# Patient Record
Sex: MALE | Race: WHITE | NOT HISPANIC OR LATINO | Employment: OTHER | URBAN - METROPOLITAN AREA
[De-identification: names, ages, dates, MRNs, and addresses within clinical notes are randomized per-mention and may not be internally consistent; named-entity substitution may affect disease eponyms.]

---

## 2018-07-24 LAB — HCV AB SER-ACNC: NON REACTIVE

## 2020-06-10 DIAGNOSIS — G89.4 CHRONIC PAIN SYNDROME: Primary | ICD-10-CM

## 2020-06-10 RX ORDER — UMECLIDINIUM 62.5 UG/1
1 AEROSOL, POWDER ORAL EVERY EVENING
COMMUNITY
Start: 2020-05-05

## 2020-06-10 RX ORDER — ATORVASTATIN CALCIUM 20 MG/1
TABLET, FILM COATED ORAL
COMMUNITY
Start: 2020-04-18 | End: 2021-04-05 | Stop reason: SDUPTHER

## 2020-06-10 RX ORDER — DOFETILIDE 0.25 MG/1
CAPSULE ORAL
COMMUNITY
Start: 2020-05-19 | End: 2021-07-02 | Stop reason: ALTCHOICE

## 2020-06-10 RX ORDER — METOPROLOL SUCCINATE 50 MG/1
TABLET, EXTENDED RELEASE ORAL
COMMUNITY
Start: 2020-05-29 | End: 2021-03-05 | Stop reason: SDUPTHER

## 2020-06-10 RX ORDER — APIXABAN 5 MG/1
TABLET, FILM COATED ORAL
COMMUNITY
Start: 2020-05-05 | End: 2020-07-15 | Stop reason: SDUPTHER

## 2020-06-10 RX ORDER — AMLODIPINE BESYLATE 10 MG/1
TABLET ORAL
COMMUNITY
Start: 2020-05-19 | End: 2020-10-23

## 2020-06-10 RX ORDER — LEVOTHYROXINE SODIUM 75 MCG
TABLET ORAL
COMMUNITY
Start: 2020-03-24 | End: 2020-06-17

## 2020-06-11 DIAGNOSIS — G89.4 CHRONIC PAIN SYNDROME: ICD-10-CM

## 2020-06-11 RX ORDER — OXYCODONE HYDROCHLORIDE AND ACETAMINOPHEN 5; 325 MG/1; MG/1
1 TABLET ORAL EVERY 4 HOURS PRN
Qty: 120 TABLET | Refills: 0 | Status: SHIPPED | OUTPATIENT
Start: 2020-06-11 | End: 2020-09-18 | Stop reason: SDUPTHER

## 2020-06-11 RX ORDER — OXYCODONE HYDROCHLORIDE AND ACETAMINOPHEN 5; 325 MG/1; MG/1
1 TABLET ORAL EVERY 4 HOURS PRN
Qty: 120 TABLET | Refills: 0 | Status: SHIPPED | OUTPATIENT
Start: 2020-06-11 | End: 2020-06-11 | Stop reason: SDUPTHER

## 2020-06-16 DIAGNOSIS — E03.9 HYPOTHYROIDISM, UNSPECIFIED TYPE: Primary | ICD-10-CM

## 2020-06-17 RX ORDER — LEVOTHYROXINE SODIUM 0.07 MG/1
TABLET ORAL
Qty: 90 TABLET | Refills: 3 | Status: SHIPPED | OUTPATIENT
Start: 2020-06-17 | End: 2021-02-15 | Stop reason: SDUPTHER

## 2020-06-18 ENCOUNTER — OFFICE VISIT (OUTPATIENT)
Dept: FAMILY MEDICINE CLINIC | Facility: CLINIC | Age: 75
End: 2020-06-18
Payer: MEDICARE

## 2020-06-18 VITALS
DIASTOLIC BLOOD PRESSURE: 80 MMHG | OXYGEN SATURATION: 97 % | TEMPERATURE: 97.4 F | HEART RATE: 79 BPM | RESPIRATION RATE: 16 BRPM | BODY MASS INDEX: 35.57 KG/M2 | SYSTOLIC BLOOD PRESSURE: 130 MMHG | WEIGHT: 213.5 LBS | HEIGHT: 65 IN

## 2020-06-18 DIAGNOSIS — E03.9 HYPOTHYROIDISM, UNSPECIFIED TYPE: ICD-10-CM

## 2020-06-18 DIAGNOSIS — E78.5 HYPERLIPIDEMIA, UNSPECIFIED HYPERLIPIDEMIA TYPE: ICD-10-CM

## 2020-06-18 DIAGNOSIS — I10 ESSENTIAL HYPERTENSION: Primary | ICD-10-CM

## 2020-06-18 DIAGNOSIS — M25.552 LEFT HIP PAIN: ICD-10-CM

## 2020-06-18 DIAGNOSIS — E66.01 CLASS 2 SEVERE OBESITY DUE TO EXCESS CALORIES WITH SERIOUS COMORBIDITY AND BODY MASS INDEX (BMI) OF 35.0 TO 35.9 IN ADULT (HCC): ICD-10-CM

## 2020-06-18 DIAGNOSIS — R73.01 IMPAIRED FASTING GLUCOSE: ICD-10-CM

## 2020-06-18 DIAGNOSIS — I48.19 ATRIAL FIBRILLATION, PERSISTENT (HCC): ICD-10-CM

## 2020-06-18 DIAGNOSIS — M15.9 PRIMARY OSTEOARTHRITIS INVOLVING MULTIPLE JOINTS: ICD-10-CM

## 2020-06-18 DIAGNOSIS — N18.30 CKD (CHRONIC KIDNEY DISEASE), STAGE III (HCC): ICD-10-CM

## 2020-06-18 DIAGNOSIS — J44.9 CHRONIC OBSTRUCTIVE PULMONARY DISEASE, UNSPECIFIED COPD TYPE (HCC): ICD-10-CM

## 2020-06-18 PROBLEM — E66.812 CLASS 2 SEVERE OBESITY DUE TO EXCESS CALORIES WITH SERIOUS COMORBIDITY AND BODY MASS INDEX (BMI) OF 35.0 TO 35.9 IN ADULT (HCC): Status: ACTIVE | Noted: 2020-06-18

## 2020-06-18 PROBLEM — G89.29 CHRONIC LUMBAR PAIN: Status: ACTIVE | Noted: 2020-06-18

## 2020-06-18 PROBLEM — R49.0 HOARSE VOICE QUALITY: Status: ACTIVE | Noted: 2020-06-18

## 2020-06-18 PROBLEM — G89.29 CHRONIC RIGHT SHOULDER PAIN: Status: ACTIVE | Noted: 2020-06-18

## 2020-06-18 PROBLEM — G89.29 CHRONIC LEFT SHOULDER PAIN: Status: ACTIVE | Noted: 2020-06-18

## 2020-06-18 PROBLEM — M25.512 CHRONIC LEFT SHOULDER PAIN: Status: ACTIVE | Noted: 2020-06-18

## 2020-06-18 PROBLEM — I77.810 DILATED AORTIC ROOT (HCC): Status: ACTIVE | Noted: 2020-02-29

## 2020-06-18 PROBLEM — M25.511 CHRONIC RIGHT SHOULDER PAIN: Status: ACTIVE | Noted: 2020-06-18

## 2020-06-18 PROBLEM — M54.50 CHRONIC LUMBAR PAIN: Status: ACTIVE | Noted: 2020-06-18

## 2020-06-18 PROBLEM — E21.3 HYPERPARATHYROIDISM (HCC): Status: ACTIVE | Noted: 2020-03-17

## 2020-06-18 PROCEDURE — 1160F RVW MEDS BY RX/DR IN RCRD: CPT | Performed by: FAMILY MEDICINE

## 2020-06-18 PROCEDURE — 3075F SYST BP GE 130 - 139MM HG: CPT | Performed by: FAMILY MEDICINE

## 2020-06-18 PROCEDURE — 3008F BODY MASS INDEX DOCD: CPT | Performed by: FAMILY MEDICINE

## 2020-06-18 PROCEDURE — 99214 OFFICE O/P EST MOD 30 MIN: CPT | Performed by: FAMILY MEDICINE

## 2020-06-18 PROCEDURE — 3079F DIAST BP 80-89 MM HG: CPT | Performed by: FAMILY MEDICINE

## 2020-06-18 PROCEDURE — 1036F TOBACCO NON-USER: CPT | Performed by: FAMILY MEDICINE

## 2020-06-18 PROCEDURE — 4040F PNEUMOC VAC/ADMIN/RCVD: CPT | Performed by: FAMILY MEDICINE

## 2020-06-18 RX ORDER — ACETAMINOPHEN 160 MG
1 TABLET,DISINTEGRATING ORAL
COMMUNITY

## 2020-06-18 RX ORDER — METOPROLOL TARTRATE 50 MG/1
1 TABLET, FILM COATED ORAL
COMMUNITY
End: 2020-11-05 | Stop reason: SDUPTHER

## 2020-07-15 DIAGNOSIS — I48.19 ATRIAL FIBRILLATION, PERSISTENT (HCC): Primary | ICD-10-CM

## 2020-07-15 RX ORDER — APIXABAN 5 MG/1
5 TABLET, FILM COATED ORAL 2 TIMES DAILY
Qty: 180 TABLET | Refills: 1 | Status: SHIPPED | OUTPATIENT
Start: 2020-07-15 | End: 2020-12-31 | Stop reason: SDUPTHER

## 2020-07-21 ENCOUNTER — TRANSCRIBE ORDERS (OUTPATIENT)
Dept: ADMINISTRATIVE | Facility: HOSPITAL | Age: 75
End: 2020-07-21

## 2020-07-22 ENCOUNTER — TRANSCRIBE ORDERS (OUTPATIENT)
Dept: ADMINISTRATIVE | Facility: HOSPITAL | Age: 75
End: 2020-07-22

## 2020-07-22 DIAGNOSIS — I71.2 THORACIC AORTIC ANEURYSM WITHOUT RUPTURE (HCC): Primary | ICD-10-CM

## 2020-08-03 ENCOUNTER — APPOINTMENT (OUTPATIENT)
Dept: LAB | Facility: CLINIC | Age: 75
End: 2020-08-03
Payer: MEDICARE

## 2020-08-03 ENCOUNTER — TRANSCRIBE ORDERS (OUTPATIENT)
Dept: LAB | Facility: CLINIC | Age: 75
End: 2020-08-03

## 2020-08-03 DIAGNOSIS — N18.30 CHRONIC KIDNEY DISEASE, STAGE III (MODERATE) (HCC): ICD-10-CM

## 2020-08-03 DIAGNOSIS — R80.1 PERSISTENT PROTEINURIA: ICD-10-CM

## 2020-08-03 DIAGNOSIS — R80.1 PERSISTENT PROTEINURIA: Primary | ICD-10-CM

## 2020-08-03 DIAGNOSIS — E21.3 HYPERPARATHYROIDISM, UNSPECIFIED (HCC): ICD-10-CM

## 2020-08-03 LAB
25(OH)D3 SERPL-MCNC: 33 NG/ML (ref 30–100)
ALBUMIN SERPL BCP-MCNC: 4.2 G/DL (ref 3.5–5)
ANION GAP SERPL CALCULATED.3IONS-SCNC: 6 MMOL/L (ref 4–13)
BUN SERPL-MCNC: 20 MG/DL (ref 5–25)
CALCIUM SERPL-MCNC: 9.9 MG/DL (ref 8.3–10.1)
CHLORIDE SERPL-SCNC: 108 MMOL/L (ref 100–108)
CO2 SERPL-SCNC: 25 MMOL/L (ref 21–32)
CREAT SERPL-MCNC: 1.64 MG/DL (ref 0.6–1.3)
GFR SERPL CREATININE-BSD FRML MDRD: 41 ML/MIN/1.73SQ M
GLUCOSE SERPL-MCNC: 107 MG/DL (ref 65–140)
PHOSPHATE SERPL-MCNC: 3.2 MG/DL (ref 2.3–4.1)
POTASSIUM SERPL-SCNC: 4.2 MMOL/L (ref 3.5–5.3)
PTH-INTACT SERPL-MCNC: 151.4 PG/ML (ref 18.4–80.1)
SODIUM SERPL-SCNC: 139 MMOL/L (ref 136–145)

## 2020-08-03 PROCEDURE — 80069 RENAL FUNCTION PANEL: CPT

## 2020-08-03 PROCEDURE — 84165 PROTEIN E-PHORESIS SERUM: CPT

## 2020-08-03 PROCEDURE — 82397 CHEMILUMINESCENT ASSAY: CPT

## 2020-08-03 PROCEDURE — 36415 COLL VENOUS BLD VENIPUNCTURE: CPT

## 2020-08-03 PROCEDURE — 82306 VITAMIN D 25 HYDROXY: CPT

## 2020-08-03 PROCEDURE — 84165 PROTEIN E-PHORESIS SERUM: CPT | Performed by: PATHOLOGY

## 2020-08-03 PROCEDURE — 82652 VIT D 1 25-DIHYDROXY: CPT

## 2020-08-03 PROCEDURE — 83883 ASSAY NEPHELOMETRY NOT SPEC: CPT

## 2020-08-03 PROCEDURE — 83970 ASSAY OF PARATHORMONE: CPT

## 2020-08-04 LAB
ALBUMIN SERPL ELPH-MCNC: 4.54 G/DL (ref 3.5–5)
ALBUMIN SERPL ELPH-MCNC: 57.5 % (ref 52–65)
ALPHA1 GLOB SERPL ELPH-MCNC: 0.3 G/DL (ref 0.1–0.4)
ALPHA1 GLOB SERPL ELPH-MCNC: 3.8 % (ref 2.5–5)
ALPHA2 GLOB SERPL ELPH-MCNC: 0.7 G/DL (ref 0.4–1.2)
ALPHA2 GLOB SERPL ELPH-MCNC: 8.9 % (ref 7–13)
BETA GLOB ABNORMAL SERPL ELPH-MCNC: 0.46 G/DL (ref 0.4–0.8)
BETA1 GLOB SERPL ELPH-MCNC: 5.8 % (ref 5–13)
BETA2 GLOB SERPL ELPH-MCNC: 4.6 % (ref 2–8)
BETA2+GAMMA GLOB SERPL ELPH-MCNC: 0.36 G/DL (ref 0.2–0.5)
GAMMA GLOB ABNORMAL SERPL ELPH-MCNC: 1.53 G/DL (ref 0.5–1.6)
GAMMA GLOB SERPL ELPH-MCNC: 19.4 % (ref 12–22)
IGG/ALB SER: 1.35 {RATIO} (ref 1.1–1.8)
KAPPA LC FREE SER-MCNC: 37.3 MG/L (ref 3.3–19.4)
KAPPA LC FREE/LAMBDA FREE SER: 1.97 {RATIO} (ref 0.26–1.65)
LAMBDA LC FREE SERPL-MCNC: 18.9 MG/L (ref 5.7–26.3)
PROT PATTERN SERPL ELPH-IMP: NORMAL
PROT SERPL-MCNC: 7.9 G/DL (ref 6.4–8.2)

## 2020-08-05 LAB — 1,25(OH)2D3 SERPL-MCNC: 72.8 PG/ML (ref 19.9–79.3)

## 2020-08-07 ENCOUNTER — HOSPITAL ENCOUNTER (OUTPATIENT)
Dept: CT IMAGING | Facility: HOSPITAL | Age: 75
Discharge: HOME | End: 2020-08-07
Attending: INTERNAL MEDICINE
Payer: MEDICARE

## 2020-08-07 DIAGNOSIS — I71.2 THORACIC AORTIC ANEURYSM WITHOUT RUPTURE (HCC): ICD-10-CM

## 2020-08-07 PROCEDURE — 71260 CT THORAX DX C+: CPT

## 2020-08-07 RX ADMIN — IODIXANOL 85 ML: 320 INJECTION, SOLUTION INTRAVASCULAR at 07:05

## 2020-08-13 LAB — PTH RELATED PROT SERPL-SCNC: <2 PMOL/L

## 2020-08-24 ENCOUNTER — TRANSCRIBE ORDERS (OUTPATIENT)
Dept: LAB | Facility: CLINIC | Age: 75
End: 2020-08-24

## 2020-08-24 ENCOUNTER — APPOINTMENT (OUTPATIENT)
Dept: LAB | Facility: CLINIC | Age: 75
End: 2020-08-24
Payer: MEDICARE

## 2020-08-24 DIAGNOSIS — E55.9 AVITAMINOSIS D: ICD-10-CM

## 2020-08-24 DIAGNOSIS — E03.9 MYXEDEMA HEART DISEASE: ICD-10-CM

## 2020-08-24 DIAGNOSIS — E78.2 MIXED HYPERLIPIDEMIA: ICD-10-CM

## 2020-08-24 DIAGNOSIS — I51.9 MYXEDEMA HEART DISEASE: ICD-10-CM

## 2020-08-24 DIAGNOSIS — I11.9 MALIGNANT HYPERTENSIVE HEART DISEASE WITHOUT HEART FAILURE: Primary | ICD-10-CM

## 2020-08-24 LAB
25(OH)D3 SERPL-MCNC: 34.2 NG/ML (ref 30–100)
ALBUMIN SERPL BCP-MCNC: 4.3 G/DL (ref 3.5–5)
ALBUMIN SERPL BCP-MCNC: 4.3 G/DL (ref 3.5–5)
ALP SERPL-CCNC: 99 U/L (ref 46–116)
ALT SERPL W P-5'-P-CCNC: 48 U/L (ref 12–78)
ANION GAP SERPL CALCULATED.3IONS-SCNC: 6 MMOL/L (ref 4–13)
AST SERPL W P-5'-P-CCNC: 27 U/L (ref 5–45)
BILIRUB DIRECT SERPL-MCNC: 0.33 MG/DL (ref 0–0.2)
BILIRUB SERPL-MCNC: 1.57 MG/DL (ref 0.2–1)
BUN SERPL-MCNC: 21 MG/DL (ref 5–25)
CALCIUM ALBUM COR SERPL-MCNC: 10.1 MG/DL (ref 8.3–10.1)
CALCIUM SERPL-MCNC: 10.3 MG/DL (ref 8.3–10.1)
CALCIUM SERPL-MCNC: 10.3 MG/DL (ref 8.3–10.1)
CHLORIDE SERPL-SCNC: 107 MMOL/L (ref 100–108)
CHOLEST SERPL-MCNC: 140 MG/DL (ref 50–200)
CO2 SERPL-SCNC: 25 MMOL/L (ref 21–32)
CREAT SERPL-MCNC: 1.38 MG/DL (ref 0.6–1.3)
ERYTHROCYTE [DISTWIDTH] IN BLOOD BY AUTOMATED COUNT: 13.8 % (ref 11.6–15.1)
GFR SERPL CREATININE-BSD FRML MDRD: 50 ML/MIN/1.73SQ M
GLUCOSE P FAST SERPL-MCNC: 117 MG/DL (ref 65–99)
HCT VFR BLD AUTO: 57 % (ref 36.5–49.3)
HDLC SERPL-MCNC: 46 MG/DL
HGB BLD-MCNC: 18.8 G/DL (ref 12–17)
LDLC SERPL CALC-MCNC: 70 MG/DL (ref 0–100)
MCH RBC QN AUTO: 30.3 PG (ref 26.8–34.3)
MCHC RBC AUTO-ENTMCNC: 33 G/DL (ref 31.4–37.4)
MCV RBC AUTO: 92 FL (ref 82–98)
NONHDLC SERPL-MCNC: 94 MG/DL
PLATELET # BLD AUTO: 193 THOUSANDS/UL (ref 149–390)
PMV BLD AUTO: 11.8 FL (ref 8.9–12.7)
POTASSIUM SERPL-SCNC: 4 MMOL/L (ref 3.5–5.3)
PROT SERPL-MCNC: 8.3 G/DL (ref 6.4–8.2)
RBC # BLD AUTO: 6.2 MILLION/UL (ref 3.88–5.62)
SODIUM SERPL-SCNC: 138 MMOL/L (ref 136–145)
T4 FREE SERPL-MCNC: 1.19 NG/DL (ref 0.76–1.46)
TRIGL SERPL-MCNC: 120 MG/DL
TSH SERPL DL<=0.05 MIU/L-ACNC: 1.46 UIU/ML (ref 0.36–3.74)
WBC # BLD AUTO: 7.92 THOUSAND/UL (ref 4.31–10.16)

## 2020-08-24 PROCEDURE — 80076 HEPATIC FUNCTION PANEL: CPT

## 2020-08-24 PROCEDURE — 85027 COMPLETE CBC AUTOMATED: CPT

## 2020-08-24 PROCEDURE — 82040 ASSAY OF SERUM ALBUMIN: CPT

## 2020-08-24 PROCEDURE — 84443 ASSAY THYROID STIM HORMONE: CPT

## 2020-08-24 PROCEDURE — 80061 LIPID PANEL: CPT

## 2020-08-24 PROCEDURE — 84439 ASSAY OF FREE THYROXINE: CPT

## 2020-08-24 PROCEDURE — 36415 COLL VENOUS BLD VENIPUNCTURE: CPT

## 2020-08-24 PROCEDURE — 80048 BASIC METABOLIC PNL TOTAL CA: CPT

## 2020-08-24 PROCEDURE — 82306 VITAMIN D 25 HYDROXY: CPT

## 2020-09-18 DIAGNOSIS — G89.4 CHRONIC PAIN SYNDROME: ICD-10-CM

## 2020-09-18 RX ORDER — OXYCODONE HYDROCHLORIDE AND ACETAMINOPHEN 5; 325 MG/1; MG/1
1 TABLET ORAL EVERY 4 HOURS PRN
Qty: 180 TABLET | Refills: 0 | Status: SHIPPED | OUTPATIENT
Start: 2020-09-18 | End: 2020-11-05 | Stop reason: ALTCHOICE

## 2020-10-23 DIAGNOSIS — I10 ESSENTIAL HYPERTENSION: Primary | ICD-10-CM

## 2020-10-23 RX ORDER — AMLODIPINE BESYLATE 10 MG/1
TABLET ORAL
Qty: 90 TABLET | Refills: 3 | Status: SHIPPED | OUTPATIENT
Start: 2020-10-23 | End: 2021-11-22 | Stop reason: SDUPTHER

## 2020-11-05 ENCOUNTER — OFFICE VISIT (OUTPATIENT)
Dept: FAMILY MEDICINE CLINIC | Facility: CLINIC | Age: 75
End: 2020-11-05
Payer: MEDICARE

## 2020-11-05 VITALS
BODY MASS INDEX: 36.49 KG/M2 | SYSTOLIC BLOOD PRESSURE: 124 MMHG | HEIGHT: 65 IN | OXYGEN SATURATION: 98 % | RESPIRATION RATE: 16 BRPM | DIASTOLIC BLOOD PRESSURE: 80 MMHG | WEIGHT: 219 LBS | HEART RATE: 61 BPM | TEMPERATURE: 97.9 F

## 2020-11-05 DIAGNOSIS — Z00.00 WELL ADULT EXAM: Primary | ICD-10-CM

## 2020-11-05 DIAGNOSIS — Z23 NEED FOR VACCINATION: ICD-10-CM

## 2020-11-05 DIAGNOSIS — Z79.899 OTHER LONG TERM (CURRENT) DRUG THERAPY: ICD-10-CM

## 2020-11-05 DIAGNOSIS — E78.5 HYPERLIPIDEMIA, UNSPECIFIED HYPERLIPIDEMIA TYPE: ICD-10-CM

## 2020-11-05 DIAGNOSIS — Z12.5 SCREENING FOR PROSTATE CANCER: ICD-10-CM

## 2020-11-05 DIAGNOSIS — E03.9 HYPOTHYROIDISM, UNSPECIFIED TYPE: ICD-10-CM

## 2020-11-05 DIAGNOSIS — I48.19 ATRIAL FIBRILLATION, PERSISTENT (HCC): ICD-10-CM

## 2020-11-05 DIAGNOSIS — G89.4 CHRONIC PAIN SYNDROME: ICD-10-CM

## 2020-11-05 DIAGNOSIS — J44.9 CHRONIC OBSTRUCTIVE PULMONARY DISEASE, UNSPECIFIED COPD TYPE (HCC): ICD-10-CM

## 2020-11-05 DIAGNOSIS — N18.31 STAGE 3A CHRONIC KIDNEY DISEASE (HCC): ICD-10-CM

## 2020-11-05 PROCEDURE — G0008 ADMIN INFLUENZA VIRUS VAC: HCPCS | Performed by: FAMILY MEDICINE

## 2020-11-05 PROCEDURE — G0439 PPPS, SUBSEQ VISIT: HCPCS | Performed by: FAMILY MEDICINE

## 2020-11-05 PROCEDURE — 99214 OFFICE O/P EST MOD 30 MIN: CPT | Performed by: FAMILY MEDICINE

## 2020-11-05 PROCEDURE — 90662 IIV NO PRSV INCREASED AG IM: CPT | Performed by: FAMILY MEDICINE

## 2020-12-08 ENCOUNTER — TRANSCRIBE ORDERS (OUTPATIENT)
Dept: LAB | Facility: CLINIC | Age: 75
End: 2020-12-08

## 2020-12-08 ENCOUNTER — LAB (OUTPATIENT)
Dept: LAB | Facility: CLINIC | Age: 75
End: 2020-12-08
Payer: MEDICARE

## 2020-12-08 DIAGNOSIS — N18.2 CHRONIC KIDNEY DISEASE, STAGE II (MILD): ICD-10-CM

## 2020-12-08 DIAGNOSIS — R73.01 IMPAIRED FASTING GLUCOSE: ICD-10-CM

## 2020-12-08 DIAGNOSIS — I77.810 DILATED AORTIC ROOT (HCC): ICD-10-CM

## 2020-12-08 DIAGNOSIS — I11.9 HYPERTENSIVE HEART DISEASE WITHOUT HEART FAILURE: Primary | ICD-10-CM

## 2020-12-08 DIAGNOSIS — I11.9 HYPERTENSIVE HEART DISEASE WITHOUT HEART FAILURE: ICD-10-CM

## 2020-12-08 DIAGNOSIS — E03.9 ACQUIRED HYPOTHYROIDISM: ICD-10-CM

## 2020-12-08 DIAGNOSIS — I48.19 ATRIAL FIBRILLATION, PERSISTENT (HCC): ICD-10-CM

## 2020-12-08 DIAGNOSIS — Z12.5 SCREENING FOR PROSTATE CANCER: ICD-10-CM

## 2020-12-08 DIAGNOSIS — Z79.899 OTHER LONG TERM (CURRENT) DRUG THERAPY: ICD-10-CM

## 2020-12-08 DIAGNOSIS — E78.2 MIXED HYPERLIPIDEMIA: ICD-10-CM

## 2020-12-08 LAB
ALBUMIN SERPL BCP-MCNC: 4.2 G/DL (ref 3.5–5)
ALP SERPL-CCNC: 99 U/L (ref 46–116)
ALT SERPL W P-5'-P-CCNC: 43 U/L (ref 12–78)
ANION GAP SERPL CALCULATED.3IONS-SCNC: 5 MMOL/L (ref 4–13)
AST SERPL W P-5'-P-CCNC: 27 U/L (ref 5–45)
BASOPHILS # BLD AUTO: 0.03 THOUSANDS/ΜL (ref 0–0.1)
BASOPHILS NFR BLD AUTO: 0 % (ref 0–1)
BILIRUB DIRECT SERPL-MCNC: 0.35 MG/DL (ref 0–0.2)
BILIRUB SERPL-MCNC: 1.59 MG/DL (ref 0.2–1)
BUN SERPL-MCNC: 22 MG/DL (ref 5–25)
CALCIUM SERPL-MCNC: 10.1 MG/DL (ref 8.3–10.1)
CHLORIDE SERPL-SCNC: 106 MMOL/L (ref 100–108)
CHOLEST SERPL-MCNC: 126 MG/DL (ref 50–200)
CO2 SERPL-SCNC: 27 MMOL/L (ref 21–32)
CREAT SERPL-MCNC: 1.44 MG/DL (ref 0.6–1.3)
CREAT UR-MCNC: 71 MG/DL
EOSINOPHIL # BLD AUTO: 0.1 THOUSAND/ΜL (ref 0–0.61)
EOSINOPHIL NFR BLD AUTO: 2 % (ref 0–6)
ERYTHROCYTE [DISTWIDTH] IN BLOOD BY AUTOMATED COUNT: 13.5 % (ref 11.6–15.1)
EST. AVERAGE GLUCOSE BLD GHB EST-MCNC: 117 MG/DL
GFR SERPL CREATININE-BSD FRML MDRD: 48 ML/MIN/1.73SQ M
GLUCOSE P FAST SERPL-MCNC: 104 MG/DL (ref 65–99)
HBA1C MFR BLD: 5.7 %
HCT VFR BLD AUTO: 54.2 % (ref 36.5–49.3)
HDLC SERPL-MCNC: 44 MG/DL
HGB BLD-MCNC: 18 G/DL (ref 12–17)
IMM GRANULOCYTES # BLD AUTO: 0.03 THOUSAND/UL (ref 0–0.2)
IMM GRANULOCYTES NFR BLD AUTO: 0 % (ref 0–2)
LDLC SERPL CALC-MCNC: 59 MG/DL (ref 0–100)
LYMPHOCYTES # BLD AUTO: 1.69 THOUSANDS/ΜL (ref 0.6–4.47)
LYMPHOCYTES NFR BLD AUTO: 25 % (ref 14–44)
MAGNESIUM SERPL-MCNC: 2.6 MG/DL (ref 1.6–2.6)
MCH RBC QN AUTO: 30.6 PG (ref 26.8–34.3)
MCHC RBC AUTO-ENTMCNC: 33.2 G/DL (ref 31.4–37.4)
MCV RBC AUTO: 92 FL (ref 82–98)
MICROALBUMIN UR-MCNC: 79.3 MG/L (ref 0–20)
MICROALBUMIN/CREAT 24H UR: 112 MG/G CREATININE (ref 0–30)
MONOCYTES # BLD AUTO: 0.59 THOUSAND/ΜL (ref 0.17–1.22)
MONOCYTES NFR BLD AUTO: 9 % (ref 4–12)
NEUTROPHILS # BLD AUTO: 4.42 THOUSANDS/ΜL (ref 1.85–7.62)
NEUTS SEG NFR BLD AUTO: 64 % (ref 43–75)
NONHDLC SERPL-MCNC: 82 MG/DL
NRBC BLD AUTO-RTO: 0 /100 WBCS
PLATELET # BLD AUTO: 194 THOUSANDS/UL (ref 149–390)
PMV BLD AUTO: 11 FL (ref 8.9–12.7)
POTASSIUM SERPL-SCNC: 3.9 MMOL/L (ref 3.5–5.3)
PROT SERPL-MCNC: 7.7 G/DL (ref 6.4–8.2)
PSA SERPL-MCNC: 4.8 NG/ML (ref 0–4)
RBC # BLD AUTO: 5.88 MILLION/UL (ref 3.88–5.62)
SODIUM SERPL-SCNC: 138 MMOL/L (ref 136–145)
T4 FREE SERPL-MCNC: 1.13 NG/DL (ref 0.76–1.46)
TRIGL SERPL-MCNC: 116 MG/DL
TSH SERPL DL<=0.05 MIU/L-ACNC: 2.29 UIU/ML (ref 0.36–3.74)
WBC # BLD AUTO: 6.86 THOUSAND/UL (ref 4.31–10.16)

## 2020-12-08 PROCEDURE — 82570 ASSAY OF URINE CREATININE: CPT | Performed by: FAMILY MEDICINE

## 2020-12-08 PROCEDURE — G0103 PSA SCREENING: HCPCS

## 2020-12-08 PROCEDURE — 83036 HEMOGLOBIN GLYCOSYLATED A1C: CPT

## 2020-12-08 PROCEDURE — 84439 ASSAY OF FREE THYROXINE: CPT

## 2020-12-08 PROCEDURE — 80061 LIPID PANEL: CPT

## 2020-12-08 PROCEDURE — 36415 COLL VENOUS BLD VENIPUNCTURE: CPT

## 2020-12-08 PROCEDURE — 82043 UR ALBUMIN QUANTITATIVE: CPT | Performed by: FAMILY MEDICINE

## 2020-12-08 PROCEDURE — 84443 ASSAY THYROID STIM HORMONE: CPT

## 2020-12-08 PROCEDURE — 83735 ASSAY OF MAGNESIUM: CPT

## 2020-12-08 PROCEDURE — 80048 BASIC METABOLIC PNL TOTAL CA: CPT

## 2020-12-08 PROCEDURE — 85025 COMPLETE CBC W/AUTO DIFF WBC: CPT

## 2020-12-08 PROCEDURE — 80076 HEPATIC FUNCTION PANEL: CPT

## 2020-12-09 ENCOUNTER — TELEPHONE (OUTPATIENT)
Dept: FAMILY MEDICINE CLINIC | Facility: CLINIC | Age: 75
End: 2020-12-09

## 2020-12-10 DIAGNOSIS — R97.20 PSA ELEVATION: Primary | ICD-10-CM

## 2020-12-31 DIAGNOSIS — I48.19 ATRIAL FIBRILLATION, PERSISTENT (HCC): ICD-10-CM

## 2020-12-31 NOTE — TELEPHONE ENCOUNTER
After today he will be [place] [facility] [place] [facility] so he needs this done today

## 2021-01-01 RX ORDER — APIXABAN 5 MG/1
5 TABLET, FILM COATED ORAL 2 TIMES DAILY
Qty: 180 TABLET | Refills: 1 | Status: SHIPPED | OUTPATIENT
Start: 2021-01-01 | End: 2021-01-04 | Stop reason: SDUPTHER

## 2021-01-04 DIAGNOSIS — I48.19 ATRIAL FIBRILLATION, PERSISTENT (HCC): ICD-10-CM

## 2021-01-04 RX ORDER — APIXABAN 5 MG/1
5 TABLET, FILM COATED ORAL 2 TIMES DAILY
Qty: 180 TABLET | Refills: 1 | Status: SHIPPED | OUTPATIENT
Start: 2021-01-04 | End: 2021-07-13

## 2021-01-07 ENCOUNTER — LAB (OUTPATIENT)
Dept: LAB | Facility: CLINIC | Age: 76
End: 2021-01-07
Payer: COMMERCIAL

## 2021-01-07 ENCOUNTER — TRANSCRIBE ORDERS (OUTPATIENT)
Dept: LAB | Facility: CLINIC | Age: 76
End: 2021-01-07

## 2021-01-07 DIAGNOSIS — R97.20 ABNORMAL PSA: Primary | ICD-10-CM

## 2021-01-07 DIAGNOSIS — R97.20 ABNORMAL PSA: ICD-10-CM

## 2021-01-07 LAB — PSA SERPL-MCNC: 3.3 NG/ML (ref 0–4)

## 2021-01-07 PROCEDURE — 84153 ASSAY OF PSA TOTAL: CPT

## 2021-01-14 ENCOUNTER — TELEPHONE (OUTPATIENT)
Dept: FAMILY MEDICINE CLINIC | Facility: CLINIC | Age: 76
End: 2021-01-14

## 2021-01-14 DIAGNOSIS — M54.50 CHRONIC LOW BACK PAIN, UNSPECIFIED BACK PAIN LATERALITY, UNSPECIFIED WHETHER SCIATICA PRESENT: Primary | ICD-10-CM

## 2021-01-14 DIAGNOSIS — G89.4 CHRONIC PAIN SYNDROME: Primary | ICD-10-CM

## 2021-01-14 DIAGNOSIS — G89.29 CHRONIC LOW BACK PAIN, UNSPECIFIED BACK PAIN LATERALITY, UNSPECIFIED WHETHER SCIATICA PRESENT: Primary | ICD-10-CM

## 2021-01-14 RX ORDER — OXYCODONE HYDROCHLORIDE AND ACETAMINOPHEN 5; 325 MG/1; MG/1
1 TABLET ORAL EVERY 4 HOURS PRN
Qty: 180 TABLET | Refills: 0 | Status: CANCELLED | OUTPATIENT
Start: 2021-01-14

## 2021-01-14 RX ORDER — OXYCODONE HYDROCHLORIDE AND ACETAMINOPHEN 5; 325 MG/1; MG/1
1 TABLET ORAL EVERY 4 HOURS PRN
Qty: 180 TABLET | Refills: 0 | Status: SHIPPED | OUTPATIENT
Start: 2021-01-14 | End: 2021-05-10 | Stop reason: SDUPTHER

## 2021-01-14 NOTE — TELEPHONE ENCOUNTER
Patient called in, he said that he was referred to urology due to an elevated PSA lab test  he saw Dr Dez Sutherland who scheduled him for a biopsy which is on 1/27    the patient had a repeat PSA, he would like to know the results and IF they are normal should he proceed with the biopsy as ordered by Dr Brennen Tidwell? Please call him back

## 2021-01-14 NOTE — TELEPHONE ENCOUNTER
PD MP Last refill 09/18/2020 # [id number]     Back in November this was removed from the med list? I verify with patient that he has been taking this all along

## 2021-02-15 DIAGNOSIS — E03.9 HYPOTHYROIDISM, UNSPECIFIED TYPE: ICD-10-CM

## 2021-02-15 RX ORDER — SULFAMETHOXAZOLE AND TRIMETHOPRIM 800; 160 MG/1; MG/1
TABLET ORAL
COMMUNITY
Start: 2021-02-03 | End: 2021-03-05 | Stop reason: ALTCHOICE

## 2021-02-15 RX ORDER — LEVOTHYROXINE SODIUM 0.07 MG/1
75 TABLET ORAL DAILY
Qty: 90 TABLET | Refills: 3 | Status: SHIPPED | OUTPATIENT
Start: 2021-02-15 | End: 2022-03-07 | Stop reason: SDUPTHER

## 2021-02-15 RX ORDER — CIPROFLOXACIN 500 MG/1
TABLET, FILM COATED ORAL
COMMUNITY
Start: 2021-02-03 | End: 2021-03-05 | Stop reason: ALTCHOICE

## 2021-03-04 ENCOUNTER — IMMUNIZATIONS (OUTPATIENT)
Dept: FAMILY MEDICINE CLINIC | Facility: HOSPITAL | Age: 76
End: 2021-03-04

## 2021-03-04 DIAGNOSIS — Z23 ENCOUNTER FOR IMMUNIZATION: Primary | ICD-10-CM

## 2021-03-04 PROCEDURE — 0001A SARS-COV-2 / COVID-19 MRNA VACCINE (PFIZER-BIONTECH) 30 MCG: CPT

## 2021-03-04 PROCEDURE — 91300 SARS-COV-2 / COVID-19 MRNA VACCINE (PFIZER-BIONTECH) 30 MCG: CPT

## 2021-03-04 RX ORDER — METOPROLOL SUCCINATE 100 MG/1
TABLET, EXTENDED RELEASE ORAL
COMMUNITY
Start: 2021-02-16

## 2021-03-05 ENCOUNTER — OFFICE VISIT (OUTPATIENT)
Dept: FAMILY MEDICINE CLINIC | Facility: CLINIC | Age: 76
End: 2021-03-05
Payer: COMMERCIAL

## 2021-03-05 VITALS
BODY MASS INDEX: 35.59 KG/M2 | RESPIRATION RATE: 18 BRPM | OXYGEN SATURATION: 98 % | DIASTOLIC BLOOD PRESSURE: 84 MMHG | HEART RATE: 76 BPM | WEIGHT: 213.6 LBS | SYSTOLIC BLOOD PRESSURE: 128 MMHG | HEIGHT: 65 IN

## 2021-03-05 DIAGNOSIS — N18.31 STAGE 3A CHRONIC KIDNEY DISEASE (HCC): ICD-10-CM

## 2021-03-05 DIAGNOSIS — N40.0 BENIGN PROSTATIC HYPERPLASIA WITHOUT LOWER URINARY TRACT SYMPTOMS: ICD-10-CM

## 2021-03-05 DIAGNOSIS — E66.01 CLASS 2 SEVERE OBESITY DUE TO EXCESS CALORIES WITH SERIOUS COMORBIDITY AND BODY MASS INDEX (BMI) OF 35.0 TO 35.9 IN ADULT (HCC): ICD-10-CM

## 2021-03-05 DIAGNOSIS — M25.552 LEFT HIP PAIN: ICD-10-CM

## 2021-03-05 DIAGNOSIS — E21.3 HYPERPARATHYROIDISM (HCC): ICD-10-CM

## 2021-03-05 DIAGNOSIS — J44.9 CHRONIC OBSTRUCTIVE PULMONARY DISEASE, UNSPECIFIED COPD TYPE (HCC): ICD-10-CM

## 2021-03-05 DIAGNOSIS — I71.2 THORACIC AORTIC ANEURYSM WITHOUT RUPTURE (HCC): ICD-10-CM

## 2021-03-05 DIAGNOSIS — I48.19 ATRIAL FIBRILLATION, PERSISTENT (HCC): Primary | ICD-10-CM

## 2021-03-05 PROCEDURE — 3725F SCREEN DEPRESSION PERFORMED: CPT | Performed by: FAMILY MEDICINE

## 2021-03-05 PROCEDURE — 3008F BODY MASS INDEX DOCD: CPT | Performed by: FAMILY MEDICINE

## 2021-03-05 PROCEDURE — 1160F RVW MEDS BY RX/DR IN RCRD: CPT | Performed by: FAMILY MEDICINE

## 2021-03-05 PROCEDURE — 1036F TOBACCO NON-USER: CPT | Performed by: FAMILY MEDICINE

## 2021-03-05 PROCEDURE — 99214 OFFICE O/P EST MOD 30 MIN: CPT | Performed by: FAMILY MEDICINE

## 2021-03-05 NOTE — PROGRESS NOTES
Assessment/Plan:    1  Atrial fibrillation, persistent ([facility] )    2  Chronic obstructive pulmonary disease, unspecified COPD type ([facility] )    3  Thoracic aortic aneurysm without rupture (HCC)    4  Class 2 severe obesity due to excess calories with serious comorbidity and body mass index (BMI) of 35 0 to 35 9 in adult ([facility] )    5  Hyperparathyroidism ([facility] )    6  Stage 3a chronic kidney disease    7  Left hip pain    8  Benign prostatic hyperplasia without lower urinary tract symptoms       Discussed about this chronic pain - no changes in meds   TAA stable needs another CTA follow up 8/21   Follows with renal and 's   Saw anterio for bph and prostate bx neg   Will reapeat psa in 6 months     Subjective:      Patient ID: Sujata Crockett is a 70 y o  male  HPI   This visit   2months sob (sitting down putting on socks)   Lasting 5-10 sec   While can walk a couple blocks and do stairs - without sob    Left middle finger pain 5 times better last couple weeks now     Iwona Spence -   PAF: had 1 ablation done  But didn't need 2nd one -  on dofetilide  eliqus 5 lipitor 20mg mety suc 50  Rec: sleep apnea testing   3 cardioversion and ablation    TAA: ascending 4 4cm on 8/7/20 follows with cardio     HTN: on amlodipine 10mg met suc controlled    JORGE: seeing aguila - going ok for last 5 weeks now   COPD: on spireva with mckenzie s/p throacotomy    HLD: on atorva 20mg no myalgia controlled - untill dx with DM LDL 80s    arthritis: right shoulder done [facility]    left shoulder: better with perc nawing pain currently does not want injection or ortho just bird deals with it - gave transdermal - bird helps    lumbar pain: better with meds    left knee medial side pain after twisting on the driveway shoveling using brace and its helping some  water ex  helping some      has seen sunday and weis in the past at [address] [place] [address]    left hip is good now after steroid injeciton x 2  finds that over all arthritis is worse though  Using percocet and needs an increase in frequency     pre DM: 6 5 max to  had issues in the past no ex  2* to arthritis now 6 0 to 5 9 to 5 7     CKD III: cr 1 5 follows with [facility] on ACE Dr Carolyn Winters    vit D def: ? 2* hyperparathyroidism on 50K every other week    hoarseness: with US thryoid clean    hypothyroidism: stable on 75mcg TSH 2 54    boonswang no colon 2001 for bad colitis s/p colon resection and J pouch    BPH; seeing anterio - psa slightly up   bx neg     The following portions of the patient's history were reviewed and updated as appropriate: allergies, current medications, past family history, past medical history, past social history, past surgical history and problem list     Review of Systems   Constitutional: Negative for activity change, appetite change and fever  HENT: Negative for congestion, nosebleeds and trouble swallowing  Eyes: Negative for itching  Respiratory: Negative for cough and chest tightness  Cardiovascular: Negative for chest pain and palpitations  Gastrointestinal: Negative for abdominal pain, constipation, diarrhea and nausea  Endocrine: Negative for cold intolerance  Genitourinary: Negative for frequency  Musculoskeletal: Positive for arthralgias and back pain  Negative for gait problem and joint swelling  Skin: Negative for rash  Allergic/Immunologic: Negative for immunocompromised state  Neurological: Negative for dizziness, tremors, seizures, syncope and headaches  Psychiatric/Behavioral: Negative for hallucinations and suicidal ideas  Objective:      /84   Pulse 76   Resp 18   Ht 5' 5" (1 651 m)   Wt 96 9 kg (213 lb 9 6 oz)   SpO2 98%   BMI 35 54 kg/m²     No visits with results within 2 Week(s) from this visit  Latest known visit with results is:   Lab on 01/07/2021   Component Date Value    PSA, Diagnostic 01/07/2021 3 3           Physical Exam  Vitals signs and nursing note reviewed     Constitutional:       General: He is not in acute distress  Appearance: He is well-developed  He is obese  HENT:      Head: Normocephalic and atraumatic  Neck:      Musculoskeletal: Normal range of motion and neck supple  Cardiovascular:      Rate and Rhythm: Normal rate and regular rhythm  Heart sounds: Normal heart sounds  No murmur  Pulmonary:      Effort: Pulmonary effort is normal       Breath sounds: Normal breath sounds  No wheezing or rales  Abdominal:      General: Bowel sounds are normal  There is no distension  Palpations: Abdomen is soft  Tenderness: There is no abdominal tenderness  There is no guarding or rebound  Musculoskeletal: Normal range of motion  General: No tenderness  Lymphadenopathy:      Cervical: No cervical adenopathy  Skin:     General: Skin is warm and dry  Capillary Refill: Capillary refill takes less than 2 seconds  Findings: No rash  Neurological:      Mental Status: He is alert and oriented to person, place, and time  Cranial Nerves: No cranial nerve deficit  Sensory: No sensory deficit  Motor: No abnormal muscle tone  Psychiatric:         Behavior: Behavior normal          Thought Content:  Thought content normal          Judgment: Judgment normal              Abelino Zambrano MD  [place] [phone]

## 2021-03-06 PROBLEM — I71.2 THORACIC AORTIC ANEURYSM WITHOUT RUPTURE (HCC): Status: ACTIVE | Noted: 2021-03-06

## 2021-03-06 PROBLEM — I77.810 DILATED AORTIC ROOT (HCC): Status: RESOLVED | Noted: 2020-02-29 | Resolved: 2021-03-06

## 2021-03-06 PROBLEM — I71.20 THORACIC AORTIC ANEURYSM WITHOUT RUPTURE: Status: ACTIVE | Noted: 2021-03-06

## 2021-03-06 PROBLEM — N40.0 BENIGN PROSTATIC HYPERPLASIA WITHOUT LOWER URINARY TRACT SYMPTOMS: Status: ACTIVE | Noted: 2021-03-06

## 2021-03-06 PROBLEM — N18.31 STAGE 3A CHRONIC KIDNEY DISEASE (HCC): Status: ACTIVE | Noted: 2020-06-18

## 2021-03-24 ENCOUNTER — IMMUNIZATIONS (OUTPATIENT)
Dept: FAMILY MEDICINE CLINIC | Facility: HOSPITAL | Age: 76
End: 2021-03-24

## 2021-03-24 DIAGNOSIS — Z23 ENCOUNTER FOR IMMUNIZATION: Primary | ICD-10-CM

## 2021-03-24 PROCEDURE — 0002A SARS-COV-2 / COVID-19 MRNA VACCINE (PFIZER-BIONTECH) 30 MCG: CPT

## 2021-03-24 PROCEDURE — 91300 SARS-COV-2 / COVID-19 MRNA VACCINE (PFIZER-BIONTECH) 30 MCG: CPT

## 2021-04-05 DIAGNOSIS — E78.5 HYPERLIPIDEMIA, UNSPECIFIED HYPERLIPIDEMIA TYPE: Primary | ICD-10-CM

## 2021-04-05 RX ORDER — ATORVASTATIN CALCIUM 20 MG/1
20 TABLET, FILM COATED ORAL DAILY
Qty: 90 TABLET | Refills: 1 | Status: SHIPPED | OUTPATIENT
Start: 2021-04-05 | End: 2021-10-18

## 2021-04-16 ENCOUNTER — APPOINTMENT (OUTPATIENT)
Dept: LAB | Facility: CLINIC | Age: 76
End: 2021-04-16
Payer: COMMERCIAL

## 2021-04-16 ENCOUNTER — TRANSCRIBE ORDERS (OUTPATIENT)
Dept: LAB | Facility: CLINIC | Age: 76
End: 2021-04-16

## 2021-04-16 DIAGNOSIS — N18.30 STAGE 3 CHRONIC KIDNEY DISEASE, UNSPECIFIED WHETHER STAGE 3A OR 3B CKD (HCC): ICD-10-CM

## 2021-04-16 DIAGNOSIS — E21.3 HYPERPARATHYROIDISM, UNSPECIFIED (HCC): ICD-10-CM

## 2021-04-16 DIAGNOSIS — R80.9 PROTEINURIA, UNSPECIFIED TYPE: ICD-10-CM

## 2021-04-16 DIAGNOSIS — E78.2 MIXED HYPERLIPIDEMIA: ICD-10-CM

## 2021-04-16 DIAGNOSIS — I51.9 MYXEDEMA HEART DISEASE: ICD-10-CM

## 2021-04-16 DIAGNOSIS — I48.19 ATRIAL FIBRILLATION, PERSISTENT (HCC): Primary | ICD-10-CM

## 2021-04-16 DIAGNOSIS — E83.52 HYPERCALCEMIA: ICD-10-CM

## 2021-04-16 DIAGNOSIS — N20.0 RENAL STONE: ICD-10-CM

## 2021-04-16 DIAGNOSIS — I10 HYPERTENSION, UNSPECIFIED TYPE: ICD-10-CM

## 2021-04-16 DIAGNOSIS — E03.9 MYXEDEMA HEART DISEASE: ICD-10-CM

## 2021-04-16 DIAGNOSIS — I11.9 HYPERTENSIVE HEART DISEASE, UNSPECIFIED WHETHER HEART FAILURE PRESENT: ICD-10-CM

## 2021-04-16 LAB
ALBUMIN SERPL BCP-MCNC: 3.9 G/DL (ref 3.5–5)
ANION GAP SERPL CALCULATED.3IONS-SCNC: 4 MMOL/L (ref 4–13)
BUN SERPL-MCNC: 23 MG/DL (ref 5–25)
CALCIUM SERPL-MCNC: 9.5 MG/DL (ref 8.3–10.1)
CHLORIDE SERPL-SCNC: 108 MMOL/L (ref 100–108)
CO2 SERPL-SCNC: 28 MMOL/L (ref 21–32)
CREAT SERPL-MCNC: 1.36 MG/DL (ref 0.6–1.3)
CREAT UR-MCNC: 62.5 MG/DL
GFR SERPL CREATININE-BSD FRML MDRD: 51 ML/MIN/1.73SQ M
GLUCOSE SERPL-MCNC: 101 MG/DL (ref 65–140)
HCT VFR BLD AUTO: 53.9 % (ref 36.5–49.3)
HGB BLD-MCNC: 17.6 G/DL (ref 12–17)
PHOSPHATE SERPL-MCNC: 2.4 MG/DL (ref 2.3–4.1)
POTASSIUM SERPL-SCNC: 3.9 MMOL/L (ref 3.5–5.3)
PROT UR-MCNC: 10 MG/DL
PROT/CREAT UR: 0.16 MG/G{CREAT} (ref 0–0.1)
PTH-INTACT SERPL-MCNC: 126.6 PG/ML (ref 18.4–80.1)
SODIUM SERPL-SCNC: 140 MMOL/L (ref 136–145)

## 2021-04-16 PROCEDURE — 82570 ASSAY OF URINE CREATININE: CPT

## 2021-04-16 PROCEDURE — 84156 ASSAY OF PROTEIN URINE: CPT

## 2021-04-16 PROCEDURE — 85018 HEMOGLOBIN: CPT

## 2021-04-16 PROCEDURE — 80069 RENAL FUNCTION PANEL: CPT

## 2021-04-16 PROCEDURE — 85014 HEMATOCRIT: CPT

## 2021-04-16 PROCEDURE — 83970 ASSAY OF PARATHORMONE: CPT

## 2021-04-16 PROCEDURE — 36415 COLL VENOUS BLD VENIPUNCTURE: CPT

## 2021-04-22 ENCOUNTER — APPOINTMENT (OUTPATIENT)
Dept: LAB | Facility: CLINIC | Age: 76
End: 2021-04-22
Payer: COMMERCIAL

## 2021-04-22 DIAGNOSIS — I11.9 HYPERTENSIVE HEART DISEASE, UNSPECIFIED WHETHER HEART FAILURE PRESENT: ICD-10-CM

## 2021-04-22 DIAGNOSIS — I48.19 ATRIAL FIBRILLATION, PERSISTENT (HCC): ICD-10-CM

## 2021-04-22 DIAGNOSIS — E03.9 MYXEDEMA HEART DISEASE: ICD-10-CM

## 2021-04-22 DIAGNOSIS — I51.9 MYXEDEMA HEART DISEASE: ICD-10-CM

## 2021-04-22 DIAGNOSIS — E78.2 MIXED HYPERLIPIDEMIA: ICD-10-CM

## 2021-04-22 LAB
ALBUMIN SERPL BCP-MCNC: 4.1 G/DL (ref 3.5–5)
ALP SERPL-CCNC: 87 U/L (ref 46–116)
ALT SERPL W P-5'-P-CCNC: 41 U/L (ref 12–78)
ANION GAP SERPL CALCULATED.3IONS-SCNC: 7 MMOL/L (ref 4–13)
AST SERPL W P-5'-P-CCNC: 26 U/L (ref 5–45)
BILIRUB DIRECT SERPL-MCNC: 0.36 MG/DL (ref 0–0.2)
BILIRUB SERPL-MCNC: 1.66 MG/DL (ref 0.2–1)
BUN SERPL-MCNC: 23 MG/DL (ref 5–25)
CALCIUM SERPL-MCNC: 10.2 MG/DL (ref 8.3–10.1)
CHLORIDE SERPL-SCNC: 107 MMOL/L (ref 100–108)
CHOLEST SERPL-MCNC: 129 MG/DL (ref 50–200)
CO2 SERPL-SCNC: 24 MMOL/L (ref 21–32)
CREAT SERPL-MCNC: 1.21 MG/DL (ref 0.6–1.3)
ERYTHROCYTE [DISTWIDTH] IN BLOOD BY AUTOMATED COUNT: 13.5 % (ref 11.6–15.1)
EST. AVERAGE GLUCOSE BLD GHB EST-MCNC: 117 MG/DL
GFR SERPL CREATININE-BSD FRML MDRD: 59 ML/MIN/1.73SQ M
GLUCOSE P FAST SERPL-MCNC: 100 MG/DL (ref 65–99)
HBA1C MFR BLD: 5.7 %
HCT VFR BLD AUTO: 55.1 % (ref 36.5–49.3)
HDLC SERPL-MCNC: 46 MG/DL
HGB BLD-MCNC: 18.4 G/DL (ref 12–17)
LDLC SERPL CALC-MCNC: 56 MG/DL (ref 0–100)
MAGNESIUM SERPL-MCNC: 2.3 MG/DL (ref 1.6–2.6)
MCH RBC QN AUTO: 29.9 PG (ref 26.8–34.3)
MCHC RBC AUTO-ENTMCNC: 33.4 G/DL (ref 31.4–37.4)
MCV RBC AUTO: 90 FL (ref 82–98)
NONHDLC SERPL-MCNC: 83 MG/DL
PLATELET # BLD AUTO: 177 THOUSANDS/UL (ref 149–390)
PMV BLD AUTO: 10.6 FL (ref 8.9–12.7)
POTASSIUM SERPL-SCNC: 4.3 MMOL/L (ref 3.5–5.3)
PROT SERPL-MCNC: 7.9 G/DL (ref 6.4–8.2)
RBC # BLD AUTO: 6.15 MILLION/UL (ref 3.88–5.62)
SODIUM SERPL-SCNC: 138 MMOL/L (ref 136–145)
TRIGL SERPL-MCNC: 134 MG/DL
TSH SERPL DL<=0.05 MIU/L-ACNC: 1.87 UIU/ML (ref 0.36–3.74)
WBC # BLD AUTO: 6.8 THOUSAND/UL (ref 4.31–10.16)

## 2021-04-22 PROCEDURE — 85027 COMPLETE CBC AUTOMATED: CPT

## 2021-04-22 PROCEDURE — 83735 ASSAY OF MAGNESIUM: CPT

## 2021-04-22 PROCEDURE — 80048 BASIC METABOLIC PNL TOTAL CA: CPT

## 2021-04-22 PROCEDURE — 80076 HEPATIC FUNCTION PANEL: CPT

## 2021-04-22 PROCEDURE — 84443 ASSAY THYROID STIM HORMONE: CPT

## 2021-04-22 PROCEDURE — 83036 HEMOGLOBIN GLYCOSYLATED A1C: CPT

## 2021-04-22 PROCEDURE — 36415 COLL VENOUS BLD VENIPUNCTURE: CPT

## 2021-04-22 PROCEDURE — 80061 LIPID PANEL: CPT

## 2021-05-10 DIAGNOSIS — G89.29 CHRONIC LOW BACK PAIN, UNSPECIFIED BACK PAIN LATERALITY, UNSPECIFIED WHETHER SCIATICA PRESENT: ICD-10-CM

## 2021-05-10 DIAGNOSIS — M54.50 CHRONIC LOW BACK PAIN, UNSPECIFIED BACK PAIN LATERALITY, UNSPECIFIED WHETHER SCIATICA PRESENT: ICD-10-CM

## 2021-05-10 RX ORDER — OXYCODONE HYDROCHLORIDE AND ACETAMINOPHEN 5; 325 MG/1; MG/1
1 TABLET ORAL EVERY 4 HOURS PRN
Qty: 180 TABLET | Refills: 0 | Status: SHIPPED | OUTPATIENT
Start: 2021-05-10 | End: 2021-09-21 | Stop reason: SDUPTHER

## 2021-07-02 ENCOUNTER — OFFICE VISIT (OUTPATIENT)
Dept: FAMILY MEDICINE CLINIC | Facility: CLINIC | Age: 76
End: 2021-07-02
Payer: COMMERCIAL

## 2021-07-02 VITALS
WEIGHT: 208 LBS | HEIGHT: 65 IN | BODY MASS INDEX: 34.66 KG/M2 | DIASTOLIC BLOOD PRESSURE: 84 MMHG | SYSTOLIC BLOOD PRESSURE: 126 MMHG | RESPIRATION RATE: 16 BRPM | HEART RATE: 85 BPM | OXYGEN SATURATION: 96 %

## 2021-07-02 DIAGNOSIS — R73.03 PREDIABETES: Primary | ICD-10-CM

## 2021-07-02 DIAGNOSIS — N18.31 STAGE 3A CHRONIC KIDNEY DISEASE (HCC): ICD-10-CM

## 2021-07-02 DIAGNOSIS — K51.00 ULCERATIVE PANCOLITIS WITHOUT COMPLICATION (HCC): ICD-10-CM

## 2021-07-02 DIAGNOSIS — Z79.899 OTHER LONG TERM (CURRENT) DRUG THERAPY: ICD-10-CM

## 2021-07-02 DIAGNOSIS — I48.19 ATRIAL FIBRILLATION, PERSISTENT (HCC): ICD-10-CM

## 2021-07-02 DIAGNOSIS — G89.4 CHRONIC PAIN SYNDROME: ICD-10-CM

## 2021-07-02 PROCEDURE — 99214 OFFICE O/P EST MOD 30 MIN: CPT | Performed by: FAMILY MEDICINE

## 2021-07-02 PROCEDURE — 1160F RVW MEDS BY RX/DR IN RCRD: CPT | Performed by: FAMILY MEDICINE

## 2021-07-02 PROCEDURE — 3008F BODY MASS INDEX DOCD: CPT | Performed by: FAMILY MEDICINE

## 2021-07-02 PROCEDURE — 1036F TOBACCO NON-USER: CPT | Performed by: FAMILY MEDICINE

## 2021-07-02 NOTE — PROGRESS NOTES
Assessment/Plan:  Seeing cardio for afib   Will get labs   No changes to the meds at this time     1  Prediabetes  -     HEMOGLOBIN A1C W/ EAG ESTIMATION; Future; Expected date: 08/02/2021    2  Stage 3a chronic kidney disease ([facility] )    3  Atrial fibrillation, persistent (HCC)  -     TSH, 3rd generation with Free T4 reflex; Future; Expected date: 08/02/2021  -     Hepatic function panel; Future; Expected date: 08/02/2021  -     Basic metabolic panel; Future; Expected date: 08/02/2021  -     CBC and differential; Future; Expected date: 08/02/2021    4  Other long term (current) drug therapy  -     HEMOGLOBIN A1C W/ EAG ESTIMATION; Future; Expected date: 08/02/2021  -     TSH, 3rd generation with Free T4 reflex; Future; Expected date: 08/02/2021    5  Ulcerative pancolitis without complication ([facility] )    6  Chronic pain syndrome  Comments:  hand / finger arthrtis is worsening     7  BMI 34 0-34 9,adult  Comments:  diet and ex  Subjective:      Patient ID: Radha Morgan is a 78 y o  male  HPI  Cardio stopped the tykosin - no more ablations   ckd - stable   jorge - pulmonary       Khaligi -   PAF: had 1 ablation done  But didn't need 2nd one -  on dofetilide  eliqus 5 lipitor 20mg mety suc 50  Rec: sleep apnea testing   3 cardioversion and ablation    TAA: ascending 4 4cm on 8/7/20 follows with cardio     HTN: on amlodipine 10mg met suc controlled    JORGE: seeing aguila - going ok for last 5 weeks now   COPD: on spireva with mckenzie s/p throacotomy    HLD: on atorva 20mg no myalgia controlled - untill dx with DM LDL 80s    arthritis: right shoulder done [facility]    left shoulder: better with perc nawing pain currently does not want injection or ortho just bird deals with it - gave transdermal - bird helps    lumbar pain: better with meds    left knee medial side pain after twisting on the driveway shoveling using brace and its helping some  water ex  helping some      has [facility] sunday and [facility] in the past at [facility]    left hip is good now after steroid injeciton x 2  finds that over all arthritis is worse though  Using percocet and needs an increase in frequency     pre DM: 6 5 max to  had issues in the past no ex  2* to arthritis now 6 0 to 5 9 to 5 7     CKD III: cr 1 5 follows with [facility] on ACE Dr Lolita Melton    vit D def: ? 2* hyperparathyroidism on 50K every other week    hoarseness: with US thryoid clean    hypothyroidism: stable on 75mcg TSH 2 54    boonswang no colon 2001 for bad colitis s/p colon resection and J pouch    BPH; seeing anterio - psa slightly up   bx neg     The following portions of the patient's history were reviewed and updated as appropriate: allergies, current medications, past family history, past medical history, past social history, past surgical history and problem list     Review of Systems   Constitutional: Negative for activity change, appetite change and fever  HENT: Negative for congestion, nosebleeds and trouble swallowing  Eyes: Negative for itching  Respiratory: Negative for cough and chest tightness  Cardiovascular: Negative for chest pain and palpitations  Gastrointestinal: Negative for abdominal pain, constipation, diarrhea and nausea  Endocrine: Negative for cold intolerance  Genitourinary: Negative for frequency  Musculoskeletal: Positive for arthralgias  Negative for gait problem and joint swelling  Skin: Negative for rash  Allergic/Immunologic: Negative for immunocompromised state  Neurological: Negative for dizziness, tremors, seizures, syncope and headaches  Psychiatric/Behavioral: Negative for hallucinations and suicidal ideas  Objective:      /84 (BP Location: Right arm, Patient Position: Sitting, Cuff Size: Standard)   Pulse 85   Resp 16   Ht 5' 5" (1 651 m)   Wt 94 3 kg (208 lb)   SpO2 96%   BMI 34 61 kg/m²     No visits with results within 2 Week(s) from this visit     Latest known visit with results is:   Appointment on 04/22/2021 Component Date Value    Sodium 04/22/2021 138     Potassium 04/22/2021 4 3     Chloride 04/22/2021 107     CO2 04/22/2021 24     ANION GAP 04/22/2021 7     BUN 04/22/2021 23     Creatinine 04/22/2021 1 21     Glucose, Fasting 04/22/2021 100*    Calcium 04/22/2021 10 2*    eGFR 04/22/2021 59     Total Bilirubin 04/22/2021 1 66*    Bilirubin, Direct 04/22/2021 0 36*    Alkaline Phosphatase 04/22/2021 87     AST 04/22/2021 26     ALT 04/22/2021 41     Total Protein 04/22/2021 7 9     Albumin 04/22/2021 4 1     Cholesterol 04/22/2021 129     Triglycerides 04/22/2021 134     HDL, Direct 04/22/2021 46     LDL Calculated 04/22/2021 56     Non-HDL-Chol (CHOL-HDL) 04/22/2021 83     WBC 04/22/2021 6 80     RBC 04/22/2021 6 15*    Hemoglobin 04/22/2021 18 4*    Hematocrit 04/22/2021 55 1*    MCV 04/22/2021 90     MCH 04/22/2021 29 9     MCHC 04/22/2021 33 4     RDW 04/22/2021 13 5     Platelets 41/72/3573 177     MPV 04/22/2021 10 6     Hemoglobin A1C 04/22/2021 5 7*    EAG 04/22/2021 117     Magnesium 04/22/2021 2 3     TSH 3RD GENERATON 04/22/2021 1 870           Physical Exam  Vitals and nursing note reviewed  Constitutional:       General: He is not in acute distress  Appearance: He is well-developed  HENT:      Head: Normocephalic and atraumatic  Cardiovascular:      Rate and Rhythm: Normal rate  Rhythm irregular  Heart sounds: Normal heart sounds  No murmur heard  Pulmonary:      Effort: Pulmonary effort is normal       Breath sounds: Normal breath sounds  No wheezing or rales  Abdominal:      General: Bowel sounds are normal  There is no distension  Palpations: Abdomen is soft  Tenderness: There is no abdominal tenderness  There is no guarding or rebound  Musculoskeletal:         General: No tenderness  Normal range of motion  Cervical back: Normal range of motion and neck supple  Lymphadenopathy:      Cervical: No cervical adenopathy  Skin:     General: Skin is warm and dry  Capillary Refill: Capillary refill takes less than 2 seconds  Findings: No rash  Neurological:      Mental Status: He is alert and oriented to person, place, and time  Cranial Nerves: No cranial nerve deficit  Sensory: No sensory deficit  Motor: No abnormal muscle tone  Psychiatric:         Behavior: Behavior normal          Thought Content: Thought content normal          Judgment: Judgment normal          BMI Counseling: Body mass index is 34 61 kg/m²  The BMI is above normal  Nutrition recommendations include decreasing portion sizes, encouraging healthy choices of fruits and vegetables and limiting drinks that contain sugar  Exercise recommendations include moderate physical activity 150 minutes/week  No pharmacotherapy was ordered  Falls Plan of Care: balance, strength, and gait training instructions were provided  Medications that increase falls were reviewed           Alice Leong MD  [place] [id number]

## 2021-07-05 PROBLEM — G89.4 CHRONIC PAIN SYNDROME: Status: ACTIVE | Noted: 2021-07-05

## 2021-07-13 DIAGNOSIS — I48.19 ATRIAL FIBRILLATION, PERSISTENT (HCC): ICD-10-CM

## 2021-07-13 RX ORDER — APIXABAN 5 MG/1
TABLET, FILM COATED ORAL
Qty: 180 TABLET | Refills: 1 | Status: SHIPPED | OUTPATIENT
Start: 2021-07-13 | End: 2022-01-13 | Stop reason: SDUPTHER

## 2021-08-16 ENCOUNTER — APPOINTMENT (OUTPATIENT)
Dept: LAB | Facility: CLINIC | Age: 76
End: 2021-08-16
Payer: COMMERCIAL

## 2021-08-16 DIAGNOSIS — R73.03 PREDIABETES: ICD-10-CM

## 2021-08-16 DIAGNOSIS — Z79.899 OTHER LONG TERM (CURRENT) DRUG THERAPY: ICD-10-CM

## 2021-08-16 DIAGNOSIS — I48.19 ATRIAL FIBRILLATION, PERSISTENT (HCC): ICD-10-CM

## 2021-08-16 DIAGNOSIS — R97.20 ABNORMAL PSA: ICD-10-CM

## 2021-08-16 LAB
ALBUMIN SERPL BCP-MCNC: 4.1 G/DL (ref 3.5–5)
ALP SERPL-CCNC: 84 U/L (ref 46–116)
ALT SERPL W P-5'-P-CCNC: 46 U/L (ref 12–78)
ANION GAP SERPL CALCULATED.3IONS-SCNC: 6 MMOL/L (ref 4–13)
AST SERPL W P-5'-P-CCNC: 35 U/L (ref 5–45)
BASOPHILS # BLD AUTO: 0.03 THOUSANDS/ΜL (ref 0–0.1)
BASOPHILS NFR BLD AUTO: 0 % (ref 0–1)
BILIRUB DIRECT SERPL-MCNC: 0.3 MG/DL (ref 0–0.2)
BILIRUB SERPL-MCNC: 1.45 MG/DL (ref 0.2–1)
BUN SERPL-MCNC: 19 MG/DL (ref 5–25)
CALCIUM SERPL-MCNC: 9.7 MG/DL (ref 8.3–10.1)
CHLORIDE SERPL-SCNC: 109 MMOL/L (ref 100–108)
CO2 SERPL-SCNC: 26 MMOL/L (ref 21–32)
CREAT SERPL-MCNC: 1.42 MG/DL (ref 0.6–1.3)
EOSINOPHIL # BLD AUTO: 0.1 THOUSAND/ΜL (ref 0–0.61)
EOSINOPHIL NFR BLD AUTO: 2 % (ref 0–6)
ERYTHROCYTE [DISTWIDTH] IN BLOOD BY AUTOMATED COUNT: 13.4 % (ref 11.6–15.1)
EST. AVERAGE GLUCOSE BLD GHB EST-MCNC: 111 MG/DL
GFR SERPL CREATININE-BSD FRML MDRD: 48 ML/MIN/1.73SQ M
GLUCOSE P FAST SERPL-MCNC: 106 MG/DL (ref 65–99)
HBA1C MFR BLD: 5.5 %
HCT VFR BLD AUTO: 56 % (ref 36.5–49.3)
HGB BLD-MCNC: 19 G/DL (ref 12–17)
IMM GRANULOCYTES # BLD AUTO: 0.03 THOUSAND/UL (ref 0–0.2)
IMM GRANULOCYTES NFR BLD AUTO: 0 % (ref 0–2)
LYMPHOCYTES # BLD AUTO: 1.4 THOUSANDS/ΜL (ref 0.6–4.47)
LYMPHOCYTES NFR BLD AUTO: 20 % (ref 14–44)
MCH RBC QN AUTO: 31 PG (ref 26.8–34.3)
MCHC RBC AUTO-ENTMCNC: 33.9 G/DL (ref 31.4–37.4)
MCV RBC AUTO: 91 FL (ref 82–98)
MONOCYTES # BLD AUTO: 0.63 THOUSAND/ΜL (ref 0.17–1.22)
MONOCYTES NFR BLD AUTO: 9 % (ref 4–12)
NEUTROPHILS # BLD AUTO: 4.69 THOUSANDS/ΜL (ref 1.85–7.62)
NEUTS SEG NFR BLD AUTO: 69 % (ref 43–75)
NRBC BLD AUTO-RTO: 0 /100 WBCS
PLATELET # BLD AUTO: 183 THOUSANDS/UL (ref 149–390)
PMV BLD AUTO: 10.8 FL (ref 8.9–12.7)
POTASSIUM SERPL-SCNC: 4.1 MMOL/L (ref 3.5–5.3)
PROT SERPL-MCNC: 8.1 G/DL (ref 6.4–8.2)
PSA SERPL-MCNC: 4.1 NG/ML (ref 0–4)
RBC # BLD AUTO: 6.13 MILLION/UL (ref 3.88–5.62)
SODIUM SERPL-SCNC: 141 MMOL/L (ref 136–145)
TSH SERPL DL<=0.05 MIU/L-ACNC: 1.88 UIU/ML (ref 0.36–3.74)
WBC # BLD AUTO: 6.88 THOUSAND/UL (ref 4.31–10.16)

## 2021-08-16 PROCEDURE — 83036 HEMOGLOBIN GLYCOSYLATED A1C: CPT

## 2021-08-16 PROCEDURE — 80076 HEPATIC FUNCTION PANEL: CPT

## 2021-08-16 PROCEDURE — 36415 COLL VENOUS BLD VENIPUNCTURE: CPT

## 2021-08-16 PROCEDURE — 80048 BASIC METABOLIC PNL TOTAL CA: CPT

## 2021-08-16 PROCEDURE — 85025 COMPLETE CBC W/AUTO DIFF WBC: CPT

## 2021-08-16 PROCEDURE — 84443 ASSAY THYROID STIM HORMONE: CPT

## 2021-08-16 PROCEDURE — 84153 ASSAY OF PSA TOTAL: CPT

## 2021-08-23 ENCOUNTER — TELEPHONE (OUTPATIENT)
Dept: FAMILY MEDICINE CLINIC | Facility: CLINIC | Age: 76
End: 2021-08-23

## 2021-08-23 NOTE — TELEPHONE ENCOUNTER
Patient called regarding his hemoglobin  He said he's not sure why it's elevated  He does use his CPAP every night faithfully and he feels fine  Stated he will follow up with you on this in October

## 2021-09-21 DIAGNOSIS — M54.50 CHRONIC LOW BACK PAIN, UNSPECIFIED BACK PAIN LATERALITY, UNSPECIFIED WHETHER SCIATICA PRESENT: ICD-10-CM

## 2021-09-21 DIAGNOSIS — G89.29 CHRONIC LOW BACK PAIN, UNSPECIFIED BACK PAIN LATERALITY, UNSPECIFIED WHETHER SCIATICA PRESENT: ICD-10-CM

## 2021-09-21 RX ORDER — OXYCODONE HYDROCHLORIDE AND ACETAMINOPHEN 5; 325 MG/1; MG/1
1 TABLET ORAL EVERY 4 HOURS PRN
Qty: 180 TABLET | Refills: 0 | Status: SHIPPED | OUTPATIENT
Start: 2021-09-21 | End: 2022-01-12 | Stop reason: SDUPTHER

## 2021-10-01 ENCOUNTER — OFFICE VISIT (OUTPATIENT)
Dept: FAMILY MEDICINE CLINIC | Facility: CLINIC | Age: 76
End: 2021-10-01
Payer: COMMERCIAL

## 2021-10-01 VITALS
DIASTOLIC BLOOD PRESSURE: 62 MMHG | RESPIRATION RATE: 16 BRPM | OXYGEN SATURATION: 98 % | HEIGHT: 65 IN | BODY MASS INDEX: 34.66 KG/M2 | WEIGHT: 208 LBS | HEART RATE: 86 BPM | SYSTOLIC BLOOD PRESSURE: 112 MMHG

## 2021-10-01 DIAGNOSIS — N18.31 STAGE 3A CHRONIC KIDNEY DISEASE (HCC): ICD-10-CM

## 2021-10-01 DIAGNOSIS — I48.19 ATRIAL FIBRILLATION, PERSISTENT (HCC): ICD-10-CM

## 2021-10-01 DIAGNOSIS — G47.33 SEVERE OBSTRUCTIVE SLEEP APNEA: Primary | ICD-10-CM

## 2021-10-01 DIAGNOSIS — J44.9 CHRONIC OBSTRUCTIVE PULMONARY DISEASE, UNSPECIFIED COPD TYPE (HCC): ICD-10-CM

## 2021-10-01 DIAGNOSIS — Z23 NEED FOR INFLUENZA VACCINATION: ICD-10-CM

## 2021-10-01 DIAGNOSIS — G89.29 CHRONIC LOW BACK PAIN, UNSPECIFIED BACK PAIN LATERALITY, UNSPECIFIED WHETHER SCIATICA PRESENT: ICD-10-CM

## 2021-10-01 DIAGNOSIS — M54.50 CHRONIC LOW BACK PAIN, UNSPECIFIED BACK PAIN LATERALITY, UNSPECIFIED WHETHER SCIATICA PRESENT: ICD-10-CM

## 2021-10-01 PROCEDURE — 1101F PT FALLS ASSESS-DOCD LE1/YR: CPT | Performed by: FAMILY MEDICINE

## 2021-10-01 PROCEDURE — 3725F SCREEN DEPRESSION PERFORMED: CPT | Performed by: FAMILY MEDICINE

## 2021-10-01 PROCEDURE — 90662 IIV NO PRSV INCREASED AG IM: CPT | Performed by: FAMILY MEDICINE

## 2021-10-01 PROCEDURE — 3008F BODY MASS INDEX DOCD: CPT | Performed by: FAMILY MEDICINE

## 2021-10-01 PROCEDURE — G0008 ADMIN INFLUENZA VIRUS VAC: HCPCS | Performed by: FAMILY MEDICINE

## 2021-10-01 PROCEDURE — 3288F FALL RISK ASSESSMENT DOCD: CPT | Performed by: FAMILY MEDICINE

## 2021-10-01 PROCEDURE — 99214 OFFICE O/P EST MOD 30 MIN: CPT | Performed by: FAMILY MEDICINE

## 2021-10-01 PROCEDURE — 1160F RVW MEDS BY RX/DR IN RCRD: CPT | Performed by: FAMILY MEDICINE

## 2021-10-01 PROCEDURE — 1036F TOBACCO NON-USER: CPT | Performed by: FAMILY MEDICINE

## 2021-10-16 DIAGNOSIS — E78.5 HYPERLIPIDEMIA, UNSPECIFIED HYPERLIPIDEMIA TYPE: ICD-10-CM

## 2021-10-18 RX ORDER — ATORVASTATIN CALCIUM 20 MG/1
TABLET, FILM COATED ORAL
Qty: 90 TABLET | Refills: 1 | Status: SHIPPED | OUTPATIENT
Start: 2021-10-18 | End: 2022-04-11 | Stop reason: SDUPTHER

## 2021-10-23 ENCOUNTER — IMMUNIZATIONS (OUTPATIENT)
Dept: FAMILY MEDICINE CLINIC | Facility: HOSPITAL | Age: 76
End: 2021-10-23

## 2021-10-23 DIAGNOSIS — Z23 ENCOUNTER FOR IMMUNIZATION: Primary | ICD-10-CM

## 2021-10-23 PROCEDURE — 0001A COVID-19 PFIZER VACC 0.3 ML: CPT

## 2021-10-23 PROCEDURE — 91300 COVID-19 PFIZER VACC 0.3 ML: CPT

## 2021-11-17 ENCOUNTER — APPOINTMENT (OUTPATIENT)
Dept: LAB | Facility: CLINIC | Age: 76
End: 2021-11-17
Payer: COMMERCIAL

## 2021-11-17 ENCOUNTER — TRANSCRIBE ORDERS (OUTPATIENT)
Dept: LAB | Facility: CLINIC | Age: 76
End: 2021-11-17

## 2021-11-17 DIAGNOSIS — E78.2 MIXED HYPERLIPIDEMIA: Primary | ICD-10-CM

## 2021-11-17 DIAGNOSIS — E78.2 MIXED HYPERLIPIDEMIA: ICD-10-CM

## 2021-11-17 LAB
ALBUMIN SERPL BCP-MCNC: 4 G/DL (ref 3.5–5)
ALP SERPL-CCNC: 85 U/L (ref 46–116)
ALT SERPL W P-5'-P-CCNC: 41 U/L (ref 12–78)
ANION GAP SERPL CALCULATED.3IONS-SCNC: 5 MMOL/L (ref 4–13)
AST SERPL W P-5'-P-CCNC: 30 U/L (ref 5–45)
BASOPHILS # BLD AUTO: 0.05 THOUSANDS/ΜL (ref 0–0.1)
BASOPHILS NFR BLD AUTO: 1 % (ref 0–1)
BILIRUB DIRECT SERPL-MCNC: 0.26 MG/DL (ref 0–0.2)
BILIRUB SERPL-MCNC: 1.31 MG/DL (ref 0.2–1)
BUN SERPL-MCNC: 20 MG/DL (ref 5–25)
CALCIUM SERPL-MCNC: 9.6 MG/DL (ref 8.3–10.1)
CHLORIDE SERPL-SCNC: 108 MMOL/L (ref 100–108)
CHOLEST SERPL-MCNC: 135 MG/DL (ref 50–200)
CO2 SERPL-SCNC: 28 MMOL/L (ref 21–32)
CREAT SERPL-MCNC: 1.38 MG/DL (ref 0.6–1.3)
EOSINOPHIL # BLD AUTO: 0.16 THOUSAND/ΜL (ref 0–0.61)
EOSINOPHIL NFR BLD AUTO: 2 % (ref 0–6)
ERYTHROCYTE [DISTWIDTH] IN BLOOD BY AUTOMATED COUNT: 13.5 % (ref 11.6–15.1)
EST. AVERAGE GLUCOSE BLD GHB EST-MCNC: 117 MG/DL
GFR SERPL CREATININE-BSD FRML MDRD: 50 ML/MIN/1.73SQ M
GLUCOSE P FAST SERPL-MCNC: 102 MG/DL (ref 65–99)
HBA1C MFR BLD: 5.7 %
HCT VFR BLD AUTO: 57.5 % (ref 36.5–49.3)
HDLC SERPL-MCNC: 50 MG/DL
HGB BLD-MCNC: 18.8 G/DL (ref 12–17)
IMM GRANULOCYTES # BLD AUTO: 0.02 THOUSAND/UL (ref 0–0.2)
IMM GRANULOCYTES NFR BLD AUTO: 0 % (ref 0–2)
LDLC SERPL CALC-MCNC: 58 MG/DL (ref 0–100)
LYMPHOCYTES # BLD AUTO: 1.49 THOUSANDS/ΜL (ref 0.6–4.47)
LYMPHOCYTES NFR BLD AUTO: 20 % (ref 14–44)
MAGNESIUM SERPL-MCNC: 2.3 MG/DL (ref 1.6–2.6)
MCH RBC QN AUTO: 30.6 PG (ref 26.8–34.3)
MCHC RBC AUTO-ENTMCNC: 32.7 G/DL (ref 31.4–37.4)
MCV RBC AUTO: 94 FL (ref 82–98)
MONOCYTES # BLD AUTO: 0.65 THOUSAND/ΜL (ref 0.17–1.22)
MONOCYTES NFR BLD AUTO: 9 % (ref 4–12)
NEUTROPHILS # BLD AUTO: 5.25 THOUSANDS/ΜL (ref 1.85–7.62)
NEUTS SEG NFR BLD AUTO: 68 % (ref 43–75)
NONHDLC SERPL-MCNC: 85 MG/DL
NRBC BLD AUTO-RTO: 0 /100 WBCS
PLATELET # BLD AUTO: 186 THOUSANDS/UL (ref 149–390)
PMV BLD AUTO: 11.5 FL (ref 8.9–12.7)
POTASSIUM SERPL-SCNC: 4 MMOL/L (ref 3.5–5.3)
PROT SERPL-MCNC: 7.7 G/DL (ref 6.4–8.2)
RBC # BLD AUTO: 6.14 MILLION/UL (ref 3.88–5.62)
SODIUM SERPL-SCNC: 141 MMOL/L (ref 136–145)
T4 FREE SERPL-MCNC: 1.21 NG/DL (ref 0.76–1.46)
TRIGL SERPL-MCNC: 135 MG/DL
TSH SERPL DL<=0.05 MIU/L-ACNC: 1.77 UIU/ML (ref 0.36–3.74)
WBC # BLD AUTO: 7.62 THOUSAND/UL (ref 4.31–10.16)

## 2021-11-17 PROCEDURE — 80048 BASIC METABOLIC PNL TOTAL CA: CPT

## 2021-11-17 PROCEDURE — 83036 HEMOGLOBIN GLYCOSYLATED A1C: CPT

## 2021-11-17 PROCEDURE — 80061 LIPID PANEL: CPT

## 2021-11-17 PROCEDURE — 36415 COLL VENOUS BLD VENIPUNCTURE: CPT

## 2021-11-17 PROCEDURE — 84439 ASSAY OF FREE THYROXINE: CPT

## 2021-11-17 PROCEDURE — 80076 HEPATIC FUNCTION PANEL: CPT

## 2021-11-17 PROCEDURE — 84443 ASSAY THYROID STIM HORMONE: CPT

## 2021-11-17 PROCEDURE — 85025 COMPLETE CBC W/AUTO DIFF WBC: CPT

## 2021-11-17 PROCEDURE — 83735 ASSAY OF MAGNESIUM: CPT

## 2021-11-22 DIAGNOSIS — I10 ESSENTIAL HYPERTENSION: ICD-10-CM

## 2021-11-22 RX ORDER — AMLODIPINE BESYLATE 10 MG/1
10 TABLET ORAL DAILY
Qty: 90 TABLET | Refills: 0 | Status: SHIPPED | OUTPATIENT
Start: 2021-11-22 | End: 2022-03-08 | Stop reason: SDUPTHER

## 2022-01-12 DIAGNOSIS — G89.29 CHRONIC LOW BACK PAIN, UNSPECIFIED BACK PAIN LATERALITY, UNSPECIFIED WHETHER SCIATICA PRESENT: ICD-10-CM

## 2022-01-12 DIAGNOSIS — M54.50 CHRONIC LOW BACK PAIN, UNSPECIFIED BACK PAIN LATERALITY, UNSPECIFIED WHETHER SCIATICA PRESENT: ICD-10-CM

## 2022-01-12 RX ORDER — OXYCODONE HYDROCHLORIDE AND ACETAMINOPHEN 5; 325 MG/1; MG/1
1 TABLET ORAL EVERY 4 HOURS PRN
Qty: 180 TABLET | Refills: 0 | Status: SHIPPED | OUTPATIENT
Start: 2022-01-12 | End: 2022-05-11 | Stop reason: SDUPTHER

## 2022-01-13 DIAGNOSIS — I48.19 ATRIAL FIBRILLATION, PERSISTENT (HCC): ICD-10-CM

## 2022-01-13 RX ORDER — APIXABAN 5 MG/1
5 TABLET, FILM COATED ORAL 2 TIMES DAILY
Qty: 180 TABLET | Refills: 1 | Status: SHIPPED | OUTPATIENT
Start: 2022-01-13

## 2022-01-27 ENCOUNTER — TRANSCRIBE ORDERS (OUTPATIENT)
Dept: LAB | Facility: CLINIC | Age: 77
End: 2022-01-27

## 2022-01-27 DIAGNOSIS — R97.20 ELEVATED PROSTATE SPECIFIC ANTIGEN (PSA): Primary | ICD-10-CM

## 2022-02-10 ENCOUNTER — OFFICE VISIT (OUTPATIENT)
Dept: FAMILY MEDICINE CLINIC | Facility: CLINIC | Age: 77
End: 2022-02-10
Payer: COMMERCIAL

## 2022-02-10 VITALS
SYSTOLIC BLOOD PRESSURE: 118 MMHG | DIASTOLIC BLOOD PRESSURE: 70 MMHG | RESPIRATION RATE: 16 BRPM | BODY MASS INDEX: 34.66 KG/M2 | OXYGEN SATURATION: 98 % | WEIGHT: 208 LBS | HEART RATE: 60 BPM | HEIGHT: 65 IN

## 2022-02-10 DIAGNOSIS — Z00.00 WELL ADULT EXAM: ICD-10-CM

## 2022-02-10 DIAGNOSIS — M15.9 PRIMARY OSTEOARTHRITIS INVOLVING MULTIPLE JOINTS: ICD-10-CM

## 2022-02-10 DIAGNOSIS — J44.9 CHRONIC OBSTRUCTIVE PULMONARY DISEASE, UNSPECIFIED COPD TYPE (HCC): ICD-10-CM

## 2022-02-10 DIAGNOSIS — F11.20 CONTINUOUS OPIOID DEPENDENCE (HCC): Primary | ICD-10-CM

## 2022-02-10 DIAGNOSIS — R73.03 PREDIABETES: ICD-10-CM

## 2022-02-10 DIAGNOSIS — E66.09 CLASS 1 OBESITY DUE TO EXCESS CALORIES WITH SERIOUS COMORBIDITY AND BODY MASS INDEX (BMI) OF 34.0 TO 34.9 IN ADULT: ICD-10-CM

## 2022-02-10 DIAGNOSIS — E21.3 HYPERPARATHYROIDISM (HCC): ICD-10-CM

## 2022-02-10 DIAGNOSIS — G47.33 SEVERE OBSTRUCTIVE SLEEP APNEA: ICD-10-CM

## 2022-02-10 DIAGNOSIS — E03.9 HYPOTHYROIDISM, UNSPECIFIED TYPE: ICD-10-CM

## 2022-02-10 DIAGNOSIS — K51.00 ULCERATIVE PANCOLITIS WITHOUT COMPLICATION (HCC): ICD-10-CM

## 2022-02-10 DIAGNOSIS — I48.19 ATRIAL FIBRILLATION, PERSISTENT (HCC): ICD-10-CM

## 2022-02-10 DIAGNOSIS — N18.31 STAGE 3A CHRONIC KIDNEY DISEASE (HCC): ICD-10-CM

## 2022-02-10 DIAGNOSIS — I71.2 THORACIC AORTIC ANEURYSM WITHOUT RUPTURE (HCC): ICD-10-CM

## 2022-02-10 PROCEDURE — 1170F FXNL STATUS ASSESSED: CPT | Performed by: FAMILY MEDICINE

## 2022-02-10 PROCEDURE — 1160F RVW MEDS BY RX/DR IN RCRD: CPT | Performed by: FAMILY MEDICINE

## 2022-02-10 PROCEDURE — 3288F FALL RISK ASSESSMENT DOCD: CPT | Performed by: FAMILY MEDICINE

## 2022-02-10 PROCEDURE — 1101F PT FALLS ASSESS-DOCD LE1/YR: CPT | Performed by: FAMILY MEDICINE

## 2022-02-10 PROCEDURE — 1125F AMNT PAIN NOTED PAIN PRSNT: CPT | Performed by: FAMILY MEDICINE

## 2022-02-10 PROCEDURE — 3725F SCREEN DEPRESSION PERFORMED: CPT | Performed by: FAMILY MEDICINE

## 2022-02-10 PROCEDURE — 1003F LEVEL OF ACTIVITY ASSESS: CPT | Performed by: FAMILY MEDICINE

## 2022-02-10 PROCEDURE — G0439 PPPS, SUBSEQ VISIT: HCPCS | Performed by: FAMILY MEDICINE

## 2022-02-10 PROCEDURE — 1036F TOBACCO NON-USER: CPT | Performed by: FAMILY MEDICINE

## 2022-02-10 PROCEDURE — 3008F BODY MASS INDEX DOCD: CPT | Performed by: FAMILY MEDICINE

## 2022-02-10 PROCEDURE — 99214 OFFICE O/P EST MOD 30 MIN: CPT | Performed by: FAMILY MEDICINE

## 2022-02-10 NOTE — ASSESSMENT & PLAN NOTE
Lab Results   Component Value Date    EGFR 50 11/17/2021    EGFR 48 08/16/2021    EGFR 59 04/22/2021    CREATININE 1 38 (H) 11/17/2021    CREATININE 1 42 (H) 08/16/2021    CREATININE 1 21 04/22/2021       -stable/controlled, continue same medication   Will evaluate again next visit    seeing latisha yearly     GR 50's

## 2022-02-10 NOTE — ASSESSMENT & PLAN NOTE
-stable/controlled, continue same medication   Will evaluate again next visit    shayne and met suc 100

## 2022-02-10 NOTE — PROGRESS NOTES
Assessment/Plan:    He finds that some days he does need 2 oxycodone pills    rec also epsom salt warm soaks and voltaren     1  Continuous opioid dependence ([facility] )    2  Well adult exam  -     CBC and differential; Future  -     Comprehensive metabolic panel; Future    3  Chronic obstructive pulmonary disease, unspecified COPD type ([facility] )  Assessment & Plan:  -stable/controlled, continue same medication  Will evaluate again next visit   On incruse         4  Ulcerative pancolitis without complication ([facility] )  Assessment & Plan:  Stable after coloectomy   Hx of colostomy with reversal and J pouch    Stable       5  Atrial fibrillation, persistent ([facility] )  Assessment & Plan:  -stable/controlled, continue same medication  Will evaluate again next visit    shayne and met suc 100       6  Class 1 obesity due to excess calories with serious comorbidity and body mass index (BMI) of 34 0 to 34 9 in adult    7  Hyperparathyroidism [facility])  Assessment & Plan:  -stable/controlled, continue same medication  Will evaluate again next visit   Seeing olaso nephro       8  Stage 3a chronic kidney disease [facility])  Assessment & Plan:  Lab Results   Component Value Date    EGFR 50 11/17/2021    EGFR 48 08/16/2021    EGFR 59 04/22/2021    CREATININE 1 38 (H) 11/17/2021    CREATININE 1 42 (H) 08/16/2021    CREATININE 1 21 04/22/2021       -stable/controlled, continue same medication  Will evaluate again next visit    seeing latisha yearly     GR 50's       9  Prediabetes  -     HEMOGLOBIN A1C W/ EAG ESTIMATION; Future    10  Hypothyroidism, unspecified type  -     TSH, 3rd generation with Free T4 reflex; Future    11  Primary osteoarthritis involving multiple joints    12  Thoracic aortic aneurysm without rupture [facility])  Assessment & Plan:  4 4cm TAA on CTA 8/20     Can repeat 8/22       13  Severe obstructive sleep apnea       Subjective:      Patient ID: Rodrick Humphries is a 78 y o  male      HPI     Here to follow up on chornic issues   Feels well things are stable and going well       The following portions of the patient's history were reviewed and updated as appropriate: allergies, current medications, past family history, past medical history, past social history, past surgical history and problem list     Review of Systems   Constitutional: Negative for activity change, appetite change and fever  HENT: Negative for congestion, nosebleeds and trouble swallowing  Eyes: Negative for itching  Respiratory: Negative for cough and chest tightness  Cardiovascular: Negative for chest pain and palpitations  Gastrointestinal: Negative for abdominal pain, constipation, diarrhea and nausea  Endocrine: Negative for cold intolerance  Genitourinary: Negative for frequency  Musculoskeletal: Positive for arthralgias  Negative for gait problem and joint swelling  Skin: Negative for rash  Allergic/Immunologic: Negative for immunocompromised state  Neurological: Negative for dizziness, tremors, seizures, syncope and headaches  Psychiatric/Behavioral: Negative for hallucinations and suicidal ideas  Objective:      /70   Pulse 60   Resp 16   Ht 5' 5" (1 651 m)   Wt 94 3 kg (208 lb)   SpO2 98%   BMI 34 61 kg/m²     No visits with results within 2 Week(s) from this visit     Latest known visit with results is:   Appointment on 11/17/2021   Component Date Value    Total Bilirubin 11/17/2021 1 31*    Bilirubin, Direct 11/17/2021 0 26*    Alkaline Phosphatase 11/17/2021 85     AST 11/17/2021 30     ALT 11/17/2021 41     Total Protein 11/17/2021 7 7     Albumin 11/17/2021 4 0     Cholesterol 11/17/2021 135     Triglycerides 11/17/2021 135     HDL, Direct 11/17/2021 50     LDL Calculated 11/17/2021 58     Non-HDL-Chol (CHOL-HDL) 11/17/2021 85     Sodium 11/17/2021 141     Potassium 11/17/2021 4 0     Chloride 11/17/2021 108     CO2 11/17/2021 28     ANION GAP 11/17/2021 5     BUN 11/17/2021 20     Creatinine 11/17/2021 1 38*    Glucose, Fasting 11/17/2021 102*    Calcium 11/17/2021 9 6     eGFR 11/17/2021 50     Hemoglobin A1C 11/17/2021 5 7*    EAG 11/17/2021 117     Magnesium 11/17/2021 2 3     TSH 3RD GENERATON 11/17/2021 1 770     Free T4 11/17/2021 1 21     WBC 11/17/2021 7 62     RBC 11/17/2021 6 14*    Hemoglobin 11/17/2021 18 8*    Hematocrit 11/17/2021 57 5*    MCV 11/17/2021 94     MCH 11/17/2021 30 6     MCHC 11/17/2021 32 7     RDW 11/17/2021 13 5     MPV 11/17/2021 11 5     Platelets 50/76/2254 186     nRBC 11/17/2021 0     Neutrophils Relative 11/17/2021 68     Immat GRANS % 11/17/2021 0     Lymphocytes Relative 11/17/2021 20     Monocytes Relative 11/17/2021 9     Eosinophils Relative 11/17/2021 2     Basophils Relative 11/17/2021 1     Neutrophils Absolute 11/17/2021 5 25     Immature Grans Absolute 11/17/2021 0 02     Lymphocytes Absolute 11/17/2021 1 49     Monocytes Absolute 11/17/2021 0 65     Eosinophils Absolute 11/17/2021 0 16     Basophils Absolute 11/17/2021 0 05           Physical Exam  Vitals and nursing note reviewed  Constitutional:       General: He is not in acute distress  Appearance: He is well-developed  He is obese  HENT:      Head: Normocephalic and atraumatic  Cardiovascular:      Rate and Rhythm: Normal rate  Rhythm irregular  Heart sounds: Normal heart sounds  No murmur heard  Pulmonary:      Effort: Pulmonary effort is normal       Breath sounds: Normal breath sounds  No wheezing or rales  Abdominal:      General: Bowel sounds are normal  There is no distension  Palpations: Abdomen is soft  Tenderness: There is no abdominal tenderness  There is no guarding or rebound  Musculoskeletal:         General: No tenderness  Normal range of motion  Cervical back: Normal range of motion and neck supple  Lymphadenopathy:      Cervical: No cervical adenopathy  Skin:     General: Skin is warm and dry        Capillary Refill: Capillary refill takes less than 2 seconds  Findings: No rash  Neurological:      Mental Status: He is alert and oriented to person, place, and time  Cranial Nerves: No cranial nerve deficit  Sensory: No sensory deficit  Motor: No abnormal muscle tone  Psychiatric:         Behavior: Behavior normal          Thought Content:  Thought content normal          Judgment: Judgment normal              MD [place] [id number]

## 2022-02-10 NOTE — PATIENT INSTRUCTIONS

## 2022-02-18 ENCOUNTER — APPOINTMENT (OUTPATIENT)
Dept: LAB | Facility: CLINIC | Age: 77
End: 2022-02-18
Payer: COMMERCIAL

## 2022-02-18 DIAGNOSIS — R97.20 ELEVATED PROSTATE SPECIFIC ANTIGEN (PSA): ICD-10-CM

## 2022-02-18 LAB — PSA SERPL-MCNC: 4.2 NG/ML (ref 0–4)

## 2022-02-18 PROCEDURE — G0103 PSA SCREENING: HCPCS

## 2022-02-18 PROCEDURE — 36415 COLL VENOUS BLD VENIPUNCTURE: CPT

## 2022-03-07 DIAGNOSIS — E03.9 HYPOTHYROIDISM, UNSPECIFIED TYPE: ICD-10-CM

## 2022-03-07 RX ORDER — LEVOTHYROXINE SODIUM 0.07 MG/1
75 TABLET ORAL DAILY
Qty: 90 TABLET | Refills: 1 | Status: SHIPPED | OUTPATIENT
Start: 2022-03-07

## 2022-03-08 DIAGNOSIS — I10 ESSENTIAL HYPERTENSION: ICD-10-CM

## 2022-03-08 RX ORDER — AMLODIPINE BESYLATE 10 MG/1
10 TABLET ORAL DAILY
Qty: 90 TABLET | Refills: 0 | Status: SHIPPED | OUTPATIENT
Start: 2022-03-08 | End: 2022-06-10 | Stop reason: SDUPTHER

## 2022-03-15 NOTE — PROGRESS NOTES
Assessment and Plan:     Problem List Items Addressed This Visit        Digestive    Ulcerative pancolitis without complication ([facility] )       Endocrine    Hyperparathyroidism ([facility] )       Respiratory    COPD (chronic obstructive pulmonary disease) ([facility] )       Cardiovascular and Mediastinum    Atrial fibrillation, persistent (HCC)       Genitourinary    Stage 3a chronic kidney disease ([facility] )       Other    Class 2 severe obesity due to excess calories with serious comorbidity and body mass index (BMI) of 35 0 to 35 9 in adult [facility])    Continuous opioid dependence ([place] [id number] ) - Primary      Other Visit Diagnoses     Well adult exam        Dilated aortic root (HCC)            BMI Counseling: There is no height or weight on file to calculate BMI  The BMI is above normal  Nutrition recommendations include decreasing portion sizes, encouraging healthy choices of fruits and vegetables and limiting drinks that contain sugar  Exercise recommendations include moderate physical activity 150 minutes/week  No pharmacotherapy was ordered  Depression Screening and Follow-up Plan: Patient was screened for depression during today's encounter  They screened negative with a PHQ-2 score of 0  Falls Plan of Care: balance, strength, and gait training instructions were provided  Medications that increase falls were reviewed  Preventive health issues were discussed with patient, and age appropriate screening tests were ordered as noted in patient's After Visit Summary  Personalized health advice and appropriate referrals for health education or preventive services given if needed, as noted in patient's After Visit Summary       History of Present Illness:     Patient presents for Medicare Annual Wellness visit    Patient Care Team:  Roberta Adrian MD as PCP - General (Family Medicine)     Problem List:     Patient Active Problem List   Diagnosis    Chronic right shoulder pain    Chronic left shoulder pain    Left hip pain    COPD (chronic obstructive pulmonary disease) (HCC)    Hoarse voice quality    Chronic lumbar pain    Stage 3a chronic kidney disease (HCC)    Atrial fibrillation, persistent (HCC)    Hyperparathyroidism ([facility] )    Hypertensive heart disease without heart failure    Hypertension    Hypothyroidism    Osteoarthrosis involving multiple sites    Impaired fasting glucose    Hyperlipidemia    Class 2 severe obesity due to excess calories with serious comorbidity and body mass index (BMI) of 35 0 to 35 9 in adult [facility])    Thoracic aortic aneurysm without rupture ([facility])    Benign prostatic hyperplasia without lower urinary tract symptoms    Ulcerative pancolitis without complication ([facility])    Chronic pain syndrome    Severe obstructive sleep apnea    Continuous opioid dependence ([facility] )      Past Medical and Surgical History:     Past Medical History:   Diagnosis Date    Arthritis     H/O echocardiogram 12/2018 9/2018    History of EKG 2019    numerous tests between 9/6/2018 through 2/7/2019    History of exercise stress test 10/2018    History of Holter monitoring 09/2018     Past Surgical History:   Procedure Laterality Date    CARDIAC ELECTROPHYSIOLOGY STUDY AND ABLATION  01/2019    CARDIOVERSION  02/2019    IR JOINT INJECTION  9/30/2019    LAPAROSCOPIC ASSISSTED TOTAL COLECTOMY W/ J-POUCH      THORACOTOMY Left 2001    VASECTOMY        Family History:     Family History   Problem Relation Age of Onset    Arthritis Father       Social History:     E-Cigarette/Vaping    E-Cigarette Use Never User      E-Cigarette/Vaping Substances    Nicotine No     THC No     CBD No     Flavoring No     Other No     Unknown No      Social History     Socioeconomic History    Marital status: /Civil Union     Spouse name: Not on file    Number of children: Not on file    Years of education: Not on file    Highest education level: Not on file   Occupational History    Occupation: Retired   Tobacco Use  Smoking status: Former Smoker     Packs/day: 1 50     Years: 15 00     Pack years: 22 50     Quit date:      Years since quittin     Smokeless tobacco: Never Used   Vaping Use    Vaping Use: Never used   Substance and Sexual Activity    Alcohol use: Yes     Comment: Occas       Drug use: Not on file    Sexual activity: Not on file   Other Topics Concern    Not on file   Social History Narrative    [place]:    Most recent tobacco use screenin2020    Do you currently or have you served in [employer]: Yes    If Yes, What branch of service: [employer]    Were you activated, into active duty, as a member of the [employer] or as a Reservist: No    Advance directive: No    Chewing tobacco: none    Alcohol intake: Occasional    Marital status:     Live alone or with others: alone    Family history of heart disease: No    High cholesterol: Yes    High blood pressure: Yes    Exercise level: Occasional    Overweight: Yes    Obese: Yes    Diabetes: No    General stress level: Medium    Diet: Diabetic    Occupation: retired    Caffeine intake: Occasional    Guns present in home: No    Seat belts used routinely: Yes    Sexual orientation: Heterosexual    Sunscreen used routinely: Yes    Seat belt/car seat used routinely: Yes    Do you have regular medical check ups: Yes    Do you have regular dental check ups: Yes     Social Determinants of Health     Financial Resource Strain: Not on file   Food Insecurity: Not on file   Transportation Needs: Not on file   Physical Activity: Not on file   Stress: Not on file   Social Connections: Not on file   Intimate Partner Violence: Not on file   Housing Stability: Not on file      Medications and Allergies:     Current Outpatient Medications   Medication Sig Dispense Refill    amLODIPine (NORVASC) 10 mg tablet Take 1 tablet (10 mg total) by mouth daily 90 tablet 0    atorvastatin (LIPITOR) 20 mg tablet TAKE 1 TABLET DAILY 90 tablet 1    B Complex-C (B COMPLEX-VITAMIN C PO) Take 1 tablet by mouth daily      Cholecalciferol (VITAMIN D3) 50 MCG (2000 UT) capsule Take 1 capsule by mouth      Eliquis 5 MG Take 1 tablet (5 mg total) by mouth 2 (two) times a day 180 tablet 1    INCRUSE ELLIPTA 62 5 MCG/INH AEPB inhaler       levothyroxine 75 mcg tablet Take 1 tablet (75 mcg total) by mouth daily 90 tablet 3    metoprolol succinate (TOPROL-XL) 100 mg 24 hr tablet Pt increased to 100 mg BID      oxyCODONE-acetaminophen (PERCOCET) 5-325 mg per tablet Take 1 tablet by mouth every 4 (four) hours as needed for moderate pain or severe pain Max Daily Amount: 6 tablets 180 tablet 0     No current facility-administered medications for this visit  No Known Allergies   Immunizations:     Immunization History   Administered Date(s) Administered    COVID-19 PFIZER VACCINE 0 3 ML IM 03/04/2021, 03/24/2021, 10/23/2021    INFLUENZA 09/14/2018    Influenza, high dose seasonal 0 7 mL 11/05/2020, 10/01/2021    Pneumococcal Conjugate 13-Valent 12/16/2015, 04/19/2017    Pneumococcal Polysaccharide PPV23 03/27/2014, 07/23/2018    Tdap 04/01/2014    Zoster 04/01/2014, 04/02/2018      Health Maintenance:         Topic Date Due    Hepatitis C Screening  Completed     There are no preventive care reminders to display for this patient  Medicare Health Risk Assessment:     /70   Pulse 60   Resp 16   Ht 5' 5" (1 651 m)   Wt 94 3 kg (208 lb)   SpO2 98%   BMI 34 61 kg/m²      Michael Castellon is here for his Subsequent Wellness visit  Last Medicare Wellness visit information reviewed, patient interviewed and updates made to the record today  Health Risk Assessment:   Patient rates overall health as good  Patient feels that their physical health rating is same  Patient is satisfied with their life  Eyesight was rated as same  Hearing was rated as same  Patient feels that their emotional and mental health rating is same   Patients states they are never, rarely angry  Patient states they are never, rarely unusually tired/fatigued  Pain experienced in the last 7 days has been some  Patient's pain rating has been 4/10  Patient states that he has experienced no weight loss or gain in last 6 months  Depression Screening:   PHQ-2 Score: 0      Fall Risk Screening: In the past year, patient has experienced: no history of falling in past year      Home Safety:  Patient has trouble with stairs inside or outside of their home  Patient has working smoke alarms and has working carbon monoxide detector  Home safety hazards include: none  Nutrition:   Current diet is Regular  Medications:   Patient is currently taking over-the-counter supplements  OTC medications include: see medication list  Patient is able to manage medications  Activities of Daily Living (ADLs)/Instrumental Activities of Daily Living (IADLs):   Walk and transfer into and out of bed and chair?: Yes  Dress and groom yourself?: Yes    Bathe or shower yourself?: Yes    Feed yourself?  Yes  Do your laundry/housekeeping?: Yes  Manage your money, pay your bills and track your expenses?: Yes  Make your own meals?: Yes    Do your own shopping?: Yes    Previous Hospitalizations:   Any hospitalizations or ED visits within the last 12 months?: No      Advance Care Planning:   Living will: No    Durable POA for healthcare: No    Advanced directive: No    Five wishes given: Yes      Cognitive Screening:   Provider or family/friend/caregiver concerned regarding cognition?: No    PREVENTIVE SCREENINGS      Cardiovascular Screening:    General: Screening Not Indicated and History Lipid Disorder      Diabetes Screening:     General: Screening Current      Colorectal Cancer Screening:     General: Screening Not Indicated      Prostate Cancer Screening:    General: Screening Current    Due for: PSA      Osteoporosis Screening:    General: Screening Not Indicated      Abdominal Aortic Aneurysm (AAA) Screening:    Risk factors include: age between 73-69 yo and tobacco use        General: Screening Current      Lung Cancer Screening:     General: Screening Not Indicated      Hepatitis C Screening:    General: Screening Current    Screening, Brief Intervention, and Referral to Treatment (SBIRT)      Brief Intervention  Alcohol & drug use screenings were reviewed  No concerns regarding substance use disorder identified         Wendi Arriaga MD n/a

## 2022-04-08 ENCOUNTER — HOSPITAL ENCOUNTER (OUTPATIENT)
Dept: CT IMAGING | Facility: HOSPITAL | Age: 77
Discharge: HOME | End: 2022-04-08
Attending: SPECIALIST
Payer: COMMERCIAL

## 2022-04-08 DIAGNOSIS — R31.9 HEMATURIA: ICD-10-CM

## 2022-04-08 PROCEDURE — 74176 CT ABD & PELVIS W/O CONTRAST: CPT

## 2022-04-11 DIAGNOSIS — E78.5 HYPERLIPIDEMIA, UNSPECIFIED HYPERLIPIDEMIA TYPE: ICD-10-CM

## 2022-04-11 RX ORDER — ATORVASTATIN CALCIUM 20 MG/1
20 TABLET, FILM COATED ORAL DAILY
Qty: 90 TABLET | Refills: 1 | Status: SHIPPED | OUTPATIENT
Start: 2022-04-11

## 2022-05-03 ENCOUNTER — APPOINTMENT (OUTPATIENT)
Dept: LAB | Facility: CLINIC | Age: 77
End: 2022-05-03
Payer: COMMERCIAL

## 2022-05-03 ENCOUNTER — TRANSCRIBE ORDERS (OUTPATIENT)
Dept: LAB | Facility: CLINIC | Age: 77
End: 2022-05-03

## 2022-05-03 DIAGNOSIS — N20.1 CALCULUS OF URETER: Primary | ICD-10-CM

## 2022-05-03 DIAGNOSIS — N20.1 CALCULUS OF URETER: ICD-10-CM

## 2022-05-03 LAB
ANION GAP SERPL CALCULATED.3IONS-SCNC: 6 MMOL/L (ref 4–13)
APTT PPP: 28 SECONDS (ref 23–37)
BASOPHILS # BLD AUTO: 0.06 THOUSANDS/ΜL (ref 0–0.1)
BASOPHILS NFR BLD AUTO: 1 % (ref 0–1)
BUN SERPL-MCNC: 20 MG/DL (ref 5–25)
CALCIUM SERPL-MCNC: 10.2 MG/DL (ref 8.3–10.1)
CHLORIDE SERPL-SCNC: 108 MMOL/L (ref 100–108)
CO2 SERPL-SCNC: 25 MMOL/L (ref 21–32)
CREAT SERPL-MCNC: 1.49 MG/DL (ref 0.6–1.3)
EOSINOPHIL # BLD AUTO: 0.1 THOUSAND/ΜL (ref 0–0.61)
EOSINOPHIL NFR BLD AUTO: 2 % (ref 0–6)
ERYTHROCYTE [DISTWIDTH] IN BLOOD BY AUTOMATED COUNT: 13.5 % (ref 11.6–15.1)
FIBRINOGEN PPP-MCNC: 262 MG/DL (ref 227–495)
GFR SERPL CREATININE-BSD FRML MDRD: 45 ML/MIN/1.73SQ M
GLUCOSE P FAST SERPL-MCNC: 117 MG/DL (ref 65–99)
HCT VFR BLD AUTO: 56.6 % (ref 36.5–49.3)
HGB BLD-MCNC: 19 G/DL (ref 12–17)
IMM GRANULOCYTES # BLD AUTO: 0.02 THOUSAND/UL (ref 0–0.2)
IMM GRANULOCYTES NFR BLD AUTO: 0 % (ref 0–2)
INR PPP: 1.17 (ref 0.84–1.19)
LYMPHOCYTES # BLD AUTO: 1.77 THOUSANDS/ΜL (ref 0.6–4.47)
LYMPHOCYTES NFR BLD AUTO: 27 % (ref 14–44)
MCH RBC QN AUTO: 31.2 PG (ref 26.8–34.3)
MCHC RBC AUTO-ENTMCNC: 33.6 G/DL (ref 31.4–37.4)
MCV RBC AUTO: 93 FL (ref 82–98)
MONOCYTES # BLD AUTO: 0.61 THOUSAND/ΜL (ref 0.17–1.22)
MONOCYTES NFR BLD AUTO: 9 % (ref 4–12)
NEUTROPHILS # BLD AUTO: 3.97 THOUSANDS/ΜL (ref 1.85–7.62)
NEUTS SEG NFR BLD AUTO: 61 % (ref 43–75)
NRBC BLD AUTO-RTO: 0 /100 WBCS
PLATELET # BLD AUTO: 175 THOUSANDS/UL (ref 149–390)
PLATELET # BLD AUTO: 175 THOUSANDS/UL (ref 149–390)
PMV BLD AUTO: 11.1 FL (ref 8.9–12.7)
PMV BLD AUTO: 11.1 FL (ref 8.9–12.7)
POTASSIUM SERPL-SCNC: 4.2 MMOL/L (ref 3.5–5.3)
PROTHROMBIN TIME: 13.8 SECONDS (ref 11.6–14.5)
PROTHROMBIN TIME: 14.4 SECONDS (ref 11.6–14.5)
RBC # BLD AUTO: 6.09 MILLION/UL (ref 3.88–5.62)
SODIUM SERPL-SCNC: 139 MMOL/L (ref 136–145)
THROMBIN TIME MIXING INCUBATED: 18.5 SECONDS (ref 14.7–18.4)
THROMBIN TIME MIXING ROOM TEMP: 18.3 SECONDS (ref 14.7–18.4)
THROMBIN TIME: 18.7 SECONDS (ref 14.7–18.4)
THROMBIN TIME: 18.7 SECONDS (ref 14.7–18.4)
WBC # BLD AUTO: 6.53 THOUSAND/UL (ref 4.31–10.16)

## 2022-05-03 PROCEDURE — 85670 THROMBIN TIME PLASMA: CPT

## 2022-05-03 PROCEDURE — 85049 AUTOMATED PLATELET COUNT: CPT

## 2022-05-03 PROCEDURE — 85025 COMPLETE CBC W/AUTO DIFF WBC: CPT

## 2022-05-03 PROCEDURE — 36415 COLL VENOUS BLD VENIPUNCTURE: CPT

## 2022-05-03 PROCEDURE — 85611 PROTHROMBIN TEST: CPT

## 2022-05-03 PROCEDURE — 85610 PROTHROMBIN TIME: CPT

## 2022-05-03 PROCEDURE — 85384 FIBRINOGEN ACTIVITY: CPT

## 2022-05-03 PROCEDURE — 80048 BASIC METABOLIC PNL TOTAL CA: CPT

## 2022-05-03 PROCEDURE — 85730 THROMBOPLASTIN TIME PARTIAL: CPT

## 2022-05-06 ENCOUNTER — APPOINTMENT (OUTPATIENT)
Dept: LAB | Facility: HOSPITAL | Age: 77
End: 2022-05-06
Attending: SPECIALIST
Payer: COMMERCIAL

## 2022-05-06 DIAGNOSIS — N20.0 KIDNEY STONE: ICD-10-CM

## 2022-05-06 NOTE — PRE-PROCEDURE INSTRUCTIONS
My Surgical Experience    The following information was developed to assist you to prepare for your operation  What do I need to do before coming to the hospital?   Arrange for a responsible person to drive you to and from the hospital    Arrange care for your children at home  Children are not allowed in the recovery areas of the hospital   Plan to wear clothing that is easy to put on and take off  If you are having shoulder surgery, wear a shirt that buttons or zippers in the front  Bathing  o Shower the evening before and the morning of your surgery with an antibacterial soap  Please refer to the Pre Op Showering Instructions for Surgery Patients Sheet   o Remove nail polish and all body piercing jewelry  o Do not shave any body part for at least 24 hours before surgery-this includes face, arms, legs and upper body  Food  o Nothing to eat or drink after midnight the night before your surgery  This includes candy and chewing gum  o Exception: If your surgery is after 12:00pm (noon), you may have clear liquids such as 7-Up®, ginger ale, apple or cranberry juice, Jell-O®, water, or clear broth until 8:00 am  o Do not drink milk or juice with pulp on the morning before surgery  o Do not drink alcohol 24 hours before surgery  Medicine  o Follow instructions you received from your surgeon about which medicines you may take on the day of surgery  o If instructed to take medicine on the morning of surgery, take pills with just a small sip of water  Call your prescribing doctor for specific infroamtion on what to do if you take insulin    What should I bring to the hospital?    Bring:  Vena Hector or a walker, if you have them, for foot or knee surgery   A list of the daily medicines, vitamins, minerals, herbals and nutritional supplements you take   Include the dosages of medicines and the time you take them each day   Glasses, dentures or hearing aids   Minimal clothing; you will be wearing hospital sleepwear   Photo ID; required to verify your identity   If you have a Living Will or Power of , bring a copy of the documents   If you have an ostomy, bring an extra pouch and any supplies you use    Do not bring   Medicines or inhalers   Money, valuables or jewelry    What other information should I know about the day of surgery?  Notify your surgeons if you develop a cold, sore throat, cough, fever, rash or any other illness   Report to the Ambulatory Surgical/Same Day Surgery Unit   You will be instructed to stop at Registration only if you have not been pre-registered   Inform your  fi they do not stay that they will be asked by the staff to leave a phone number where they can be reached   Be available to be reached before surgery  In the event the operating room schedule changes, you may be asked to come in earlier or later than expected    *It is important to tell your doctor and others involved in your health care if you are taking or have been taking any non-prescription drugs, vitamins, minerals, herbals or other nutritional supplements  Any of these may interact with some food or medicines and cause a reaction      Pre-Surgery Instructions:   Medication Instructions    amLODIPine (NORVASC) 10 mg tablet Take night before surgery    atorvastatin (LIPITOR) 20 mg tablet Take night before surgery    B Complex-C (B COMPLEX-VITAMIN C PO) Hold day of surgery   Cholecalciferol (VITAMIN D3) 50 MCG (2000 UT) capsule Hold day of surgery   Eliquis 5 MG Stop taking 5 days prior to surgery   INCRUSE ELLIPTA 62 5 MCG/INH AEPB inhaler Take night before surgery    levothyroxine 75 mcg tablet Take day of surgery   metoprolol succinate (TOPROL-XL) 100 mg 24 hr tablet Take day of surgery      oxyCODONE-acetaminophen (PERCOCET) 5-325 mg per tablet Uses PRN- DO NOT take day of surgery

## 2022-05-10 ENCOUNTER — ANESTHESIA EVENT (OUTPATIENT)
Dept: PERIOP | Facility: HOSPITAL | Age: 77
End: 2022-05-10
Payer: COMMERCIAL

## 2022-05-11 DIAGNOSIS — G89.29 CHRONIC LOW BACK PAIN, UNSPECIFIED BACK PAIN LATERALITY, UNSPECIFIED WHETHER SCIATICA PRESENT: ICD-10-CM

## 2022-05-11 DIAGNOSIS — M54.50 CHRONIC LOW BACK PAIN, UNSPECIFIED BACK PAIN LATERALITY, UNSPECIFIED WHETHER SCIATICA PRESENT: ICD-10-CM

## 2022-05-11 RX ORDER — OXYCODONE HYDROCHLORIDE AND ACETAMINOPHEN 5; 325 MG/1; MG/1
1 TABLET ORAL EVERY 4 HOURS PRN
Qty: 180 TABLET | Refills: 0 | Status: SHIPPED | OUTPATIENT
Start: 2022-05-11

## 2022-05-11 NOTE — H&P
H&P Exam - Urology       Patient: Eugenie Peter   : 1945 Sex: male   MRN: [id number]     CSN: [id number]      History of Present Illness   HPI:  Eugenie Peter is a 78 y o  male who presents with a 10 8 mm stone left kidney intermittent flank pain wishing to undergo left ureteroscopy laser lithotripsy stent placement aware risk of anesthesia infection bleeding additional urologic procedures        Review of Systems:   Constitutional:  Negative for activity change, fever, chills and diaphoresis  HENT: Negative for hearing loss and trouble swallowing  Eyes: Negative for itching and visual disturbance  Respiratory: Negative for chest tightness and shortness of breath  Cardiovascular: Negative for chest pain, edema  Gastrointestinal: Negative for abdominal distention, na abdominal pain, constipation, diarrhea, Nausea and vomiting  Genitourinary: Negative for decreased urine volume, difficulty urinating, dysuria, enuresis, frequency, hematuria and urgency  Musculoskeletal: Negative for gait problem and myalgias  Neurological: Negative for dizziness and headaches  Hematological: Does not bruise/bleed easily  Historical Information   Past Medical History:   Diagnosis Date    Arthritis     H/O echocardiogram 2018    History of EKG 2019    numerous tests between 2018 through 2019    History of exercise stress test 10/2018    History of Holter monitoring 2018    Hyperlipidemia     Hypertension     Kidney stone      Past Surgical History:   Procedure Laterality Date    CARDIAC ELECTROPHYSIOLOGY STUDY AND ABLATION  2019    CARDIOVERSION  2019    COLON SURGERY      CYSTOSCOPY      HERNIA REPAIR      IR JOINT INJECTION  2019    LAPAROSCOPIC ASSISSTED TOTAL COLECTOMY W/ J-POUCH      THORACOTOMY Left 2001    VASECTOMY       Social History   Social History     Substance and Sexual Activity   Alcohol Use Yes    Comment: Occas        Social History Substance and Sexual Activity   Drug Use Never     Social History     Tobacco Use   Smoking Status Former Smoker    Packs/day: 1 50    Years: 15 00    Pack years: 22 50    Quit date: 12    Years since quittin 3   Smokeless Tobacco Never Used     Family History:   Family History   Problem Relation Age of Onset    Arthritis Father        Meds/Allergies   No medications prior to admission  No Known Allergies    Objective   Vitals: There were no vitals taken for this visit  Physical Exam:  General Alert awake   Normocephalic atraumatic PERRLA  Lungs clear bilaterally  Cardiac normal S1 normal S2  Abdomen soft, flank pain  Extremities no edema    No intake/output data recorded      Invasive Devices  Report    None                     Lab Results: CBC:   Lab Results   Component Value Date    WBC 6 53 2022    HGB 19 0 (H) 2022    HCT 56 6 (H) 2022    MCV 93 2022     2022     2022    MCH 31 2 2022    MCHC 33 6 2022    RDW 13 5 2022    MPV 11 1 2022    MPV 11 1 2022    NRBC 0 2022     CMP:   Lab Results   Component Value Date     2022    CO2 25 2022    BUN 20 2022    CREATININE 1 49 (H) 2022    CALCIUM 10 2 (H) 2022    AST 30 2021    ALT 41 2021    ALKPHOS 85 2021    EGFR 45 2022     Urinalysis: No results found for: Lupe Mchenry, SPECGRAV, PHUR, LEUKOCYTESUR, NITRITE, PROTEINUA, GLUCOSEU, Evans Angelucci, BILIRUBINUR, BLOODU  Urine Culture: No results found for: URINECX  PSA:   Lab Results   Component Value Date    PSA 4 2 (H) 2022    PSA 4 1 (H) 2021    PSA 3 3 2021    PSA 4 8 (H) 2020           Assessment/ Plan:  Cysto left ureteroscopy laser stent      Livier Patel MD

## 2022-05-12 ENCOUNTER — ANESTHESIA (OUTPATIENT)
Dept: PERIOP | Facility: HOSPITAL | Age: 77
End: 2022-05-12
Payer: COMMERCIAL

## 2022-05-12 ENCOUNTER — APPOINTMENT (OUTPATIENT)
Dept: RADIOLOGY | Facility: HOSPITAL | Age: 77
End: 2022-05-12
Payer: COMMERCIAL

## 2022-05-12 ENCOUNTER — HOSPITAL ENCOUNTER (OUTPATIENT)
Facility: HOSPITAL | Age: 77
Setting detail: OUTPATIENT SURGERY
Discharge: HOME | End: 2022-05-12
Attending: SPECIALIST | Admitting: SPECIALIST
Payer: COMMERCIAL

## 2022-05-12 VITALS
BODY MASS INDEX: 34.72 KG/M2 | SYSTOLIC BLOOD PRESSURE: 127 MMHG | WEIGHT: 208.4 LBS | HEIGHT: 65 IN | TEMPERATURE: 97.1 F | DIASTOLIC BLOOD PRESSURE: 84 MMHG | RESPIRATION RATE: 20 BRPM | OXYGEN SATURATION: 98 % | HEART RATE: 64 BPM

## 2022-05-12 DIAGNOSIS — N20.0 CALCULUS OF KIDNEY: ICD-10-CM

## 2022-05-12 LAB — GLUCOSE SERPL-MCNC: 105 MG/DL (ref 65–140)

## 2022-05-12 PROCEDURE — 74420 UROGRAPHY RTRGR +-KUB: CPT

## 2022-05-12 PROCEDURE — 82948 REAGENT STRIP/BLOOD GLUCOSE: CPT

## 2022-05-12 PROCEDURE — C2617 STENT, NON-COR, TEM W/O DEL: HCPCS | Performed by: SPECIALIST

## 2022-05-12 PROCEDURE — 88112 CYTOPATH CELL ENHANCE TECH: CPT | Performed by: SPECIALIST

## 2022-05-12 PROCEDURE — C1769 GUIDE WIRE: HCPCS | Performed by: SPECIALIST

## 2022-05-12 PROCEDURE — 88300 SURGICAL PATH GROSS: CPT | Performed by: PATHOLOGY

## 2022-05-12 PROCEDURE — 82360 CALCULUS ASSAY QUANT: CPT | Performed by: SPECIALIST

## 2022-05-12 PROCEDURE — C1894 INTRO/SHEATH, NON-LASER: HCPCS | Performed by: SPECIALIST

## 2022-05-12 DEVICE — INLAY URETERAL STENT W/O GUIDEWIRE
Type: IMPLANTABLE DEVICE | Site: URETER | Status: FUNCTIONAL
Brand: BARD® INLAY® URETERAL STENT

## 2022-05-12 RX ORDER — FENTANYL CITRATE/PF 50 MCG/ML
25 SYRINGE (ML) INJECTION
Status: DISCONTINUED | OUTPATIENT
Start: 2022-05-12 | End: 2022-05-12 | Stop reason: HOSPADM

## 2022-05-12 RX ORDER — MAGNESIUM HYDROXIDE 1200 MG/15ML
LIQUID ORAL AS NEEDED
Status: DISCONTINUED | OUTPATIENT
Start: 2022-05-12 | End: 2022-05-12 | Stop reason: HOSPADM

## 2022-05-12 RX ORDER — EPHEDRINE SULFATE 50 MG/ML
INJECTION INTRAVENOUS AS NEEDED
Status: DISCONTINUED | OUTPATIENT
Start: 2022-05-12 | End: 2022-05-12

## 2022-05-12 RX ORDER — SODIUM CHLORIDE, SODIUM LACTATE, POTASSIUM CHLORIDE, CALCIUM CHLORIDE 600; 310; 30; 20 MG/100ML; MG/100ML; MG/100ML; MG/100ML
125 INJECTION, SOLUTION INTRAVENOUS CONTINUOUS
Status: DISCONTINUED | OUTPATIENT
Start: 2022-05-12 | End: 2022-05-12 | Stop reason: HOSPADM

## 2022-05-12 RX ORDER — PROPOFOL 10 MG/ML
INJECTION, EMULSION INTRAVENOUS AS NEEDED
Status: DISCONTINUED | OUTPATIENT
Start: 2022-05-12 | End: 2022-05-12

## 2022-05-12 RX ORDER — ONDANSETRON 2 MG/ML
4 INJECTION INTRAMUSCULAR; INTRAVENOUS ONCE AS NEEDED
Status: DISCONTINUED | OUTPATIENT
Start: 2022-05-12 | End: 2022-05-12 | Stop reason: HOSPADM

## 2022-05-12 RX ORDER — METOPROLOL TARTRATE 5 MG/5ML
INJECTION INTRAVENOUS AS NEEDED
Status: DISCONTINUED | OUTPATIENT
Start: 2022-05-12 | End: 2022-05-12

## 2022-05-12 RX ORDER — ONDANSETRON 2 MG/ML
INJECTION INTRAMUSCULAR; INTRAVENOUS AS NEEDED
Status: DISCONTINUED | OUTPATIENT
Start: 2022-05-12 | End: 2022-05-12

## 2022-05-12 RX ORDER — LIDOCAINE HYDROCHLORIDE 10 MG/ML
INJECTION, SOLUTION EPIDURAL; INFILTRATION; INTRACAUDAL; PERINEURAL AS NEEDED
Status: DISCONTINUED | OUTPATIENT
Start: 2022-05-12 | End: 2022-05-12

## 2022-05-12 RX ORDER — ESMOLOL HYDROCHLORIDE 10 MG/ML
INJECTION INTRAVENOUS AS NEEDED
Status: DISCONTINUED | OUTPATIENT
Start: 2022-05-12 | End: 2022-05-12

## 2022-05-12 RX ORDER — FENTANYL CITRATE 50 UG/ML
INJECTION, SOLUTION INTRAMUSCULAR; INTRAVENOUS AS NEEDED
Status: DISCONTINUED | OUTPATIENT
Start: 2022-05-12 | End: 2022-05-12

## 2022-05-12 RX ORDER — CEFAZOLIN SODIUM 2 G/50ML
2000 SOLUTION INTRAVENOUS ONCE
Status: COMPLETED | OUTPATIENT
Start: 2022-05-12 | End: 2022-05-12

## 2022-05-12 RX ADMIN — LIDOCAINE HYDROCHLORIDE 50 MG: 10 INJECTION, SOLUTION EPIDURAL; INFILTRATION; INTRACAUDAL at 09:40

## 2022-05-12 RX ADMIN — SODIUM CHLORIDE, SODIUM LACTATE, POTASSIUM CHLORIDE, AND CALCIUM CHLORIDE 125 ML/HR: .6; .31; .03; .02 INJECTION, SOLUTION INTRAVENOUS at 08:25

## 2022-05-12 RX ADMIN — ESMOLOL HYDROCHLORIDE 30 MG: 10 INJECTION, SOLUTION INTRAVENOUS at 11:17

## 2022-05-12 RX ADMIN — ESMOLOL HYDROCHLORIDE 30 MG: 10 INJECTION, SOLUTION INTRAVENOUS at 10:54

## 2022-05-12 RX ADMIN — ONDANSETRON 4 MG: 2 INJECTION INTRAMUSCULAR; INTRAVENOUS at 10:00

## 2022-05-12 RX ADMIN — FENTANYL CITRATE 50 MCG: 50 INJECTION INTRAMUSCULAR; INTRAVENOUS at 09:40

## 2022-05-12 RX ADMIN — CEFAZOLIN SODIUM 2000 MG: 2 SOLUTION INTRAVENOUS at 09:36

## 2022-05-12 RX ADMIN — FENTANYL CITRATE 25 MCG: 50 INJECTION INTRAMUSCULAR; INTRAVENOUS at 10:02

## 2022-05-12 RX ADMIN — METOROPROLOL TARTRATE 1 MG: 5 INJECTION, SOLUTION INTRAVENOUS at 11:22

## 2022-05-12 RX ADMIN — PROPOFOL 180 MG: 10 INJECTION, EMULSION INTRAVENOUS at 09:40

## 2022-05-12 RX ADMIN — EPHEDRINE SULFATE 5 MG: 50 INJECTION, SOLUTION INTRAVENOUS at 10:12

## 2022-05-12 RX ADMIN — EPHEDRINE SULFATE 5 MG: 50 INJECTION, SOLUTION INTRAVENOUS at 10:13

## 2022-05-12 RX ADMIN — FENTANYL CITRATE 25 MCG: 50 INJECTION INTRAMUSCULAR; INTRAVENOUS at 10:03

## 2022-05-12 NOTE — OP NOTE
OPERATIVE REPORT  PATIENT NAME: Jeffry Balderas    :  1945  MRN: [id number]  Pt Location: [facility] OR ROOM 04    SURGERY DATE: 2022    Surgeon(s) and Role:     * Heidi Babin MD - Primary    Preop Diagnosis:  Calculus of kidney [N20 0]  Microscopic hematuria  Agent Orange exposure  Renal failure    Post-Op Diagnosis Codes:     * Calculus of kidney [N20 0]  Right distal ureteral stricture  Left renal stones 10/8 mm    Procedure(s) (LRB):  BILATERAL RETROGRADE PYELOGRAM, CYSTOSCOPY,  LEFT URETEROSCOPY, LEFT LASER LITHOTRIPSY BASKET EXTRACTION, LEFT STENT (N/A) right diagnostic ureteroscopy    Specimen(s):  ID Type Source Tests Collected by Time Destination   1 : catheterized cystoscopic urine for cytology Urine Urine, Cystoscopic CYTOLOGY, URINE Heidi aBbin MD 2022 2881    2 : LEFT KIDNEY CALCULUS Calculus Kidney, Left STONE ANALYSIS, TISSUE EXAM Heidi Babin MD 2022 [id number]        Estimated Blood Loss:   Minimal    Drains:  Urethral Catheter 20 Fr  (Active)   Number of days: 0       Ureteral Internal Stent Left ureter 6 Fr  (Active)   Number of days: 0       Anesthesia Type:   IV Sedation with Anesthesia    Operative Indications:  Calculus of kidney [N20 0]  This 79-year-old male seen in the office for persistent microscopic hematuria chronic renal failure left renal stones underwent spiral CT scan confirming a 10 and 8 mm renal stone  Patient was advised to undergo cysto retrogrades in light of Agent Orange exposure during the war  Patient now to undergo cysto retrogrades with left ureteroscopy laser lithotripsy multiple stones    Aware risk of anesthesia infection bleeding additional urologic procedures    Operative Findings:  Cystoscopy confirms 3 5 cm bilobar obstructing prostatic urethra  Right retrograde pyelogram confirms complete cutoff at the  Iliac vessels   Right ureteroscopy confirming stricture disease  Left ureteroscopy confirming 10 and 8 mm stones laser lithotripsy basket extraction stent placed    Complications:   None    Procedure and Technique:  Patient identified in the surgical preop unit  Left side marked placed in the operative suite  After anesthesia induced he was placed thigh position draped prep standard fashion  Time-out performed  Twenty-two Czech cystoscope passed through through the bladder  Anterior showed no abnormalities  Posterior the confirmed 3 5 cm bilobar obstructing prostatic urethra impinging towards the trigon pancystoscopy with 37 degree lens confirms no bladder lesions  The right orifice cannulated with a 5 Czech catheter retrograde confirmed complete cutoff at the distal ureter just distal to the iliac vessels  Left diagnostic ureteroscopy confirmed stricture disease attempts at passing a wire unsuccessful  Left retrograde pyelogram confirmed filling of the distal mid proximal ureter 2 038 wires were passed up into the left renal pelvis the 1st left a safety the 2nd cannulated with 35 cm ureteral sheath passed upward under fluoroscopic control retrograde through the sheath confirmed filling of the entire pelvis no abnormalities  The after removed flexible scope was placed the stones in the mid lower pole were encountered and broken with the laser basket extraction of larger fragments  The largest fragment removed with the sheath in light of size without difficulty a 24 cm 6 Czech stent was passed wire removed  [de-identified] Western Gwendolyn Ash catheter inserted left to bag drainage time of dictation urine is mild hematuria postop procedure well awakened taken recovery room stable condition patient will be discussed on right ureteral stricture previous surgeries injuries     I was present for the entire procedure    Patient Disposition:  PACU       SIGNATURE: Ravinder Dyer MD  DATE: May 12, 2022  TIME: 11:16 AM

## 2022-05-12 NOTE — ANESTHESIA PREPROCEDURE EVALUATION
Procedure:  CYSTOSCOPY, BILATERAL RETROGRADE PYELOGRAM, LEFT URETEROSCOPY, LASER, AND STENT (N/A Bladder)    Relevant Problems   CARDIO   (+) Atrial fibrillation, persistent (HCC)   (+) Hyperlipidemia   (+) Hypertension   (+) Thoracic aortic aneurysm without rupture (HCC)      ENDO   (+) Hyperparathyroidism (HCC)   (+) Hypothyroidism      /RENAL   (+) Benign prostatic hyperplasia without lower urinary tract symptoms   (+) Stage 3a chronic kidney disease (HCC)      MUSCULOSKELETAL   (+) Chronic lumbar pain   (+) Osteoarthrosis involving multiple sites      NEURO/PSYCH   (+) Chronic left shoulder pain   (+) Chronic lumbar pain   (+) Chronic pain syndrome   (+) Chronic right shoulder pain   (+) Continuous opioid dependence (HCC)      PULMONARY   (+) COPD (chronic obstructive pulmonary disease) (HCC)   (+) Severe obstructive sleep apnea        Physical Exam    Airway    Mallampati score: II  TM Distance: >3 FB  Neck ROM: full     Dental   No notable dental hx upper dentures and lower dentures,     Cardiovascular      Pulmonary      Other Findings        Anesthesia Plan  ASA Score- 3     Anesthesia Type- general with ASA Monitors  Additional Monitors:   Airway Plan: LMA  Plan Factors-Exercise tolerance (METS): <4 METS  Chart reviewed  EKG reviewed  Existing labs reviewed  Patient summary reviewed  Patient is not a current smoker  Induction- intravenous  Postoperative Plan- Plan for postoperative opioid use  Informed Consent- Anesthetic plan and risks discussed with patient  I personally reviewed this patient with the CRNA  Discussed and agreed on the Anesthesia Plan with the CRNA  Antonio Mccurdy

## 2022-05-12 NOTE — DISCHARGE INSTRUCTIONS
#1 no heavy straining or lifting above 10 pounds for 2 weeks    #2 call office fevers, chills, or worsening blood in the urine  #3 Patient has follow-up Dr Yosi Foley tomorrow May 14th 10:00 a m  To have Ash removed patient has follow-up Dr Yosi Foley May 26th Thursday 10:00 a m  Please do KUB next week if stone fragments passed will undergo cysto left stent removal without time      Cherylene Fury Antario M D  [address] [place] [address] office  [address] [place] [address]  [phone]  8:30 AM to 4:30 PM  Monday through Friday    [place] office  [phone]  [place], [address] [place] [address]  [phone]  1:00 to 5:00 PM  Wednesday

## 2022-05-12 NOTE — ANESTHESIA POSTPROCEDURE EVALUATION
Post-Op Assessment Note    CV Status:  Stable  Pain Score: 0    Pain management: adequate     Mental Status:  Alert and awake   Hydration Status:  Stable   PONV Controlled:  None      Post Op Vitals Reviewed: Yes      Staff: Anesthesiologist, CRNA         No complications documented      BP   129/96   Temp      Pulse  98   Resp 14   SpO2   100

## 2022-05-12 NOTE — PROGRESS NOTES
Progress Note - Urology      Patient: Pilar Bishop   : 1945 Sex: male   MRN: [id number]     CSN: [id number]  Unit/Bed#: OR POOL     SUBJECTIVE:   Patient preop surgical unit microscopic hematuria Agent Orange exposure spiral CT scan confirming left renal stone 10/6 mm now to undergo cysto retrogrades left ureteroscopy laser lithotripsy stent placement aware risk of anesthesia infection bleeding additional urologic procedures      Objective   Vitals: /86   Pulse 86   Temp 98 °F (36 7 °C) (Temporal)   Resp 18   Ht 5' 5" (1 651 m)   Wt 94 5 kg (208 lb 6 4 oz)   SpO2 95%   BMI 34 68 kg/m²     No intake/output data recorded        Physical Exam:   General Alert awake   Normocephalic atraumatic PERRLA  Lungs clear bilaterally  Cardiac normal S1 normal S2  Abdomen soft, flank pain  Extremities no edema      Lab Results: CBC:   Lab Results   Component Value Date    WBC 6 53 2022    HGB 19 0 (H) 2022    HCT 56 6 (H) 2022    MCV 93 2022     2022     2022    MCH 31 2 2022    MCHC 33 6 2022    RDW 13 5 2022    MPV 11 1 2022    MPV 11 1 2022    NRBC 0 2022     CMP:   Lab Results   Component Value Date     2022    CO2 25 2022    BUN 20 2022    CREATININE 1 49 (H) 2022    CALCIUM 10 2 (H) 2022    AST 30 2021    ALT 41 2021    ALKPHOS 85 2021    EGFR 45 2022     Urinalysis: No results found for: Nalini Early, SPECGRAV, PHUR, LEUKOCYTESUR, NITRITE, PROTEINUA, GLUCOSEU, KETONESU, BILIRUBINUR, BLOODU  Urine Culture: No results found for: URINECX  PSA:   Lab Results   Component Value Date    PSA 4 2 (H) 2022    PSA 4 1 (H) 2021    PSA 3 3 2021    PSA 4 8 (H) 2020         Assessment/ Plan:  Cysto bilateral retrogrades left ureteroscopy laser lithotripsy stent placement          Lyla De La Fuente MD

## 2022-05-16 ENCOUNTER — HOSPITAL ENCOUNTER (OUTPATIENT)
Dept: RADIOLOGY | Facility: HOSPITAL | Age: 77
Discharge: HOME | End: 2022-05-16
Attending: SPECIALIST
Payer: COMMERCIAL

## 2022-05-16 DIAGNOSIS — N20.0 URIC ACID NEPHROLITHIASIS: ICD-10-CM

## 2022-05-16 PROCEDURE — 74018 RADEX ABDOMEN 1 VIEW: CPT

## 2022-05-17 ENCOUNTER — TRANSCRIBE ORDERS (OUTPATIENT)
Dept: LAB | Facility: CLINIC | Age: 77
End: 2022-05-17

## 2022-05-17 ENCOUNTER — APPOINTMENT (OUTPATIENT)
Dept: LAB | Facility: CLINIC | Age: 77
End: 2022-05-17
Payer: COMMERCIAL

## 2022-05-17 DIAGNOSIS — I48.19 PERSISTENT ATRIAL FIBRILLATION (HCC): ICD-10-CM

## 2022-05-17 DIAGNOSIS — E03.9 MYXEDEMA HEART DISEASE: ICD-10-CM

## 2022-05-17 DIAGNOSIS — I51.9 MYXEDEMA HEART DISEASE: ICD-10-CM

## 2022-05-17 DIAGNOSIS — N18.2 CHRONIC KIDNEY DISEASE, STAGE II (MILD): ICD-10-CM

## 2022-05-17 DIAGNOSIS — I51.9 MYXEDEMA HEART DISEASE: Primary | ICD-10-CM

## 2022-05-17 DIAGNOSIS — R73.03 PREDIABETES: ICD-10-CM

## 2022-05-17 DIAGNOSIS — I11.9 MALIGNANT HYPERTENSIVE HEART DISEASE WITHOUT HEART FAILURE: ICD-10-CM

## 2022-05-17 DIAGNOSIS — E03.9 MYXEDEMA HEART DISEASE: Primary | ICD-10-CM

## 2022-05-17 DIAGNOSIS — R73.01 IMPAIRED FASTING GLUCOSE: ICD-10-CM

## 2022-05-17 DIAGNOSIS — Z00.00 WELL ADULT EXAM: ICD-10-CM

## 2022-05-17 DIAGNOSIS — E03.9 HYPOTHYROIDISM, UNSPECIFIED TYPE: ICD-10-CM

## 2022-05-17 LAB
ALBUMIN SERPL BCP-MCNC: 3.8 G/DL (ref 3.5–5)
ALP SERPL-CCNC: 78 U/L (ref 46–116)
ALT SERPL W P-5'-P-CCNC: 39 U/L (ref 12–78)
ANION GAP SERPL CALCULATED.3IONS-SCNC: 2 MMOL/L (ref 4–13)
AST SERPL W P-5'-P-CCNC: 36 U/L (ref 5–45)
BASOPHILS # BLD AUTO: 0.04 THOUSANDS/ΜL (ref 0–0.1)
BASOPHILS NFR BLD AUTO: 1 % (ref 0–1)
BILIRUB DIRECT SERPL-MCNC: 0.25 MG/DL (ref 0–0.2)
BILIRUB SERPL-MCNC: 1.29 MG/DL (ref 0.2–1)
BUN SERPL-MCNC: 21 MG/DL (ref 5–25)
CALCIUM SERPL-MCNC: 10.4 MG/DL (ref 8.3–10.1)
CHLORIDE SERPL-SCNC: 109 MMOL/L (ref 100–108)
CHOLEST SERPL-MCNC: 133 MG/DL
CO2 SERPL-SCNC: 29 MMOL/L (ref 21–32)
CREAT SERPL-MCNC: 1.66 MG/DL (ref 0.6–1.3)
EOSINOPHIL # BLD AUTO: 0.14 THOUSAND/ΜL (ref 0–0.61)
EOSINOPHIL NFR BLD AUTO: 2 % (ref 0–6)
ERYTHROCYTE [DISTWIDTH] IN BLOOD BY AUTOMATED COUNT: 13.3 % (ref 11.6–15.1)
EST. AVERAGE GLUCOSE BLD GHB EST-MCNC: 114 MG/DL
GFR SERPL CREATININE-BSD FRML MDRD: 39 ML/MIN/1.73SQ M
GLUCOSE P FAST SERPL-MCNC: 117 MG/DL (ref 65–99)
HBA1C MFR BLD: 5.6 %
HCT VFR BLD AUTO: 55.3 % (ref 36.5–49.3)
HDLC SERPL-MCNC: 43 MG/DL
HGB BLD-MCNC: 18 G/DL (ref 12–17)
IMM GRANULOCYTES # BLD AUTO: 0.03 THOUSAND/UL (ref 0–0.2)
IMM GRANULOCYTES NFR BLD AUTO: 0 % (ref 0–2)
LDLC SERPL CALC-MCNC: 67 MG/DL (ref 0–100)
LYMPHOCYTES # BLD AUTO: 1.3 THOUSANDS/ΜL (ref 0.6–4.47)
LYMPHOCYTES NFR BLD AUTO: 20 % (ref 14–44)
MAGNESIUM SERPL-MCNC: 2.1 MG/DL (ref 1.6–2.6)
MCH RBC QN AUTO: 30.9 PG (ref 26.8–34.3)
MCHC RBC AUTO-ENTMCNC: 32.5 G/DL (ref 31.4–37.4)
MCV RBC AUTO: 95 FL (ref 82–98)
MONOCYTES # BLD AUTO: 0.55 THOUSAND/ΜL (ref 0.17–1.22)
MONOCYTES NFR BLD AUTO: 8 % (ref 4–12)
NEUTROPHILS # BLD AUTO: 4.61 THOUSANDS/ΜL (ref 1.85–7.62)
NEUTS SEG NFR BLD AUTO: 69 % (ref 43–75)
NONHDLC SERPL-MCNC: 90 MG/DL
NRBC BLD AUTO-RTO: 0 /100 WBCS
PLATELET # BLD AUTO: 193 THOUSANDS/UL (ref 149–390)
PMV BLD AUTO: 10.9 FL (ref 8.9–12.7)
POTASSIUM SERPL-SCNC: 4 MMOL/L (ref 3.5–5.3)
PROT SERPL-MCNC: 7.9 G/DL (ref 6.4–8.2)
RBC # BLD AUTO: 5.82 MILLION/UL (ref 3.88–5.62)
SODIUM SERPL-SCNC: 140 MMOL/L (ref 136–145)
T4 FREE SERPL-MCNC: 1.48 NG/DL (ref 0.76–1.46)
TRIGL SERPL-MCNC: 116 MG/DL
TSH SERPL DL<=0.05 MIU/L-ACNC: 0.71 UIU/ML (ref 0.45–4.5)
WBC # BLD AUTO: 6.67 THOUSAND/UL (ref 4.31–10.16)

## 2022-05-17 PROCEDURE — 85025 COMPLETE CBC W/AUTO DIFF WBC: CPT

## 2022-05-17 PROCEDURE — 36415 COLL VENOUS BLD VENIPUNCTURE: CPT

## 2022-05-17 PROCEDURE — 80061 LIPID PANEL: CPT

## 2022-05-17 PROCEDURE — 80053 COMPREHEN METABOLIC PANEL: CPT

## 2022-05-17 PROCEDURE — 83036 HEMOGLOBIN GLYCOSYLATED A1C: CPT

## 2022-05-17 PROCEDURE — 82248 BILIRUBIN DIRECT: CPT

## 2022-05-17 PROCEDURE — 84443 ASSAY THYROID STIM HORMONE: CPT

## 2022-05-17 PROCEDURE — 84439 ASSAY OF FREE THYROXINE: CPT

## 2022-05-17 PROCEDURE — 83735 ASSAY OF MAGNESIUM: CPT

## 2022-05-19 LAB
COLOR STONE: NORMAL
COM MFR STONE: 100 %
COMMENT-STONE3: NORMAL
COMPOSITION: NORMAL
LABORATORY COMMENT REPORT: NORMAL
PHOTO: NORMAL
SIZE STONE: NORMAL MM
SPEC SOURCE SUBJ: NORMAL
STONE ANALYSIS-IMP: NORMAL
STONE ANALYSIS-IMP: NORMAL
WT STONE: 57 MG

## 2022-06-01 ENCOUNTER — HOSPITAL ENCOUNTER (OUTPATIENT)
Dept: RADIOLOGY | Facility: HOSPITAL | Age: 77
Discharge: HOME | End: 2022-06-01
Attending: SPECIALIST
Payer: COMMERCIAL

## 2022-06-01 DIAGNOSIS — N20.0 URIC ACID NEPHROLITHIASIS: ICD-10-CM

## 2022-06-01 PROCEDURE — 74018 RADEX ABDOMEN 1 VIEW: CPT

## 2022-06-10 DIAGNOSIS — I10 ESSENTIAL HYPERTENSION: ICD-10-CM

## 2022-06-10 RX ORDER — AMLODIPINE BESYLATE 10 MG/1
10 TABLET ORAL DAILY
Qty: 90 TABLET | Refills: 1 | Status: SHIPPED | OUTPATIENT
Start: 2022-06-10

## 2022-07-26 ENCOUNTER — HOSPITAL ENCOUNTER (OUTPATIENT)
Dept: RADIOLOGY | Facility: HOSPITAL | Age: 77
Discharge: HOME | End: 2022-07-26
Attending: SPECIALIST
Payer: COMMERCIAL

## 2022-07-26 DIAGNOSIS — N20.0 URIC ACID NEPHROLITHIASIS: ICD-10-CM

## 2022-07-26 PROCEDURE — 74018 RADEX ABDOMEN 1 VIEW: CPT

## 2022-08-04 ENCOUNTER — RA CDI HCC (OUTPATIENT)
Dept: OTHER | Facility: HOSPITAL | Age: 77
End: 2022-08-04

## 2022-08-04 NOTE — PROGRESS NOTES
[facility]  coding opportunities       Chart reviewed, no opportunity found:   [address]        Patients Insurance     Medicare Insurance: [employer]

## 2022-08-10 RX ORDER — AMOXICILLIN 500 MG/1
CAPSULE ORAL
COMMUNITY
Start: 2022-07-07 | End: 2022-09-14

## 2022-08-11 ENCOUNTER — OFFICE VISIT (OUTPATIENT)
Dept: FAMILY MEDICINE CLINIC | Facility: CLINIC | Age: 77
End: 2022-08-11
Payer: COMMERCIAL

## 2022-08-11 VITALS
HEIGHT: 65 IN | BODY MASS INDEX: 32.15 KG/M2 | WEIGHT: 193 LBS | DIASTOLIC BLOOD PRESSURE: 80 MMHG | SYSTOLIC BLOOD PRESSURE: 125 MMHG | OXYGEN SATURATION: 99 % | HEART RATE: 91 BPM

## 2022-08-11 DIAGNOSIS — I10 ESSENTIAL HYPERTENSION: ICD-10-CM

## 2022-08-11 DIAGNOSIS — J44.9 CHRONIC OBSTRUCTIVE PULMONARY DISEASE, UNSPECIFIED COPD TYPE (HCC): ICD-10-CM

## 2022-08-11 DIAGNOSIS — M54.50 CHRONIC LOW BACK PAIN, UNSPECIFIED BACK PAIN LATERALITY, UNSPECIFIED WHETHER SCIATICA PRESENT: ICD-10-CM

## 2022-08-11 DIAGNOSIS — E66.09 CLASS 1 OBESITY DUE TO EXCESS CALORIES WITH SERIOUS COMORBIDITY AND BODY MASS INDEX (BMI) OF 32.0 TO 32.9 IN ADULT: ICD-10-CM

## 2022-08-11 DIAGNOSIS — I48.19 ATRIAL FIBRILLATION, PERSISTENT (HCC): ICD-10-CM

## 2022-08-11 DIAGNOSIS — G47.33 SEVERE OBSTRUCTIVE SLEEP APNEA: ICD-10-CM

## 2022-08-11 DIAGNOSIS — G89.29 CHRONIC LOW BACK PAIN, UNSPECIFIED BACK PAIN LATERALITY, UNSPECIFIED WHETHER SCIATICA PRESENT: ICD-10-CM

## 2022-08-11 DIAGNOSIS — E03.9 HYPOTHYROIDISM, UNSPECIFIED TYPE: ICD-10-CM

## 2022-08-11 DIAGNOSIS — N18.31 STAGE 3A CHRONIC KIDNEY DISEASE (HCC): ICD-10-CM

## 2022-08-11 DIAGNOSIS — N20.0 KIDNEY STONE: Primary | ICD-10-CM

## 2022-08-11 DIAGNOSIS — F11.20 CONTINUOUS OPIOID DEPENDENCE (HCC): ICD-10-CM

## 2022-08-11 PROCEDURE — 1160F RVW MEDS BY RX/DR IN RCRD: CPT | Performed by: FAMILY MEDICINE

## 2022-08-11 PROCEDURE — 99214 OFFICE O/P EST MOD 30 MIN: CPT | Performed by: FAMILY MEDICINE

## 2022-08-11 PROCEDURE — 1003F LEVEL OF ACTIVITY ASSESS: CPT | Performed by: FAMILY MEDICINE

## 2022-08-11 PROCEDURE — 3725F SCREEN DEPRESSION PERFORMED: CPT | Performed by: FAMILY MEDICINE

## 2022-08-11 NOTE — ASSESSMENT & PLAN NOTE
Lab Results   Component Value Date    EGFR 39 05/17/2022    EGFR 45 05/03/2022    EGFR 50 11/17/2021    CREATININE 1 66 (H) 05/17/2022    CREATININE 1 49 (H) 05/03/2022    CREATININE 1 38 (H) 11/17/2021   family hx of esrd   -stable/controlled, continue same medication   Will evaluate again next visit

## 2022-08-11 NOTE — PROGRESS NOTES
Assessment/Plan:    1  Kidney stone    2  Atrial fibrillation, persistent ([facility] )  Assessment & Plan:  -stable/controlled, continue same medication  Will evaluate again next visit         3  Continuous opioid dependence ([facility] )    4  Hypothyroidism, unspecified type  Assessment & Plan:  -stable/controlled, continue same medication  Will evaluate again next visit         5  Essential hypertension    6  Chronic obstructive pulmonary disease, unspecified COPD type ([facility] )  Assessment & Plan:  -stable/controlled, continue same medication  Will evaluate again next visit         7  Stage 3a chronic kidney disease [facility])  Assessment & Plan:  Lab Results   Component Value Date    EGFR 39 05/17/2022    EGFR 45 05/03/2022    EGFR 50 11/17/2021    CREATININE 1 66 (H) 05/17/2022    CREATININE 1 49 (H) 05/03/2022    CREATININE 1 38 (H) 11/17/2021   family hx of esrd   -stable/controlled, continue same medication  Will evaluate again next visit         8  Severe obstructive sleep apnea  Assessment & Plan:  -stable/controlled, continue same medication  Will evaluate again next visit         9  Chronic low back pain, unspecified back pain laterality, unspecified whether sciatica present  Comments:  stable on 1/2 a pill at night     10  Class 1 obesity due to excess calories with serious comorbidity and body mass index (BMI) of 32 0 to 32 9 in adult         Subjective:      Patient ID: Priscilla Serrano is a 78 y o  male  HPI     Here to go over chronic issues and labs / imaging studies if applicable       Reviewed the CT and KUB for the stones     Cardiac stable     And pain is controled   Today is a good day     No breathing issues     Labs utd     Loosing wt   Intentional      The following portions of the patient's history were reviewed and updated as appropriate: allergies, current medications, past family history, past medical history, past social history, past surgical history and problem list     Review of Systems   Constitutional: Negative for activity change, appetite change and fever  HENT: Negative for congestion, nosebleeds and trouble swallowing  Eyes: Negative for itching  Respiratory: Negative for cough and chest tightness  Cardiovascular: Negative for chest pain and palpitations  Gastrointestinal: Negative for abdominal pain, constipation, diarrhea and nausea  Endocrine: Negative for cold intolerance  Genitourinary: Negative for frequency  Musculoskeletal: Positive for arthralgias  Negative for gait problem and joint swelling  Skin: Negative for rash  Allergic/Immunologic: Negative for immunocompromised state  Neurological: Negative for dizziness, tremors, seizures, syncope and headaches  Psychiatric/Behavioral: Negative for hallucinations and suicidal ideas  Objective:      /80   Pulse 91   Ht 5' 5" (1 651 m)   Wt 87 5 kg (193 lb)   SpO2 99%   BMI 32 12 kg/m²     No visits with results within 2 Week(s) from this visit     Latest known visit with results is:   Appointment on 05/17/2022   Component Date Value    Cholesterol 05/17/2022 133     Triglycerides 05/17/2022 116     HDL, Direct 05/17/2022 43     LDL Calculated 05/17/2022 67     Non-HDL-Chol (CHOL-HDL) 05/17/2022 90     Magnesium 05/17/2022 2 1     Free T4 05/17/2022 1 48 (A)    Hemoglobin A1C 05/17/2022 5 6     EAG 05/17/2022 114     TSH 3RD GENERATON 05/17/2022 0 708     WBC 05/17/2022 6 67     RBC 05/17/2022 5 82 (A)    Hemoglobin 05/17/2022 18 0 (A)    Hematocrit 05/17/2022 55 3 (A)    MCV 05/17/2022 95     MCH 05/17/2022 30 9     MCHC 05/17/2022 32 5     RDW 05/17/2022 13 3     MPV 05/17/2022 10 9     Platelets 53/89/3995 193     nRBC 05/17/2022 0     Neutrophils Relative 05/17/2022 69     Immat GRANS % 05/17/2022 0     Lymphocytes Relative 05/17/2022 20     Monocytes Relative 05/17/2022 8     Eosinophils Relative 05/17/2022 2     Basophils Relative 05/17/2022 1     Neutrophils Absolute 05/17/2022 4 61  Immature Grans Absolute 05/17/2022 0 03     Lymphocytes Absolute 05/17/2022 1 30     Monocytes Absolute 05/17/2022 0 55     Eosinophils Absolute 05/17/2022 0 14     Basophils Absolute 05/17/2022 0 04     Sodium 05/17/2022 140     Potassium 05/17/2022 4 0     Chloride 05/17/2022 109 (A)    CO2 05/17/2022 29     ANION GAP 05/17/2022 2 (A)    BUN 05/17/2022 21     Creatinine 05/17/2022 1 66 (A)    Glucose, Fasting 05/17/2022 117 (A)    Calcium 05/17/2022 10 4 (A)    AST 05/17/2022 36     ALT 05/17/2022 39     Alkaline Phosphatase 05/17/2022 78     Total Protein 05/17/2022 7 9     Albumin 05/17/2022 3 8     Total Bilirubin 05/17/2022 1 29 (A)    eGFR 05/17/2022 39     Bilirubin, Direct 05/17/2022 0 25 (A)          Physical Exam  Vitals and nursing note reviewed  Constitutional:       General: He is not in acute distress  Appearance: He is well-developed  He is obese  HENT:      Head: Normocephalic and atraumatic  Cardiovascular:      Rate and Rhythm: Normal rate  Rhythm irregular  Heart sounds: Normal heart sounds  No murmur heard  Pulmonary:      Effort: Pulmonary effort is normal       Breath sounds: Normal breath sounds  No wheezing or rales  Abdominal:      General: Bowel sounds are normal  There is no distension  Palpations: Abdomen is soft  Tenderness: There is no abdominal tenderness  There is no guarding or rebound  Musculoskeletal:         General: No tenderness  Normal range of motion  Cervical back: Normal range of motion and neck supple  Lymphadenopathy:      Cervical: No cervical adenopathy  Skin:     General: Skin is warm and dry  Capillary Refill: Capillary refill takes less than 2 seconds  Findings: No rash  Neurological:      Mental Status: He is alert and oriented to person, place, and time  Cranial Nerves: No cranial nerve deficit  Sensory: No sensory deficit  Motor: No abnormal muscle tone     Psychiatric: Behavior: Behavior normal          Thought Content: Thought content normal          Judgment: Judgment normal            BMI Counseling: Body mass index is 32 12 kg/m²  The BMI is above normal  Nutrition recommendations include decreasing portion sizes, encouraging healthy choices of fruits and vegetables, decreasing fast food intake, consuming healthier snacks and limiting drinks that contain sugar  Exercise recommendations include exercising 3-5 times per week  No pharmacotherapy was ordered  Rationale for BMI follow-up plan is due to patient being overweight or obese  Falls Plan of Care: balance, strength, and gait training instructions were provided  Medications that increase falls were reviewed         Alex Benavides MD  [place] [address]

## 2022-08-11 NOTE — ASSESSMENT & PLAN NOTE
-stable/controlled, continue same medication   Will evaluate again next visit Dr. Sellers's office is located at [address] [place] [address]  Office phone number #[phone].

## 2022-08-15 DIAGNOSIS — I48.19 ATRIAL FIBRILLATION, PERSISTENT (HCC): ICD-10-CM

## 2022-08-15 RX ORDER — APIXABAN 5 MG/1
TABLET, FILM COATED ORAL
Qty: 180 TABLET | Refills: 1 | Status: SHIPPED | OUTPATIENT
Start: 2022-08-15

## 2022-08-23 ENCOUNTER — VBI (OUTPATIENT)
Dept: ADMINISTRATIVE | Facility: OTHER | Age: 77
End: 2022-08-23

## 2022-08-26 NOTE — PRE-PROCEDURE INSTRUCTIONS
My Surgical Experience    The following information was developed to assist you to prepare for your operation  What do I need to do before coming to the hospital?   Arrange for a responsible person to drive you to and from the hospital    Arrange care for your children at home  Children are not allowed in the recovery areas of the hospital   Plan to wear clothing that is easy to put on and take off  If you are having shoulder surgery, wear a shirt that buttons or zippers in the front  Bathing  o Shower the evening before and the morning of your surgery with an antibacterial soap  Please refer to the Pre Op Showering Instructions for Surgery Patients Sheet   o Remove nail polish and all body piercing jewelry  o Do not shave any body part for at least 24 hours before surgery-this includes face, arms, legs and upper body  Food  o Nothing to eat or drink after midnight the night before your surgery  This includes candy and chewing gum  o Exception: If your surgery is after 12:00pm (noon), you may have clear liquids such as 7-Up®, ginger ale, apple or cranberry juice, Jell-O®, water, or clear broth until 8:00 am  o Do not drink milk or juice with pulp on the morning before surgery  o Do not drink alcohol 24 hours before surgery  Medicine  o Follow instructions you received from your surgeon about which medicines you may take on the day of surgery  o If instructed to take medicine on the morning of surgery, take pills with just a small sip of water  Call your prescribing doctor for specific infroamtion on what to do if you take insulin    What should I bring to the hospital?    Bring:  Ayala Hammonds or a walker, if you have them, for foot or knee surgery   A list of the daily medicines, vitamins, minerals, herbals and nutritional supplements you take   Include the dosages of medicines and the time you take them each day   Glasses, dentures or hearing aids   Minimal clothing; you will be wearing hospital sleepwear   Photo ID; required to verify your identity   If you have a Living Will or Power of , bring a copy of the documents   If you have an ostomy, bring an extra pouch and any supplies you use    Do not bring   Medicines or inhalers   Money, valuables or jewelry    What other information should I know about the day of surgery?  Notify your surgeons if you develop a cold, sore throat, cough, fever, rash or any other illness   Report to the Ambulatory Surgical/Same Day Surgery Unit   You will be instructed to stop at Registration only if you have not been pre-registered   Inform your  fi they do not stay that they will be asked by the staff to leave a phone number where they can be reached   Be available to be reached before surgery  In the event the operating room schedule changes, you may be asked to come in earlier or later than expected    *It is important to tell your doctor and others involved in your health care if you are taking or have been taking any non-prescription drugs, vitamins, minerals, herbals or other nutritional supplements  Any of these may interact with some food or medicines and cause a reaction      Pre-Surgery Instructions:   Medication Instructions    amLODIPine (NORVASC) 10 mg tablet Take night before surgery    atorvastatin (LIPITOR) 20 mg tablet Take night before surgery    B Complex-C (B COMPLEX-VITAMIN C PO) Hold day of surgery   Cholecalciferol (VITAMIN D3) 50 MCG (2000 UT) capsule Hold day of surgery   Eliquis 5 MG Instructions provided by MD    INCRUSE ELLIPTA 62 5 MCG/INH AEPB inhaler Take night before surgery    levothyroxine 75 mcg tablet Take day of surgery   metoprolol succinate (TOPROL-XL) 100 mg 24 hr tablet Take day of surgery   oxyCODONE-acetaminophen (PERCOCET) 5-325 mg per tablet Hold day of surgery

## 2022-08-30 ENCOUNTER — ANESTHESIA EVENT (OUTPATIENT)
Dept: PERIOP | Facility: HOSPITAL | Age: 77
End: 2022-08-30
Payer: COMMERCIAL

## 2022-08-30 NOTE — ANESTHESIA PREPROCEDURE EVALUATION
Procedure:  CYSTOSCOPY, URETEROSCOPY WITH BASKET, STENT (Left Bladder)    Relevant Problems   CARDIO   (+) Atrial fibrillation, persistent (HCC)   (+) Hyperlipidemia   (+) Hypertension   (+) Thoracic aortic aneurysm without rupture (HCC)      ENDO   (+) Hyperparathyroidism (HCC)   (+) Hypothyroidism      /RENAL   (+) Benign prostatic hyperplasia without lower urinary tract symptoms   (+) Stage 3a chronic kidney disease (HCC)      MUSCULOSKELETAL   (+) Chronic lumbar pain   (+) Osteoarthrosis involving multiple sites      NEURO/PSYCH   (+) Chronic left shoulder pain   (+) Chronic lumbar pain   (+) Chronic pain syndrome   (+) Chronic right shoulder pain   (+) Continuous opioid dependence (HCC)      PULMONARY   (+) COPD (chronic obstructive pulmonary disease) ([facility])   (+) Severe obstructive sleep apnea        Physical Exam    Airway    Mallampati score: II  TM Distance: >3 FB  Neck ROM: full     Dental   upper dentures and lower dentures,     Cardiovascular  Rhythm: regular, Rate: normal,     Pulmonary  Breath sounds clear to auscultation,     Other Findings        Anesthesia Plan  ASA Score- 3     Anesthesia Type- general with ASA Monitors  Additional Monitors:   Airway Plan: LMA  Plan Factors-    Chart reviewed  Patient is not a current smoker  Induction- intravenous  Postoperative Plan- Plan for postoperative opioid use  Informed Consent- Anesthetic plan and risks discussed with patient  I personally reviewed this patient with the CRNA  Discussed and agreed on the Anesthesia Plan with the CRNA  Antonio Thrasher

## 2022-08-31 NOTE — H&P
H&P Exam - Urology       Patient: Skinny Prater   : 1945 Sex: male   MRN: [id number]     CSN: [id number]      History of Present Illness   HPI:  Skinny Prater is a 78 y o  male who presents with residual stone fragments status post left ureteroscopy laser lithotripsy stent placement a few weeks ago refusing office cysto stent removal now to undergo cysto stent removal retrograde possible ureteroscopy basket laser stent exchange aware risk of anesthesia infection bleeding additional urologic procedures will most likely go home with Ash        Review of Systems:   Constitutional:  Negative for activity change, fever, chills and diaphoresis  HENT: Negative for hearing loss and trouble swallowing  Eyes: Negative for itching and visual disturbance  Respiratory: Negative for chest tightness and shortness of breath  Cardiovascular: Negative for chest pain, edema  Gastrointestinal: Negative for abdominal distention, na abdominal pain, constipation, diarrhea, Nausea and vomiting  Genitourinary: Negative for decreased urine volume, difficulty urinating, dysuria, enuresis, frequency, hematuria and urgency  Musculoskeletal: Negative for gait problem and myalgias  Neurological: Negative for dizziness and headaches  Hematological: Does not bruise/bleed easily         Historical Information   Past Medical History:   Diagnosis Date    Arthritis     H/O echocardiogram 2018    History of EKG 2019    numerous tests between 2018 through 2019    History of exercise stress test 10/2018    History of Holter monitoring 2018    Hyperlipidemia     Hypertension     Irregular heart beat     Kidney stone      Past Surgical History:   Procedure Laterality Date    CARDIAC ELECTROPHYSIOLOGY STUDY AND ABLATION  2019    CARDIOVERSION  2019    COLON SURGERY      CYSTOSCOPY      FL RETROGRADE PYELOGRAM  2022    HERNIA REPAIR      IR JOINT INJECTION  2019    LAPAROSCOPIC ASSISSTED TOTAL COLECTOMY W/ J-POUCH      RI CYSTO/URETERO W/LITHOTRIPSY &INDWELL STENT INSRT N/A 2022    Procedure: BILATERAL RETROGRADE PYELOGRAM, CYSTOSCOPY,  LEFT URETEROSCOPY, LEFT LASER LITHOTRIPSY BASKET EXTRACTION, LEFT STENT;  Surgeon: Stephanie Hess MD;  Location: [address] [place] [address];  Service: Urology    THORACOTOMY Left 2001    VASECTOMY       Social History   Social History     Substance and Sexual Activity   Alcohol Use Yes    Comment: Cadence Baron  Social History     Substance and Sexual Activity   Drug Use Never     Social History     Tobacco Use   Smoking Status Former Smoker    Packs/day: 1 50    Years: 15 00    Pack years: 22 50    Quit date: 12    Years since quittin 6   Smokeless Tobacco Never Used     Family History:   Family History   Problem Relation Age of Onset    Arthritis Father        Meds/Allergies   No medications prior to admission  No Known Allergies    Objective   Vitals: There were no vitals taken for this visit  Physical Exam:  General Alert awake   Normocephalic atraumatic PERRLA  Lungs clear bilaterally  Cardiac normal S1 normal S2  Abdomen soft, flank pain  Extremities no edema    No intake/output data recorded      Invasive Devices  Report    Peripheral Intravenous Line  Duration           Peripheral IV 22 Left;Ventral (anterior) Hand 111 days          Drain  Duration           Ureteral Internal Stent Left ureter 6 Fr  111 days    Urethral Catheter 20 Fr  111 days                    Lab Results: CBC:   Lab Results   Component Value Date    WBC 6 67 2022    HGB 18 0 (H) 2022    HCT 55 3 (H) 2022    MCV 95 2022     2022    MCH 30 9 2022    MCHC 32 5 2022    RDW 13 3 2022    MPV 10 9 2022    NRBC 0 2022     CMP:   Lab Results   Component Value Date     (H) 2022    CO2 29 2022    BUN 21 2022    CREATININE 1 66 (H) 2022    CALCIUM 10 4 (H) 2022 AST 36 05/17/2022    ALT 39 05/17/2022    ALKPHOS 78 05/17/2022    EGFR 39 05/17/2022     Urinalysis: No results found for: Patricia Earnest, SPECGRAV, PHUR, LEUKOCYTESUR, NITRITE, PROTEINUA, GLUCOSEU, KETONESU, BILIRUBINUR, BLOODU  Urine Culture: No results found for: URINECX  PSA:   Lab Results   Component Value Date    PSA 4 2 (H) 02/18/2022    PSA 4 1 (H) 08/16/2021    PSA 3 3 01/07/2021    PSA 4 8 (H) 12/08/2020           Assessment/ Plan:  Cysto left stent removal retrograde ureteroscopy basket stent exchange      Ade Levi MD

## 2022-09-01 ENCOUNTER — ANESTHESIA (OUTPATIENT)
Dept: PERIOP | Facility: HOSPITAL | Age: 77
End: 2022-09-01
Payer: COMMERCIAL

## 2022-09-01 ENCOUNTER — HOSPITAL ENCOUNTER (OUTPATIENT)
Facility: HOSPITAL | Age: 77
Setting detail: OUTPATIENT SURGERY
Discharge: HOME | End: 2022-09-01
Attending: SPECIALIST | Admitting: SPECIALIST
Payer: COMMERCIAL

## 2022-09-01 ENCOUNTER — APPOINTMENT (OUTPATIENT)
Dept: RADIOLOGY | Facility: HOSPITAL | Age: 77
End: 2022-09-01
Payer: COMMERCIAL

## 2022-09-01 VITALS
WEIGHT: 192.2 LBS | HEIGHT: 65 IN | OXYGEN SATURATION: 96 % | RESPIRATION RATE: 18 BRPM | DIASTOLIC BLOOD PRESSURE: 80 MMHG | HEART RATE: 85 BPM | BODY MASS INDEX: 32.02 KG/M2 | TEMPERATURE: 97.2 F | SYSTOLIC BLOOD PRESSURE: 127 MMHG

## 2022-09-01 DIAGNOSIS — N20.1 CALCULUS OF URETER: ICD-10-CM

## 2022-09-01 PROCEDURE — 82360 CALCULUS ASSAY QUANT: CPT | Performed by: SPECIALIST

## 2022-09-01 PROCEDURE — C1769 GUIDE WIRE: HCPCS | Performed by: SPECIALIST

## 2022-09-01 PROCEDURE — C1894 INTRO/SHEATH, NON-LASER: HCPCS | Performed by: SPECIALIST

## 2022-09-01 PROCEDURE — 74420 UROGRAPHY RTRGR +-KUB: CPT

## 2022-09-01 PROCEDURE — 88300 SURGICAL PATH GROSS: CPT | Performed by: STUDENT IN AN ORGANIZED HEALTH CARE EDUCATION/TRAINING PROGRAM

## 2022-09-01 RX ORDER — MAGNESIUM HYDROXIDE 1200 MG/15ML
LIQUID ORAL AS NEEDED
Status: DISCONTINUED | OUTPATIENT
Start: 2022-09-01 | End: 2022-09-01 | Stop reason: HOSPADM

## 2022-09-01 RX ORDER — LEVOFLOXACIN 5 MG/ML
500 INJECTION, SOLUTION INTRAVENOUS ONCE
Status: COMPLETED | OUTPATIENT
Start: 2022-09-01 | End: 2022-09-01

## 2022-09-01 RX ORDER — SODIUM CHLORIDE, SODIUM LACTATE, POTASSIUM CHLORIDE, CALCIUM CHLORIDE 600; 310; 30; 20 MG/100ML; MG/100ML; MG/100ML; MG/100ML
75 INJECTION, SOLUTION INTRAVENOUS CONTINUOUS
Status: DISCONTINUED | OUTPATIENT
Start: 2022-09-01 | End: 2022-09-01 | Stop reason: HOSPADM

## 2022-09-01 RX ORDER — ONDANSETRON 2 MG/ML
INJECTION INTRAMUSCULAR; INTRAVENOUS AS NEEDED
Status: DISCONTINUED | OUTPATIENT
Start: 2022-09-01 | End: 2022-09-01

## 2022-09-01 RX ORDER — LIDOCAINE HYDROCHLORIDE 10 MG/ML
INJECTION, SOLUTION EPIDURAL; INFILTRATION; INTRACAUDAL; PERINEURAL AS NEEDED
Status: DISCONTINUED | OUTPATIENT
Start: 2022-09-01 | End: 2022-09-01

## 2022-09-01 RX ORDER — PROPOFOL 10 MG/ML
INJECTION, EMULSION INTRAVENOUS AS NEEDED
Status: DISCONTINUED | OUTPATIENT
Start: 2022-09-01 | End: 2022-09-01

## 2022-09-01 RX ORDER — FENTANYL CITRATE 50 UG/ML
INJECTION, SOLUTION INTRAMUSCULAR; INTRAVENOUS AS NEEDED
Status: DISCONTINUED | OUTPATIENT
Start: 2022-09-01 | End: 2022-09-01

## 2022-09-01 RX ORDER — PROPOFOL 10 MG/ML
INJECTION, EMULSION INTRAVENOUS CONTINUOUS PRN
Status: DISCONTINUED | OUTPATIENT
Start: 2022-09-01 | End: 2022-09-01

## 2022-09-01 RX ORDER — DOXYCYCLINE HYCLATE 100 MG/1
100 CAPSULE ORAL EVERY 12 HOURS SCHEDULED
COMMUNITY
End: 2022-09-14

## 2022-09-01 RX ORDER — FENTANYL CITRATE/PF 50 MCG/ML
25 SYRINGE (ML) INJECTION
Status: DISCONTINUED | OUTPATIENT
Start: 2022-09-01 | End: 2022-09-01 | Stop reason: HOSPADM

## 2022-09-01 RX ORDER — MIDAZOLAM HYDROCHLORIDE 2 MG/2ML
INJECTION, SOLUTION INTRAMUSCULAR; INTRAVENOUS AS NEEDED
Status: DISCONTINUED | OUTPATIENT
Start: 2022-09-01 | End: 2022-09-01

## 2022-09-01 RX ORDER — ONDANSETRON 2 MG/ML
4 INJECTION INTRAMUSCULAR; INTRAVENOUS ONCE AS NEEDED
Status: DISCONTINUED | OUTPATIENT
Start: 2022-09-01 | End: 2022-09-01 | Stop reason: HOSPADM

## 2022-09-01 RX ADMIN — FENTANYL CITRATE 50 MCG: 50 INJECTION, SOLUTION INTRAMUSCULAR; INTRAVENOUS at 08:45

## 2022-09-01 RX ADMIN — FENTANYL CITRATE 25 MCG: 50 INJECTION, SOLUTION INTRAMUSCULAR; INTRAVENOUS at 08:22

## 2022-09-01 RX ADMIN — FENTANYL CITRATE 25 MCG: 50 INJECTION, SOLUTION INTRAMUSCULAR; INTRAVENOUS at 08:28

## 2022-09-01 RX ADMIN — SODIUM CHLORIDE, SODIUM LACTATE, POTASSIUM CHLORIDE, AND CALCIUM CHLORIDE 75 ML/HR: .6; .31; .03; .02 INJECTION, SOLUTION INTRAVENOUS at 07:22

## 2022-09-01 RX ADMIN — LIDOCAINE HYDROCHLORIDE 50 MG: 10 INJECTION, SOLUTION EPIDURAL; INFILTRATION; INTRACAUDAL; PERINEURAL at 08:22

## 2022-09-01 RX ADMIN — MIDAZOLAM 2 MG: 1 INJECTION INTRAMUSCULAR; INTRAVENOUS at 08:19

## 2022-09-01 RX ADMIN — PROPOFOL 50 MG: 10 INJECTION, EMULSION INTRAVENOUS at 08:22

## 2022-09-01 RX ADMIN — PHENYLEPHRINE HYDROCHLORIDE 50 MCG/MIN: 10 INJECTION INTRAVENOUS at 09:17

## 2022-09-01 RX ADMIN — PROPOFOL 120 MCG/KG/MIN: 10 INJECTION, EMULSION INTRAVENOUS at 08:22

## 2022-09-01 RX ADMIN — ONDANSETRON 4 MG: 2 INJECTION INTRAMUSCULAR; INTRAVENOUS at 08:42

## 2022-09-01 RX ADMIN — LEVOFLOXACIN: 500 INJECTION, SOLUTION INTRAVENOUS at 08:19

## 2022-09-01 NOTE — PERIOPERATIVE NURSING NOTE
Received from OR, Alert awake, VSS  20 [place] asif patent for dark real urine, no clots noted  Given water, soda to drink

## 2022-09-01 NOTE — DISCHARGE INSTRUCTIONS
#1 no heavy straining or lifting above 10 pounds for 2 weeks    #2 call office fevers, chills, or worsening blood in the urine  #3 Patient has follow-up tomorrow September 2nd 931st cysto stent removal Ash removal  Male blood in the urine  No aspirin  Patient has follow-up September 26th 3:45 p m  Discuss stone analysis      Jerman LARA  [facility] office  [address] [place] [address]  [phone]  8:30 AM to 4:30 PM  Monday through Friday    [place] office  [phone]  [place], [address] [place] [address]  [phone]  1:00 to 5:00 PM  Wednesday

## 2022-09-01 NOTE — PROGRESS NOTES
Progress Note - Urology      Patient: Holly Orellana   : 1945 Sex: male   MRN: [id number]     CSN: [id number]  Unit/Bed#: OR POOL     SUBJECTIVE:   No change history physical aware risk of anesthesia infection bleeding additional ureter procedures      Objective   Vitals: /75   Pulse 97   Temp 97 8 °F (36 6 °C) (Temporal)   Resp 18   Ht 5' 5" (1 651 m)   Wt 87 2 kg (192 lb 3 2 oz)   SpO2 97%   BMI 31 98 kg/m²     No intake/output data recorded        Physical Exam:   General Alert awake   Normocephalic atraumatic PERRLA  Lungs clear bilaterally  Cardiac normal S1 normal S2  Abdomen soft, flank pain  Extremities no edema      Lab Results: CBC:   Lab Results   Component Value Date    WBC 6 67 2022    HGB 18 0 (H) 2022    HCT 55 3 (H) 2022    MCV 95 2022     2022    MCH 30 9 2022    MCHC 32 5 2022    RDW 13 3 2022    MPV 10 9 2022    NRBC 0 2022     CMP:   Lab Results   Component Value Date     (H) 2022    CO2 29 2022    BUN 21 2022    CREATININE 1 66 (H) 2022    CALCIUM 10 4 (H) 2022    AST 36 2022    ALT 39 2022    ALKPHOS 78 2022    EGFR 39 2022     Urinalysis: No results found for: Massena Noon, SPECGRAV, PHUR, LEUKOCYTESUR, NITRITE, PROTEINUA, GLUCOSEU, KETONESU, BILIRUBINUR, BLOODU  Urine Culture: No results found for: URINECX  PSA:   Lab Results   Component Value Date    PSA 4 2 (H) 2022    PSA 4 1 (H) 2021    PSA 3 3 2021    PSA 4 8 (H) 2020         Assessment/ Plan:  Cysto left ureteroscopy laser stent          Iliana Dominguez MD

## 2022-09-01 NOTE — PERIOPERATIVE NURSING NOTE
Discharge instructions reviewed with pt, spouse  Instructed on asif care, emptying  Verbalizes understanding

## 2022-09-01 NOTE — OP NOTE
OPERATIVE REPORT  PATIENT NAME: Joseph Pemberton    :  1945  MRN: [id number]  Pt Location: [facility] OR ROOM 04    SURGERY DATE: 2022    Surgeon(s) and Role:     * Stephanie Hess MD - Primary    Preop Diagnosis:  Calculus of ureter [N20 1]  Left renal stones    Post-Op Diagnosis Codes:     * Calculus of ureter [N20 1]  Left renal stones 5 mm x3  Left proximal ureteral stone 5 mm   Left migrated stent distal ureter    Procedure(s) (LRB):  CYSTOSCOPY, URETEROSCOPY STENT REMOVAL, LASER LITHOTRIPSY, WITH BASKET, INSERTION OF URETERAL STENT (Left)    Specimen(s):  ID Type Source Tests Collected by Time Destination   1 : LEFT URETERAL STONE Calculus Ureter, Left STONE ANALYSIS, TISSUE EXAM Stephanie Hess MD 2022 0911        Estimated Blood Loss:   Minimal    Drains:  Urethral Catheter 20 Fr  (Active)   Number of days: 112       Urethral Catheter (Active)   Number of days: 0       Ureteral Internal Stent Left ureter 6 Fr  (Active)   Number of days: 0       Anesthesia Type:   IV Sedation with Anesthesia    Operative Indications:  Calculus of ureter [N20 1]  This 72-year-old male known to me underwent left ureteroscopy laser lithotripsy stent placement for large multiple stones a few weeks ago left with multiple fragments due to bleeding patient recently seen in the office wishing to have the stent removed in the OR with removal of remaining stone fragments if did not pass over the last few months    Operative Findings:  Stent migrated up to the distal ureter requiring ureteroscopy stent removal with basket and Parana forceps  Proximal 5-6 mm stone requiring laser lithotripsy removed with basket  Multiple small stone fragments with 5 mm noted in the lower pole laser lithotripsy basket extraction of larger fragments  Twenty-six cm stent left  Twenty Occitan Ash postop hematuria    Complications:   None    Procedure and Technique:  Patient seen in preop holding area left side marked consent signed placed op suite  After anesthesia was induced placed thigh position draped prep standard fashion  Time-out performed  A 22 [place] Ze[place] cystoscope with 30 lens passed through through the bladder  Anterior showed abnormalities    Posterior the confirmed a 2 5 cm bilobar obstructing prostatic urethra the scope was inserted to the bladder lumen the left stent was noted to migrate up the left ureter fluoroscopic views confirmed to be at least 2 cm above the left orifice a 0 038 wire was passed up by the stent into the left renal pelvis left a safety the cystoscope was removed and the semi rigid noted attempts with the basket and finally with the [place] Spatz was used to pull the stent out a 2 038nd wire was then passed upward into the left renal pelvis retrograde pyelogram confirmed a filling defect in the left proximal ureter the ureteral sheath was passed up to this silvana with the wire removed the after removed flexible scope passed and a 5-6 mm stone was encountered attempts at passing a basket were unsuccessful the laser was passed and the stone was broken into multiple small pieces carefully removed with the basket the scope was then placed up into the renal pelvis repeat retrograde confirmed upper middle lower pole calyx no stones were noted in the upper middle pole in the lower pole there was a conglomerate of stone fragments from previous ureteroscopy the largest about 5 mm the laser was then passed and the stone was broken into multiple small pieces in the end most fragments were 1 mm attempts removing some of these pieces with the basket were successful the scope was then removed sheath removed over the safety wire a 26 cm 6 [place] Ze[place]ander stent was passed some bleeding was noted 20 Western Gwendolyn Ash catheter inserted left to bag drainage time dictation postop urine mild hematuria postop procedure well awakened taken recovery stable   I was present for the entire procedure    Patient Disposition:  PACU       SIGNATURE: Zaria Finney MD  DATE: September 1, 2022  TIME: 9:41 AM

## 2022-09-08 LAB
COLOR STONE: NORMAL
COM MFR STONE: 100 %
COMMENT-STONE3: NORMAL
COMPOSITION: NORMAL
LABORATORY COMMENT REPORT: NORMAL
PHOTO: NORMAL
SIZE STONE: NORMAL MM
SPEC SOURCE SUBJ: NORMAL
STONE ANALYSIS-IMP: NORMAL
STONE ANALYSIS-IMP: NORMAL
WT STONE: 89 MG

## 2022-09-09 DIAGNOSIS — M54.50 CHRONIC LOW BACK PAIN, UNSPECIFIED BACK PAIN LATERALITY, UNSPECIFIED WHETHER SCIATICA PRESENT: ICD-10-CM

## 2022-09-09 DIAGNOSIS — G89.29 CHRONIC LOW BACK PAIN, UNSPECIFIED BACK PAIN LATERALITY, UNSPECIFIED WHETHER SCIATICA PRESENT: ICD-10-CM

## 2022-09-09 RX ORDER — OXYCODONE HYDROCHLORIDE AND ACETAMINOPHEN 5; 325 MG/1; MG/1
1 TABLET ORAL EVERY 4 HOURS PRN
Qty: 180 TABLET | Refills: 0 | Status: SHIPPED | OUTPATIENT
Start: 2022-09-09

## 2022-09-13 DIAGNOSIS — E03.9 HYPOTHYROIDISM, UNSPECIFIED TYPE: ICD-10-CM

## 2022-09-13 RX ORDER — LEVOTHYROXINE SODIUM 0.07 MG/1
TABLET ORAL
Qty: 90 TABLET | Refills: 1 | Status: SHIPPED | OUTPATIENT
Start: 2022-09-13

## 2022-09-13 RX ORDER — DOXYCYCLINE HYCLATE 100 MG
100 TABLET ORAL 2 TIMES DAILY
COMMUNITY
Start: 2022-08-30 | End: 2022-09-14

## 2022-09-13 RX ORDER — CEPHALEXIN 500 MG/1
500 CAPSULE ORAL 2 TIMES DAILY
COMMUNITY
Start: 2022-08-30 | End: 2022-09-14

## 2022-09-14 ENCOUNTER — OFFICE VISIT (OUTPATIENT)
Dept: FAMILY MEDICINE CLINIC | Facility: CLINIC | Age: 77
End: 2022-09-14
Payer: COMMERCIAL

## 2022-09-14 VITALS
DIASTOLIC BLOOD PRESSURE: 78 MMHG | HEART RATE: 84 BPM | OXYGEN SATURATION: 98 % | SYSTOLIC BLOOD PRESSURE: 122 MMHG | HEIGHT: 65 IN | WEIGHT: 191 LBS | RESPIRATION RATE: 16 BRPM | BODY MASS INDEX: 31.82 KG/M2

## 2022-09-14 DIAGNOSIS — E03.9 HYPOTHYROIDISM, UNSPECIFIED TYPE: ICD-10-CM

## 2022-09-14 DIAGNOSIS — M54.50 CHRONIC LOW BACK PAIN, UNSPECIFIED BACK PAIN LATERALITY, UNSPECIFIED WHETHER SCIATICA PRESENT: ICD-10-CM

## 2022-09-14 DIAGNOSIS — G89.29 CHRONIC LOW BACK PAIN, UNSPECIFIED BACK PAIN LATERALITY, UNSPECIFIED WHETHER SCIATICA PRESENT: ICD-10-CM

## 2022-09-14 DIAGNOSIS — M25.552 LEFT HIP PAIN: ICD-10-CM

## 2022-09-14 DIAGNOSIS — M25.50 MULTIPLE JOINT PAIN: ICD-10-CM

## 2022-09-14 DIAGNOSIS — M79.672 LEFT FOOT PAIN: Primary | ICD-10-CM

## 2022-09-14 PROBLEM — E66.01 CLASS 2 SEVERE OBESITY DUE TO EXCESS CALORIES WITH SERIOUS COMORBIDITY AND BODY MASS INDEX (BMI) OF 35.0 TO 35.9 IN ADULT (HCC): Status: RESOLVED | Noted: 2020-06-18 | Resolved: 2022-09-14

## 2022-09-14 PROBLEM — E66.812 CLASS 2 SEVERE OBESITY DUE TO EXCESS CALORIES WITH SERIOUS COMORBIDITY AND BODY MASS INDEX (BMI) OF 35.0 TO 35.9 IN ADULT (HCC): Status: RESOLVED | Noted: 2020-06-18 | Resolved: 2022-09-14

## 2022-09-14 PROCEDURE — 1160F RVW MEDS BY RX/DR IN RCRD: CPT | Performed by: FAMILY MEDICINE

## 2022-09-14 PROCEDURE — 99214 OFFICE O/P EST MOD 30 MIN: CPT | Performed by: FAMILY MEDICINE

## 2022-09-14 NOTE — PROGRESS NOTES
Assessment/Plan:  I wonder if it was positional while getting the litho and stent removal   He is getting better   Wt loss is good         1  Left foot pain  -     Sedimentation rate, automated; Future  -     Basic metabolic panel; Future  -     C-reactive protein; Future  -     Uric acid; Future    2  Chronic low back pain, unspecified back pain laterality, unspecified whether sciatica present  -     Sedimentation rate, automated; Future  -     Basic metabolic panel; Future  -     C-reactive protein; Future  -     Uric acid; Future    3  Multiple joint pain  -     Sedimentation rate, automated; Future  -     Basic metabolic panel; Future  -     C-reactive protein; Future  -     Uric acid; Future    4  Left hip pain    5  Hypothyroidism, unspecified type  -     TSH, 3rd generation with Free T4 reflex; Future       Subjective:      Patient ID: Yari Escobar is a 78 y o  male  HPI    Left top of the foot pain   After lithotripsy   Traveling down ended under  The ball of the foot   Now its worsening to most jjoints in his body   Hands and knees and back and hip       Stone comp was dion ox  3x4   From left ureter  Lithotripsy       appt on the 25th   With uro     Interestingly   He had left foot sweling and pain when he was 18 or so   Had the tonsil removed and everything is better since then         The following portions of the patient's history were reviewed and updated as appropriate: allergies, current medications, past family history, past medical history, past social history, past surgical history and problem list     Review of Systems   Constitutional: Negative for activity change, appetite change and fever  HENT: Negative for congestion, nosebleeds and trouble swallowing  Eyes: Negative for itching  Respiratory: Negative for cough and chest tightness  Cardiovascular: Negative for chest pain and palpitations  Gastrointestinal: Negative for abdominal pain, constipation, diarrhea and nausea  Endocrine: Negative for cold intolerance  Genitourinary: Negative for frequency  Musculoskeletal: Positive for arthralgias  Negative for gait problem and joint swelling  Skin: Negative for rash  Allergic/Immunologic: Negative for immunocompromised state  Neurological: Negative for dizziness, tremors, seizures, syncope and headaches  Psychiatric/Behavioral: Negative for hallucinations and suicidal ideas  Objective:      /78 (BP Location: Right arm, Patient Position: Sitting, Cuff Size: Large)   Pulse 84   Resp 16   Ht 5' 5" (1 651 m)   Wt 86 6 kg (191 lb)   SpO2 98%   BMI 31 78 kg/m²     Admission on 09/01/2022, Discharged on 09/01/2022   Component Date Value    COLOR 09/01/2022 Tye Lung Size 09/01/2022 3x4     Stone Weight 09/01/2022 89     Composition 09/01/2022 Comment     Ca Oxalate,Monohydr  09/01/2022 100     STONE COMMENT 09/01/2022 Comment     STONE COMMENT 09/01/2022 Comment     Please note 09/01/2022 Comment     Disclaimer 09/01/2022 Comment     Photo 09/01/2022 Comment     Stone Source 09/01/2022 Comment     Case Report 09/01/2022                      Value:Surgical Pathology Report                         Case: [id number]                                   Authorizing Provider:  Dayana Crandall MD         Collected:           09/01/2022 0911              Ordering Location:     [address] [place] [address] Received:            09/01/2022 1115                                     Operating Room                                                               Pathologist:           Jaun OaDO daniel                                                          Specimen:    Ureter, Left, LEFT URETERAL STONE                                                          Final Diagnosis 09/01/2022                      Value: This result contains rich text formatting which cannot be displayed here   Additional Information 09/01/2022                      Value: This result contains rich text formatting which cannot be displayed here  Phil Needs Gross Description 09/01/2022                      Value: This result contains rich text formatting which cannot be displayed here  Physical Exam  Vitals and nursing note reviewed  Constitutional:       General: He is not in acute distress  Appearance: He is well-developed  He is obese  HENT:      Head: Normocephalic and atraumatic  Cardiovascular:      Rate and Rhythm: Normal rate  Rhythm irregular  Heart sounds: Normal heart sounds  No murmur heard  Pulmonary:      Effort: Pulmonary effort is normal       Breath sounds: Normal breath sounds  No wheezing or rales  Abdominal:      General: Bowel sounds are normal  There is no distension  Palpations: Abdomen is soft  Tenderness: There is no abdominal tenderness  There is no guarding or rebound  Musculoskeletal:         General: No tenderness  Normal range of motion  Cervical back: Normal range of motion and neck supple  Lymphadenopathy:      Cervical: No cervical adenopathy  Skin:     General: Skin is warm and dry  Capillary Refill: Capillary refill takes less than 2 seconds  Findings: No rash  Neurological:      Mental Status: He is alert and oriented to person, place, and time  Cranial Nerves: No cranial nerve deficit  Sensory: No sensory deficit  Motor: No abnormal muscle tone  Psychiatric:         Behavior: Behavior normal          Thought Content:  Thought content normal          Judgment: Judgment normal               Lillian Vanessa MD  [place] [id number]

## 2022-10-23 DIAGNOSIS — E78.5 HYPERLIPIDEMIA, UNSPECIFIED HYPERLIPIDEMIA TYPE: ICD-10-CM

## 2022-10-24 RX ORDER — ATORVASTATIN CALCIUM 20 MG/1
TABLET, FILM COATED ORAL
Qty: 90 TABLET | Refills: 1 | Status: SHIPPED | OUTPATIENT
Start: 2022-10-24

## 2022-11-18 ENCOUNTER — TELEPHONE (OUTPATIENT)
Dept: FAMILY MEDICINE CLINIC | Facility: CLINIC | Age: 77
End: 2022-11-18

## 2022-11-18 DIAGNOSIS — M10.9 ACUTE GOUT OF MULTIPLE SITES, UNSPECIFIED CAUSE: Primary | ICD-10-CM

## 2022-11-18 RX ORDER — PREDNISONE 10 MG/1
10 TABLET ORAL 2 TIMES DAILY WITH MEALS
Qty: 10 TABLET | Refills: 0 | Status: SHIPPED | OUTPATIENT
Start: 2022-11-18 | End: 2022-11-23 | Stop reason: SDUPTHER

## 2022-11-18 NOTE — TELEPHONE ENCOUNTER
Roxy Skiff was recently discharged from [facility] and he stated that he has been having gout pain and is on his last day of prednisone  He is still experiencing diarrhea but it is getting better  He wants to know if his prednisone should be extended? His hospital note is in the chart

## 2022-11-22 ENCOUNTER — TELEPHONE (OUTPATIENT)
Dept: FAMILY MEDICINE CLINIC | Facility: CLINIC | Age: 77
End: 2022-11-22

## 2022-11-22 NOTE — TELEPHONE ENCOUNTER
I also spoke with Luna aPz and he just wanted advice on what to do or not to do and I also advised him to reach out to the kidney care specialist (they were trying to schedule him)

## 2022-11-22 NOTE — TELEPHONE ENCOUNTER
Shravan Mccarty is calling to let Dr Jeremy Werner know that things are improving  His concern is that tomorrow is the last day on the prednisone  He's worried that if that's get worse of the holiday he will not have any more prednisone and what will he do than  Is asking if the prednisone should be extended

## 2022-11-23 DIAGNOSIS — M10.9 ACUTE GOUT OF MULTIPLE SITES, UNSPECIFIED CAUSE: ICD-10-CM

## 2022-11-23 RX ORDER — PREDNISONE 10 MG/1
10 TABLET ORAL 2 TIMES DAILY WITH MEALS
Qty: 10 TABLET | Refills: 0 | Status: SHIPPED | OUTPATIENT
Start: 2022-11-23 | End: 2022-11-28

## 2022-11-23 NOTE — TELEPHONE ENCOUNTER
I spoke with him   He cannot leave the house due to pain and gout   So he cannot get labs done to check Uric acid or bmp cbc and esr crp     He thinks he will be better by Monday   Ill extend his course of pred till then   If he is not able to leave the house then he will need to go to the ER

## 2022-11-28 ENCOUNTER — OFFICE VISIT (OUTPATIENT)
Dept: FAMILY MEDICINE CLINIC | Facility: CLINIC | Age: 77
End: 2022-11-28

## 2022-11-28 ENCOUNTER — APPOINTMENT (OUTPATIENT)
Dept: LAB | Facility: CLINIC | Age: 77
End: 2022-11-28

## 2022-11-28 VITALS
HEART RATE: 74 BPM | DIASTOLIC BLOOD PRESSURE: 68 MMHG | RESPIRATION RATE: 16 BRPM | HEIGHT: 65 IN | SYSTOLIC BLOOD PRESSURE: 120 MMHG | OXYGEN SATURATION: 96 % | WEIGHT: 192 LBS | BODY MASS INDEX: 31.99 KG/M2

## 2022-11-28 DIAGNOSIS — N18.31 STAGE 3A CHRONIC KIDNEY DISEASE (HCC): ICD-10-CM

## 2022-11-28 DIAGNOSIS — M10.9 ACUTE GOUT OF MULTIPLE SITES, UNSPECIFIED CAUSE: Primary | ICD-10-CM

## 2022-11-28 DIAGNOSIS — G89.4 CHRONIC PAIN SYNDROME: ICD-10-CM

## 2022-11-28 DIAGNOSIS — E03.9 HYPOTHYROIDISM, UNSPECIFIED TYPE: ICD-10-CM

## 2022-11-28 DIAGNOSIS — I48.19 ATRIAL FIBRILLATION, PERSISTENT (HCC): ICD-10-CM

## 2022-11-28 DIAGNOSIS — M10.9 ACUTE GOUT OF MULTIPLE SITES, UNSPECIFIED CAUSE: ICD-10-CM

## 2022-11-28 LAB
ANION GAP SERPL CALCULATED.3IONS-SCNC: 7 MMOL/L (ref 4–13)
BUN SERPL-MCNC: 52 MG/DL (ref 5–25)
CALCIUM SERPL-MCNC: 9.9 MG/DL (ref 8.3–10.1)
CHLORIDE SERPL-SCNC: 111 MMOL/L (ref 96–108)
CO2 SERPL-SCNC: 20 MMOL/L (ref 21–32)
CREAT SERPL-MCNC: 2.08 MG/DL (ref 0.6–1.3)
CRP SERPL QL: <3 MG/L
ERYTHROCYTE [SEDIMENTATION RATE] IN BLOOD: 23 MM/HOUR (ref 0–19)
GFR SERPL CREATININE-BSD FRML MDRD: 30 ML/MIN/1.73SQ M
GLUCOSE SERPL-MCNC: 184 MG/DL (ref 65–140)
POTASSIUM SERPL-SCNC: 5 MMOL/L (ref 3.5–5.3)
SODIUM SERPL-SCNC: 138 MMOL/L (ref 135–147)
TSH SERPL DL<=0.05 MIU/L-ACNC: 1.65 UIU/ML (ref 0.45–4.5)
URATE SERPL-MCNC: 8.7 MG/DL (ref 3.5–8.5)

## 2022-11-28 RX ORDER — PREDNISONE 20 MG/1
TABLET ORAL
COMMUNITY
Start: 2022-11-16 | End: 2022-11-28

## 2022-11-28 RX ORDER — DEXAMETHASONE 2 MG/1
2 TABLET ORAL 2 TIMES DAILY WITH MEALS
Qty: 10 TABLET | Refills: 0 | Status: SHIPPED | OUTPATIENT
Start: 2022-11-28

## 2022-11-28 NOTE — PROGRESS NOTES
Assessment/Plan:    1  Acute gout of multiple sites, unspecified cause  -     Basic metabolic panel; Future  -     Uric acid; Future  -     C-reactive protein; Future  -     Sedimentation rate, automated; Future  -     dexamethasone (DECADRON) 2 mg tablet; Take 1 tablet (2 mg total) by mouth 2 (two) times a day with meals    2  Hypothyroidism, unspecified type  -     TSH, 3rd generation with Free T4 reflex; Future    3  Chronic pain syndrome    4  Atrial fibrillation, persistent (HCC)    5  Stage 3a chronic kidney disease (HCC)       Subjective:      Patient ID: Gwendolyn Fuller is a 78 y o  male  HPI  Here for follow up with gout and adimision for gina from diarrhea     drinking water and gatorade     No pain while on the predisone   He has been on it  11/11 till now   Last pill is today   20mg bid     The following portions of the patient's history were reviewed and updated as appropriate: allergies, current medications, past family history, past medical history, past social history, past surgical history and problem list     Review of Systems   Constitutional: Negative for activity change, appetite change and fever  HENT: Negative for congestion, nosebleeds and trouble swallowing  Eyes: Negative for itching  Respiratory: Negative for cough and chest tightness  Cardiovascular: Negative for chest pain and palpitations  Gastrointestinal: Negative for abdominal pain, constipation, diarrhea and nausea  Endocrine: Negative for cold intolerance  Genitourinary: Negative for frequency  Musculoskeletal: Positive for arthralgias  Negative for gait problem and joint swelling  Skin: Negative for rash  Allergic/Immunologic: Negative for immunocompromised state  Neurological: Negative for dizziness, tremors, seizures, syncope and headaches  Psychiatric/Behavioral: Negative for hallucinations and suicidal ideas           Objective:      /68 (BP Location: Right arm, Patient Position: Sitting, Cuff Size: Large)   Pulse 74   Resp 16   Ht 5' 5" (1 651 m)   Wt 87 1 kg (192 lb)   SpO2 96%   BMI 31 95 kg/m²     No visits with results within 2 Week(s) from this visit  Latest known visit with results is:   Admission on 09/01/2022, Discharged on 09/01/2022   Component Date Value   • COLOR 09/01/2022 Brown    • Size 09/01/2022 3x4    • Stone Weight 09/01/2022 89    • Composition 09/01/2022 Comment    • Ca Oxalate,Monohydr  09/01/2022 100    • STONE COMMENT 09/01/2022 Comment    • STONE COMMENT 09/01/2022 Comment    • Please note 09/01/2022 Comment    • Disclaimer 09/01/2022 Comment    • Photo 09/01/2022 Comment    • Stone Source 09/01/2022 Comment    • Case Report 09/01/2022                      Value:Surgical Pathology Report                         Case: [id number]                                   Authorizing Provider:  Ivan Betancourt MD         Collected:           09/01/2022 0911              Ordering Location:     [address] [place] [address] Received:            09/01/2022 1115                                     Operating Room                                                               Pathologist:           Hina Dinero DO                                                          Specimen:    Ureter, Left, LEFT URETERAL STONE                                                         • Final Diagnosis 09/01/2022                      Value: This result contains rich text formatting which cannot be displayed here  • Additional Information 09/01/2022                      Value: This result contains rich text formatting which cannot be displayed here  • Gross Description 09/01/2022                      Value: This result contains rich text formatting which cannot be displayed here  Physical Exam  Vitals and nursing note reviewed  Constitutional:       General: He is not in acute distress  Appearance: He is well-developed  He is obese  HENT:      Head: Normocephalic and atraumatic  Cardiovascular:      Rate and Rhythm: Normal rate and regular rhythm  Heart sounds: Normal heart sounds  No murmur heard  Pulmonary:      Effort: Pulmonary effort is normal       Breath sounds: Normal breath sounds  No wheezing or rales  Abdominal:      General: Bowel sounds are normal  There is no distension  Palpations: Abdomen is soft  Tenderness: There is no abdominal tenderness  There is no guarding or rebound  Musculoskeletal:         General: No tenderness  Normal range of motion  Cervical back: Normal range of motion and neck supple  Lymphadenopathy:      Cervical: No cervical adenopathy  Skin:     General: Skin is warm and dry  Capillary Refill: Capillary refill takes less than 2 seconds  Findings: No rash  Neurological:      Mental Status: He is alert and oriented to person, place, and time  Cranial Nerves: No cranial nerve deficit  Sensory: No sensory deficit  Motor: No abnormal muscle tone  Psychiatric:         Behavior: Behavior normal          Thought Content:  Thought content normal          Judgment: Judgment normal              Janie Rodas MD  [place] [id number]

## 2022-11-29 ENCOUNTER — TELEPHONE (OUTPATIENT)
Dept: FAMILY MEDICINE CLINIC | Facility: CLINIC | Age: 77
End: 2022-11-29

## 2022-11-30 ENCOUNTER — TELEPHONE (OUTPATIENT)
Dept: FAMILY MEDICINE CLINIC | Facility: CLINIC | Age: 77
End: 2022-11-30

## 2022-11-30 NOTE — TELEPHONE ENCOUNTER
[facility] Dr Nick June left regarding his lab results and wanted to follow up on reaching out to Dr Donna Do  He said when he called their office he was told Dr Donna Do is on vacation and that may be the reason Dr Nick June couldn't reach him  He wasn't given a date that he would be back in the office

## 2022-12-02 DIAGNOSIS — M10.9 ACUTE GOUT OF MULTIPLE SITES, UNSPECIFIED CAUSE: Primary | ICD-10-CM

## 2022-12-02 RX ORDER — ALLOPURINOL 100 MG/1
100 TABLET ORAL DAILY
Qty: 90 TABLET | Refills: 3 | Status: SHIPPED | OUTPATIENT
Start: 2022-12-02

## 2022-12-02 NOTE — TELEPHONE ENCOUNTER
Violetta Richardson well we will start with allopurinol 100mg   And get the uric aicd and kidneys checked 1 week on it please     Med is sent to the pharmacy

## 2022-12-08 ENCOUNTER — APPOINTMENT (OUTPATIENT)
Dept: LAB | Facility: HOSPITAL | Age: 77
End: 2022-12-08

## 2022-12-08 DIAGNOSIS — M10.9 ACUTE GOUT OF MULTIPLE SITES, UNSPECIFIED CAUSE: ICD-10-CM

## 2022-12-08 LAB
ANION GAP SERPL CALCULATED.3IONS-SCNC: 10 MMOL/L (ref 4–13)
BUN SERPL-MCNC: 22 MG/DL (ref 5–25)
CALCIUM SERPL-MCNC: 9.2 MG/DL (ref 8.4–10.2)
CHLORIDE SERPL-SCNC: 104 MMOL/L (ref 96–108)
CO2 SERPL-SCNC: 27 MMOL/L (ref 21–32)
CREAT SERPL-MCNC: 1.63 MG/DL (ref 0.6–1.3)
GFR SERPL CREATININE-BSD FRML MDRD: 40 ML/MIN/1.73SQ M
GLUCOSE P FAST SERPL-MCNC: 82 MG/DL (ref 65–99)
POTASSIUM SERPL-SCNC: 3.6 MMOL/L (ref 3.5–5.3)
SODIUM SERPL-SCNC: 141 MMOL/L (ref 135–147)
URATE SERPL-MCNC: 6.5 MG/DL (ref 3.5–8.5)

## 2022-12-09 ENCOUNTER — TELEPHONE (OUTPATIENT)
Dept: FAMILY MEDICINE CLINIC | Facility: CLINIC | Age: 77
End: 2022-12-09

## 2022-12-09 NOTE — TELEPHONE ENCOUNTER
----- Message from Alcides Jack MD sent at 12/9/2022  8:10 AM EST -----  Cont with the allo its working well   And Dr China Ford was ok with it too     [address],[facility] [address]

## 2022-12-30 ENCOUNTER — HOSPITAL ENCOUNTER (EMERGENCY)
Facility: HOSPITAL | Age: 77
End: 2022-12-30
Attending: EMERGENCY MEDICINE

## 2022-12-30 ENCOUNTER — HOSPITAL ENCOUNTER (INPATIENT)
Facility: HOSPITAL | Age: 77
LOS: 5 days | Discharge: HOME | End: 2023-01-04
Attending: STUDENT IN AN ORGANIZED HEALTH CARE EDUCATION/TRAINING PROGRAM | Admitting: STUDENT IN AN ORGANIZED HEALTH CARE EDUCATION/TRAINING PROGRAM

## 2022-12-30 ENCOUNTER — APPOINTMENT (EMERGENCY)
Dept: RADIOLOGY | Facility: HOSPITAL | Age: 77
End: 2022-12-30

## 2022-12-30 ENCOUNTER — APPOINTMENT (EMERGENCY)
Dept: CT IMAGING | Facility: HOSPITAL | Age: 77
End: 2022-12-30

## 2022-12-30 VITALS
OXYGEN SATURATION: 90 % | BODY MASS INDEX: 33.5 KG/M2 | HEIGHT: 65 IN | HEART RATE: 98 BPM | WEIGHT: 201.06 LBS | RESPIRATION RATE: 18 BRPM | SYSTOLIC BLOOD PRESSURE: 156 MMHG | TEMPERATURE: 97.8 F | DIASTOLIC BLOOD PRESSURE: 103 MMHG

## 2022-12-30 DIAGNOSIS — I48.91 ATRIAL FIBRILLATION WITH RVR (HCC): ICD-10-CM

## 2022-12-30 DIAGNOSIS — I71.00 DISSECTION OF DESCENDING AORTA (HCC): Primary | ICD-10-CM

## 2022-12-30 DIAGNOSIS — J43.9 PULMONARY EMPHYSEMA (HCC): ICD-10-CM
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
## 2022-12-30 DIAGNOSIS — I71.00 AORTIC DISSECTION (HCC): Primary | ICD-10-CM

## 2022-12-30 DIAGNOSIS — I48.19 ATRIAL FIBRILLATION, PERSISTENT (HCC): ICD-10-CM

## 2022-12-30 DIAGNOSIS — I10 ESSENTIAL HYPERTENSION: ICD-10-CM

## 2022-12-30 DIAGNOSIS — I71.03 AORTIC DISSECTION, THORACOABDOMINAL (HCC): ICD-10-CM

## 2022-12-30 DIAGNOSIS — I71.21 THORACIC ASCENDING AORTIC ANEURYSM: ICD-10-CM

## 2022-12-30 DIAGNOSIS — M25.552 LEFT HIP PAIN: ICD-10-CM

## 2022-12-30 DIAGNOSIS — N18.31 STAGE 3A CHRONIC KIDNEY DISEASE (HCC): ICD-10-CM

## 2022-12-30 LAB
ABO GROUP BLD: NORMAL
ALBUMIN SERPL BCP-MCNC: 3.8 G/DL (ref 3.5–5)
ALP SERPL-CCNC: 76 U/L (ref 34–104)
ALT SERPL W P-5'-P-CCNC: 16 U/L (ref 7–52)
ANION GAP SERPL CALCULATED.3IONS-SCNC: 10 MMOL/L (ref 4–13)
APTT PPP: 30 SECONDS (ref 23–37)
AST SERPL W P-5'-P-CCNC: 21 U/L (ref 13–39)
BASOPHILS # BLD AUTO: 0.05 THOUSANDS/ÂΜL (ref 0–0.1)
BASOPHILS NFR BLD AUTO: 0 % (ref 0–1)
BILIRUB SERPL-MCNC: 0.85 MG/DL (ref 0.2–1)
BLD GP AB SCN SERPL QL: NEGATIVE
BUN SERPL-MCNC: 29 MG/DL (ref 5–25)
CALCIUM SERPL-MCNC: 9.6 MG/DL (ref 8.4–10.2)
CARDIAC TROPONIN I PNL SERPL HS: 13 NG/L
CHLORIDE SERPL-SCNC: 110 MMOL/L (ref 96–108)
CO2 SERPL-SCNC: 24 MMOL/L (ref 21–32)
CREAT SERPL-MCNC: 1.62 MG/DL (ref 0.6–1.3)
EOSINOPHIL # BLD AUTO: 0.02 THOUSAND/ÂΜL (ref 0–0.61)
EOSINOPHIL NFR BLD AUTO: 0 % (ref 0–6)
ERYTHROCYTE [DISTWIDTH] IN BLOOD BY AUTOMATED COUNT: 16.3 % (ref 11.6–15.1)
GFR SERPL CREATININE-BSD FRML MDRD: 40 ML/MIN/1.73SQ M
GLUCOSE SERPL-MCNC: 96 MG/DL (ref 65–140)
HCT VFR BLD AUTO: 41.8 % (ref 36.5–49.3)
HGB BLD-MCNC: 13.4 G/DL (ref 12–17)
IMM GRANULOCYTES # BLD AUTO: 0.11 THOUSAND/UL (ref 0–0.2)
IMM GRANULOCYTES NFR BLD AUTO: 1 % (ref 0–2)
INR PPP: 1.16 (ref 0.84–1.19)
LIPASE SERPL-CCNC: 24 U/L (ref 11–82)
LYMPHOCYTES # BLD AUTO: 1.22 THOUSANDS/ÂΜL (ref 0.6–4.47)
LYMPHOCYTES NFR BLD AUTO: 11 % (ref 14–44)
MCH RBC QN AUTO: 29.5 PG (ref 26.8–34.3)
MCHC RBC AUTO-ENTMCNC: 32.1 G/DL (ref 31.4–37.4)
MCV RBC AUTO: 92 FL (ref 82–98)
MONOCYTES # BLD AUTO: 0.83 THOUSAND/ÂΜL (ref 0.17–1.22)
MONOCYTES NFR BLD AUTO: 7 % (ref 4–12)
NEUTROPHILS # BLD AUTO: 8.97 THOUSANDS/ÂΜL (ref 1.85–7.62)
NEUTS SEG NFR BLD AUTO: 81 % (ref 43–75)
NRBC BLD AUTO-RTO: 0 /100 WBCS
PLATELET # BLD AUTO: 190 THOUSANDS/UL (ref 149–390)
PMV BLD AUTO: 10.8 FL (ref 8.9–12.7)
POTASSIUM SERPL-SCNC: 4.4 MMOL/L (ref 3.5–5.3)
PROT SERPL-MCNC: 6.8 G/DL (ref 6.4–8.4)
PROTHROMBIN TIME: 15 SECONDS (ref 11.6–14.5)
RBC # BLD AUTO: 4.54 MILLION/UL (ref 3.88–5.62)
RH BLD: POSITIVE
SODIUM SERPL-SCNC: 144 MMOL/L (ref 135–147)
SPECIMEN EXPIRATION DATE: NORMAL
WBC # BLD AUTO: 11.2 THOUSAND/UL (ref 4.31–10.16)

## 2022-12-30 RX ORDER — ATORVASTATIN CALCIUM 20 MG/1
20 TABLET, FILM COATED ORAL DAILY
Status: DISCONTINUED | OUTPATIENT
Start: 2022-12-31 | End: 2023-01-04 | Stop reason: HOSPADM

## 2022-12-30 RX ORDER — ALLOPURINOL 100 MG/1
100 TABLET ORAL DAILY
Status: DISCONTINUED | OUTPATIENT
Start: 2022-12-31 | End: 2023-01-04 | Stop reason: HOSPADM

## 2022-12-30 RX ORDER — HYDROMORPHONE HCL/PF 1 MG/ML
0.5 SYRINGE (ML) INJECTION
Status: DISCONTINUED | OUTPATIENT
Start: 2022-12-30 | End: 2022-12-31

## 2022-12-30 RX ORDER — ALBUTEROL SULFATE 90 UG/1
2 AEROSOL, METERED RESPIRATORY (INHALATION) EVERY 4 HOURS PRN
Status: DISCONTINUED | OUTPATIENT
Start: 2022-12-30 | End: 2023-01-04 | Stop reason: HOSPADM

## 2022-12-30 RX ORDER — OXYCODONE HYDROCHLORIDE 5 MG/1
5 TABLET ORAL EVERY 6 HOURS PRN
Status: DISCONTINUED | OUTPATIENT
Start: 2022-12-30 | End: 2023-01-01

## 2022-12-30 RX ORDER — ESMOLOL HYDROCHLORIDE 10 MG/ML
25-200 INJECTION, SOLUTION INTRAVENOUS
Status: DISCONTINUED | OUTPATIENT
Start: 2022-12-30 | End: 2023-01-01

## 2022-12-30 RX ORDER — GINSENG 100 MG
1 CAPSULE ORAL ONCE
Status: COMPLETED | OUTPATIENT
Start: 2022-12-30 | End: 2022-12-30

## 2022-12-30 RX ORDER — HYDROMORPHONE HCL/PF 1 MG/ML
0.5 SYRINGE (ML) INJECTION ONCE
Status: COMPLETED | OUTPATIENT
Start: 2022-12-30 | End: 2022-12-30

## 2022-12-30 RX ORDER — LABETALOL HYDROCHLORIDE 5 MG/ML
20 INJECTION, SOLUTION INTRAVENOUS ONCE
Status: COMPLETED | OUTPATIENT
Start: 2022-12-30 | End: 2022-12-30

## 2022-12-30 RX ORDER — LEVOTHYROXINE SODIUM 0.07 MG/1
75 TABLET ORAL DAILY
Status: DISCONTINUED | OUTPATIENT
Start: 2022-12-31 | End: 2023-01-04 | Stop reason: HOSPADM

## 2022-12-30 RX ORDER — FENTANYL CITRATE 50 UG/ML
1 INJECTION, SOLUTION INTRAMUSCULAR; INTRAVENOUS ONCE
Status: COMPLETED | OUTPATIENT
Start: 2022-12-30 | End: 2022-12-30

## 2022-12-30 RX ORDER — MAGNESIUM HYDROXIDE/ALUMINUM HYDROXICE/SIMETHICONE 120; 1200; 1200 MG/30ML; MG/30ML; MG/30ML
30 SUSPENSION ORAL ONCE
Status: COMPLETED | OUTPATIENT
Start: 2022-12-30 | End: 2022-12-30

## 2022-12-30 RX ORDER — OXYCODONE HYDROCHLORIDE 10 MG/1
10 TABLET ORAL EVERY 6 HOURS PRN
Status: DISCONTINUED | OUTPATIENT
Start: 2022-12-30 | End: 2023-01-01

## 2022-12-30 RX ORDER — FAMOTIDINE 10 MG/ML
20 INJECTION, SOLUTION INTRAVENOUS ONCE
Status: COMPLETED | OUTPATIENT
Start: 2022-12-30 | End: 2022-12-30

## 2022-12-30 RX ADMIN — ALUMINUM HYDROXIDE, MAGNESIUM HYDROXIDE, AND DIMETHICONE 30 ML: 200; 20; 200 SUSPENSION ORAL at 15:12

## 2022-12-30 RX ADMIN — HYDROMORPHONE HYDROCHLORIDE 0.5 MG: 1 INJECTION, SOLUTION INTRAMUSCULAR; INTRAVENOUS; SUBCUTANEOUS at 15:13

## 2022-12-30 RX ADMIN — NICARDIPINE HYDROCHLORIDE 5 MG/HR: 2.5 INJECTION, SOLUTION INTRAVENOUS at 20:00

## 2022-12-30 RX ADMIN — ESMOLOL HYDROCHLORIDE IN SODIUM CHLORIDE 25 MCG/KG/MIN: 10 INJECTION INTRAVENOUS at 20:00

## 2022-12-30 RX ADMIN — BACITRACIN 1 SMALL APPLICATION: 500 OINTMENT TOPICAL at 16:07

## 2022-12-30 RX ADMIN — FAMOTIDINE 20 MG: 10 INJECTION, SOLUTION INTRAVENOUS at 15:19

## 2022-12-30 RX ADMIN — IOHEXOL 100 ML: 350 INJECTION, SOLUTION INTRAVENOUS at 16:41

## 2022-12-30 RX ADMIN — ESMOLOL HYDROCHLORIDE IN SODIUM CHLORIDE 175 MCG/KG/MIN: 10 INJECTION INTRAVENOUS at 23:43

## 2022-12-30 RX ADMIN — LABETALOL HYDROCHLORIDE 20 MG: 5 INJECTION, SOLUTION INTRAVENOUS at 17:33

## 2022-12-30 RX ADMIN — OXYCODONE HYDROCHLORIDE 5 MG: 5 TABLET ORAL at 22:58

## 2022-12-30 NOTE — EMTALA/ACUTE CARE TRANSFER
[address] [place] [address]  Dept: [phone]      EMTALA TRANSFER CONSENT    NAME Lia Navarro                                         1945                              MRN [id number]    I have been informed of my rights regarding examination, treatment, and transfer   by Dr Ralf Zapata MD    Benefits: Specialized equipment and/or services available at the receiving facility (Include comment)________________________, Continuity of care    Risks: Potential for delay in receiving treatment, Increased discomfort during transfer, Possible worsening of condition or death during transfer, Potential deterioration of medical condition, Loss of IV      Transfer Request   I acknowledge that my medical condition has been evaluated and explained to me by the emergency department physician or other qualified medical person and/or my attending physician who has recommended and offered to me further medical examination and treatment  I understand the Hospital's obligation with respect to the treatment and stabilization of my emergency medical condition  I nevertheless request to be transferred  I release the Hospital, the doctor, and any other persons caring for me from all responsibility or liability for any injury or ill effects that may result from my transfer and agree to accept all responsibility for the consequences of my choice to transfer, rather than receive stabilizing treatment at the Hospital  I understand that because the transfer is my request, my insurance may not provide reimbursement for the services  The Hospital will assist and direct me and my family in how to make arrangements for transfer, but the hospital is not liable for any fees charged by the transport service    In spite of this understanding, I refuse to consent to further medical examination and treatment which has been offered to me, and request transfer to Accepting [facility]  I authorize the performance of emergency medical procedures and treatments upon me in both transit and upon arrival at the receiving facility  Additionally, I authorize the release of any and all medical records to the receiving facility and request they be transported with me, if possible  I authorize the performance of emergency medical procedures and treatments upon me in both transit and upon arrival at the receiving facility  Additionally, I authorize the release of any and all medical records to the receiving facility and request they be transported with me, if possible  I understand that the safest mode of transportation during a medical emergency is an ambulance and that the Hospital advocates the use of this mode of transport  Risks of traveling to the receiving facility by car, including absence of medical control, life sustaining equipment, such as oxygen, and medical personnel has been explained to me and I fully understand them  (INGRID CORRECT BOX BELOW)  [  ]  I consent to the stated transfer and to be transported by ambulance/helicopter  [  ]  I consent to the stated transfer, but refuse transportation by ambulance and accept full responsibility for my transportation by car  I understand the risks of non-ambulance transfers and I exonerate the Hospital and its staff from any deterioration in my condition that results from this refusal     X___________________________________________    DATE  22  TIME________  Signature of patient or legally responsible individual signing on patient behalf           RELATIONSHIP TO PATIENT_________________________          Provider Certification    NAME Roberto Chu                                         1945                              N [id number]    A medical screening exam was performed on the above named patient    Based on the examination:    Condition Necessitating Transfer 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 ([facility] [address] )  Patient Condition: An emergency transfer is being made prior to stabilization due to the need for definitive care and the benefit of transfer outweighs the risk    Reason for Transfer: Level of Care needed not available at this facility    Transfer Requirements: Facility [facility]   · Space available and qualified personnel available for treatment as acknowledged by    · Agreed to accept transfer and to provide appropriate medical treatment as acknowledged by       surgery  · Appropriate medical records of the examination and treatment of the patient are provided at the time of transfer   [facility], [address] _______  · Transfer will be performed by qualified personnel from    and appropriate transfer equipment as required, including the use of necessary and appropriate life support measures      Provider Certification: I have examined the patient and explained the following risks and benefits of being transferred/refusing transfer to the patient/family:  General risk, such as traffic hazards, adverse weather conditions, rough terrain or turbulence, possible failure of equipment (including vehicle or aircraft), or consequences of actions of persons outside the control of the transport personnel, Unanticipated needs of medical equipment and personnel during transport, The possibility of a transport vehicle being unavailable, Risk of worsening condition      Based on these reasonable risks and benefits to the patient and/or the unborn child(alida), and based upon the information available at the time of the patient’s examination, I certify that the medical benefits reasonably to be expected from the provision of appropriate medical treatments at another medical facility outweigh the increasing risks, if any, to the individual’s medical condition, and in the case of labor to the unborn child, from effecting the transfer      X____________________________________________ DATE 12/30/22        TIME_______      ORIGINAL - SEND TO MEDICAL RECORDS   COPY - SEND WITH PATIENT DURING TRANSFER

## 2022-12-30 NOTE — ED PROVIDER NOTES
History  Chief Complaint   Patient presents with   • Fall     Pt here by ems from home d/t ongoing L hip problems  Today he was walking around his home and felt his left hip pop out   He fell -headstrike +eliquis  Pt states past hour L leg has been numb  100mcg of fentanyl given by ems      53-year-old male with history of hypertension, A  fib on Eliquis, ascending aortic aneurysm measuring 4 4 cm in 2020 presenting to the ED via EMS due to left hip pain  Patient states he was walking around in his house, felt pain in his mid thoracic back which traveled to his left hip  Patient then tried to sit on the bed, missed the bed and fell on his left lateral side on the floor  Of note, patient walked to the bathroom while in the ED, immediately coming back from the bathroom felt right parasternal chest pain which is exertional nonpleuritic nonreproducible not worsened with truncal rotation, feeling heartburn/GERD  After walking back from bathroom, pt also reported numb/tingling of entire L leg to his feet  Patient took Percocet at home  Received fentanyl 100 mcg from EMS  Denies shortness of breath, abdominal pain, nausea, vomiting, diarrhea, fatigue, diaphoresis, fever, chills, URI sx's, loss of perineal sensation, urinary and/or bladder retention or incontinence  MDM: Patient given Dilaudid for pain, Mylanta and Pepcid for GERD symptoms  X-ray left hip normal       CTA chest abdomen pelvis revealed long segment aortic dissection which originates just distal to the takeoff of left subclavian artery and extends into bilateral common iliac arteries with diminished perfusion of the right kidney,  ascending thoracic aortic aneurysm measuring 4 5 cm, pulmonary emphysema  Discussed case with both vascular surgery and cardiothoracic surgery at [facility], patient will be transferred to [facility], accepting physician: Dr Diane Wesley        Prior to ED departure, patient was given 20 mg IV bolus of labetalol  Cardene drip was ordered but not sent by pharmacy in time prior to ED departure  Spoke with EMS about systolic BP goal < 619, heart rate goal < 60  EMS verbalized understanding  Recommended labetalol 10-20 mg q5 minutes up to 300 mg total   EMS also plan to start Cardene drip at 5 mg/min en route to B  Prior to Admission Medications   Prescriptions Last Dose Informant Patient Reported? Taking?    B Complex-C (B COMPLEX-VITAMIN C PO)   Yes No   Sig: Take 1 tablet by mouth daily   Cholecalciferol (VITAMIN D3) 50 MCG (2000 UT) capsule   Yes No   Sig: Take 1 capsule by mouth   Eliquis 5 MG   No No   Sig: TAKE 1 TABLET TWICE A DAY   Patient taking differently: Last dose is 22- will check with cardiologist to be sure   INCRUSE ELLIPTA 62 5 MCG/INH AEPB inhaler   Yes No   Si puff every evening     allopurinol (ZYLOPRIM) 100 mg tablet   No No   Sig: Take 1 tablet (100 mg total) by mouth daily   amLODIPine (NORVASC) 10 mg tablet   No No   Sig: Take 1 tablet (10 mg total) by mouth daily   Patient taking differently: Take 10 mg by mouth every evening   atorvastatin (LIPITOR) 20 mg tablet   No No   Sig: TAKE 1 TABLET DAILY   dexamethasone (DECADRON) 2 mg tablet   No No   Sig: Take 1 tablet (2 mg total) by mouth 2 (two) times a day with meals   levothyroxine 75 mcg tablet   No No   Sig: TAKE 1 TABLET DAILY   metoprolol succinate (TOPROL-XL) 100 mg 24 hr tablet   Yes No   Sig: Pt increased to 100 mg BID   oxyCODONE-acetaminophen (PERCOCET) 5-325 mg per tablet   No No   Sig: Take 1 tablet by mouth every 4 (four) hours as needed for moderate pain or severe pain Max Daily Amount: 6 tablets      Facility-Administered Medications: None       Past Medical History:   Diagnosis Date   • Arthritis    • H/O echocardiogram 2018   • History of EKG 2019    numerous tests between 2018 through 2019   • History of exercise stress test 10/2018   • History of Holter monitoring 2018   • Hyperlipidemia    • Hypertension    • Irregular heart beat    • Kidney stone        Past Surgical History:   Procedure Laterality Date   • CARDIAC ELECTROPHYSIOLOGY STUDY AND ABLATION  2019   • CARDIOVERSION  2019   • FL RETROGRADE PYELOGRAM  2022   • FL RETROGRADE PYELOGRAM  2022   • HERNIA REPAIR     • IR JOINT INJECTION  2019   • LAPAROSCOPIC ASSISSTED TOTAL COLECTOMY W/ J-POUCH     • CT CYSTO/URETERO W/LITHOTRIPSY &INDWELL STENT INSRT N/A 2022    Procedure: BILATERAL RETROGRADE PYELOGRAM, CYSTOSCOPY,  LEFT URETEROSCOPY, LEFT LASER LITHOTRIPSY BASKET EXTRACTION, LEFT STENT;  Surgeon: Elena Hinkle MD;  Location: [address] [place] [address];  Service: Urology   • CT CYSTO/URETERO W/LITHOTRIPSY &INDWELL STENT INSRT Left 2022    Procedure: CYSTOSCOPY, URETEROSCOPY STENT REMOVAL, LASER LITHOTRIPSY, WITH BASKET, INSERTION OF URETERAL STENT;  Surgeon: Elena Hinkle MD;  Location: [facility];  Service: Urology   • THORACOTOMY Left    • VASECTOMY         Family History   Problem Relation Age of Onset   • Arthritis Father      I have reviewed and agree with the history as documented  E-Cigarette/Vaping   • E-Cigarette Use Never User      E-Cigarette/Vaping Substances   • Nicotine No    • THC No    • CBD No    • Flavoring No    • Other No    • Unknown No      Social History     Tobacco Use   • Smoking status: Former     Packs/day: 1 50     Years: 15 00     Pack years: 22 50     Types: Cigarettes     Quit date:      Years since quittin 0   • Smokeless tobacco: Never   Vaping Use   • Vaping Use: Never used   Substance Use Topics   • Alcohol use: Yes     Comment: Occas  • Drug use: Never        Review of Systems   Constitutional: Negative for chills and fever  HENT: Negative for ear pain and sore throat  Eyes: Negative for pain and visual disturbance  Respiratory: Negative for cough and shortness of breath  Cardiovascular: Positive for chest pain  Negative for palpitations  Gastrointestinal: Negative for abdominal pain and vomiting  GERD symptoms   Genitourinary: Negative for dysuria and hematuria  Musculoskeletal: Positive for back pain  Negative for arthralgias  Left hip pain   Skin: Negative for color change and rash  Neurological: Positive for numbness  Negative for seizures and syncope  All other systems reviewed and are negative  Physical Exam  ED Triage Vitals   Temperature Pulse Respirations Blood Pressure SpO2   12/30/22 1251 12/30/22 1251 12/30/22 1251 12/30/22 1251 12/30/22 1251   97 8 °F (36 6 °C) 87 18 142/85 97 %      Temp src Heart Rate Source Patient Position - Orthostatic VS BP Location FiO2 (%)   -- 12/30/22 1251 12/30/22 1510 12/30/22 1510 --    Monitor Sitting Left arm       Pain Score       12/30/22 1251       5             Orthostatic Vital Signs  Vitals:    12/30/22 1708 12/30/22 1715 12/30/22 1724 12/30/22 1730   BP: 153/95 153/95 (!) 156/103 (!) 156/103   Pulse: 87 89 87 98   Patient Position - Orthostatic VS: Sitting Sitting Sitting Sitting       Physical Exam  Vitals and nursing note reviewed  Constitutional:       General: He is not in acute distress  Appearance: He is well-developed  HENT:      Head: Normocephalic and atraumatic  Eyes:      Conjunctiva/sclera: Conjunctivae normal    Cardiovascular:      Rate and Rhythm: Normal rate and regular rhythm  Pulses:           Radial pulses are 2+ on the right side and 2+ on the left side  Dorsalis pedis pulses are 2+ on the right side and 2+ on the left side  Posterior tibial pulses are 2+ on the right side and 2+ on the left side  Heart sounds: No murmur heard  Pulmonary:      Effort: Pulmonary effort is normal  No respiratory distress  Breath sounds: Normal breath sounds and air entry  Abdominal:      Palpations: Abdomen is soft  Tenderness: There is no abdominal tenderness  Comments: Left flank abrasion     Musculoskeletal: General: No swelling  Cervical back: Neck supple  Right lower leg: No edema  Left lower leg: No edema  Comments: Left hip: Pain near lateral aspect of hip, no swelling  Normal range of motion of hip, normal gait, normal dorsi flexion and plantar flexion bilateral feet, 5/5 strength in both lower extremities  Normal capillary refill less than 2 seconds  2+ DP and PT pulses bilaterally  No midline CT or L tenderness or step-offs  Skin:     General: Skin is warm and dry  Capillary Refill: Capillary refill takes less than 2 seconds  Neurological:      Mental Status: He is alert     Psychiatric:         Mood and Affect: Mood normal          ED Medications  Medications   fentanyl citrate (PF) (FOR EMS ONLY) 100 mcg/2 mL injection 100 mcg (0 mcg Does not apply Given to EMS 12/30/22 1302)   Famotidine (PF) (PEPCID) injection 20 mg (20 mg Intravenous Given 12/30/22 1519)   aluminum-magnesium hydroxide-simethicone (MYLANTA) oral suspension 30 mL (30 mL Oral Given 12/30/22 1512)   HYDROmorphone (DILAUDID) injection 0 5 mg (0 5 mg Intravenous Given 12/30/22 1513)   bacitracin topical ointment 1 small application (1 small application Topical Given 12/30/22 1607)   iohexol (OMNIPAQUE) 350 MG/ML injection (SINGLE-DOSE) 100 mL (100 mL Intravenous Given 12/30/22 1641)   labetalol (NORMODYNE) injection 20 mg (20 mg Intravenous Given 12/30/22 1733)       Diagnostic Studies  Results Reviewed     Procedure Component Value Units Date/Time    HS Troponin 0hr (reflex protocol) [[id number]]  (Normal) Collected: 12/30/22 1542    Lab Status: Final result Specimen: Blood from Arm, Right Updated: 12/30/22 1625     hs TnI 0hr 13 ng/L     Comprehensive metabolic panel [[id number]]  (Abnormal) Collected: 12/30/22 1542    Lab Status: Final result Specimen: Blood from Arm, Right Updated: 12/30/22 1615     Sodium 144 mmol/L      Potassium 4 4 mmol/L      Chloride 110 mmol/L      CO2 24 mmol/L      ANION GAP 10 mmol/L BUN 29 mg/dL      Creatinine 1 62 mg/dL      Glucose 96 mg/dL      Calcium 9 6 mg/dL      AST 21 U/L      ALT 16 U/L      Alkaline Phosphatase 76 U/L      Total Protein 6 8 g/dL      Albumin 3 8 g/dL      Total Bilirubin 0 85 mg/dL      eGFR 40 ml/min/1 73sq m     Narrative:      National Kidney Disease Foundation guidelines for Chronic Kidney Disease (CKD):   •  Stage 1 with normal or high GFR (GFR > 90 mL/min/1 73 square meters)  •  Stage 2 Mild CKD (GFR = 60-89 mL/min/1 73 square meters)  •  Stage 3A Moderate CKD (GFR = 45-59 mL/min/1 73 square meters)  •  Stage 3B Moderate CKD (GFR = 30-44 mL/min/1 73 square meters)  •  Stage 4 Severe CKD (GFR = 15-29 mL/min/1 73 square meters)  •  Stage 5 End Stage CKD (GFR <15 mL/min/1 73 square meters)  Note: GFR calculation is accurate only with a steady state creatinine    Lipase [[id number]]  (Normal) Collected: 12/30/22 1542    Lab Status: Final result Specimen: Blood from Arm, Right Updated: 12/30/22 1615     Lipase 24 u/L     CBC and differential [[id number]]  (Abnormal) Collected: 12/30/22 1542    Lab Status: Final result Specimen: Blood from Arm, Right Updated: 12/30/22 1554     WBC 11 20 Thousand/uL      RBC 4 54 Million/uL      Hemoglobin 13 4 g/dL      Hematocrit 41 8 %      MCV 92 fL      MCH 29 5 pg      MCHC 32 1 g/dL      RDW 16 3 %      MPV 10 8 fL      Platelets 168 Thousands/uL      nRBC 0 /100 WBCs      Neutrophils Relative 81 %      Immat GRANS % 1 %      Lymphocytes Relative 11 %      Monocytes Relative 7 %      Eosinophils Relative 0 %      Basophils Relative 0 %      Neutrophils Absolute 8 97 Thousands/µL      Immature Grans Absolute 0 11 Thousand/uL      Lymphocytes Absolute 1 22 Thousands/µL      Monocytes Absolute 0 83 Thousand/µL      Eosinophils Absolute 0 02 Thousand/µL      Basophils Absolute 0 05 Thousands/µL                  CTA chest abdomen pelvis w wo contrast   Final Result by Andrea Moreno MD (12/30 1740)      There is a long segment aortic dissection which originates just distal to the takeoff of the left subclavian artery and extends into the bilateral common iliac arteries, as detailed above  Diminished perfusion of the right kidney relative to the left  Pulmonary emphysema  Postop changes of the bowel without bowel obstruction  Ascending thoracic aortic aneurysm measuring 4 5 cm  At the time of the dictation, the emergency department physician was aware of the presence of the dissection and was arranging for patient to be transported for further care at the [facility] performed: [id number]         XR chest 1 view portable   Final Result by Tee Wesley MD (12/30 1534)      No acute cardiopulmonary disease  Workstation performed: [id number]         XR hip/pelv 2-3 vws left if performed   Final Result by Brandy Soto MD (12/30 2665)      No acute osseous abnormality              Workstation performed: [id number]               Procedures  ECG 12 Lead Documentation Only    Date/Time: 12/30/2022 5:48 PM  Performed by: Nirmal Escobedo MD  Authorized by: Nirmal Escobedo MD     Indications / Diagnosis:  Cp  Patient location:  ED  Previous ECG:     Previous ECG:  Compared to current    Comparison ECG info:  5/6/22    Similarity:  No change  Interpretation:     Interpretation: abnormal    Rate:     ECG rate:  87    ECG rate assessment: normal    Rhythm:     Rhythm: atrial fibrillation    Ectopy:     Ectopy: none    QRS:     QRS axis:  Normal    QRS intervals:  Normal  Conduction:     Conduction: normal    ST segments:     ST segments:  Normal  T waves:     T waves: normal    ECG 12 Lead Documentation Only    Date/Time: 12/30/2022 5:49 PM  Performed by: Nirmal Escobedo MD  Authorized by: Nirmal Escobedo MD     Indications / Diagnosis:  2 hour ekg  Patient location:  ED  Previous ECG:     Previous ECG:  Compared to current    Comparison ECG info: 12/30/22 at 12:14    Similarity:  No change  Interpretation:     Interpretation: abnormal    Rate:     ECG rate:  95    ECG rate assessment: normal    Rhythm:     Rhythm: atrial fibrillation    Ectopy:     Ectopy: none    QRS:     QRS axis:  Normal    QRS intervals:  Normal  Conduction:     Conduction: normal    ST segments:     ST segments:  Normal  T waves:     T waves: normal            ED Course  ED Course as of 12/31/22 0004   Fri Dec 30, 2022   1502 XR L hip: There is no acute fracture or dislocation      Bilateral mild degenerative hip joints      No lytic or blastic osseous lesion      Pelvic multiple surgical wire staple lines and surgical clips again noted      Degenerative lower lumbar spine and pubic symphysis  0 CXR normal   9173 CTA concerning for descending dissection  EDT 21 order placed  Rupture alert called  [phone] with vascular fellow (Guille Son Mena Lung) and CT surgery (Jr Tilley); states to start labetalol and Cardene drip  HR goal < 70  Systolic goal <954  Did not recommend elliquis reversal  Labetalol 20 mg bolus given prior to EMS arrival  EMS aware of HR and BP goals with specific instructions of labetalol 10 mg q 5 mins, Cardene drip at 5 mg/hr  EMTALA form signed   Pt sent to [address] [place] [address] to [facility] room 516, Dr Cooper Rinne is accepting     [phone] CTA: There is a long segment aortic dissection which originates just distal to the takeoff of the left subclavian artery and extends into the bilateral common iliac arteries, as detailed above      Diminished perfusion of the right kidney relative to the left      Pulmonary emphysema      Postop changes of the bowel without bowel obstruction      Ascending thoracic aortic aneurysm measuring 4 5 cm      At the time of the dictation, the emergency department physician was aware of the presence of the dissection and was arranging for patient to be transported for further care at the [address] [place] [address] [address] Score Flowsheet Row Most Recent Value   Heart Score Risk Calculator    History 1 Filed at: 12/30/2022 1750   ECG 0 Filed at: 12/30/2022 1750   Age 2 Filed at: 12/30/2022 1750   Risk Factors 2 Filed at: 12/30/2022 1750   Troponin 1 Filed at: 12/30/2022 1750   HEART Score 6 Filed at: 12/30/2022 1750                                MDM    Disposition  Final diagnoses:   Dissection of descending aorta ([address] )   Thoracic ascending aortic aneurysm - 4 5 cm   Pulmonary emphysema (HCC)   Left hip pain   Fall, initial encounter     Time reflects when diagnosis was documented in both MDM as applicable and the Disposition within this note     Time User Action Codes Description Comment    12/30/2022  5:29 PM Reggy Brooking Add [I71 00] Dissection of descending aorta ([address] )     12/30/2022  5:45 PM Reggy Brooking Add [I71 21] Thoracic ascending aortic aneurysm     12/30/2022  5:46 PM Reggy Brooking Modify [I71 21] Thoracic ascending aortic aneurysm 4 5 cm    12/30/2022  5:46 PM Reggy Brooking Add [J43 9] Pulmonary emphysema ([facility] [address] )     12/30/2022  5:46 PM Reggy Brooking Add [M25 552] Left hip pain     12/30/2022  5:46 PM Reggy Brooking Add [W19  Yana Plum, initial encounter       ED Disposition     ED Disposition   Transfer to Another Facility-In Network    Condition   --    Date/Time   Fri Dec 30, 2022  5:29 PM    Comment   Jeanmarie Draft should be transferred out to B             MD Documentation    Flowsheet Row Most Recent Value   Patient Condition An emergency transfer is being made prior to stabilization due to the need for definitive care and the benefit of transfer outweighs the risk   Reason for Transfer Level of Care needed not available at this facility   Benefits of Transfer Specialized equipment and/or services available at the receiving facility (Include comment)________________________, Continuity of care   Risks of Transfer Potential for delay in receiving treatment, Increased discomfort during transfer, Possible worsening of condition or death during transfer, Potential deterioration of medical condition, Loss of IV   Accepting Physician surgery   Accepting Facility Name, [facility]   Sending MD Katlin Jaimes MD, PGY2   Provider Certification General risk, such as traffic hazards, adverse weather conditions, rough terrain or turbulence, possible failure of equipment (including vehicle or aircraft), or consequences of actions of persons outside the control of the transport personnel, Unanticipated needs of medical equipment and personnel during transport, The possibility of a transport vehicle being unavailable, Risk of worsening condition      RN Documentation    [address], [address]  [facility]      Follow-up Information    None         Discharge Medication List as of 12/30/2022  5:54 PM      CONTINUE these medications which have NOT CHANGED    Details   allopurinol (ZYLOPRIM) 100 mg tablet Take 1 tablet (100 mg total) by mouth daily, Starting Fri 12/2/2022, Normal      amLODIPine (NORVASC) 10 mg tablet Take 1 tablet (10 mg total) by mouth daily, Starting Fri 6/10/2022, Normal      atorvastatin (LIPITOR) 20 mg tablet TAKE 1 TABLET DAILY, Normal      B Complex-C (B COMPLEX-VITAMIN C PO) Take 1 tablet by mouth daily, Historical Med      Cholecalciferol (VITAMIN D3) 50 MCG (2000 UT) capsule Take 1 capsule by mouth, Historical Med      dexamethasone (DECADRON) 2 mg tablet Take 1 tablet (2 mg total) by mouth 2 (two) times a day with meals, Starting Mon 11/28/2022, Normal      Eliquis 5 MG TAKE 1 TABLET TWICE A DAY, Normal      INCRUSE ELLIPTA 62 5 MCG/INH AEPB inhaler 1 puff every evening  , Starting Tue 5/5/2020, Historical Med      levothyroxine 75 mcg tablet TAKE 1 TABLET DAILY, Normal      metoprolol succinate (TOPROL-XL) 100 mg 24 hr tablet Pt increased to 100 mg BID, Historical Med      oxyCODONE-acetaminophen (PERCOCET) 5-325 mg per tablet Take 1 tablet by mouth every 4 (four) hours as needed for moderate pain or severe pain Max Daily Amount: 6 tablets, Starting Fri 9/9/2022, Normal           No discharge procedures on file  PDMP Review       Value Time User    PDMP Reviewed  Yes 12/30/2022 10:38 PM Valeria Bullock PA-C           ED Provider  Attending physically available and evaluated Jeanmarie Draft  I managed the patient along with the ED Attending      Electronically Signed by         Vanessa Padilla MD  12/31/22 7312

## 2022-12-31 ENCOUNTER — APPOINTMENT (INPATIENT)
Dept: NON INVASIVE DIAGNOSTICS | Facility: HOSPITAL | Age: 77
End: 2022-12-31

## 2022-12-31 DIAGNOSIS — I71.21 ANEURYSM OF ASCENDING AORTA WITHOUT RUPTURE (HCC): Primary | ICD-10-CM

## 2022-12-31 DIAGNOSIS — N18.31 STAGE 3A CHRONIC KIDNEY DISEASE (HCC): ICD-10-CM

## 2022-12-31 DIAGNOSIS — I71.03 AORTIC DISSECTION, THORACOABDOMINAL (HCC): ICD-10-CM

## 2022-12-31 PROBLEM — G93.40 ENCEPHALOPATHY ACUTE: Status: ACTIVE | Noted: 2022-12-31

## 2022-12-31 LAB
ALBUMIN SERPL BCP-MCNC: 3.1 G/DL (ref 3.5–5)
ALP SERPL-CCNC: 85 U/L (ref 46–116)
ALT SERPL W P-5'-P-CCNC: 22 U/L (ref 12–78)
ANION GAP SERPL CALCULATED.3IONS-SCNC: 4 MMOL/L (ref 4–13)
AORTIC ROOT: 3.9 CM
AORTIC VALVE MEAN VELOCITY: 9.3 M/S
APICAL FOUR CHAMBER EJECTION FRACTION: 48 %
ASCENDING AORTA: 3.6 CM
AST SERPL W P-5'-P-CCNC: 32 U/L (ref 5–45)
ATRIAL RATE: 357 BPM
ATRIAL RATE: 82 BPM
AV LVOT MEAN GRADIENT: 1 MMHG
AV LVOT PEAK GRADIENT: 2 MMHG
AV MEAN GRADIENT: 4 MMHG
AV PEAK GRADIENT: 5 MMHG
AV REGURGITATION PRESSURE HALF TIME: 494 MS
AV VELOCITY RATIO: 0.67
BILIRUB SERPL-MCNC: 1.45 MG/DL (ref 0.2–1)
BUN SERPL-MCNC: 23 MG/DL (ref 5–25)
CALCIUM ALBUM COR SERPL-MCNC: 10.1 MG/DL (ref 8.3–10.1)
CALCIUM SERPL-MCNC: 9.4 MG/DL (ref 8.3–10.1)
CHLORIDE SERPL-SCNC: 109 MMOL/L (ref 96–108)
CO2 SERPL-SCNC: 25 MMOL/L (ref 21–32)
CREAT SERPL-MCNC: 1.54 MG/DL (ref 0.6–1.3)
DOP CALC AO PEAK VEL: 1.15 M/S
DOP CALC AO VTI: 20.12 CM
DOP CALC LVOT PEAK VEL VTI: 12.54 CM
DOP CALC LVOT PEAK VEL: 0.77 M/S
E WAVE DECELERATION TIME: 122 MS
ERYTHROCYTE [DISTWIDTH] IN BLOOD BY AUTOMATED COUNT: 16.7 % (ref 11.6–15.1)
FRACTIONAL SHORTENING: 22 (ref 28–44)
GFR SERPL CREATININE-BSD FRML MDRD: 43 ML/MIN/1.73SQ M
GLUCOSE SERPL-MCNC: 117 MG/DL (ref 65–140)
HCT VFR BLD AUTO: 41.2 % (ref 36.5–49.3)
HGB BLD-MCNC: 13 G/DL (ref 12–17)
INTERVENTRICULAR SEPTUM IN DIASTOLE (PARASTERNAL SHORT AXIS VIEW): 1.8 CM
INTERVENTRICULAR SEPTUM: 1.8 CM (ref 0.6–1.1)
LAAS-AP2: 26.2 CM2
LAAS-AP4: 19.2 CM2
LEFT ATRIUM SIZE: 4.8 CM
LEFT INTERNAL DIMENSION IN SYSTOLE: 3.6 CM (ref 2.1–4)
LEFT VENTRICULAR INTERNAL DIMENSION IN DIASTOLE: 4.6 CM (ref 3.5–6)
LEFT VENTRICULAR POSTERIOR WALL IN END DIASTOLE: 1.6 CM
LEFT VENTRICULAR STROKE VOLUME: 44 ML
LVSV (TEICH): 44 ML
MAGNESIUM SERPL-MCNC: 2.2 MG/DL (ref 1.6–2.6)
MCH RBC QN AUTO: 29.9 PG (ref 26.8–34.3)
MCHC RBC AUTO-ENTMCNC: 31.6 G/DL (ref 31.4–37.4)
MCV RBC AUTO: 95 FL (ref 82–98)
MV E'TISSUE VEL-SEP: 8 CM/S
MV PEAK E VEL: 126 CM/S
MV STENOSIS PRESSURE HALF TIME: 35 MS
MV VALVE AREA P 1/2 METHOD: 6.29
PHOSPHATE SERPL-MCNC: 2.9 MG/DL (ref 2.3–4.1)
PISA MRMAX VEL: 0.36 M/S
PISA RADIUS: 0.4 CM
PLATELET # BLD AUTO: 172 THOUSANDS/UL (ref 149–390)
PMV BLD AUTO: 10.7 FL (ref 8.9–12.7)
POTASSIUM SERPL-SCNC: 4.2 MMOL/L (ref 3.5–5.3)
PROT SERPL-MCNC: 6.5 G/DL (ref 6.4–8.4)
PULMONARY REGURGITATION LATE DIASTOLIC VELOCITY: 0.02 M/S
QRS AXIS: 50 DEGREES
QRS AXIS: 55 DEGREES
QRSD INTERVAL: 78 MS
QRSD INTERVAL: 78 MS
QT INTERVAL: 396 MS
QT INTERVAL: 414 MS
QTC INTERVAL: 476 MS
QTC INTERVAL: 520 MS
RBC # BLD AUTO: 4.35 MILLION/UL (ref 3.88–5.62)
RIGHT ATRIUM AREA SYSTOLE A4C: 17.1 CM2
RIGHT VENTRICLE ID DIMENSION: 4.6 CM
SL CV AV DECELERATION TIME RETROGRADE: 1703 MS
SL CV AV PEAK GRADIENT RETROGRADE: 44 MMHG
SL CV LEFT ATRIUM LENGTH A2C: 6.6 CM
SL CV LV EF: 55
SL CV PED ECHO LEFT VENTRICLE DIASTOLIC VOLUME (MOD BIPLANE) 2D: 98 ML
SL CV PED ECHO LEFT VENTRICLE SYSTOLIC VOLUME (MOD BIPLANE) 2D: 53 ML
SODIUM SERPL-SCNC: 138 MMOL/L (ref 135–147)
T WAVE AXIS: 17 DEGREES
T WAVE AXIS: 52 DEGREES
TRICUSPID VALVE PEAK REGURGITATION VELOCITY: 2.46 M/S
VENTRICULAR RATE: 87 BPM
VENTRICULAR RATE: 95 BPM
WBC # BLD AUTO: 9.45 THOUSAND/UL (ref 4.31–10.16)

## 2022-12-31 RX ORDER — DIGOXIN 0.25 MG/ML
250 INJECTION INTRAMUSCULAR; INTRAVENOUS ONCE
Status: COMPLETED | OUTPATIENT
Start: 2022-12-31 | End: 2022-12-31

## 2022-12-31 RX ORDER — QUETIAPINE FUMARATE 25 MG/1
12.5 TABLET, FILM COATED ORAL
Status: DISCONTINUED | OUTPATIENT
Start: 2022-12-31 | End: 2023-01-04 | Stop reason: HOSPADM

## 2022-12-31 RX ORDER — METOPROLOL SUCCINATE 100 MG/1
100 TABLET, EXTENDED RELEASE ORAL 2 TIMES DAILY
Status: DISCONTINUED | OUTPATIENT
Start: 2022-12-31 | End: 2023-01-04 | Stop reason: HOSPADM

## 2022-12-31 RX ORDER — METOPROLOL TARTRATE 5 MG/5ML
5 INJECTION INTRAVENOUS ONCE
Status: COMPLETED | OUTPATIENT
Start: 2022-12-31 | End: 2022-12-31

## 2022-12-31 RX ORDER — LANOLIN ALCOHOL/MO/W.PET/CERES
6 CREAM (GRAM) TOPICAL DAILY
Status: DISCONTINUED | OUTPATIENT
Start: 2022-12-31 | End: 2022-12-31

## 2022-12-31 RX ORDER — DEXMEDETOMIDINE HYDROCHLORIDE 4 UG/ML
.1-.7 INJECTION, SOLUTION INTRAVENOUS
Status: DISCONTINUED | OUTPATIENT
Start: 2022-12-31 | End: 2023-01-01

## 2022-12-31 RX ORDER — HYDRALAZINE HYDROCHLORIDE 20 MG/ML
10 INJECTION INTRAMUSCULAR; INTRAVENOUS EVERY 6 HOURS PRN
Status: DISCONTINUED | OUTPATIENT
Start: 2022-12-31 | End: 2023-01-04 | Stop reason: HOSPADM

## 2022-12-31 RX ADMIN — ESMOLOL HYDROCHLORIDE IN SODIUM CHLORIDE 200 MCG/KG/MIN: 10 INJECTION INTRAVENOUS at 06:47

## 2022-12-31 RX ADMIN — METOROPROLOL TARTRATE 5 MG: 5 INJECTION, SOLUTION INTRAVENOUS at 16:58

## 2022-12-31 RX ADMIN — DIGOXIN 250 MCG: 0.25 INJECTION INTRAMUSCULAR; INTRAVENOUS at 15:08

## 2022-12-31 RX ADMIN — APIXABAN 5 MG: 5 TABLET, FILM COATED ORAL at 17:21

## 2022-12-31 RX ADMIN — ESMOLOL HYDROCHLORIDE IN SODIUM CHLORIDE 200 MCG/KG/MIN: 10 INJECTION INTRAVENOUS at 11:41

## 2022-12-31 RX ADMIN — ESMOLOL HYDROCHLORIDE IN SODIUM CHLORIDE 200 MCG/KG/MIN: 10 INJECTION INTRAVENOUS at 14:03

## 2022-12-31 RX ADMIN — ALLOPURINOL 100 MG: 100 TABLET ORAL at 08:49

## 2022-12-31 RX ADMIN — LEVOTHYROXINE SODIUM 75 MCG: 75 TABLET ORAL at 05:17

## 2022-12-31 RX ADMIN — ESMOLOL HYDROCHLORIDE IN SODIUM CHLORIDE 200 MCG/KG/MIN: 10 INJECTION INTRAVENOUS at 08:48

## 2022-12-31 RX ADMIN — DEXMEDETOMIDINE HYDROCHLORIDE 0.4 MCG/KG/HR: 400 INJECTION INTRAVENOUS at 16:55

## 2022-12-31 RX ADMIN — QUETIAPINE FUMARATE 12.5 MG: 25 TABLET ORAL at 17:21

## 2022-12-31 RX ADMIN — DEXMEDETOMIDINE HYDROCHLORIDE 0.6 MCG/KG/HR: 400 INJECTION INTRAVENOUS at 22:11

## 2022-12-31 RX ADMIN — ATORVASTATIN CALCIUM 20 MG: 20 TABLET, FILM COATED ORAL at 08:49

## 2022-12-31 RX ADMIN — METOPROLOL SUCCINATE 100 MG: 100 TABLET, EXTENDED RELEASE ORAL at 11:26

## 2022-12-31 RX ADMIN — ESMOLOL HYDROCHLORIDE IN SODIUM CHLORIDE 200 MCG/KG/MIN: 10 INJECTION INTRAVENOUS at 04:42

## 2022-12-31 RX ADMIN — METOPROLOL SUCCINATE 100 MG: 100 TABLET, EXTENDED RELEASE ORAL at 21:29

## 2022-12-31 RX ADMIN — APIXABAN 5 MG: 5 TABLET, FILM COATED ORAL at 11:26

## 2022-12-31 RX ADMIN — ESMOLOL HYDROCHLORIDE IN SODIUM CHLORIDE 200 MCG/KG/MIN: 10 INJECTION INTRAVENOUS at 02:10

## 2022-12-31 NOTE — ASSESSMENT & PLAN NOTE
Lab Results   Component Value Date    EGFR 40 12/30/2022    EGFR 40 12/08/2022    EGFR 30 11/28/2022    CREATININE 1 62 (H) 12/30/2022    CREATININE 1 63 (H) 12/08/2022    CREATININE 2 08 (H) 11/28/2022     · Baseline Cr 1 2-1 4 though recent admission with CRIS 2/2 poor PO intake and kidney stones  · Trend renal function  · Strict I/Os  · Urinary retention protocol

## 2022-12-31 NOTE — H&P
[address] [facility]  H&P- Doug Chen 1945, 78 y o  male MRN: [id number]  Unit/Bed#: [facility] 516-01 Encounter: [id number]  Primary Care Provider: Jeremy Sanchez MD   Date and time admitted to hospital: 12/30/2022  6:17 PM    * Aortic dissection, thoracoabdominal [facility])  Assessment & Plan  · Presented [facility] ED 12/30 with acute onset of back pain  · 12/30 CTA C/A/P: There is a long segment aortic dissection which originates just distal to the takeoff of the left subclavian artery and extends into the bilateral common iliac arteries  · Transferred to [facility] for vascular surgery and cardiothoracic surgery evaluation  · Esmolol and cardene infusions to maintain goal HR < 70 and SBP < 130  · q1h neurovascular checks   · Pain control   · Check echocardiogram     Atrial fibrillation, persistent ([address] )  Assessment & Plan  · Patient with chronic afib, prior cardioversion and ablation without success  · Maintained on metoprolol 100 mg BID and eliquis as an outpatient  · Hold anticoagulation tonight  · Esmolol for HR control     COPD (chronic obstructive pulmonary disease) ([address] )  Assessment & Plan  · Continue incruse ellipta  · Respiratory protocol    Stage 3a chronic kidney disease [facility])  Assessment & Plan  Lab Results   Component Value Date    EGFR 40 12/30/2022    EGFR 40 12/08/2022    EGFR 30 11/28/2022    CREATININE 1 62 (H) 12/30/2022    CREATININE 1 63 (H) 12/08/2022    CREATININE 2 08 (H) 11/28/2022     · Baseline Cr 1 2-1 4 though recent admission with CRIS 2/2 diarrhea and poor PO intake  · Trend renal function  · Strict I/Os  · Urinary retention protocol     Left hip pain  Assessment & Plan  · Patient with chronic arthritis of left him  Was worse today leading to ambulatory dysfunction  Per the patient's daughter, it may have been dislocated and put back in place by EMS  Hip xray without acute findings     · Consider ortho consult  · PT/OT    Hypothyroidism  Assessment & Plan  · Continue home synthroid    Severe obstructive sleep apnea  Assessment & Plan  · Patient wears CPAP qHS at home, continue while inpatient     -------------------------------------------------------------------------------------------------------------  Chief Complaint: Type B aortic dissection     History of Present Illness   Holly Orellana is a 78 y o  male with PMH significant for afib on Eliquis, HTN, hypothyroidism, CKD, hx UC s/p colectomy and J-pouch, JORGE, COPD who was at home when he developed acute onset of upper back pain  He was attempting to ambulate to the bed when he had worsening left hip pain and fell to the floor  EMS was called and brought the patient to the ED  While in the ED he also had intermittent chest pain  CTA C/A/P was performed which revealed acute type B aortic dissection  He was given labetalol, started on cardene and transferred to B  Patient states that he had intermittent numbness in his left leg when he had left hip pain in the ED but this is now resolved  Has only mild back pain at this time  History obtained from chart review and the patient   -------------------------------------------------------------------------------------------------------------  Dispo: Admit to Critical Care     Code Status: Level 1 - Full Code  --------------------------------------------------------------------------------------------------------------  Review of Systems   Constitutional: Negative for chills and fever  Respiratory: Negative for shortness of breath  Cardiovascular: Negative for chest pain  Gastrointestinal: Negative for abdominal pain and nausea  Genitourinary: Negative for difficulty urinating  Musculoskeletal: Positive for back pain and gait problem (left hip pain)  A 12-point, complete review of systems was reviewed and negative except as stated above     Physical Exam  HENT:      Head: Atraumatic        Mouth/Throat:      Mouth: Mucous membranes are moist  Cardiovascular:      Rate and Rhythm: Tachycardia present  Rhythm irregularly irregular  Pulses:           Radial pulses are 2+ on the right side and 2+ on the left side  Dorsalis pedis pulses are 2+ on the right side and 2+ on the left side  Pulmonary:      Effort: No respiratory distress  Breath sounds: No wheezing, rhonchi or rales  Abdominal:      General: Bowel sounds are normal  There is no distension  Palpations: Abdomen is soft  Tenderness: There is no abdominal tenderness  Musculoskeletal:      Cervical back: Neck supple  Right lower leg: No edema  Left lower leg: No edema  Skin:     General: Skin is warm and dry  Capillary Refill: Capillary refill takes 2 to 3 seconds  Neurological:      General: No focal deficit present  Mental Status: He is alert and oriented to person, place, and time  Sensory: Sensation is intact        --------------------------------------------------------------------------------------------------------------  Vitals:   Vitals:    12/30/22 1830 12/30/22 2000 12/30/22 2020 12/30/22 2030   BP: 107/67 107/59 (!) 85/59 97/73   Pulse: 97 96 86 102   Resp: (!) 26 20 20 20   SpO2: 95% 93% 92% 92%     Temp  Min: 97 8 °F (36 6 °C)  Max: 97 8 °F (36 6 °C)        There is no height or weight on file to calculate BMI      Laboratory and Diagnostics:  Results from last 7 days   Lab Units 12/30/22  1542   WBC Thousand/uL 11 20*   HEMOGLOBIN g/dL 13 4   HEMATOCRIT % 41 8   PLATELETS Thousands/uL 190   NEUTROS PCT % 81*   MONOS PCT % 7     Results from last 7 days   Lab Units 12/30/22  1542   SODIUM mmol/L 144   POTASSIUM mmol/L 4 4   CHLORIDE mmol/L 110*   CO2 mmol/L 24   ANION GAP mmol/L 10   BUN mg/dL 29*   CREATININE mg/dL 1 62*   CALCIUM mg/dL 9 6   GLUCOSE RANDOM mg/dL 96   ALT U/L 16   AST U/L 21   ALK PHOS U/L 76   ALBUMIN g/dL 3 8   TOTAL BILIRUBIN mg/dL 0 85          Results from last 7 days   Lab Units 12/30/22  2124   INR 1 16   PTT seconds 30              ABG:    VBG:          Micro:        EKG: Atrial fibrillation   Imagin/30 CTA C/A/P I have personally reviewed pertinent films in PACS      Historical Information   Past Medical History:   Diagnosis Date   • Arthritis    • H/O echocardiogram 2018   • History of EKG 2019    numerous tests between 2018 through 2019   • History of exercise stress test 10/2018   • History of Holter monitoring 2018   • Hyperlipidemia    • Hypertension    • Irregular heart beat    • Kidney stone      Past Surgical History:   Procedure Laterality Date   • CARDIAC ELECTROPHYSIOLOGY STUDY AND ABLATION  2019   • CARDIOVERSION  2019   • FL RETROGRADE PYELOGRAM  2022   • FL RETROGRADE PYELOGRAM  2022   • HERNIA REPAIR     • IR JOINT INJECTION  2019   • LAPAROSCOPIC ASSISSTED TOTAL COLECTOMY W/ J-POUCH     • MO CYSTO/URETERO W/LITHOTRIPSY &INDWELL STENT INSRT N/A 2022    Procedure: BILATERAL RETROGRADE PYELOGRAM, CYSTOSCOPY,  LEFT URETEROSCOPY, LEFT LASER LITHOTRIPSY BASKET EXTRACTION, LEFT STENT;  Surgeon: Zaria Finney MD;  Location: [address] [place] [address];  Service: Urology   • MO CYSTO/URETERO W/LITHOTRIPSY &INDWELL STENT INSRT Left 2022    Procedure: CYSTOSCOPY, URETEROSCOPY STENT REMOVAL, LASER LITHOTRIPSY, WITH BASKET, INSERTION OF URETERAL STENT;  Surgeon: Zaria Finney MD;  Location: [address] [place] [address];  Service: Urology   • THORACOTOMY Left    • VASECTOMY       Social History   Social History     Substance and Sexual Activity   Alcohol Use Yes    Comment: Occas        Social History     Substance and Sexual Activity   Drug Use Never     Social History     Tobacco Use   Smoking Status Former   • Packs/day: 1 50   • Years: 15 00   • Pack years: 22 50   • Types: Cigarettes   • Quit date: 12   • Years since quittin 0   Smokeless Tobacco Never     Exercise History: Ambulatory   Family History:   Family History   Problem Relation Age of Onset   • Arthritis Father      I have reviewed this patient's family history and commented on sigificant items within the HPI      Medications:  Current Facility-Administered Medications   Medication Dose Route Frequency   • [START ON 2022] allopurinol (ZYLOPRIM) tablet 100 mg  100 mg Oral Daily   • [START ON 2022] atorvastatin (LIPITOR) tablet 20 mg  20 mg Oral Daily   • esmolol (BREVIBLOC) 2500 mg/250 mL IV infusion (premix)   mcg/kg/min Intravenous Titrated   • [START ON 2022] levothyroxine tablet 75 mcg  75 mcg Oral Daily   • niCARdipine (CARDENE) 25 mg (STANDARD CONCENTRATION) in sodium chloride 0 9% 250 mL  1-15 mg/hr Intravenous Titrated   • [START ON 2022] umeclidinium 62 5 mcg/actuation inhaler AEPB 1 puff  1 puff Inhalation Daily     Home medications:  Prior to Admission Medications   Prescriptions Last Dose Informant Patient Reported? Taking?    B Complex-C (B COMPLEX-VITAMIN C PO)   Yes No   Sig: Take 1 tablet by mouth daily   Cholecalciferol (VITAMIN D3) 50 MCG (2000 UT) capsule   Yes No   Sig: Take 1 capsule by mouth   Eliquis 5 MG   No No   Sig: TAKE 1 TABLET TWICE A DAY   Patient taking differently: Last dose is 22- will check with cardiologist to be sure   INCRUSE ELLIPTA 62 5 MCG/INH AEPB inhaler   Yes No   Si puff every evening     allopurinol (ZYLOPRIM) 100 mg tablet   No No   Sig: Take 1 tablet (100 mg total) by mouth daily   amLODIPine (NORVASC) 10 mg tablet   No No   Sig: Take 1 tablet (10 mg total) by mouth daily   Patient taking differently: Take 10 mg by mouth every evening   atorvastatin (LIPITOR) 20 mg tablet   No No   Sig: TAKE 1 TABLET DAILY   dexamethasone (DECADRON) 2 mg tablet   No No   Sig: Take 1 tablet (2 mg total) by mouth 2 (two) times a day with meals   levothyroxine 75 mcg tablet   No No   Sig: TAKE 1 TABLET DAILY   metoprolol succinate (TOPROL-XL) 100 mg 24 hr tablet   Yes No   Sig: Pt increased to 100 mg BID   oxyCODONE-acetaminophen (PERCOCET) 5-325 mg per tablet   No No   Sig: Take 1 tablet by mouth every 4 (four) hours as needed for moderate pain or severe pain Max Daily Amount: 6 tablets      Facility-Administered Medications: None     Allergies:  No Known Allergies    ------------------------------------------------------------------------------------------------------------  Advance Directive and Living Will:      Power of :    POLST:    ------------------------------------------------------------------------------------------------------------  Anticipated Length of Stay is > 2 midnights    Care Time Delivered:   Upon my evaluation, this patient had a high probability of imminent or life-threatening deterioration due to acute type B aortic dissection , which required my direct attention, intervention, and personal management  I have personally provided 38 minutes (1800 to 2200) of critical care time, exclusive of procedures, teaching, family meetings, and any prior time recorded by providers other than myself  Jerilyn Baez PA-C        Portions of the record may have been created with voice recognition software  Occasional wrong word or "sound a like" substitutions may have occurred due to the inherent limitations of voice recognition software    Read the chart carefully and recognize, using context, where substitutions have occurred

## 2022-12-31 NOTE — ASSESSMENT & PLAN NOTE
· Patient with chronic afib, prior cardioversion and ablation without success  · Maintained on metoprolol 100 mg BID and eliquis as an outpatient  · Discuss timing of restarting anticoagulation  · Currently on Esmolol for HR control with HRs still in 80s-90s  · Restart home metoprolol

## 2022-12-31 NOTE — ASSESSMENT & PLAN NOTE
· Patient with chronic afib, prior cardioversion and ablation without success  · Maintained on metoprolol 100 mg BID and eliquis as an outpatient  · Hold anticoagulation tonight  · Esmolol for HR control

## 2022-12-31 NOTE — PROGRESS NOTES
[address] [facility]  Progress Note Erlinda Morales 1945, 78 y o  male MRN: [id number]  Unit/Bed#: [facility] 516-01 Encounter: [id number]  Primary Care Provider: Enrique Scott MD   Date and time admitted to hospital: 12/30/2022  6:17 PM    * Aortic dissection, thoracoabdominal [facility])  Assessment & Plan  · [facility] ED 12/30 with acute onset of back pain  · 12/30 CTA C/A/P: There is a long segment aortic dissection which originates just distal to the takeoff of the left subclavian artery and extends into the bilateral common iliac arteries  · Transferred to [facility] for vascular surgery and cardiothoracic surgery evaluation  · No immediate intervention planned  · Esmolol and cardene infusions to maintain goal HR < 70 and SBP < 130  · Difficult to control HR given afib, start PO metoprolol   · q1h neurovascular checks   · Pain control   · Check echocardiogram   · Discuss timing of repeat imaging    Atrial fibrillation, persistent ([facility] )  Assessment & Plan  · Patient with chronic afib, prior cardioversion and ablation without success  · Maintained on metoprolol 100 mg BID and eliquis as an outpatient  · Discuss timing of restarting anticoagulation  · Currently on Esmolol for HR control with HRs still in 80s-90s  · Restart home metoprolol     COPD (chronic obstructive pulmonary disease) ([facility] )  Assessment & Plan  · Continue incruse ellipta  · Respiratory protocol    Stage 3a chronic kidney disease [facility])  Assessment & Plan  Lab Results   Component Value Date    EGFR 40 12/30/2022    EGFR 40 12/08/2022    EGFR 30 11/28/2022    CREATININE 1 62 (H) 12/30/2022    CREATININE 1 63 (H) 12/08/2022    CREATININE 2 08 (H) 11/28/2022     · Baseline Cr 1 2-1 4 though recent admission with CRIS 2/2 poor PO intake and kidney stones  · Trend renal function  · Strict I/Os  · Urinary retention protocol     Left hip pain  Assessment & Plan  · Patient with chronic arthritis of left him   Was worse today leading to ambulatory dysfunction  Per the patient's daughter, it may have been dislocated and put back in place by EMS  Hip xray without acute findings  · Consider ortho consult  · PT/OT    Hypothyroidism  Assessment & Plan  · Continue home synthroid    Severe obstructive sleep apnea  Assessment & Plan  · Patient ordered CPAP qHS but states he does not use at home and refusing to wear here    ----------------------------------------------------------------------------------------  HPI/24hr events:   · Transferred for management of type B aortic dissection   · Esmolol infusion started and uptitrated but unable to maintain goal HR 2/2 afib    Patient appropriate for transfer out of the ICU today?: No  Disposition: Continue Critical Care   Code Status: Level 1 - Full Code  ---------------------------------------------------------------------------------------  SUBJECTIVE  "I'm good"    Review of Systems   Respiratory: Negative for cough and shortness of breath  Cardiovascular: Positive for chest pain (mild)  Negative for palpitations and leg swelling  Gastrointestinal: Negative for abdominal pain and nausea  Genitourinary: Negative for difficulty urinating  Review of systems was reviewed and negative unless stated above in HPI/24-hour events   ---------------------------------------------------------------------------------------  OBJECTIVE    Vitals   Vitals:    22 0100 22 0200 22 0300 22 0400   BP: 121/63 98/58 99/55 113/67   Pulse: 95 86 89 88   Resp: 15 17 15 20   SpO2: 94% 95% 94% 98%     Temp (24hrs), Av 8 °F (36 6 °C), Min:97 8 °F (36 6 °C), Max:97 8 °F (36 6 °C)    Respiratory:  SpO2: SpO2: 98 %, SpO2 Device: O2 Device: None (Room air)    Physical Exam  Constitutional:       General: He is not in acute distress  HENT:      Head: Atraumatic  Mouth/Throat:      Mouth: Mucous membranes are moist    Cardiovascular:      Rate and Rhythm: Normal rate   Rhythm irregularly irregular  Pulmonary:      Effort: No respiratory distress  Breath sounds: No wheezing, rhonchi or rales  Abdominal:      General: Bowel sounds are normal  There is no distension  Palpations: Abdomen is soft  Tenderness: There is no abdominal tenderness  Musculoskeletal:      Right lower leg: No edema  Left lower leg: No edema  Skin:     General: Skin is warm and dry  Capillary Refill: Capillary refill takes 2 to 3 seconds  Neurological:      General: No focal deficit present  Mental Status: He is alert and oriented to person, place, and time  Laboratory and Diagnostics:  Results from last 7 days   Lab Units 12/30/22  1542   WBC Thousand/uL 11 20*   HEMOGLOBIN g/dL 13 4   HEMATOCRIT % 41 8   PLATELETS Thousands/uL 190   NEUTROS PCT % 81*   MONOS PCT % 7     Results from last 7 days   Lab Units 12/30/22  1542   SODIUM mmol/L 144   POTASSIUM mmol/L 4 4   CHLORIDE mmol/L 110*   CO2 mmol/L 24   ANION GAP mmol/L 10   BUN mg/dL 29*   CREATININE mg/dL 1 62*   CALCIUM mg/dL 9 6   GLUCOSE RANDOM mg/dL 96   ALT U/L 16   AST U/L 21   ALK PHOS U/L 76   ALBUMIN g/dL 3 8   TOTAL BILIRUBIN mg/dL 0 85          Results from last 7 days   Lab Units 12/30/22  2124   INR  1 16   PTT seconds 30              ABG:    VBG:          Micro        EKG: Atrial fibrillation  Imaging: I have personally reviewed pertinent films in PACS    Intake and Output  I/O       12/29 0701  12/30 0700 12/30 0701  12/31 0700    Urine  975    Total Output  975    Net  -975                Height and Weights         There is no height or weight on file to calculate BMI  Weight (last 2 days)     None            Nutrition       Diet Orders   (From admission, onward)             Start     Ordered    12/30/22 1923  Diet NPO; Sips of clear liquids  Diet effective now        References:    Nutrtion Support Algorithm Enteral vs  Parenteral   Question Answer Comment   Diet Type NPO    NPO Except:  Sips of clear liquids    RD to adjust diet per protocol? Yes        12/30/22 1925                  Active Medications  Scheduled Meds:  Current Facility-Administered Medications   Medication Dose Route Frequency Provider Last Rate   • albuterol  2 puff Inhalation Q4H PRN Stephan Isbell MD     • allopurinol  100 mg Oral Daily Jerilyn Baez PA-C     • atorvastatin  20 mg Oral Daily Jerilyn Baez PA-C     • esmolol   mcg/kg/min Intravenous Titrated Jerilyn Baez PA-C 200 mcg/kg/min (12/31/22 0210)   • HYDROmorphone  0 5 mg Intravenous Q3H PRN Jerilyn Baez PA-C     • levothyroxine  75 mcg Oral Daily Jerilyn Baez PA-C     • niCARdipine  1-15 mg/hr Intravenous Titrated Jerilyn Baez PA-C Stopped (12/30/22 2320)   • oxyCODONE  5 mg Oral Q6H PRN Jerilyn Baez PA-C      Or   • oxyCODONE  10 mg Oral Q6H PRN Jerilyn Baez PA-C     • umeclidinium  1 puff Inhalation Daily Jerilyn Baez PA-C       Continuous Infusions:  esmolol,  mcg/kg/min, Last Rate: 200 mcg/kg/min (12/31/22 0210)  niCARdipine, 1-15 mg/hr, Last Rate: Stopped (12/30/22 2320)      PRN Meds:   albuterol, 2 puff, Q4H PRN  HYDROmorphone, 0 5 mg, Q3H PRN  oxyCODONE, 5 mg, Q6H PRN   Or  oxyCODONE, 10 mg, Q6H PRN        Invasive Devices Review  Invasive Devices     Peripheral Intravenous Line  Duration           Peripheral IV 12/30/22 Right Antecubital <1 day    Peripheral IV 12/30/22 Right; Lower Forearm <1 day          Drain  Duration           Ureteral Internal Stent Left ureter 6 Fr  120 days                Rationale for remaining devices: IV access  ---------------------------------------------------------------------------------------  Advance Directive and Living Will:      Power of :    POLST:    ---------------------------------------------------------------------------------------  Care Time Delivered:   No Critical Care time spent       Jerilyn Baez PA-C      Portions of the record may have been created with voice recognition software  Occasional wrong word or "sound a like" substitutions may have occurred due to the inherent limitations of voice recognition software    Read the chart carefully and recognize, using context, where substitutions have occurred

## 2022-12-31 NOTE — ED ATTENDING ATTESTATION
12/30/2022  IJames MD, saw and evaluated the patient  I have discussed the patient with the resident/non-physician practitioner and agree with the resident's/non-physician practitioner's findings, Plan of Care, and MDM as documented in the resident's/non-physician practitioner's note, except where noted  All available labs and Radiology studies were reviewed  I was present for key portions of any procedure(s) performed by the resident/non-physician practitioner and I was immediately available to provide assistance  At this point I agree with the current assessment done in the Emergency Department  I have conducted an independent evaluation of this patient a history and physical is as follows:    ED Course  ED Course as of 12/30/22 2223   Fri Dec 30, 2022   128 29-year-old male presenting to the emergency department with acute onset of mid back pain earlier today  Patient reports that he had a sudden stabbing pain in the left side of his back, got up from sitting, then attempted to ambulate, leading to significant left hip pain while walking, subsequently falling onto his left side  Denies head trauma denies LOC  Past medical history significant for abdominal aortic aneurysm and 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  His arrival to the emergency department, patient also complains of an indescribable chest pain in addition to back pain and lower extremity numbness and tingling though denies difficulty with ambulation at this time  Denies recent fever, chills, shortness of breath, nausea, vomiting, urinary symptoms, changes in stool, leg swelling  Vital signs on arrival grossly unremarkable  Physical exam grossly unremarkable, though patient has received narcotic pain medication at the time of my examination  Appears comfortable, without respiratory distress, without abdominal tenderness  Patient was noted to be ambulating to the bathroom without issue      Differential diagnosis includes abdominal aortic aneurysm, kidney stone, degenerative changes of spine/chronic pain, degenerative changes of hip, fracture, dislocation, cauda equina/conus medullaris  At this time doubt cauda equina and conus medullaris as patient's numbness and tingling has resolved and patient has no loss of sensation and no difficult ambulation or changes to bowel or bladder  Lab work and imaging was ordered  Dilaudid was given previously for pain control  Lab work remarkable for leukocytosis, chronic renal dysfunction  Preliminary read of CT shows large aortic dissection  Rupture alert was called, vascular surgery and critical care was notified, and patient was set up for transfer to [facility] Care Time  Procedures

## 2022-12-31 NOTE — ASSESSMENT & PLAN NOTE
· Presented to [facility] ED 12/30 with acute onset of back pain  · 12/30 CTA C/A/P: There is a long segment aortic dissection which originates just distal to the takeoff of the left subclavian artery and extends into the bilateral common iliac arteries  · Transferred to [facility]"" for vascular surgery and cardiothoracic surgery evaluation  · No immediate intervention planned  · Esmolol and cardene infusions to maintain goal HR < 70 and SBP < 130  · Difficult to control HR given afib, start PO metoprolol   · q1h neurovascular checks   · Pain control   · Check echocardiogram   · Discuss timing of repeat imaging

## 2022-12-31 NOTE — PROGRESS NOTES
Progress Note - Vascular Surgery   Juan R Room 78 y o  male MRN: [id number]  Unit/Bed#: [facility] 516-01 Encounter: [id number]    Assessment:  78y o  year old male who presents with h/o Afib (on Eliquis), HLD, HTN, Hypothyroidism, h/o UC (s/p total proctocolectomyy with J-pouch), h/o polyarticular gout, COPD, JORGE, CKD transferred [facility] ED for Type B aortic dissection      Uncomplicated NQES(4-11) without evidence of malperfusion  Plan:  Continue medical management  Continue pain control  Continue neurovascular checks  Anti-impulse therapy and strict BP control with SBP <140 and HR <70  ASA, Statin  CTS on board  Will need repeat imaging in future  Please reach out if any changes in neurovascular examination       Marivel Dick MD  12/31/2022    Subjective:  No acute events overnight  Denies any chest pain, SOB, no parasthesias or weakness  No abdominal pain  Remains in afib with rates in 80s-100s  SBP 90-120s  Currently on 200mcg/kg/min esmolol gtt    Cr 1 54 (baseline 1 98-1  6)    Vitals:  /65   Pulse 94   Temp 98 2 °F (36 8 °C)   Resp 22   Wt 90 1 kg (198 lb 10 2 oz)   SpO2 92%   BMI 33 05 kg/m²     I/Os:  I/O last 3 completed shifts: In: 953 5 [I V :953 5]  Out: 1125 [TSXGY:7987]  I/O this shift: In: 458 8 [P O :240;  I V :218 8]  Out: 200 [Urine:200]    Lab Results and Cultures:   CBC with diff:   Lab Results   Component Value Date    WBC 9 45 12/31/2022    HGB 13 0 12/31/2022    HCT 41 2 12/31/2022    MCV 95 12/31/2022     12/31/2022    MCH 29 9 12/31/2022    MCHC 31 6 12/31/2022    RDW 16 7 (H) 12/31/2022    MPV 10 7 12/31/2022    NRBC 0 12/30/2022   ,   BMP/CMP:  Lab Results   Component Value Date    SODIUM 138 12/31/2022    K 4 2 12/31/2022     (H) 12/31/2022    CO2 25 12/31/2022    BUN 23 12/31/2022    CREATININE 1 54 (H) 12/31/2022    CALCIUM 9 4 12/31/2022    AST 32 12/31/2022    ALT 22 12/31/2022    ALKPHOS 85 12/31/2022    EGFR 43 12/31/2022   ,   Lipid Panel: No results found for: CHOL,   Coags:   Lab Results   Component Value Date    PTT 30 12/30/2022    INR 1 16 12/30/2022   ,     Blood Culture: No results found for: BLOODCX,   Urinalysis: No results found for: COLORU, CLARITYU, SPECGRAV, PHUR, LEUKOCYTESUR, NITRITE, PROTEINUA, GLUCOSEU, KETONESU, BILIRUBINUR, BLOODU,   Urine Culture: No results found for: URINECX,   Wound Culure: No results found for: WOUNDCULT    Medications:  Current Facility-Administered Medications   Medication Dose Route Frequency   • albuterol (PROVENTIL HFA,VENTOLIN HFA) inhaler 2 puff  2 puff Inhalation Q4H PRN   • allopurinol (ZYLOPRIM) tablet 100 mg  100 mg Oral Daily   • atorvastatin (LIPITOR) tablet 20 mg  20 mg Oral Daily   • esmolol (BREVIBLOC) 2500 mg/250 mL IV infusion (premix)   mcg/kg/min Intravenous Titrated   • HYDROmorphone (DILAUDID) injection 0 5 mg  0 5 mg Intravenous Q3H PRN   • levothyroxine tablet 75 mcg  75 mcg Oral Daily   • niCARdipine (CARDENE) 25 mg (STANDARD CONCENTRATION) in sodium chloride 0 9% 250 mL  1-15 mg/hr Intravenous Titrated   • oxyCODONE (ROXICODONE) IR tablet 5 mg  5 mg Oral Q6H PRN    Or   • oxyCODONE (ROXICODONE) immediate release tablet 10 mg  10 mg Oral Q6H PRN   • umeclidinium 62 5 mcg/actuation inhaler AEPB 1 puff  1 puff Inhalation Daily       Physical Exam  Vitals reviewed  HENT:      Head: Normocephalic  Nose: Nose normal    Eyes:      Extraocular Movements: Extraocular movements intact  Cardiovascular:      Rate and Rhythm: Rhythm irregular  Comments: Bilateral radial/femoral/DP/PT pulses palpable  Abdominal:      Palpations: Abdomen is soft  Tenderness: There is no abdominal tenderness  Comments: Well healed midline laparotomy incision   Musculoskeletal:      Cervical back: Neck supple  Skin:     Coloration: Skin is pale  Neurological:      General: No focal deficit present  Mental Status: He is alert and oriented to person, place, and time     Psychiatric: Behavior: Behavior normal

## 2022-12-31 NOTE — CONSULTS
Consultation - Cardiothoracic Surgery   Joseph Pemberton 78 y o  male MRN: [id number]  Unit/Bed#: [facility] 209-67 Encounter: [id number]    Physician Requesting Consult: Dashawn Krishna MD    Reason for Consult / Principal Problem: Type B aortic dissection    Consults  History of Present Illness: Joseph Pemberton is a 78y o  year old male who presented to [address] [place] [address] for the evaluation of acute upper back pain while doing paperwork  He attempted to ambulate back into bed and had worsening left hip pain, causing him to fall to the floor  EMS was called  While in the ER, the patient had intermittent CP  CTA C/A/P revealed an acute Type B aortic dissection  He was given Labetalol, started on Cardene and transferred to B  The patient has a PMH of afib (on Eliquis), CKD 3, HTN, hypothyroidism, ulcerative colitis s/p colectomy and J-pouch, JORGE (refuses CPAP), and COPD and history of thoracotomy  He is currently resting comfortably  He denies CP, SOB, weakness, or numbness  He is on an Esmolol gtt      Past Medical History:  Past Medical History:   Diagnosis Date   • Arthritis    • H/O echocardiogram 12/2018 9/2018   • History of EKG 2019    numerous tests between 9/6/2018 through 2/7/2019   • History of exercise stress test 10/2018   • History of Holter monitoring 09/2018   • Hyperlipidemia    • Hypertension    • Irregular heart beat    • Kidney stone          Past Surgical History:   Past Surgical History:   Procedure Laterality Date   • CARDIAC ELECTROPHYSIOLOGY STUDY AND ABLATION  01/2019   • CARDIOVERSION  02/2019   • FL RETROGRADE PYELOGRAM  05/12/2022   • FL RETROGRADE PYELOGRAM  09/01/2022   • HERNIA REPAIR     • IR JOINT INJECTION  09/30/2019   • LAPAROSCOPIC ASSISSTED TOTAL COLECTOMY W/ J-POUCH     • NJ CYSTO/URETERO W/LITHOTRIPSY &INDWELL STENT INSRT N/A 05/12/2022    Procedure: BILATERAL RETROGRADE PYELOGRAM, CYSTOSCOPY,  LEFT URETEROSCOPY, LEFT LASER LITHOTRIPSY BASKET EXTRACTION, LEFT STENT;  Surgeon: Viraj Lozoya MD;  Location: [address] [place] [address];  Service: Urology   • GA CYSTO/URETERO W/LITHOTRIPSY &INDWELL STENT INSRT Left 2022    Procedure: CYSTOSCOPY, URETEROSCOPY STENT REMOVAL, LASER LITHOTRIPSY, WITH BASKET, INSERTION OF URETERAL STENT;  Surgeon: Viraj Lozoya MD;  Location: [facility];  Service: Urology   • THORACOTOMY Left    • VASECTOMY           Family History:  Family History   Problem Relation Age of Onset   • Arthritis Father          Social History:  Social History     Substance and Sexual Activity   Alcohol Use Yes    Comment: [facility]  Social History     Substance and Sexual Activity   Drug Use Never     Social History     Tobacco Use   Smoking Status Former   • Packs/day: 1 50   • Years: 15 00   • Pack years: 22 50   • Types: Cigarettes   • Quit date: 12   • Years since quittin 0   Smokeless Tobacco Never     Marital Status: /Civil Union      Home Medications:   Prior to Admission medications    Medication Sig Start Date End Date Taking?  Authorizing Provider   allopurinol (ZYLOPRIM) 100 mg tablet Take 1 tablet (100 mg total) by mouth daily 22   Lillian Vanessa MD   amLODIPine (NORVASC) 10 mg tablet Take 1 tablet (10 mg total) by mouth daily  Patient taking differently: Take 10 mg by mouth every evening 6/10/22   Lillian Vanessa MD   atorvastatin (LIPITOR) 20 mg tablet TAKE 1 TABLET DAILY 10/24/22   Lillian Vanessa MD   B Complex-C (B COMPLEX-VITAMIN C PO) Take 1 tablet by mouth daily    Historical Provider, MD   Cholecalciferol (VITAMIN D3) 50 MCG (2000 UT) capsule Take 1 capsule by mouth    Historical Provider, MD   dexamethasone (DECADRON) 2 mg tablet Take 1 tablet (2 mg total) by mouth 2 (two) times a day with meals 22   Lillian Vanessa MD   Eliquis 5 MG TAKE 1 TABLET TWICE A DAY  Patient taking differently: Last dose is 22- will check with cardiologist to be sure 8/15/22   Lillian Vanessa MD   INCRUSE ELLIPTA 62 5 MCG/INH AEPB inhaler 1 puff every evening   5/5/20   Historical Provider, MD   levothyroxine 75 mcg tablet TAKE 1 TABLET DAILY 9/13/22   Wendi Arriaga MD   metoprolol succinate (TOPROL-XL) 100 mg 24 hr tablet Pt increased to 100 mg BID 2/16/21   Historical Provider, MD   oxyCODONE-acetaminophen (PERCOCET) 5-325 mg per tablet Take 1 tablet by mouth every 4 (four) hours as needed for moderate pain or severe pain Max Daily Amount: 6 tablets 9/9/22   Wendi Arriaga MD       Inpatient Medications:  Scheduled Meds:   Current Facility-Administered Medications   Medication Dose Route Frequency Provider Last Rate   • albuterol  2 puff Inhalation Q4H PRN Reginaldo Mills MD     • allopurinol  100 mg Oral Daily Alois Necessary, PA-C     • atorvastatin  20 mg Oral Daily Alois Necessary, PA-C     • esmolol   mcg/kg/min Intravenous Titrated Alois Necessary, PA-C 200 mcg/kg/min (12/31/22 0848)   • HYDROmorphone  0 5 mg Intravenous Q3H PRN Alois Necessary, PA-C     • levothyroxine  75 mcg Oral Daily Alois Necessary, PA-C     • niCARdipine  1-15 mg/hr Intravenous Titrated Alois Necessary, PA-C Stopped (12/30/22 2320)   • oxyCODONE  5 mg Oral Q6H PRN Alois Necessary, PA-C      Or   • oxyCODONE  10 mg Oral Q6H PRN Alois Necessary, PA-C     • umeclidinium  1 puff Inhalation Daily Alois Necessary, PA-C       Continuous Infusions: esmolol,  mcg/kg/min, Last Rate: 200 mcg/kg/min (12/31/22 0848)  niCARdipine, 1-15 mg/hr, Last Rate: Stopped (12/30/22 2320)      PRN Meds:  albuterol, 2 puff, Q4H PRN  HYDROmorphone, 0 5 mg, Q3H PRN  oxyCODONE, 5 mg, Q6H PRN   Or  oxyCODONE, 10 mg, Q6H PRN        Allergies:  No Known Allergies    Review of Systems:  Review of Systems   Constitutional: Negative  Negative for chills, diaphoresis, fatigue and fever  HENT: Negative  Eyes: Negative  Respiratory: Negative  Negative for chest tightness and shortness of breath  Cardiovascular: Positive for chest pain  Negative for leg swelling  Gastrointestinal: Negative  Endocrine: Negative  Genitourinary: Negative  Musculoskeletal: Positive for arthralgias and back pain  Skin: Negative  Allergic/Immunologic: Negative  Neurological: Negative  Negative for syncope  Hematological: Negative for adenopathy  Does not bruise/bleed easily  Psychiatric/Behavioral: Negative  All other systems reviewed and are negative  Vital Signs:     Vitals:    12/31/22 0840 12/31/22 0900 12/31/22 0910 12/31/22 1000   BP: 106/71 95/68 107/65 102/56   Pulse: 96 (!) 107 94 100   Resp: (!) 35 (!) 24 22 17   Temp:  98 2 °F (36 8 °C)     SpO2: 95% 96% 92% 94%   Weight:         Invasive Devices     Peripheral Intravenous Line  Duration           Peripheral IV 12/30/22 Right Antecubital <1 day    Peripheral IV 12/30/22 Right; Lower Forearm <1 day          Drain  Duration           Ureteral Internal Stent Left ureter 6 Fr  121 days                Physical Exam:  Physical Exam  Vitals and nursing note reviewed  Constitutional:       General: He is not in acute distress  Appearance: He is well-developed  He is not diaphoretic  HENT:      Head: Normocephalic and atraumatic  Right Ear: External ear normal       Left Ear: External ear normal       Nose: Nose normal       Mouth/Throat:      Pharynx: No oropharyngeal exudate  Eyes:      General: No scleral icterus  Right eye: No discharge  Left eye: No discharge  Conjunctiva/sclera: Conjunctivae normal       Pupils: Pupils are equal, round, and reactive to light  Neck:      Vascular: No JVD  Trachea: No tracheal deviation  Cardiovascular:      Rate and Rhythm: Normal rate  Rhythm irregular  Heart sounds: Normal heart sounds  No murmur heard  No friction rub  Pulmonary:      Effort: Pulmonary effort is normal  No respiratory distress  Breath sounds: Normal breath sounds  No stridor  No wheezing or rales     Abdominal:      General: Bowel sounds are normal  There is no distension  Palpations: Abdomen is soft  There is no mass  Tenderness: There is no abdominal tenderness  There is no guarding  Musculoskeletal:         General: No tenderness or deformity  Normal range of motion  Cervical back: Normal range of motion and neck supple  Skin:     General: Skin is warm and dry  Coloration: Skin is not pale  Findings: No erythema or rash  Neurological:      General: No focal deficit present  Mental Status: He is alert and oriented to person, place, and time  Cranial Nerves: No cranial nerve deficit  Sensory: No sensory deficit  Motor: No abnormal muscle tone  Coordination: Coordination normal       Deep Tendon Reflexes: Reflexes normal    Psychiatric:         Mood and Affect: Mood normal          Behavior: Behavior normal          Thought Content:  Thought content normal          Judgment: Judgment normal          Lab Results:     Results from last 7 days   Lab Units 12/31/22  0458 12/30/22  1542   WBC Thousand/uL 9 45 11 20*   HEMOGLOBIN g/dL 13 0 13 4   HEMATOCRIT % 41 2 41 8   PLATELETS Thousands/uL 172 190     Results from last 7 days   Lab Units 12/31/22  0458 12/30/22  1542   POTASSIUM mmol/L 4 2 4 4   CHLORIDE mmol/L 109* 110*   CO2 mmol/L 25 24   BUN mg/dL 23 29*   CREATININE mg/dL 1 54* 1 62*   CALCIUM mg/dL 9 4 9 6     Results from last 7 days   Lab Units 12/30/22  2124   INR  1 16   PTT seconds 30     Lab Results   Component Value Date    HGBA1C 5 6 05/17/2022     No results found for: CKTOTAL, CKMB, CKMBINDEX, TROPONINI    Imaging Studies:     CTA CAP: There is a long segment aortic dissection which originates just distal to the takeoff of the left subclavian artery and extends into the bilateral common iliac arteries, as detailed above      Diminished perfusion of the right kidney relative to the left      Pulmonary emphysema      Postop changes of the bowel without bowel obstruction      Ascending thoracic aortic aneurysm measuring 4 5 cm  Echocardiogram: ordered    I have personally reviewed pertinent reports  and I have personally reviewed pertinent films in PACS    Assessment:  Principal Problem: Aortic dissection, thoracoabdominal (HCC)  Active Problems:    Left hip pain    COPD (chronic obstructive pulmonary disease) (HCC)    Stage 3a chronic kidney disease (HCC)    Atrial fibrillation, persistent (HCC)    Hypertension    Hypothyroidism    Severe obstructive sleep apnea    Acute Type B Aortic dissection  Ascending Aortic Aneurysm 4 5cm    Plan:  Mr Jessica Culver has an acute Type B aortic dissection and an ascending aneurysm measuring 4 5cm  Our recommendation is medical management, continued surveillance and good BP control  We will arrange for outpatient follow up with CLEMENCIA Zhou  in 1 month with repeat CTA CAP at that time  Sandoval Dimitris was comfortable with our recommendations, and their questions were answered to their satisfaction  Thank you for allowing us to participate in the care of this patient  SIGNATURE: JohnKendall Beaulieu  DATE: December 31, 2022  TIME: 10:49 AM    * This note was completed in part utilizing [employer] direct voice recognition software  Grammatical errors, random word insertion, spelling mistakes, and incomplete sentences may be an occasional consequence of the system secondary to software limitations, ambient noise and hardware issues  At the time of dictation, efforts were made to edit, clarify and /or correct errors  Please read the chart carefully and recognize, using context, where substitutions have occurred  If you have any questions or concerns about the context, text or information contained within the body of this dictation, please contact myself, the provider, for further clarification

## 2022-12-31 NOTE — CONSULTS
Consultation - Cardiothoracic Surgery   Sindhu Love 78 y o  male MRN: [id number]  Unit/Bed#: [facility] 302-60 Encounter: [id number]      History of Present Illness   Physician Requesting Consult: Bandar Shafer MD  Reason for Consult / Principal Problem: type B aortic dissection  HPI: Rasta Cleaning is a 78y o  year old male  w/ past cardiac hx sig for HTN & a fib s/p ablation in 2019 at [facility] presents to [facility] 12/30 w/ c/o left hip pain  Had mid thoracic pain while ambulating which quickly radiated down to his left hip  HE went to his on his bed & fell on his left side therefore cam eto ED  CTA w/ type B aortic dissection  Awaiting transfer tro [facility] for further management & vascular sx/cardiac sx consultations  Upon examination today, *** reports she feeling ***  Admits to ***  Denies CP, palpitations, SOB, ANSARI, LE edema b/l, orthopnea, PND, numbness/tinlging/paresthesias in UE or LE bilaterally, HA, lightheadeness, dizziness, presyncopal symptoms, hx syncopal events, N/V/D, hemoptysis, hematemesis, hematochezia, melena  Denies hx stroke, hx cancer w/ chest wall radiation, hx blood loss anemia, hx varicose veins or vein stripping  PMH includes  type B dissection, a fib (Eliquis, s/p ablation [facility] 2019), gout, hypothyroidism, CKD 3 (baseline Cr 1 3-1 6), former tobacco use, h/o ulcerative pancolitis, COPD, JORGE (CPAP), h/o nephrolithiasis (s/p lithotripsy), DJD/OA, obesity (BMI 33 46), emphysema, TAA (4 5cm, unclear AV morphology - no TTE this admission & none on report from [facility] in [facility]), chronic pain syndrome (Percocet 5-325mg Q4 PRN), ***  PSH includes vasectomy, left thoracotomy (***), cystoscopy w/ lithotripsy & ureteral stent placement, lap-total colectomy w/ J pouch, IR joint injection (***), hernia repair (***), pyelograms, cardioversion/a fib ablation (2019, [facility]), ***  Denies FH of CAD/MI, HTN, HL, valvular disease, DM, aortic aneurysms, or SCD  Patient is *** & worked as ***   Lives in a *** home ***  Does not use an assist device for ambulation  Drives  (+) former tobacco use (1 5ppd x15 years, quit 1992)  (+) social ETOH use  Denies current or prior use of drugs  NKDA  Cardiac pertinent medications include: ***  Other medications can be reviewed in the patient chart  Patient sees Dr Leroy Whitfield as his primary care physician, their prior visit documentation was reviewed at this visit  Patient sees Dr Macy [facility] as his cardiologist, their proior visit documentation was reviewed at this visit      Inpatient consult to Cardiothoracic Surgery  Consult performed by: Berna Cornelius PA-C  Consult ordered by: Yashira Paredes PA-C          Review of Systems    Historical Information   Past Medical History:   Diagnosis Date   • Arthritis    • H/O echocardiogram 12/2018 9/2018   • History of EKG 2019    numerous tests between 9/6/2018 through 2/7/2019   • History of exercise stress test 10/2018   • History of Holter monitoring 09/2018   • Hyperlipidemia    • Hypertension    • Irregular heart beat    • Kidney stone      Past Surgical History:   Procedure Laterality Date   • CARDIAC ELECTROPHYSIOLOGY STUDY AND ABLATION  01/2019   • CARDIOVERSION  02/2019   • COLON SURGERY     • CYSTOSCOPY     • FL RETROGRADE PYELOGRAM  5/12/2022   • FL RETROGRADE PYELOGRAM  9/1/2022   • HERNIA REPAIR     • IR JOINT INJECTION  9/30/2019   • LAPAROSCOPIC ASSISSTED TOTAL COLECTOMY W/ J-POUCH     • ID CYSTO/URETERO W/LITHOTRIPSY &INDWELL STENT INSRT N/A 5/12/2022    Procedure: BILATERAL RETROGRADE PYELOGRAM, CYSTOSCOPY,  LEFT URETEROSCOPY, LEFT LASER LITHOTRIPSY BASKET EXTRACTION, LEFT STENT;  Surgeon: Rafael Romo MD;  Location: [address] [place] [address];  Service: Urology   • ID CYSTO/URETERO W/LITHOTRIPSY &INDWELL STENT INSRT Left 9/1/2022    Procedure: CYSTOSCOPY, URETEROSCOPY STENT REMOVAL, LASER LITHOTRIPSY, WITH BASKET, INSERTION OF URETERAL STENT;  Surgeon: Rafael Romo MD;  Location: [address] [place] [address];  Service: Urology   • THORACOTOMY Left    • VASECTOMY       Social History     Substance and Sexual Activity   Alcohol Use Yes    Comment: Occas  Social History     Substance and Sexual Activity   Drug Use Never     Social History     Tobacco Use   Smoking Status Former   • Packs/day: 1 50   • Years: 15 00   • Pack years: 22 50   • Types: Cigarettes   • Quit date: 12   • Years since quittin 0   Smokeless Tobacco Never     Family History: {YONY WHITE FAM HISTORY SMARTLIST:[id number]}    Meds/Allergies   {[facility] NDTB:[id number]}  No Known Allergies    Objective   Vitals: Blood pressure 107/67, pulse 97, resp  rate (!) 26, SpO2 95 %  Invasive Devices     Peripheral Intravenous Line  Duration           Peripheral IV 22 Right Antecubital <1 day    Peripheral IV 22 Right; Lower Forearm <1 day          Drain  Duration           Ureteral Internal Stent Left ureter 6 Fr  120 days                Physical Exam    Lab Results:   Results from last 7 days   Lab Units 22  1542   WBC Thousand/uL 11 20*   HEMOGLOBIN g/dL 13 4   HEMATOCRIT % 41 8   PLATELETS Thousands/uL 190     Results from last 7 days   Lab Units 22  1542   POTASSIUM mmol/L 4 4   CHLORIDE mmol/L 110*   CO2 mmol/L 24   BUN mg/dL 29*   CREATININE mg/dL 1 62*   CALCIUM mg/dL 9 6         Lab Results   Component Value Date    HGBA1C 5 6 2022     No results found for: CKTOTAL, CKMB, CKMBINDEX, TROPONINI    Imaging Studies: {Results Review Statement:15647}  EKG, Pathology, and Other Studies: { CHRISTOPHER Results Review with Cardiologist Statement:47992}     CXR : no acute disease     EKG : done     CTA CAP dissection : aortic dissection originating distal to takeoff of left subclavian artry & extending to b/l common iliac arteries, diminished perfusion of right kidney, pulm emphysema, post-op changes of bowel wo obstruction, TAA 4 5cm     Assessment:  Type B aortic dissection  HTN  COPD/emphysema  JORGE (CPAP)  Opoid dependence  Obesity    Plan:    Risks and benefits of {ctssurgerytype:84961} were discussed in detail today with the patient  They understand and wish to proceed with further workup and ultimately surgical intervention  We have ordered routine preoperative laboratory and vascular studies  Pending the results of these tests, they will be scheduled for surgery *** with Dr Ana Snider  The patient was comfortable with our recommendations, and their questions were answered to their satisfaction  We will continue to evaluate the patient daily with further recommendations as work up is completed  Thank you for allowing us to participate in the care of this patient         Stephon Mirza PA-C  7:56 PM  12/30/22

## 2022-12-31 NOTE — RESPIRATORY THERAPY NOTE
RT Protocol Note  Etelvina Bocanegra 78 y o  male MRN: [id number]  Unit/Bed#: [facility] 516-01 Encounter: [id number]    Assessment    Principal Problem: Aortic dissection, thoracoabdominal (HCC)  Active Problems:    Left hip pain    COPD (chronic obstructive pulmonary disease) (HCC)    Stage 3a chronic kidney disease (HCC)    Atrial fibrillation, persistent (HCC)    Hypertension    Hypothyroidism    Severe obstructive sleep apnea      Home Pulmonary Medications:  Incrse Ellipta 1puff daily         Past Medical History:   Diagnosis Date    Arthritis     H/O echocardiogram 2018    History of EKG 2019    numerous tests between 2018 through 2019    History of exercise stress test 10/2018    History of Holter monitoring 2018    Hyperlipidemia     Hypertension     Irregular heart beat     Kidney stone      Social History     Socioeconomic History    Marital status: /Civil Union     Spouse name: Not on file    Number of children: Not on file    Years of education: Not on file    Highest education level: Not on file   Occupational History    Occupation: Retired   Tobacco Use    Smoking status: Former     Packs/day: 1 50     Years: 15 00     Pack years: 22 50     Types: Cigarettes     Quit date:      Years since quittin 0    Smokeless tobacco: Never   Vaping Use    Vaping Use: Never used   Substance and Sexual Activity    Alcohol use: Yes     Comment: Occas       Drug use: Never    Sexual activity: Not Currently   Other Topics Concern    Not on file   Social History Narrative    Zunilda:    Most recent tobacco use screenin2020    Do you currently or have you served in [employer]: Yes    If Yes, What branch of service: [employer]    Were you activated, into active duty, as a member of the [employer] or as a Reservist: No    Advance directive: No    Chewing tobacco: none    Alcohol intake: Occasional    Marital status:     Live alone or with others: alone    Family history of heart disease: No    High cholesterol: Yes    High blood pressure: Yes    Exercise level: Occasional    Overweight: Yes    Obese: Yes    Diabetes: No    General stress level: Medium    Diet: Diabetic    Occupation: retired    Caffeine intake: Occasional    Guns present in home: No    Seat belts used routinely: Yes    Sexual orientation: Heterosexual    Sunscreen used routinely: Yes    Seat belt/car seat used routinely: Yes    Do you have regular medical check ups: Yes    Do you have regular dental check ups: Yes     Social Determinants of Health     Financial Resource Strain: Not on file   Food Insecurity: Not on file   Transportation Needs: Not on file   Physical Activity: Not on file   Stress: Not on file   Social Connections: Not on file   Intimate Partner Violence: Not on file   Housing Stability: Not on file       Subjective         Objective    Physical Exam:   Assessment Type: (P) Assess only  General Appearance: (P) Awake, Alert  Respiratory Pattern: (P) Normal  Chest Assessment: (P) Chest expansion symmetrical  Bilateral Breath Sounds: (P) Diminished, Clear  O2 Device: (P) RA    Vitals:  Blood pressure 97/73, pulse 102, resp  rate 20, SpO2 92 %  Imaging and other studies: I have personally reviewed pertinent reports  O2 Device: (P) RA     Plan    Respiratory Plan: (P) Discontinue Protocol        Resp Comments: (P) Pt ordered on Resp Protocol/ CPAP HS  PT was then protocoled at bedside  Pt is here for Aortic dissection and has a hx of COPD per pt  PT states he takes Incruse Ellipta 1puff daily  Coral using  cpap for HS and said he will not be using one here  Pt currently in no distress on RA and has clear lungs  Will d/c cpap HS order and make pt PRN MDi while here and d/c protocol

## 2022-12-31 NOTE — ASSESSMENT & PLAN NOTE
· Patient with chronic arthritis of left him  Was worse today leading to ambulatory dysfunction  Per the patient's daughter, it may have been dislocated and put back in place by EMS  Hip xray without acute findings     · Consider ortho consult  · PT/OT

## 2022-12-31 NOTE — CONSULTS
Consultation - Vascular Surgery   Skinny Prater 78 y o  male MRN: [id number]  Unit/Bed#: [facility] 516-01 Encounter: [id number]      Assessment:  78y o  year old male who presents with h/o Afib (on Eliquis), HLD, HTN, Hypothyroidism, h/o UC (s/p total proctocolectomyy with J-pouch), h/o polyarticular gout, COPD, JORGE, CKD transferred from [facility] ED for Type B aortic dissection  Uncomplicated SAPY(2-21) without evidence of malperfusion  Plan:  No indication for intervention at this time  Continue medical management  Continue pain control  Continue neurovascular checks  Anti-impulse therapy and strict BP control with SBP <140 and HR <70  ASA, Statin  CTS on board  Will need repeat imaging in future  Please reach out if any changes in neurovascular examination    History of Present Illness   Physician Requesting Consult: Elvia Iniguez MD  Reason for Consult / Principal Problem: Type B Aortic Dissection    HPI: Skinny Prater is a 78y o  year old male who presents with h/o Afib (on Eliquis), HLD, HTN, Hypothyroidism, h/o UC (s/p total proctocolectomyy with J-pouch), h/o polyarticular gout, COPD, JORGE, CKD transferred from [facility] ED for Type B aortic dissection  Patient reports acute onset stabbing back pain earlier today while doing paperwork at home  He was in the basement and made it up to the living room where he experience significant left hip pain and parasthesias of his left leg resulting in ground level fall  He felt his left hip dislocate and at some point reports this was manipulated and felt better after "popping" back in with subsequent resolution of his left leg numbness  Denies any current chest pain, SOB, no parasthesias or weakness  CTA CAP performed demonstrated Tybe B aortic dissection distal to left SCA extending into bilateral common iliac arteries   Celiac and SMA perfused by true lumen  Left renal artery perfused by true lumen  Right renal artery perfused by false lumen  Inpatient consult to Vascular Surgery  Consult performed by: Vale Ivory MD  Consult ordered by: Christian Beatty PA-C        Review of Systems   All other systems reviewed and are negative  Historical Information   Past Medical History:   Diagnosis Date   • Arthritis    • H/O echocardiogram 12/2018 9/2018   • History of EKG 2019    numerous tests between 9/6/2018 through 2/7/2019   • History of exercise stress test 10/2018   • History of Holter monitoring 09/2018   • Hyperlipidemia    • Hypertension    • Irregular heart beat    • Kidney stone      Past Surgical History:   Procedure Laterality Date   • CARDIAC ELECTROPHYSIOLOGY STUDY AND ABLATION  01/2019   • CARDIOVERSION  02/2019   • FL RETROGRADE PYELOGRAM  05/12/2022   • FL RETROGRADE PYELOGRAM  09/01/2022   • HERNIA REPAIR     • IR JOINT INJECTION  09/30/2019   • LAPAROSCOPIC ASSISSTED TOTAL COLECTOMY W/ J-POUCH     • AZ CYSTO/URETERO W/LITHOTRIPSY &INDWELL STENT INSRT N/A 05/12/2022    Procedure: BILATERAL RETROGRADE PYELOGRAM, CYSTOSCOPY,  LEFT URETEROSCOPY, LEFT LASER LITHOTRIPSY BASKET EXTRACTION, LEFT STENT;  Surgeon: Viraj Barba MD;  Location: [address] [place] [address];  Service: Urology   • AZ CYSTO/URETERO W/LITHOTRIPSY &INDWELL STENT INSRT Left 09/01/2022    Procedure: CYSTOSCOPY, URETEROSCOPY STENT REMOVAL, LASER LITHOTRIPSY, WITH BASKET, INSERTION OF URETERAL STENT;  Surgeon: Viraj Barba MD;  Location: [address] [place] [address];  Service: Urology   • THORACOTOMY Left 2001   • VASECTOMY       Social History   Social History     Substance and Sexual Activity   Alcohol Use Yes    Comment: Occas        Social History     Substance and Sexual Activity   Drug Use Never     E-Cigarette/Vaping   • E-Cigarette Use Never User      E-Cigarette/Vaping Substances   • Nicotine No    • THC No    • CBD No    • Flavoring No    • Other No    • Unknown No      Social History     Tobacco Use   Smoking Status Former   • Packs/day: 1 50   • Years: 15 00   • Pack years: 22 50 • Types: Cigarettes   • Quit date: 12   • Years since quittin 0   Smokeless Tobacco Never     Family History: non-contributory}    Meds/Allergies   all current active meds have been reviewed  No Known Allergies    Objective   Vitals: Blood pressure 97/73, pulse 102, resp  rate 20, SpO2 92 %  ,There is no height or weight on file to calculate BMI  Intake/Output Summary (Last 24 hours) at 2022  Last data filed at 2022 1837  Gross per 24 hour   Intake --   Output 225 ml   Net -225 ml     Invasive Devices     Peripheral Intravenous Line  Duration           Peripheral IV 22 Right Antecubital <1 day    Peripheral IV 22 Right; Lower Forearm <1 day          Drain  Duration           Ureteral Internal Stent Left ureter 6 Fr  120 days                Physical Exam  Vitals reviewed  HENT:      Head: Normocephalic  Nose: Nose normal    Eyes:      Extraocular Movements: Extraocular movements intact  Cardiovascular:      Rate and Rhythm: Rhythm irregular  Comments: Bilateral radial/femoral/DP/PT pulses palpable  Abdominal:      Palpations: Abdomen is soft  Tenderness: There is no abdominal tenderness  Comments: Well healed midline laparotomy incision   Musculoskeletal:      Cervical back: Neck supple  Skin:     Coloration: Skin is pale  Neurological:      General: No focal deficit present  Mental Status: He is alert and oriented to person, place, and time     Psychiatric:         Behavior: Behavior normal

## 2022-12-31 NOTE — SEPSIS NOTE
Sepsis Note   Vasiliy Garcia 78 y o  male MRN: [id number]  Unit/Bed#: [facility] 516-01 Encounter: [id number]    Tachycardia 2/2 afib  SBP in the 80s 2/2 cardene infusion for aortic dissection  No concern for acute infection at this time     qSOFA     Row Name 12/30/22 2030 12/30/22 2020 12/30/22 2000 12/30/22 1830       Altered mental status GCS < 15 -- -- -- --     Respiratory Rate > / =22 0 0 0 1     Systolic BP < / =252 1 1 0 0     Q Sofa Score 1 1 0 1                Initial Sepsis Screening     Row Name 12/30/22 2037                Is the patient's history suggestive of a new or worsening infection? No  -        Suspected source of infection --        Are two or more of the following signs & symptoms of infection both present and new to the patient? --        Indicate SIRS criteria --        If the answer is yes to both questions, suspicion of sepsis is present --        If severe sepsis is present AND tissue hypoperfusion perists in the hour after fluid resuscitation or lactate > 4, the patient meets criteria for SEPTIC SHOCK --        Are any of the following organ dysfunction criteria present within 6 hours of suspected infection and SIRS criteria that are NOT considered to be chronic conditions?  --        Organ dysfunction --        Date of presentation of severe sepsis --        Time of presentation of severe sepsis --        Tissue hypoperfusion persists in the hour after crystalloid fluid administration, evidenced, by either: --        Was hypotension present within one hour of the conclusion of crystalloid fluid administration? --        Date of presentation of septic shock --        Time of presentation of septic shock --              User Key  (r) = Recorded By, (t) = Taken By, (c) = Cosigned By    [address] Name Provider Type     Manuel Albright PA-C Physician Assistant

## 2022-12-31 NOTE — ASSESSMENT & PLAN NOTE
· Presented to [facility] 12/30 with acute onset of back pain  · 12/30 CTA C/A/P: There is a long segment aortic dissection which originates just distal to the takeoff of the left subclavian artery and extends into the bilateral common iliac arteries  · [facility] vascular surgery and cardiothoracic surgery evaluation  · Esmolol and cardene infusions to maintain goal HR < 70 and SBP < 130  · q1h neurovascular checks   · Pain control   · Check echocardiogram

## 2023-01-01 ENCOUNTER — APPOINTMENT (INPATIENT)
Dept: RADIOLOGY | Facility: HOSPITAL | Age: 78
End: 2023-01-01

## 2023-01-01 LAB
ANION GAP SERPL CALCULATED.3IONS-SCNC: 6 MMOL/L (ref 4–13)
ATRIAL RATE: 227 BPM
BUN SERPL-MCNC: 26 MG/DL (ref 5–25)
CALCIUM SERPL-MCNC: 9.5 MG/DL (ref 8.3–10.1)
CHLORIDE SERPL-SCNC: 115 MMOL/L (ref 96–108)
CO2 SERPL-SCNC: 23 MMOL/L (ref 21–32)
CREAT SERPL-MCNC: 1.57 MG/DL (ref 0.6–1.3)
ERYTHROCYTE [DISTWIDTH] IN BLOOD BY AUTOMATED COUNT: 16.5 % (ref 11.6–15.1)
GFR SERPL CREATININE-BSD FRML MDRD: 42 ML/MIN/1.73SQ M
GLUCOSE SERPL-MCNC: 140 MG/DL (ref 65–140)
HCT VFR BLD AUTO: 41.9 % (ref 36.5–49.3)
HGB BLD-MCNC: 13.3 G/DL (ref 12–17)
MCH RBC QN AUTO: 29.4 PG (ref 26.8–34.3)
MCHC RBC AUTO-ENTMCNC: 31.7 G/DL (ref 31.4–37.4)
MCV RBC AUTO: 93 FL (ref 82–98)
PLATELET # BLD AUTO: 157 THOUSANDS/UL (ref 149–390)
PMV BLD AUTO: 11 FL (ref 8.9–12.7)
POTASSIUM SERPL-SCNC: 4.1 MMOL/L (ref 3.5–5.3)
QRS AXIS: 61 DEGREES
QRSD INTERVAL: 80 MS
QT INTERVAL: 444 MS
QTC INTERVAL: 489 MS
RBC # BLD AUTO: 4.53 MILLION/UL (ref 3.88–5.62)
SODIUM SERPL-SCNC: 144 MMOL/L (ref 135–147)
T WAVE AXIS: 113 DEGREES
VENTRICULAR RATE: 73 BPM
WBC # BLD AUTO: 10.89 THOUSAND/UL (ref 4.31–10.16)

## 2023-01-01 RX ORDER — OXYCODONE HYDROCHLORIDE 5 MG/1
5 TABLET ORAL EVERY 6 HOURS PRN
Status: DISCONTINUED | OUTPATIENT
Start: 2023-01-01 | End: 2023-01-04 | Stop reason: HOSPADM

## 2023-01-01 RX ORDER — LANOLIN ALCOHOL/MO/W.PET/CERES
6 CREAM (GRAM) TOPICAL EVERY 24 HOURS
Status: DISCONTINUED | OUTPATIENT
Start: 2023-01-01 | End: 2023-01-04 | Stop reason: HOSPADM

## 2023-01-01 RX ORDER — LABETALOL HYDROCHLORIDE 5 MG/ML
10 INJECTION, SOLUTION INTRAVENOUS EVERY 6 HOURS PRN
Status: DISCONTINUED | OUTPATIENT
Start: 2023-01-01 | End: 2023-01-04 | Stop reason: HOSPADM

## 2023-01-01 RX ORDER — ACETAMINOPHEN 325 MG/1
975 TABLET ORAL EVERY 8 HOURS PRN
Status: DISCONTINUED | OUTPATIENT
Start: 2023-01-01 | End: 2023-01-04 | Stop reason: HOSPADM

## 2023-01-01 RX ORDER — DILTIAZEM HYDROCHLORIDE 5 MG/ML
20 INJECTION INTRAVENOUS ONCE
Status: COMPLETED | OUTPATIENT
Start: 2023-01-01 | End: 2023-01-01

## 2023-01-01 RX ORDER — LABETALOL HYDROCHLORIDE 5 MG/ML
10 INJECTION, SOLUTION INTRAVENOUS EVERY 6 HOURS PRN
Status: DISCONTINUED | OUTPATIENT
Start: 2023-01-01 | End: 2023-01-01

## 2023-01-01 RX ADMIN — MELATONIN 6 MG: at 20:36

## 2023-01-01 RX ADMIN — QUETIAPINE FUMARATE 12.5 MG: 25 TABLET ORAL at 21:35

## 2023-01-01 RX ADMIN — ACETAMINOPHEN 975 MG: 325 TABLET, FILM COATED ORAL at 16:26

## 2023-01-01 RX ADMIN — DILTIAZEM HYDROCHLORIDE 5 MG/HR: 5 INJECTION, SOLUTION INTRAVENOUS at 18:14

## 2023-01-01 RX ADMIN — APIXABAN 5 MG: 5 TABLET, FILM COATED ORAL at 08:13

## 2023-01-01 RX ADMIN — LABETALOL HYDROCHLORIDE 10 MG: 5 INJECTION, SOLUTION INTRAVENOUS at 16:45

## 2023-01-01 RX ADMIN — HYDRALAZINE HYDROCHLORIDE 10 MG: 20 INJECTION, SOLUTION INTRAMUSCULAR; INTRAVENOUS at 02:44

## 2023-01-01 RX ADMIN — ACETAMINOPHEN 975 MG: 325 TABLET, FILM COATED ORAL at 21:47

## 2023-01-01 RX ADMIN — UMECLIDINIUM 1 PUFF: 62.5 AEROSOL, POWDER ORAL at 08:13

## 2023-01-01 RX ADMIN — ATORVASTATIN CALCIUM 20 MG: 20 TABLET, FILM COATED ORAL at 08:12

## 2023-01-01 RX ADMIN — ACETAMINOPHEN 975 MG: 325 TABLET, FILM COATED ORAL at 08:12

## 2023-01-01 RX ADMIN — APIXABAN 5 MG: 5 TABLET, FILM COATED ORAL at 17:07

## 2023-01-01 RX ADMIN — HYDRALAZINE HYDROCHLORIDE 10 MG: 20 INJECTION, SOLUTION INTRAMUSCULAR; INTRAVENOUS at 14:19

## 2023-01-01 RX ADMIN — DEXMEDETOMIDINE HYDROCHLORIDE 0.6 MCG/KG/HR: 400 INJECTION INTRAVENOUS at 06:34

## 2023-01-01 RX ADMIN — OXYCODONE HYDROCHLORIDE 5 MG: 5 TABLET ORAL at 03:44

## 2023-01-01 RX ADMIN — LEVOTHYROXINE SODIUM 75 MCG: 75 TABLET ORAL at 05:38

## 2023-01-01 RX ADMIN — METOPROLOL SUCCINATE 100 MG: 100 TABLET, EXTENDED RELEASE ORAL at 20:36

## 2023-01-01 RX ADMIN — METOPROLOL SUCCINATE 100 MG: 100 TABLET, EXTENDED RELEASE ORAL at 08:13

## 2023-01-01 RX ADMIN — DILTIAZEM HYDROCHLORIDE 20 MG: 5 INJECTION, SOLUTION INTRAVENOUS at 17:58

## 2023-01-01 RX ADMIN — ALLOPURINOL 100 MG: 100 TABLET ORAL at 08:13

## 2023-01-01 NOTE — ASSESSMENT & PLAN NOTE
· Presented to [facility] ED 12/30 with acute onset of back pain  · 12/30 CTA C/A/P: There is a long segment aortic dissection which originates just distal to the takeoff of the left subclavian artery and extends into the bilateral common iliac arteries  · Transferred to [facility] for vascular surgery and cardiothoracic surgery evaluation  12/31 ECHO: EF 55%  Systolic function is normal  RV is mildly dilated  Systolic function is mildly reduced  LA is moderately dilated (42-48 mL/m2), mild AR, moderate MR,  aortic root is mildly dilated at 4 0 cm/2 0 cm/m2    Plan  · No immediate intervention planned  · Repeat imaging per CTS and Vascular  · Goal HR < 70 and SBP < 130  · Metoprolol 100mg PO BID  · PRN Hydralazine  · q1h neurovascular checks, change to Q2 hours today  · Pain control

## 2023-01-01 NOTE — ASSESSMENT & PLAN NOTE
· Pt with increasing agitation on 12/31  · Serial neuro exams  · [facility] ICU      Plan  · Precedex infusion  · 12/31 Seroquel x 1 dose

## 2023-01-01 NOTE — PROGRESS NOTES
Progress Note - Cardiothoracic Surgery   Neil High 78 y o  male MRN: [id number]  Unit/Bed#: [facility] 516-01 Encounter: [id number]      24 Hour Events: No events  HR/BP well-controlled with oral Lopressor  Echo completed and acceptable  Medications:   Scheduled Meds:  Current Facility-Administered Medications   Medication Dose Route Frequency Provider Last Rate   • acetaminophen  975 mg Oral Q8H PRN Arlene Zuñiga PA-C     • albuterol  2 puff Inhalation Q4H PRN Francisco Goodman MD     • allopurinol  100 mg Oral Daily Sharminclare EDWINA Alatorre     • apixaban  5 mg Oral BID Alma Villalobos PA-C     • atorvastatin  20 mg Oral Daily Maryclare EDWINA Alatorre     • hydrALAZINE  10 mg Intravenous Q6H PRN Alma Villalobos PA-C     • levothyroxine  75 mcg Oral Daily Sharminclare EDWINA Alatorre     • metoprolol succinate  100 mg Oral BID Alma Villalobos PA-C     • oxyCODONE  5 mg Oral Q6H PRN Alma Villalobos PA-C     • QUEtiapine  12 5 mg Oral HS Alma Villalobos PA-C     • umeclidinium  1 puff Inhalation Daily Sharmincljoshua Alatorre PA-C       Continuous Infusions:     Results:   Results from last 7 days   Lab Units 01/01/23  0421 12/31/22  0458 12/30/22  1542   WBC Thousand/uL 10 89* 9 45 11 20*   HEMOGLOBIN g/dL 13 3 13 0 13 4   HEMATOCRIT % 41 9 41 2 41 8   PLATELETS Thousands/uL 157 172 190     Results from last 7 days   Lab Units 01/01/23  0421 12/31/22  0458 12/30/22  1542   POTASSIUM mmol/L 4 1 4 2 4 4   CHLORIDE mmol/L 115* 109* 110*   CO2 mmol/L 23 25 24   BUN mg/dL 26* 23 29*   CREATININE mg/dL 1 57* 1 54* 1 62*   CALCIUM mg/dL 9 5 9 4 9 6     Results from last 7 days   Lab Units 12/30/22  2124   INR  1 16   PTT seconds 30       Studies:     CTA CAP dissection 12/30: aortic dissection originating distal to takeoff of left subclavian artry & extending to b/l common iliac arteries, diminished perfusion of right kidney, pulm emphysema, post-op changes of bowel wo obstruction, TAA 4 5cm     Echocardiogram: EF 55%  Mild AI  Moderate MR  Vitals:   Vitals:    01/01/23 0600 01/01/23 0700 01/01/23 0800 01/01/23 0811   BP: 138/81 144/84 106/62 121/58   Pulse: 79 75 80 83   Resp: 20      Temp:       TempSrc:       SpO2: 97% 96% 96%    Weight:       Height:           Physical Exam:    HEENT/NECK:  Normocephalic  Atraumatic  No jugular venous distention  Extremities: Extremities warm/dry  Neuro: Alert and oriented X 3  Skin: Warm/Dry, without rashes or lesions  Assessment:    Descending aortic dissection    Plan:  No current surgical indications    Recommend medical management  Outpatient CTA imaging ordered for one month    Will follow peripherally during remainder of this admission; Please recall if needed  SIGNATURE: Elliot Jeffries PA-C  DATE: January 1, 2023  TIME: 9:21 AM    * This note was completed in part utilizing [employer] direct voice recognition software  Grammatical errors, random word insertion, spelling mistakes, and incomplete sentences may be an occasional consequence of the system secondary to software limitations, ambient noise and hardware issues  At the time of dictation, efforts were made to edit, clarify and /or correct errors  Please read the chart carefully and recognize, using context, where substitutions have occurred  If you have any questions or concerns about the context, text or information contained within the body of this dictation, please contact myself, the provider, for further clarification

## 2023-01-01 NOTE — CONSULTS
Consultation -- Cardiology  Jaime Shanks 78 y o  male MRN: [id number]  Unit/Bed#: [facility] 505-01 Encounter: [id number]      Inpatient consult to Cardiology  Consult performed by: Rahul Andrade MD  Consult ordered by: Kiana Bonds MD      Cardiologist: Dr Rafaela Nicholson at [facility]  History of Present Illness   Physician Requesting Consult: Kiana Bonds MD  Reason for Consult / Principal Problem: aortic dissection, atrial fibrillation  Assessment/Plan   Assessment/Plan:  Jaime Shanks is a 78y o  year old male with PMH of Afib, HTN, UC s/p colectomy, CKD3, JORGE, COPD who presented with sudden onset back pain found to have 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  #Acute thoracoabdominal aortic dissection  Plan for medical management  Blood pressure and heart rate control in the ICU however heart rates are now elevated on the hospital floor  We will control atrial fibrillation and heart rate as listed below   - goal HR <70, goal SBP <130  -for HR: continue home metoprolol 100 twice daily  Start diltiazem drip   -medical management recommended by CTS    #Persistent afib  Patient with persistent atrial fibrillation  Notes from [facility] and indicates he had a PVI and right atrial tachycardia ablation in 2019 and was maintained on Tikosyn and metoprolol afterwards  He continued to have paroxysmal atrial fibrillation which became asymptomatic persistent atrial fibrillation in January 2021 and Tikosyn was discontinued  He has been continued on rate control since then as an outpatient with metoprolol 100 mg twice daily  In the ICU was on an esmolol drip, was also given one-time dose of digoxin  Heart rates controlled on this  Once coming to the hospital floor his heart rates were 110s to 120s  We will continue metoprolol and start IV diltiazem for immediate rate control  Can likely transition to oral rate control medications once rates are controlled    -continue metoprolol succinate 100 mg twice daily  -IV diltiazem 20 mg followed by drip at 5 mg/h and will titrate to heart rate    -Continue home apixaban  #CKD3  Cr 1 57, close to baseline  HPI: Neil High is a 78y o  year old male with PMH of Afib, HTN, UC s/p colectomy, CKD3, JORGE, COPD  who presented to [facility] with sudden onset back pain with LLE pain and intermittent numbness, found to have Type B aortic dissection and transferred to [facility] for vascular and CTS inputs  CTA showed long segment of aortic dissection just distal to the left subclavian artery and extends into the bike lateral common iliac arteries  The celiac artery and superior mesenteric artery arise from the true lumen  There is a dual left renal artery system and both arteries arise from the true lumen  The right renal artery herniation and snared the intimal flap with slightly diminished enhancement compared to the left  He was evaluated by the CT surgical team who recommended medical management at this time  In the ICU the patient was on a nicardipine gtt as well as an esmolol gtt and 1 dose of digoxin 250 mcg was given on 12/31  His heart rate was controlled and he was transitioned back to his home metoprolol 100mg BID  The patient was transferred to the floor today and since then has had a gradual increase in his heart rate  This afternoon the patient's heart rate was 100s to 120s in atrial fibrillation  The patient feels well and denies any palpitations, chest pain, back pain, shortness of breath  The stabbing pain he described which prompted the present presentation in the hospital is now gone  TTE  •  Left Ventricle: Left ventricular cavity size is normal  Wall thickness is moderately increased  There is moderate concentric hypertrophy  The left ventricular ejection fraction is 55%   Systolic function is normal  Although no diagnostic regional wall motion abnormality was identified, this possibility cannot be completely excluded on the basis of this study  Unable to assess diastolic function due to atrial fibrillation  •  Right Ventricle: Right ventricular cavity size is mildly dilated  Systolic function is mildly reduced  •  Left Atrium: The atrium is moderately dilated (42-48 mL/m2)  •  Aortic Valve: There is mild regurgitation  •  Mitral Valve: There is mild annular calcification  There is moderate regurgitation  •  Aorta: The aortic root is mildly dilated at 4 0 cm/2 0 cm/m2  Historical Information   Past Medical History:   Diagnosis Date   • Arthritis    • H/O echocardiogram 12/2018 9/2018   • History of EKG 2019    numerous tests between 9/6/2018 through 2/7/2019   • History of exercise stress test 10/2018   • History of Holter monitoring 09/2018   • Hyperlipidemia    • Hypertension    • Irregular heart beat    • Kidney stone      Past Surgical History:   Procedure Laterality Date   • CARDIAC ELECTROPHYSIOLOGY STUDY AND ABLATION  01/2019   • CARDIOVERSION  02/2019   • FL RETROGRADE PYELOGRAM  05/12/2022   • FL RETROGRADE PYELOGRAM  09/01/2022   • HERNIA REPAIR     • IR JOINT INJECTION  09/30/2019   • LAPAROSCOPIC ASSISSTED TOTAL COLECTOMY W/ J-POUCH     • CO CYSTO/URETERO W/LITHOTRIPSY &INDWELL STENT INSRT N/A 05/12/2022    Procedure: BILATERAL RETROGRADE PYELOGRAM, CYSTOSCOPY,  LEFT URETEROSCOPY, LEFT LASER LITHOTRIPSY BASKET EXTRACTION, LEFT STENT;  Surgeon: Viraj Lozoya MD;  Location: [address] [place] [address];  Service: Urology   • CO CYSTO/URETERO W/LITHOTRIPSY &INDWELL STENT INSRT Left 09/01/2022    Procedure: CYSTOSCOPY, URETEROSCOPY STENT REMOVAL, LASER LITHOTRIPSY, WITH BASKET, INSERTION OF URETERAL STENT;  Surgeon: Viraj Lozoya MD;  Location: [address] [place] [address];  Service: Urology   • THORACOTOMY Left 2001   • VASECTOMY       Social History     Substance and Sexual Activity   Alcohol Use Yes    Comment: Occas        Social History     Substance and Sexual Activity   Drug Use Never     Social History Tobacco Use   Smoking Status Former   • Packs/day: 1 50   • Years: 15 00   • Pack years: 22 50   • Types: Cigarettes   • Quit date: 12   • Years since quittin 0   Smokeless Tobacco Never     Family [id number]: non-contributory    Meds/Allergies   Hospital Medications:   Current Facility-Administered Medications   Medication Dose Route Frequency   • acetaminophen (TYLENOL) tablet 975 mg  975 mg Oral Q8H PRN   • albuterol (PROVENTIL HFA,VENTOLIN HFA) inhaler 2 puff  2 puff Inhalation Q4H PRN   • allopurinol (ZYLOPRIM) tablet 100 mg  100 mg Oral Daily   • apixaban (ELIQUIS) tablet 5 mg  5 mg Oral BID   • atorvastatin (LIPITOR) tablet 20 mg  20 mg Oral Daily   • hydrALAZINE (APRESOLINE) injection 10 mg  10 mg Intravenous Q6H PRN   • labetalol (NORMODYNE) injection 10 mg  10 mg Intravenous Q6H PRN   • levothyroxine tablet 75 mcg  75 mcg Oral Daily   • melatonin tablet 6 mg  6 mg Oral Q24H   • metoprolol succinate (TOPROL-XL) 24 hr tablet 100 mg  100 mg Oral BID   • oxyCODONE (ROXICODONE) IR tablet 5 mg  5 mg Oral Q6H PRN   • QUEtiapine (SEROquel) tablet 12 5 mg  12 5 mg Oral HS   • umeclidinium 62 5 mcg/actuation inhaler AEPB 1 puff  1 puff Inhalation Daily     Home Medications:   Medications Prior to Admission   Medication   • allopurinol (ZYLOPRIM) 100 mg tablet   • amLODIPine (NORVASC) 10 mg tablet   • atorvastatin (LIPITOR) 20 mg tablet   • B Complex-C (B COMPLEX-VITAMIN C PO)   • Cholecalciferol (VITAMIN D3) 50 MCG (2000) capsule   • dexamethasone (DECADRON) 2 mg tablet   • Eliquis 5 MG   • INCRUSE ELLIPTA 62 5 MCG/INH AEPB inhaler   • levothyroxine 75 mcg tablet   • metoprolol succinate (TOPROL-XL) 100 mg 24 hr tablet   • oxyCODONE-acetaminophen (PERCOCET) 5-325 mg per tablet       No Known Allergies    Objective   Vitals: Blood pressure 115/78, pulse 103, temperature 98 2 °F (36 8 °C), resp  rate 18, height 5' 5" (1 651 m), weight 90 kg (198 lb 6 6 oz), SpO2 94 %    Orthostatic Blood Pressures Flowsheet Row Most Recent Value   Blood Pressure 115/78 filed at 01/01/2023 1534            Intake/Output Summary (Last 24 hours) at 1/1/2023 1620  Last data filed at 1/1/2023 0600  Gross per 24 hour   Intake 154 96 ml   Output 1150 ml   Net -995 04 ml       Invasive Devices     Peripheral Intravenous Line  Duration           Peripheral IV 12/30/22 Right Antecubital 2 days    Peripheral IV 12/30/22 Right; Lower Forearm 1 day          Drain  Duration           Ureteral Internal Stent Left ureter 6 Fr  122 days                Review of Systems:  ROS  ROS as noted above, otherwise 12 point review of systems was performed and is negative  Physical Exam:  Physical Exam  Constitutional:       General: He is not in acute distress  HENT:      Head: Normocephalic  Cardiovascular:      Rate and Rhythm: Tachycardia present  Rhythm irregular  Pulses: Normal pulses  Heart sounds: No murmur heard  Pulmonary:      Effort: Pulmonary effort is normal  No respiratory distress  Breath sounds: No wheezing or rales  Abdominal:      General: Abdomen is flat  There is no distension  Tenderness: There is no abdominal tenderness  Musculoskeletal:         General: No swelling  Skin:     General: Skin is warm and dry  Neurological:      General: No focal deficit present  Mental Status: He is alert  Psychiatric:         Mood and Affect: Mood normal          Lab Results: I have personally reviewed pertinent lab results      Results from last 7 days   Lab Units 01/01/23  0421 12/31/22 0458 12/30/22  1542   WBC Thousand/uL 10 89* 9 45 11 20*   HEMOGLOBIN g/dL 13 3 13 0 13 4   HEMATOCRIT % 41 9 41 2 41 8   PLATELETS Thousands/uL 157 172 190     Results from last 7 days   Lab Units 01/01/23  0421 12/31/22  0458 12/30/22  1542   POTASSIUM mmol/L 4 1 4 2 4 4   CHLORIDE mmol/L 115* 109* 110*   CO2 mmol/L 23 25 24   BUN mg/dL 26* 23 29*   CREATININE mg/dL 1 57* 1 54* 1 62*   CALCIUM mg/dL 9 5 9 4 9 6 Results from last 7 days   Lab Units 12/30/22  2124   INR  1 16   PTT seconds 30     Results from last 7 days   Lab Units 12/31/22  0458   MAGNESIUM mg/dL 2 2

## 2023-01-01 NOTE — PROGRESS NOTES
[facility]  Progress Note Brandie Hemp 1945, 78 y o  male MRN: [id number]  Unit/Bed#: [facility] 071-99 Encounter: [id number]  Primary Care Provider: Danette Sultana MD   Date and time admitted to hospital: 12/30/2022  6:17 PM    * Aortic dissection, thoracoabdominal [facility])  Assessment & Plan  · [facility] ED 12/30 with acute onset of back pain  · 12/30 CTA C/A/P: There is a long segment aortic dissection which originates just distal to the takeoff of the left subclavian artery and extends into the bilateral common iliac arteries  · Transferred to [facility] for vascular surgery and cardiothoracic surgery evaluation  12/31 ECHO: EF 55%  Systolic function is normal  RV is mildly dilated  Systolic function is mildly reduced  LA is moderately dilated (42-48 mL/m2), mild AR, moderate MR,  aortic root is mildly dilated at 4 0 cm/2 0 cm/m2    Plan  · No immediate intervention planned  · Repeat imaging per CTS and Vascular  · Goal HR < 70 and SBP < 130  · Metoprolol 100mg PO BID  · PRN Hydralazine  · q1h neurovascular checks, change to Q2 hours today  · Pain control      Encephalopathy acute  Assessment & Plan  · Pt with increasing agitation on 12/31  · Serial neuro exams  · CAM ICU      Plan  · Precedex infusion  · 12/31 Seroquel x 1 dose     Atrial fibrillation, persistent ([address] )  Assessment & Plan  · Patient with chronic afib, prior cardioversion and ablation without success  · Maintained on metoprolol 100 mg BID and eliquis as an outpatient      Plan:  · Metoprolol 100 BID  · Eliquis 5 mg BID  · Digoxin x 1 dose on 12/31     COPD (chronic obstructive pulmonary disease) ([facility])  Assessment & Plan  · Continue incruse ellipta  · Respiratory protocol     Stage 3a chronic kidney disease [facility])  Assessment & Plan  Lab Results   Component Value Date    EGFR 43 12/31/2022    EGFR 40 12/30/2022    EGFR 40 12/08/2022    CREATININE 1 54 (H) 12/31/2022    CREATININE 1 62 (H) 12/30/2022 CREATININE 1 63 (H) 12/08/2022     · Baseline Cr 1 2-1 4 though recent admission with CRIS 2/2 poor PO intake and kidney stones  · Trend renal function  · Strict I/Os  · Urinary retention protocol     Left hip pain  Assessment & Plan  · Patient with chronic arthritis of left him  Was worse today leading to ambulatory dysfunction  Per the patient's daughter, it may have been dislocated and put back in place by EMS  Hip xray without acute findings  · Consider ortho consult  · PT/OT     Hypothyroidism  Assessment & Plan  ·  Continue home synthroid     Severe obstructive sleep apnea  Assessment & Plan  · Patient ordered CPAP qHS but states he does not use at home and refusing to wear here          ----------------------------------------------------------------------------------------  HPI/24hr events:   Progressive delirium and agitation  Impusive  Precedex infusion  Seroquel 12 5mg x 1    Patient appropriate for transfer out of the ICU today?: No, can re-evaluate when off precedex infusion  Disposition: Continue Critical Care   Code Status: Level 1 - Full Code  ---------------------------------------------------------------------------------------  SUBJECTIVE  Pt with no complaints on my exam    Review of Systems   Respiratory: Negative for shortness of breath  Cardiovascular: Negative for chest pain, palpitations and leg swelling  Endocrine: Negative  Genitourinary: Positive for frequency  Musculoskeletal: Negative  Skin: Negative  Allergic/Immunologic: Negative  Neurological: Negative  Hematological: Negative  Psychiatric/Behavioral: Positive for confusion     All other systems reviewed and are negative   ---------------------------------------------------------------------------------------  OBJECTIVE    Vitals   Vitals:    12/31/22 2000 12/31/22 2100 12/31/22 2129 12/31/22 2200   BP: 125/74 125/82 134/71 117/63   Pulse: 76 83 80 73   Resp:    20   Temp:    98 °F (36 7 °C)   TempSrc: Oral   SpO2: 97% 96%  93%   Weight:       Height:         Temp (24hrs), Av 1 °F (36 7 °C), Min:98 °F (36 7 °C), Max:98 2 °F (36 8 °C)  Current: Temperature: 98 °F (36 7 °C)          Respiratory:  SpO2: SpO2: 96 %         Physical Exam  Constitutional:       General: He is not in acute distress  Appearance: He is not toxic-appearing or diaphoretic  HENT:      Mouth/Throat:      Mouth: Mucous membranes are dry  Tongue: Tongue does not deviate from midline  Eyes:      Pupils: Pupils are equal, round, and reactive to light  Cardiovascular:      Rate and Rhythm: Normal rate  Rhythm irregularly irregular  Pulses:           Radial pulses are 2+ on the right side and 2+ on the left side  Dorsalis pedis pulses are 1+ on the right side and 1+ on the left side  Heart sounds: No murmur heard  No friction rub  No gallop  Pulmonary:      Effort: Pulmonary effort is normal       Breath sounds: No wheezing, rhonchi or rales  Abdominal:      Comments: Round, soft, NT, ND, +ve BS   Genitourinary:     Comments: voiding  Musculoskeletal:      Right lower leg: No edema  Left lower leg: No edema  Skin:     General: Skin is warm  Capillary Refill: Capillary refill takes less than 2 seconds  Nails: There is no clubbing  Neurological:      Mental Status: He is alert        Comments: Pt' sedated on Precedex infusion for agitation and delirium and impulsiveness  Pt non focal, strength equal bilaterally, using bedside commode with minimal 1 person assist  Pt attempting to get out of bed, impulsive, not redirectable  Orientation wax and wanes throughout shift, at times Oriented x 1 then at other times oriented x 3             Laboratory and Diagnostics:  Results from last 7 days   Lab Units 22  0458 22  1542   WBC Thousand/uL 9 45 11 20*   HEMOGLOBIN g/dL 13 0 13 4   HEMATOCRIT % 41 2 41 8   PLATELETS Thousands/uL 172 190   NEUTROS PCT %  --  81*   MONOS PCT %  --  7 Results from last 7 days   Lab Units 12/31/22  0458 12/30/22  1542   SODIUM mmol/L 138 144   POTASSIUM mmol/L 4 2 4 4   CHLORIDE mmol/L 109* 110*   CO2 mmol/L 25 24   ANION GAP mmol/L 4 10   BUN mg/dL 23 29*   CREATININE mg/dL 1 54* 1 62*   CALCIUM mg/dL 9 4 9 6   GLUCOSE RANDOM mg/dL 117 96   ALT U/L 22 16   AST U/L 32 21   ALK PHOS U/L 85 76   ALBUMIN g/dL 3 1* 3 8   TOTAL BILIRUBIN mg/dL 1 45* 0 85     Results from last 7 days   Lab Units 12/31/22  0458   MAGNESIUM mg/dL 2 2   PHOSPHORUS mg/dL 2 9      Results from last 7 days   Lab Units 12/30/22  2124   INR  1 16   PTT seconds 30              ABG:    VBG:          Micro        EKG: Afib on tele  Imaging: I have personally reviewed pertinent reports  and I have personally reviewed pertinent films in PACS    Intake and Output  I/O       12/30 0701  12/31 0700 12/31 0701  01/01 0700    P  O   480    I V  (mL/kg) 953 5 (10 6) 1041 8 (11 6)    Total Intake(mL/kg) 953 5 (10 6) 1521 8 (16 9)    Urine (mL/kg/hr) 1125 1700 (0 8)    Total Output 1125 1700    Net -171 5 -178 2          Unmeasured Urine Occurrence  1 x            Height and Weights   Height: 5' 5" (165 1 cm)  IBW (Ideal Body Weight): 61 5 kg  Body mass index is 32 95 kg/m²  Weight (last 2 days)     Date/Time Weight    12/31/22 1002 89 8 (198)    12/31/22 0532 90 1 (198 63)            Nutrition       Diet Orders   (From admission, onward)             Start     Ordered    12/31/22 1106  Diet Cardiovascular; Cardiac  Diet effective now        References:    Nutrtion Support Algorithm Enteral vs  Parenteral   Question Answer Comment   Diet Type Cardiovascular    Cardiac Cardiac    RD to adjust diet per protocol?  Yes        12/31/22 1105                  Active Medications  Scheduled Meds:  Current Facility-Administered Medications   Medication Dose Route Frequency Provider Last Rate   • albuterol  2 puff Inhalation Q4H PRN Adonis Santos MD     • allopurinol  100 mg Oral Daily Ashat , EDWINA     • apixaban  5 mg Oral BID Alma Villalobos PA-C     • atorvastatin  20 mg Oral Daily Christian Beatty PA-C     • dexmedetomidine  0 1-0 7 mcg/kg/hr Intravenous Titrated Caitlin Tirado PA-C 0 4 mcg/kg/hr (12/31/22 2319)   • esmolol   mcg/kg/min Intravenous Titrated Christian Beatty PA-C Stopped (12/31/22 1459)   • hydrALAZINE  10 mg Intravenous Q6H PRN Caitlin Tirado PA-C     • levothyroxine  75 mcg Oral Daily Christian Beatty PA-C     • metoprolol succinate  100 mg Oral BID Alma Villalobos PA-C     • oxyCODONE  5 mg Oral Q6H PRN Christian Beatty PA-C      Or   • oxyCODONE  10 mg Oral Q6H PRN Christian Beatty PA-C     • QUEtiapine  12 5 mg Oral HS Alma Villalobos PA-C     • umeclidinium  1 puff Inhalation Daily Christian Beatty PA-C       Continuous Infusions:  dexmedetomidine, 0 1-0 7 mcg/kg/hr, Last Rate: 0 4 mcg/kg/hr (12/31/22 2319)  esmolol,  mcg/kg/min, Last Rate: Stopped (12/31/22 1459)      PRN Meds:   albuterol, 2 puff, Q4H PRN  hydrALAZINE, 10 mg, Q6H PRN  oxyCODONE, 5 mg, Q6H PRN   Or  oxyCODONE, 10 mg, Q6H PRN        Invasive Devices Review  Invasive Devices     Peripheral Intravenous Line  Duration           Peripheral IV 12/30/22 Right Antecubital 1 day    Peripheral IV 12/30/22 Right; Lower Forearm 1 day          Drain  Duration           Ureteral Internal Stent Left ureter 6 Fr  121 days                Rationale for remaining devices: IV access  ---------------------------------------------------------------------------------------  Advance Directive and Living Will:      Power of :    POLST:    ---------------------------------------------------------------------------------------  Care Time Delivered:   No Critical Care time spent       CHRIS Mejia      Portions of the record may have been created with voice recognition software  Occasional wrong word or "sound a like" substitutions may have occurred due to the inherent limitations of voice recognition software  Read the chart carefully and recognize, using context, where substitutions have occurred

## 2023-01-01 NOTE — ASSESSMENT & PLAN NOTE
Lab Results   Component Value Date    EGFR 43 12/31/2022    EGFR 40 12/30/2022    EGFR 40 12/08/2022    CREATININE 1 54 (H) 12/31/2022    CREATININE 1 62 (H) 12/30/2022    CREATININE 1 63 (H) 12/08/2022     · Baseline Cr 1 2-1 4 though recent admission with CRIS 2/2 poor PO intake and kidney stones  · Trend renal function  · Strict I/Os  · Urinary retention protocol

## 2023-01-01 NOTE — ASSESSMENT & PLAN NOTE
· Patient with chronic afib, prior cardioversion and ablation without success  · Maintained on metoprolol 100 mg BID and eliquis as an outpatient      Plan:  · Metoprolol 100 BID  · Eliquis 5 mg BID  · Digoxin x 1 dose on 12/31

## 2023-01-01 NOTE — PROGRESS NOTES
Patient seen and evaluated by Critical Care today and deemed to be appropriate for transfer to Med Surg with Telemetry   Spoke to Dr Jm Alvarado from [facility] to accept patient transfer  Critical care can be contacted via [employer] with any questions or concerns

## 2023-01-02 PROBLEM — I48.91 ATRIAL FIBRILLATION WITH RVR (HCC): Status: ACTIVE | Noted: 2019-10-27

## 2023-01-02 LAB
ANION GAP SERPL CALCULATED.3IONS-SCNC: 7 MMOL/L (ref 4–13)
BUN SERPL-MCNC: 26 MG/DL (ref 5–25)
CALCIUM SERPL-MCNC: 9.8 MG/DL (ref 8.3–10.1)
CHLORIDE SERPL-SCNC: 112 MMOL/L (ref 96–108)
CO2 SERPL-SCNC: 22 MMOL/L (ref 21–32)
CREAT SERPL-MCNC: 1.61 MG/DL (ref 0.6–1.3)
ERYTHROCYTE [DISTWIDTH] IN BLOOD BY AUTOMATED COUNT: 16.5 % (ref 11.6–15.1)
GFR SERPL CREATININE-BSD FRML MDRD: 41 ML/MIN/1.73SQ M
GLUCOSE SERPL-MCNC: 125 MG/DL (ref 65–140)
HCT VFR BLD AUTO: 40.1 % (ref 36.5–49.3)
HGB BLD-MCNC: 13.2 G/DL (ref 12–17)
MCH RBC QN AUTO: 30.1 PG (ref 26.8–34.3)
MCHC RBC AUTO-ENTMCNC: 32.9 G/DL (ref 31.4–37.4)
MCV RBC AUTO: 92 FL (ref 82–98)
PLATELET # BLD AUTO: 177 THOUSANDS/UL (ref 149–390)
PMV BLD AUTO: 11.5 FL (ref 8.9–12.7)
POTASSIUM SERPL-SCNC: 3.6 MMOL/L (ref 3.5–5.3)
RBC # BLD AUTO: 4.38 MILLION/UL (ref 3.88–5.62)
SODIUM SERPL-SCNC: 141 MMOL/L (ref 135–147)
WBC # BLD AUTO: 5.45 THOUSAND/UL (ref 4.31–10.16)

## 2023-01-02 RX ORDER — OLANZAPINE 10 MG/1
2.5 INJECTION, POWDER, LYOPHILIZED, FOR SOLUTION INTRAMUSCULAR ONCE
Status: COMPLETED | OUTPATIENT
Start: 2023-01-02 | End: 2023-01-02

## 2023-01-02 RX ADMIN — MELATONIN 6 MG: at 21:02

## 2023-01-02 RX ADMIN — DILTIAZEM HYDROCHLORIDE 30 MG: 30 TABLET, FILM COATED ORAL at 21:02

## 2023-01-02 RX ADMIN — OLANZAPINE 2.5 MG: 10 INJECTION, POWDER, FOR SOLUTION INTRAMUSCULAR at 04:06

## 2023-01-02 RX ADMIN — DILTIAZEM HYDROCHLORIDE 10 MG/HR: 5 INJECTION, SOLUTION INTRAVENOUS at 07:11

## 2023-01-02 RX ADMIN — LEVOTHYROXINE SODIUM 75 MCG: 75 TABLET ORAL at 05:18

## 2023-01-02 RX ADMIN — METOPROLOL SUCCINATE 100 MG: 100 TABLET, EXTENDED RELEASE ORAL at 21:02

## 2023-01-02 RX ADMIN — UMECLIDINIUM 1 PUFF: 62.5 AEROSOL, POWDER ORAL at 08:32

## 2023-01-02 RX ADMIN — ALLOPURINOL 100 MG: 100 TABLET ORAL at 08:31

## 2023-01-02 RX ADMIN — APIXABAN 5 MG: 5 TABLET, FILM COATED ORAL at 18:11

## 2023-01-02 RX ADMIN — QUETIAPINE FUMARATE 12.5 MG: 25 TABLET ORAL at 21:02

## 2023-01-02 RX ADMIN — ATORVASTATIN CALCIUM 20 MG: 20 TABLET, FILM COATED ORAL at 08:31

## 2023-01-02 RX ADMIN — DILTIAZEM HYDROCHLORIDE 10 MG/HR: 5 INJECTION, SOLUTION INTRAVENOUS at 18:09

## 2023-01-02 RX ADMIN — DILTIAZEM HYDROCHLORIDE 30 MG: 30 TABLET, FILM COATED ORAL at 09:54

## 2023-01-02 RX ADMIN — DILTIAZEM HYDROCHLORIDE 30 MG: 30 TABLET, FILM COATED ORAL at 18:11

## 2023-01-02 RX ADMIN — METOPROLOL SUCCINATE 100 MG: 100 TABLET, EXTENDED RELEASE ORAL at 08:31

## 2023-01-02 RX ADMIN — APIXABAN 5 MG: 5 TABLET, FILM COATED ORAL at 08:31

## 2023-01-02 NOTE — ASSESSMENT & PLAN NOTE
· Goal SBP  <120 given aortic dissection as above   · Medication adjustments as per cardiology, outlined above

## 2023-01-02 NOTE — PROGRESS NOTES
[address] [facility]  Progress Note Balbina Sandoval 1945, 78 y o  male MRN: [id number]  Unit/Bed#: [facility] 505-01 Encounter: [id number]  Primary Care Provider: Felisha Andino MD   Date and time admitted to hospital: 12/30/2022  6:17 PM    * Aortic dissection, thoracoabdominal [facility])  Assessment & Plan  Initially presented to [address] [place] [address] with sudden onset back pain associated with left lower extremity pain and intermittent numbness  He was found to have type B aortic dissection and transferred to [facility] for vascular and CT surgery evaluations  · Appreciate vascular surgery and CT surgery consult  · Recommending medical management at this time  · Outpatient follow-up with repeat CTA CAP in 1 month  · Goal SBP < 120 and goal heart rate < 70     Atrial fibrillation with RVR ([facility] [address] )  Assessment & Plan  History of chronic atrial fibrillation maintained on metoprolol and Eliquis outpatient  A  fib with RVR on admission requiring esmolol infusion in ICU  · Appreciate ongoing cardiology recommendations  · Initiated on Cardizem drip yesterday, continued today  · Initiated on short acting Cardizem 30 mg 4 times daily today  · Continued on home dose metoprolol succinate 100 mg twice daily  · Continue AC with Eliquis  · Monitor on telemetry    Stage 3a chronic kidney disease [facility])  Assessment & Plan  Lab Results   Component Value Date    EGFR 41 01/02/2023    EGFR 42 01/01/2023    EGFR 43 12/31/2022    CREATININE 1 61 (H) 01/02/2023    CREATININE 1 57 (H) 01/01/2023    CREATININE 1 54 (H) 12/31/2022     · Creatinine is slightly above baseline of approximately 1 2-1 4  · Note recent admission with CRIS secondary to diarrhea and poor oral intake  · Avoid nephrotoxins and hypotension  · Monitor BMP    Left hip pain  Assessment & Plan  Per ICU documentation "Patient with chronic arthritis of left hip  Was worse today leading to ambulatory dysfunction   Per the patient's daughter, it may have been dislocated and put back in place by EMS "  · Hip x-ray without acute findings  · Analgesics as needed   · PT/OT evaluations pending     Severe obstructive sleep apnea  Assessment & Plan  · Encourage compliance with CPAP    Ulcerative pancolitis without complication ([facility] )  Assessment & Plan  Status post total colectomy  · Supportive cares    Hypothyroidism  Assessment & Plan  · Continue home levothyroxine    Hypertension  Assessment & Plan  · Goal SBP  <120 given aortic dissection as above   · Medication adjustments as per cardiology, outlined above    COPD (chronic obstructive pulmonary disease) ([facility] )  Assessment & Plan  Not in acute exacerbation  · Continue home medical management      VTE Pharmacologic Prophylaxis: VTE Score: 4 Moderate Risk (Score 3-4) - Pharmacological DVT Prophylaxis Ordered: apixaban (Eliquis)  Patient Centered Rounds: I performed bedside rounds with nursing staff today  Discussions with Specialists or Other Care Team Provider: primary RN    Education and Discussions with Family / Patient: Patient declined call to   Time Spent for Care: 30 minutes  More than 50% of total time spent on counseling and coordination of care as described above  Current Length of Stay: 3 day(s)  Current Patient Status: Inpatient   Certification Statement: The patient will continue to require additional inpatient hospital stay due to management of HR and BP per cardiology  Discharge Plan: Anticipate discharge in 24-48 hrs to discharge location to be determined pending rehab evaluations  Code Status: Level 1 - Full Code    Subjective:   Patient reports tiredness due to lack of sleep  Otherwise offers no specific complaints       Objective:     Vitals:   Temp (24hrs), Av 6 °F (37 °C), Min:98 2 °F (36 8 °C), Max:99 2 °F (37 3 °C)    Temp:  [98 2 °F (36 8 °C)-99 2 °F (37 3 °C)] 99 °F (37 2 °C)  HR:  [] 90  Resp:  [16-24] 16  BP: (113-154)/(64-90) 143/90  SpO2:  [92 %-97 %] 92 %  Body mass index is 31 55 kg/m²  Input and Output Summary (last 24 hours): Intake/Output Summary (Last 24 hours) at 1/2/2023 1125  Last data filed at 1/2/2023 0753  Gross per 24 hour   Intake 581 38 ml   Output --   Net 581 38 ml       Physical Exam:   Physical Exam  Vitals reviewed  Constitutional:       General: He is sleeping  He is not in acute distress  Cardiovascular:      Rate and Rhythm: Normal rate  Rhythm irregularly irregular  Pulmonary:      Effort: Pulmonary effort is normal  No respiratory distress  Neurological:      General: No focal deficit present  Mental Status: He is easily aroused  Mental status is at baseline  Additional Data:     Labs:  Results from last 7 days   Lab Units 01/02/23  0519 12/31/22  0458 12/30/22  1542   WBC Thousand/uL 5 45   < > 11 20*   HEMOGLOBIN g/dL 13 2   < > 13 4   HEMATOCRIT % 40 1   < > 41 8   PLATELETS Thousands/uL 177   < > 190   NEUTROS PCT %  --   --  81*   LYMPHS PCT %  --   --  11*   MONOS PCT %  --   --  7   EOS PCT %  --   --  0    < > = values in this interval not displayed  Results from last 7 days   Lab Units 01/02/23  0519 01/01/23  0421 12/31/22  0458   SODIUM mmol/L 141   < > 138   POTASSIUM mmol/L 3 6   < > 4 2   CHLORIDE mmol/L 112*   < > 109*   CO2 mmol/L 22   < > 25   BUN mg/dL 26*   < > 23   CREATININE mg/dL 1 61*   < > 1 54*   ANION GAP mmol/L 7   < > 4   CALCIUM mg/dL 9 8   < > 9 4   ALBUMIN g/dL  --   --  3 1*   TOTAL BILIRUBIN mg/dL  --   --  1 45*   ALK PHOS U/L  --   --  85   ALT U/L  --   --  22   AST U/L  --   --  32   GLUCOSE RANDOM mg/dL 125   < > 117    < > = values in this interval not displayed  Results from last 7 days   Lab Units 12/30/22  2124   INR  1 16                   Lines/Drains:  Invasive Devices     Peripheral Intravenous Line  Duration           Peripheral IV 12/30/22 Right Antecubital 2 days    Peripheral IV 12/30/22 Right; Lower Forearm 2 days    Peripheral IV 01/02/23 Left Antecubital <1 day Drain  Duration           Ureteral Internal Stent Left ureter 6 Fr  123 days                  Telemetry:  Telemetry Orders (From admission, onward)             48 Hour Telemetry Monitoring  Continuous x 48 hours        References:    Telemetry Guidelines   Question:  Reason for 48 Hour Telemetry  Answer:  Arrhythmias Requiring Medical Therapy (eg  SVT, Vtach/fib, Bradycardia, Uncontrolled A-fib)                 Telemetry Reviewed: Atrial fibrillation  HR averaging 80-90  Indication for Continued Telemetry Use: Arrthymias requiring medical therapy             Imaging: No pertinent imaging reviewed  Recent Cultures (last 7 days):         Last 24 Hours Medication List:   Current Facility-Administered Medications   Medication Dose Route Frequency Provider Last Rate   • acetaminophen  975 mg Oral Q8H PRN Alma Villalobos PA-C     • albuterol  2 puff Inhalation Q4H PRN Alma Villalobos PA-C     • allopurinol  100 mg Oral Daily Alma Villalobos PA-C     • apixaban  5 mg Oral BID Alma Villalobos PA-C     • atorvastatin  20 mg Oral Daily Alma Villalobos PA-C     • diltiazem  1-15 mg/hr Intravenous Titrated Kadi Richard MD 10 mg/hr (01/02/23 4048)   • diltiazem  30 mg Oral 4x Daily Víctor King DO     • hydrALAZINE  10 mg Intravenous Q6H PRN Alma Villalobos PA-C     • labetalol  10 mg Intravenous Q6H PRN Doug Martínez MD     • levothyroxine  75 mcg Oral Daily Alma Villalobos PA-C     • melatonin  6 mg Oral Q24H Alma Villalobos PA-C     • metoprolol succinate  100 mg Oral BID Alma Villalobos PA-C     • oxyCODONE  5 mg Oral Q6H PRN Alma Villalobos PA-C     • QUEtiapine  12 5 mg Oral HS Alma Villalobos PA-C     • umeclidinium  1 puff Inhalation Daily Dorothy Hall PA-C          Today, Patient Was Seen By: David Rai PA-C    **Please Note: This note may have been constructed using a voice recognition system  **

## 2023-01-02 NOTE — ASSESSMENT & PLAN NOTE
Lab Results   Component Value Date    EGFR 41 01/02/2023    EGFR 42 01/01/2023    EGFR 43 12/31/2022    CREATININE 1 61 (H) 01/02/2023    CREATININE 1 57 (H) 01/01/2023    CREATININE 1 54 (H) 12/31/2022     · Creatinine is slightly above baseline of approximately 1 2-1 4  · Note recent admission with CRIS secondary to diarrhea and poor oral intake  · Avoid nephrotoxins and hypotension  · Monitor BMP

## 2023-01-02 NOTE — ASSESSMENT & PLAN NOTE
Initially presented to [address] [place] [address] with sudden onset back pain associated with left lower extremity pain and intermittent numbness  He was found to have type B aortic dissection and transferred to [facility] for vascular and CT surgery evaluations    · Appreciate vascular surgery and CT surgery consult  · Recommending medical management at this time  · Outpatient follow-up with repeat CTA CAP in 1 month  · Goal SBP < 120 and goal heart rate < 70

## 2023-01-02 NOTE — ASSESSMENT & PLAN NOTE
Per ICU documentation "Patient with chronic arthritis of left hip  Was worse today leading to ambulatory dysfunction   Per the patient's daughter, it may have been dislocated and put back in place by EMS "  · Hip x-ray without acute findings  · Analgesics as needed   · PT/OT evaluations pending

## 2023-01-02 NOTE — ASSESSMENT & PLAN NOTE
History of chronic atrial fibrillation maintained on metoprolol and Eliquis outpatient  A  fib with RVR on admission requiring esmolol infusion in ICU    · Appreciate ongoing cardiology recommendations  · Initiated on Cardizem drip yesterday, continued today  · Initiated on short acting Cardizem 30 mg 4 times daily today  · Continued on home dose metoprolol succinate 100 mg twice daily  · Continue AC with Eliquis  · Monitor on telemetry

## 2023-01-03 LAB
ANION GAP SERPL CALCULATED.3IONS-SCNC: 7 MMOL/L (ref 4–13)
BUN SERPL-MCNC: 31 MG/DL (ref 5–25)
CALCIUM SERPL-MCNC: 10 MG/DL (ref 8.3–10.1)
CHLORIDE SERPL-SCNC: 110 MMOL/L (ref 96–108)
CO2 SERPL-SCNC: 24 MMOL/L (ref 21–32)
CREAT SERPL-MCNC: 2.07 MG/DL (ref 0.6–1.3)
GFR SERPL CREATININE-BSD FRML MDRD: 30 ML/MIN/1.73SQ M
GLUCOSE SERPL-MCNC: 121 MG/DL (ref 65–140)
POTASSIUM SERPL-SCNC: 3.8 MMOL/L (ref 3.5–5.3)
SODIUM SERPL-SCNC: 141 MMOL/L (ref 135–147)

## 2023-01-03 RX ORDER — SODIUM CHLORIDE, SODIUM GLUCONATE, SODIUM ACETATE, POTASSIUM CHLORIDE, MAGNESIUM CHLORIDE, SODIUM PHOSPHATE, DIBASIC, AND POTASSIUM PHOSPHATE .53; .5; .37; .037; .03; .012; .00082 G/100ML; G/100ML; G/100ML; G/100ML; G/100ML; G/100ML; G/100ML
75 INJECTION, SOLUTION INTRAVENOUS CONTINUOUS
Status: DISCONTINUED | OUTPATIENT
Start: 2023-01-03 | End: 2023-01-04 | Stop reason: HOSPADM

## 2023-01-03 RX ADMIN — DILTIAZEM HYDROCHLORIDE 30 MG: 30 TABLET, FILM COATED ORAL at 18:10

## 2023-01-03 RX ADMIN — DILTIAZEM HYDROCHLORIDE 10 MG/HR: 5 INJECTION, SOLUTION INTRAVENOUS at 06:01

## 2023-01-03 RX ADMIN — APIXABAN 5 MG: 5 TABLET, FILM COATED ORAL at 08:55

## 2023-01-03 RX ADMIN — ATORVASTATIN CALCIUM 20 MG: 20 TABLET, FILM COATED ORAL at 08:55

## 2023-01-03 RX ADMIN — MELATONIN 6 MG: at 20:17

## 2023-01-03 RX ADMIN — SODIUM CHLORIDE, SODIUM GLUCONATE, SODIUM ACETATE, POTASSIUM CHLORIDE, MAGNESIUM CHLORIDE, SODIUM PHOSPHATE, DIBASIC, AND POTASSIUM PHOSPHATE 75 ML/HR: .53; .5; .37; .037; .03; .012; .00082 INJECTION, SOLUTION INTRAVENOUS at 15:13

## 2023-01-03 RX ADMIN — DILTIAZEM HYDROCHLORIDE 30 MG: 30 TABLET, FILM COATED ORAL at 08:56

## 2023-01-03 RX ADMIN — ALLOPURINOL 100 MG: 100 TABLET ORAL at 08:55

## 2023-01-03 RX ADMIN — DILTIAZEM HYDROCHLORIDE 30 MG: 30 TABLET, FILM COATED ORAL at 13:01

## 2023-01-03 RX ADMIN — ACETAMINOPHEN 975 MG: 325 TABLET, FILM COATED ORAL at 15:57

## 2023-01-03 RX ADMIN — UMECLIDINIUM 1 PUFF: 62.5 AEROSOL, POWDER ORAL at 08:56

## 2023-01-03 RX ADMIN — DILTIAZEM HYDROCHLORIDE 30 MG: 30 TABLET, FILM COATED ORAL at 21:06

## 2023-01-03 RX ADMIN — APIXABAN 5 MG: 5 TABLET, FILM COATED ORAL at 18:10

## 2023-01-03 RX ADMIN — METOPROLOL SUCCINATE 100 MG: 100 TABLET, EXTENDED RELEASE ORAL at 20:17

## 2023-01-03 RX ADMIN — LEVOTHYROXINE SODIUM 75 MCG: 75 TABLET ORAL at 05:15

## 2023-01-03 RX ADMIN — QUETIAPINE FUMARATE 12.5 MG: 25 TABLET ORAL at 21:06

## 2023-01-03 RX ADMIN — METOPROLOL SUCCINATE 100 MG: 100 TABLET, EXTENDED RELEASE ORAL at 08:56

## 2023-01-03 NOTE — ASSESSMENT & PLAN NOTE
Per ICU documentation "Patient with chronic arthritis of left hip  Was worse today leading to ambulatory dysfunction   Per the patient's daughter, it may have been dislocated and put back in place by EMS "  · Hip x-ray without acute findings  · Analgesics as needed

## 2023-01-03 NOTE — APP STUDENT NOTE
Jason Michelle is a 79 y/o M with PMH afib, CKD, JORGE, UC, hypothyroidism, and HTN on hospital day 4 for a type B aortic dissection    Objective  Vitals  General  CV  PV  Pulm  Abd        Assessment and Plan   1  Aortic dissection: CT surgery and vascular surgery following; maintain SBP <120 and HR <70, repeat CTA 1 month   2  A-fib: cardiology following, continue metroprolol, cardizem, and eloquis  Continue monitoring   3  Stage 3 CKD: monitor daily BMP, avoid nephrotoxins and hypotension   4  Left hip pain: pain control as needed, PT/OT  5  JORGE: encourage CPAP use  6  Ulcerative colitis s/p colectomy: supportive care   7  Hypothyroidism: continue levothyroxine  8  HTN: continue BP meds, SMP goal <120  9   VTE prophylaxis SQ heparin

## 2023-01-03 NOTE — CASE MANAGEMENT
Case Management Assessment    Patient name Natalia Reyes  Location [facility] 505/[facility] 927-19 MRN [id number]  : 1945 Date 1/3/2023       Current Admission Date: 2022  Current Admission Diagnosis:Aortic dissection, thoracoabdominal [facility])   Patient Active Problem List    Diagnosis Date Noted   • Aortic dissection, thoracoabdominal ([facility] [address] ) 2022   • Continuous opioid dependence ([facility] [address] ) 02/10/2022   • Chronic pain syndrome 2021   • Ulcerative pancolitis without complication ([facility] [address] )    • Thoracic aortic aneurysm without rupture 2021   • Benign prostatic hyperplasia without lower urinary tract symptoms 2021   • Severe obstructive sleep apnea 2020   • Chronic right shoulder pain 2020   • Chronic left shoulder pain 2020   • Left hip pain 2020   • COPD (chronic obstructive pulmonary disease) ([facility] [address] ) 2020   • Hoarse voice quality 2020   • Chronic lumbar pain 2020   • Stage 3a chronic kidney disease ([facility] [address] ) 2020   • Hyperlipidemia 2020   • Hyperparathyroidism ([facility] [address] ) 2020   • Hypertension 2020   • Atrial fibrillation with RVR ([facility] [address] ) 10/27/2019   • Impaired fasting glucose 2014   • Hypertensive heart disease without heart failure 10/25/2013   • Hypothyroidism 10/25/2013   • Osteoarthrosis involving multiple sites 2013      LOS (days): 4  Geometric Mean LOS (GMLOS) (days):   Days to GMLOS:     OBJECTIVE:    Risk of Unplanned Readmission Score: 20 25         Current admission status: Inpatient       Preferred Pharmacy:   [facility], [address] [address]  Phone: [phone] Fax: [phone]    [address]  Phone: [phone] Fax: [phone]    Primary Care Provider: Dorota Randall MD    Primary Insurance: Gege Isaiah REP  Secondary Insurance: ASSESSMENT:  Active Health Care Proxies    There are no active Health Care Proxies on file  Patient Information  Admitted from[de-identified] Home  Mental Status: Alert  During Assessment patient was accompanied by: Not accompanied during assessment  Assessment information provided by[de-identified] Patient  Primary Caregiver: Self  Support Systems: Family members  Home entry access options   Select all that apply : Stairs  Number of steps to enter home : 1  Type of Current Residence: 2 story home  Upon entering residence, is there a bedroom on the main floor (no further steps)?: No  A bedroom is located on the following floor levels of residence (select all that apply):: 2nd Floor  Upon entering residence, is there a bathroom on the main floor (no further steps)?: No  Indicate which floors of current residence have a bathroom (select all the apply):: 2nd Floor  Number of steps to 2nd floor from main floor: One Flight  Homeless/housing insecurity resource given?: N/A  Living Arrangements: Lives Alone    Activities of Daily Living Prior to Admission  Functional Status: Independent  Completes ADLs independently?: Yes  Ambulates independently?: Yes  Does patient use assisted devices?: No  Does patient currently own DME?: No  Does patient have a history of Outpatient Therapy (PT/OT)?: Yes  Does the patient have a history of Short-Term Rehab?: No  Does patient have a history of HHC?: No  Does patient currently have [facility]?: No  Patient Information Continued  Income Source: Pension/senior care  Food insecurity resource given?: N/A  Does patient receive dialysis treatments?: No  Does patient have a history of substance abuse?: No  Does patient have a history of Mental Health Diagnosis?: No  Means of Transportation  [facility] D[facility] Self  Was application for public transport provided?: N/A

## 2023-01-03 NOTE — PROGRESS NOTES
Cardiology Progress Note - Team 1  Buffy Pimentel 78 y o  male MRN: [id number]  Unit/Bed#: [facility] 505-01 Encounter: [id number]      Assessment/Plan:  1  Acute thoracoabdominal aortic dissection  - CTA CAP dissection (12/30/2022) - aortic dissection originating distal to takeoff of left subclavian artry & extending to b/l common iliac arteries, diminished perfusion of right kidney, pulm emphysema, post-op changes of bowel w/o obstruction, TAA 4 5cm   - echo (12/31/2022) - LVEF 55%, RWM cannot be excluded on the basis of the study, RV mildly dilated, moderately dilated LA, mild AI, moderate MR, aortic root mildly dilated at 4 0 cm  - evaluated by CT surgery - recommend medical management + outpatient CTA imaging in 1 month  - HR/BP now well controlled  - continue Toprol- mg twice daily, Cardizem 30 mg four times daily     2  Persistent  atrial fibrillation  - s/p PVI/right atrial tachycardia ablation (2019, [facility]) - maintained on Tikosyn and metoprolol following  - Tikosyn discontinued (1/2021) - continued on metoprolol 100 mg twice daily   - telemetry review - afib, HR 70-80s  - remains on cardizem gtt at 10mL/hr - asked nursing to titrate to off as tolerable  - continue rate control with Toprol XL, Cardizem - can transition to long-acting Cardizem upon discharge  - continue Eliquis 5 mg twice daily for systemic anticoagulation  - continue monitor on telemetry     3  CKD age 3  - creatinine on arrival 1 62 - 2 07 today  - baseline 1 2-1 4  - continue to monitor with daily BMPs    4  JORGE  - encourage compliance with CPAP    5  Hypothyroidism  - TSH (11/20/2022) -1 65  - continue home levothyroxine    6  Hypertension  - last documented /64; improved  - continue Toprol-XL, Cardizem    Subjective:   Patient seen and examined  No significant events overnight  Patient complains of some back and arm soreness from laying in the bed  Denies any chest pain, shortness of breath, or palpitations      Objective: Vitals: Blood pressure 110/64, pulse 80, temperature 98 6 °F (37 °C), temperature source Oral, resp  rate 16, height 5' 5" (1 651 m), weight 85 1 kg (187 lb 9 8 oz), SpO2 91 % , Body mass index is 31 22 kg/m² ,   Orthostatic Blood Pressures    Flowsheet Row Most Recent Value   Blood Pressure 110/64 filed at 01/03/2023 0725   Patient Position - Orthostatic VS Sitting filed at 01/03/2023 0725          Intake/Output Summary (Last 24 hours) at 1/3/2023 0017  Last data filed at 1/3/2023 0831  Gross per 24 hour   Intake 479 5 ml   Output --   Net 479 5 ml     Physical Exam  Constitutional:       General: He is not in acute distress  Appearance: He is obese  He is not ill-appearing  HENT:      Head: Normocephalic and atraumatic  Nose: Nose normal       Mouth/Throat:      Mouth: Mucous membranes are moist       Pharynx: Oropharynx is clear  Eyes:      Pupils: Pupils are equal, round, and reactive to light  Cardiovascular:      Rate and Rhythm: Normal rate  Rhythm irregular  Pulses: Normal pulses  Heart sounds: Normal heart sounds  No murmur heard  Pulmonary:      Effort: Pulmonary effort is normal  No respiratory distress  Breath sounds: Normal breath sounds  No wheezing or rales  Abdominal:      General: Bowel sounds are normal       Palpations: Abdomen is soft  Musculoskeletal:         General: Normal range of motion  Cervical back: Normal range of motion  Right lower leg: No edema  Left lower leg: No edema  Skin:     General: Skin is warm and dry  Capillary Refill: Capillary refill takes less than 2 seconds  Neurological:      General: No focal deficit present  Mental Status: He is alert and oriented to person, place, and time     Psychiatric:         Mood and Affect: Mood normal          Behavior: Behavior normal      Medications:    Current Facility-Administered Medications:   •  acetaminophen (TYLENOL) tablet 975 mg, 975 mg, Oral, Q8H PRN, Rigoberto Brooks, EDWINA, 975 mg at 01/01/23 2147  •  albuterol (PROVENTIL HFA,VENTOLIN HFA) inhaler 2 puff, 2 puff, Inhalation, Q4H PRN, Alma Villalobos PA-C  •  allopurinol (ZYLOPRIM) tablet 100 mg, 100 mg, Oral, Daily, Alma Villalobos PA-C, 100 mg at 01/03/23 0576  •  apixaban (ELIQUIS) tablet 5 mg, 5 mg, Oral, BID, Alma Villalobos PA-C, 5 mg at 01/03/23 7927  •  atorvastatin (LIPITOR) tablet 20 mg, 20 mg, Oral, Daily, Alma Villalobos PA-C, 20 mg at 01/03/23 9788  •  diltiazem (CARDIZEM) 125 mg in sodium chloride 0 9 % 125 mL infusion, 1-15 mg/hr, Intravenous, Titrated, Laila Agustin MD, Last Rate: 10 mL/hr at 01/03/23 0601, 10 mg/hr at 01/03/23 0601  •  diltiazem (CARDIZEM) tablet 30 mg, 30 mg, Oral, 4x Daily, Christine Rivas DO, 30 mg at 01/03/23 0856  •  hydrALAZINE (APRESOLINE) injection 10 mg, 10 mg, Intravenous, Q6H PRN, Alma Villalobos PA-C, 10 mg at 01/01/23 1419  •  labetalol (NORMODYNE) injection 10 mg, 10 mg, Intravenous, Q6H PRN, Elza Hurst MD, 10 mg at 01/01/23 1645  •  levothyroxine tablet 75 mcg, 75 mcg, Oral, Daily, Alma Villalobos PA-C, 75 mcg at 01/03/23 0515  •  melatonin tablet 6 mg, 6 mg, Oral, Q24H, Alma Villalobos PA-C, 6 mg at 01/02/23 2102  •  metoprolol succinate (TOPROL-XL) 24 hr tablet 100 mg, 100 mg, Oral, BID, Alma Villalobos PA-C, 100 mg at 01/03/23 2003  •  oxyCODONE (ROXICODONE) IR tablet 5 mg, 5 mg, Oral, Q6H PRN **OR** [DISCONTINUED] oxyCODONE (ROXICODONE) immediate release tablet 10 mg, 10 mg, Oral, Q6H PRN, Dimitry Aragon PA-C  •  QUEtiapine (SEROquel) tablet 12 5 mg, 12 5 mg, Oral, HS, Alma Villalobos PA-C, 12 5 mg at 01/02/23 2102  •  umeclidinium 62 5 mcg/actuation inhaler AEPB 1 puff, 1 puff, Inhalation, Daily, Alma Villalobos PA-C, 1 puff at 01/03/23 0856     Labs & Results:      Results from last 7 days   Lab Units 01/02/23  0519 01/01/23  0421 12/31/22  0458   WBC Thousand/uL 5 45 10 89* 9 45   HEMOGLOBIN g/dL 13 2 13 3 13 0   HEMATOCRIT % 40 1 41 9 41 2   PLATELETS Thousands/uL 177 157 172 Results from last 7 days   Lab Units 01/03/23  0525 01/02/23  0519 01/01/23  0421 12/31/22  0458 12/30/22  1542   POTASSIUM mmol/L 3 8 3 6 4 1 4 2 4 4   CHLORIDE mmol/L 110* 112* 115* 109* 110*   CO2 mmol/L 24 22 23 25 24   BUN mg/dL 31* 26* 26* 23 29*   CREATININE mg/dL 2 07* 1 61* 1 57* 1 54* 1 62*   CALCIUM mg/dL 10 0 9 8 9 5 9 4 9 6   ALK PHOS U/L  --   --   --  85 76   ALT U/L  --   --   --  22 16   AST U/L  --   --   --  32 21     Results from last 7 days   Lab Units 12/30/22  2124   INR  1 16   PTT seconds 30     Results from last 7 days   Lab Units 12/31/22  0458   MAGNESIUM mg/dL 2 2

## 2023-01-03 NOTE — PROGRESS NOTES
[address] [facility]  Progress Note Dorise December 1945, 78 y o  male MRN: [id number]  Unit/Bed#: [facility] 505-01 Encounter: [id number]  Primary Care Provider: Jaun Golden MD   Date and time admitted to hospital: 12/30/2022  6:17 PM    * Aortic dissection, thoracoabdominal [facility])  Assessment & Plan  Initially presented to [address] [place] [address] with sudden onset back pain associated with left lower extremity pain and intermittent numbness  He was found to have type B aortic dissection and transferred to [facility] for vascular and CT surgery evaluations  · Appreciate vascular surgery and CT surgery consult  · Recommending medical management at this time  · Outpatient follow-up with repeat CTA CAP in 1 month  · Goal SBP < 120 and goal heart rate < 70     Atrial fibrillation with RVR ([address] )  Assessment & Plan  History of chronic atrial fibrillation maintained on metoprolol and Eliquis outpatient  A  fib with RVR on admission requiring esmolol infusion in ICU    · Appreciate ongoing cardiology recommendations  · Initiated on Cardizem drip 1/1/23 - weaned off as of 1/3/23 late morning   · Initiated on short acting Cardizem 30 mg 4 times daily 1/2/23 with plan to transition to long active Cardizem upon discharge   · Continued on home dose metoprolol succinate 100 mg twice daily  · Continue AC with Eliquis  · Monitor on telemetry    Stage 3a chronic kidney disease [facility])  Assessment & Plan  Lab Results   Component Value Date    EGFR 30 01/03/2023    EGFR 41 01/02/2023    EGFR 42 01/01/2023    CREATININE 2 07 (H) 01/03/2023    CREATININE 1 61 (H) 01/02/2023    CREATININE 1 57 (H) 01/01/2023     · Creatinine is slightly above baseline of approximately 1 2-1 4  · Note recent admission with CRIS secondary to diarrhea and poor oral intake  · Avoid nephrotoxins and hypotension  · Monitor BMP - creatinine up-trending today, consider addition of IV fluids     Left hip pain  Assessment & Plan  Per ICU documentation "Patient with chronic arthritis of left hip  Was worse today leading to ambulatory dysfunction  Per the patient's daughter, it may have been dislocated and put back in place by EMS "  · Hip x-ray without acute findings  · Analgesics as needed     Severe obstructive sleep apnea  Assessment & Plan  · Encourage compliance with CPAP    Ulcerative pancolitis without complication ([facility] )  Assessment & Plan  Status post total colectomy  · Supportive cares    Hypothyroidism  Assessment & Plan  · Continue home levothyroxine    Hypertension  Assessment & Plan  · Goal SBP  <120 given aortic dissection as above   · Medication adjustments as per cardiology, outlined above    COPD (chronic obstructive pulmonary disease) ([facility] )  Assessment & Plan  Not in acute exacerbation  · Continue home medical management      VTE Pharmacologic Prophylaxis: VTE Score: 4 Moderate Risk (Score 3-4) - Pharmacological DVT Prophylaxis Ordered: apixaban (Eliquis)  Patient Centered Rounds: I performed bedside rounds with nursing staff today  Discussions with Specialists or Other Care Team Provider: none    Education and Discussions with Family / Patient: Patient declined call to   Time Spent for Care: 20 minutes  More than 50% of total time spent on counseling and coordination of care as described above  Current Length of Stay: 4 day(s)  Current Patient Status: Inpatient   Certification Statement: The patient will continue to require additional inpatient hospital stay due to ongoing medication adjustments, pending cardiology clearance  Discharge Plan: Anticipate discharge in 24-48 hrs to home  Code Status: Level 1 - Full Code    Subjective:   Patient offers no new complaints today  Hoping for discharge tomorrow       Objective:     Vitals:   Temp (24hrs), Av 2 °F (37 3 °C), Min:98 6 °F (37 °C), Max:100 °F (37 8 °C)    Temp:  [98 6 °F (37 °C)-100 °F (37 8 °C)] 99 2 °F (37 3 °C)  HR:  [72-97] 78  Resp:  [16-18] 18  BP: (110-142)/(64-98) 134/98  SpO2:  [90 %-95 %] 95 %  Body mass index is 31 22 kg/m²  Input and Output Summary (last 24 hours): Intake/Output Summary (Last 24 hours) at 1/3/2023 1312  Last data filed at 1/3/2023 0831  Gross per 24 hour   Intake 479 5 ml   Output --   Net 479 5 ml       Physical Exam:   Physical Exam  Vitals reviewed  Cardiovascular:      Rate and Rhythm: Normal rate  Rhythm irregularly irregular  Pulmonary:      Effort: Pulmonary effort is normal  No respiratory distress  Breath sounds: No wheezing, rhonchi or rales  Neurological:      General: No focal deficit present  Mental Status: He is alert and oriented to person, place, and time  Mental status is at baseline  Additional Data:     Labs:  Results from last 7 days   Lab Units 01/02/23  0519 12/31/22  0458 12/30/22  1542   WBC Thousand/uL 5 45   < > 11 20*   HEMOGLOBIN g/dL 13 2   < > 13 4   HEMATOCRIT % 40 1   < > 41 8   PLATELETS Thousands/uL 177   < > 190   NEUTROS PCT %  --   --  81*   LYMPHS PCT %  --   --  11*   MONOS PCT %  --   --  7   EOS PCT %  --   --  0    < > = values in this interval not displayed  Results from last 7 days   Lab Units 01/03/23  0525 01/01/23  0421 12/31/22  0458   SODIUM mmol/L 141   < > 138   POTASSIUM mmol/L 3 8   < > 4 2   CHLORIDE mmol/L 110*   < > 109*   CO2 mmol/L 24   < > 25   BUN mg/dL 31*   < > 23   CREATININE mg/dL 2 07*   < > 1 54*   ANION GAP mmol/L 7   < > 4   CALCIUM mg/dL 10 0   < > 9 4   ALBUMIN g/dL  --   --  3 1*   TOTAL BILIRUBIN mg/dL  --   --  1 45*   ALK PHOS U/L  --   --  85   ALT U/L  --   --  22   AST U/L  --   --  32   GLUCOSE RANDOM mg/dL 121   < > 117    < > = values in this interval not displayed       Results from last 7 days   Lab Units 12/30/22  2124   INR  1 16                   Lines/Drains:  Invasive Devices     Peripheral Intravenous Line  Duration           Peripheral IV 01/02/23 Left;Proximal;Ventral (anterior) Forearm <1 day Drain  Duration           Ureteral Internal Stent Left ureter 6 Fr  124 days                  Telemetry:  Telemetry Orders (From admission, onward)             48 Hour Telemetry Monitoring  Continuous x 48 hours           References:    Telemetry Guidelines   Question:  Reason for 48 Hour Telemetry  Answer:  Arrhythmias Requiring Medical Therapy (eg  SVT, Vtach/fib, Bradycardia, Uncontrolled A-fib)                 Telemetry Reviewed: Atrial fibrillation  HR averaging 80  Indication for Continued Telemetry Use: Arrthymias requiring medical therapy             Imaging: No pertinent imaging reviewed  Recent Cultures (last 7 days):         Last 24 Hours Medication List:   Current Facility-Administered Medications   Medication Dose Route Frequency Provider Last Rate   • acetaminophen  975 mg Oral Q8H PRN Alma Villalobos PA-C     • albuterol  2 puff Inhalation Q4H PRN Alma Villalobos PA-C     • allopurinol  100 mg Oral Daily Alma Villalobos PA-C     • apixaban  5 mg Oral BID Alma Villalobos PA-C     • atorvastatin  20 mg Oral Daily Alma Villalobos PA-C     • diltiazem  1-15 mg/hr Intravenous Titrated Chandni Olvera MD Stopped (23 0385)   • diltiazem  30 mg Oral 4x Daily Nery Thakkar DO     • hydrALAZINE  10 mg Intravenous Q6H PRN Alma Villalobos PA-C     • labetalol  10 mg Intravenous Q6H PRN Victor Manuel Huang MD     • levothyroxine  75 mcg Oral Daily Alma Villalobos PA-C     • melatonin  6 mg Oral Q24H Alma Villalobos PA-C     • metoprolol succinate  100 mg Oral BID Alma Villalobos PA-C     • oxyCODONE  5 mg Oral Q6H PRN Alma Villalobos PA-C     • QUEtiapine  12 5 mg Oral HS Alma Villalobos PA-C     • umeclidinium  1 puff Inhalation Daily Caitlin Tirado PA-C          Today, Patient Was Seen By: Jacqueline Bah PA-C    **Please Note: This note may have been constructed using a voice recognition system  **

## 2023-01-03 NOTE — ASSESSMENT & PLAN NOTE
Lab Results   Component Value Date    EGFR 30 01/03/2023    EGFR 41 01/02/2023    EGFR 42 01/01/2023    CREATININE 2 07 (H) 01/03/2023    CREATININE 1 61 (H) 01/02/2023    CREATININE 1 57 (H) 01/01/2023     · Creatinine is slightly above baseline of approximately 1 2-1 4  · Note recent admission with CRIS secondary to diarrhea and poor oral intake  · Avoid nephrotoxins and hypotension  · Monitor BMP

## 2023-01-03 NOTE — ASSESSMENT & PLAN NOTE
History of chronic atrial fibrillation maintained on metoprolol and Eliquis outpatient  A  fib with RVR on admission requiring esmolol infusion in ICU    · Appreciate ongoing cardiology recommendations  · Initiated on Cardizem drip 1/1/23 - weaned off as of 1/3/23 late morning   · Initiated on short acting Cardizem 30 mg 4 times daily 1/2/23 with plan to transition to long active Cardizem upon discharge   · Continued on home dose metoprolol succinate 100 mg twice daily  · Continue AC with Eliquis  · Monitor on telemetry

## 2023-01-03 NOTE — PLAN OF CARE
Problem: PAIN - ADULT  Goal: Verbalizes/displays adequate comfort level or baseline comfort level  Description: Interventions:  - Encourage patient to monitor pain and request assistance  - Assess pain using appropriate pain scale  - Administer analgesics based on type and severity of pain and evaluate response  - Implement non-pharmacological measures as appropriate and evaluate response  - Consider cultural and social influences on pain and pain management  - Notify physician/advanced practitioner if interventions unsuccessful or patient reports new pain  Outcome: Progressing     Problem: INFECTION - ADULT  Goal: Absence or prevention of progression during hospitalization  Description: INTERVENTIONS:  - Assess and monitor for signs and symptoms of infection  - Monitor lab/diagnostic results  - Monitor all insertion sites, i e  indwelling lines, tubes, and drains  - Monitor endotracheal if appropriate and nasal secretions for changes in amount and color  - [place] appropriate cooling/warming therapies per order  - Administer medications as ordered  - Instruct and encourage patient and family to use good hand hygiene technique  - Identify and instruct in appropriate isolation precautions for identified infection/condition  Outcome: Progressing  Goal: Absence of fever/infection during neutropenic period  Description: INTERVENTIONS:  - Monitor WBC    Outcome: Progressing

## 2023-01-03 NOTE — ASSESSMENT & PLAN NOTE
Initially presented to [facility] with sudden onset back pain associated with left lower extremity pain and intermittent numbness  He was found to have type B aortic dissection and transferred to [facility] for vascular and CT surgery evaluations    · Appreciate vascular surgery and CT surgery consult  · Recommending medical management at this time  · Outpatient follow-up with repeat CTA CAP in 1 month  · Goal SBP < 120 and goal heart rate < 70

## 2023-01-04 VITALS
WEIGHT: 188 LBS | DIASTOLIC BLOOD PRESSURE: 75 MMHG | RESPIRATION RATE: 18 BRPM | BODY MASS INDEX: 31.32 KG/M2 | HEIGHT: 65 IN | HEART RATE: 88 BPM | OXYGEN SATURATION: 93 % | SYSTOLIC BLOOD PRESSURE: 119 MMHG | TEMPERATURE: 98.8 F

## 2023-01-04 PROBLEM — I48.91 ATRIAL FIBRILLATION WITH RVR (HCC): Status: RESOLVED | Noted: 2019-10-27 | Resolved: 2023-01-04

## 2023-01-04 PROBLEM — I48.91 ATRIAL FIBRILLATION (HCC): Status: ACTIVE | Noted: 2023-01-04

## 2023-01-04 LAB
ANION GAP SERPL CALCULATED.3IONS-SCNC: 5 MMOL/L (ref 4–13)
BUN SERPL-MCNC: 34 MG/DL (ref 5–25)
CALCIUM SERPL-MCNC: 9.5 MG/DL (ref 8.3–10.1)
CHLORIDE SERPL-SCNC: 108 MMOL/L (ref 96–108)
CO2 SERPL-SCNC: 25 MMOL/L (ref 21–32)
CREAT SERPL-MCNC: 2 MG/DL (ref 0.6–1.3)
GFR SERPL CREATININE-BSD FRML MDRD: 31 ML/MIN/1.73SQ M
GLUCOSE SERPL-MCNC: 106 MG/DL (ref 65–140)
POTASSIUM SERPL-SCNC: 3.4 MMOL/L (ref 3.5–5.3)
SODIUM SERPL-SCNC: 138 MMOL/L (ref 135–147)

## 2023-01-04 RX ORDER — POTASSIUM CHLORIDE 20 MEQ/1
40 TABLET, EXTENDED RELEASE ORAL ONCE
Status: COMPLETED | OUTPATIENT
Start: 2023-01-04 | End: 2023-01-04

## 2023-01-04 RX ORDER — ATORVASTATIN CALCIUM 40 MG/1
40 TABLET, FILM COATED ORAL DAILY
Qty: 30 TABLET | Refills: 0 | Status: SHIPPED | OUTPATIENT
Start: 2023-01-04 | End: 2023-01-04

## 2023-01-04 RX ORDER — DILTIAZEM HYDROCHLORIDE 120 MG/1
120 CAPSULE, COATED, EXTENDED RELEASE ORAL DAILY
Qty: 30 CAPSULE | Refills: 0 | Status: SHIPPED | OUTPATIENT
Start: 2023-01-04 | End: 2023-02-03

## 2023-01-04 RX ORDER — DILTIAZEM HYDROCHLORIDE 120 MG/1
120 CAPSULE, COATED, EXTENDED RELEASE ORAL DAILY
Status: DISCONTINUED | OUTPATIENT
Start: 2023-01-04 | End: 2023-01-04 | Stop reason: HOSPADM

## 2023-01-04 RX ORDER — AMLODIPINE BESYLATE 10 MG/1
10 TABLET ORAL EVERY EVENING
Start: 2023-01-04

## 2023-01-04 RX ORDER — ASPIRIN 81 MG/1
81 TABLET ORAL DAILY
Qty: 30 TABLET | Refills: 0 | Status: SHIPPED | OUTPATIENT
Start: 2023-01-04 | End: 2023-02-03

## 2023-01-04 RX ADMIN — DILTIAZEM HYDROCHLORIDE 30 MG: 30 TABLET, FILM COATED ORAL at 08:28

## 2023-01-04 RX ADMIN — LEVOTHYROXINE SODIUM 75 MCG: 75 TABLET ORAL at 05:23

## 2023-01-04 RX ADMIN — UMECLIDINIUM 1 PUFF: 62.5 AEROSOL, POWDER ORAL at 08:30

## 2023-01-04 RX ADMIN — SODIUM CHLORIDE, SODIUM GLUCONATE, SODIUM ACETATE, POTASSIUM CHLORIDE, MAGNESIUM CHLORIDE, SODIUM PHOSPHATE, DIBASIC, AND POTASSIUM PHOSPHATE 75 ML/HR: .53; .5; .37; .037; .03; .012; .00082 INJECTION, SOLUTION INTRAVENOUS at 05:23

## 2023-01-04 RX ADMIN — ATORVASTATIN CALCIUM 20 MG: 20 TABLET, FILM COATED ORAL at 08:28

## 2023-01-04 RX ADMIN — APIXABAN 5 MG: 5 TABLET, FILM COATED ORAL at 08:28

## 2023-01-04 RX ADMIN — ALLOPURINOL 100 MG: 100 TABLET ORAL at 08:28

## 2023-01-04 RX ADMIN — DILTIAZEM HYDROCHLORIDE 120 MG: 120 CAPSULE, COATED, EXTENDED RELEASE ORAL at 11:56

## 2023-01-04 RX ADMIN — METOPROLOL SUCCINATE 100 MG: 100 TABLET, EXTENDED RELEASE ORAL at 08:28

## 2023-01-04 RX ADMIN — POTASSIUM CHLORIDE 40 MEQ: 1500 TABLET, EXTENDED RELEASE ORAL at 09:20

## 2023-01-04 RX ADMIN — ACETAMINOPHEN 975 MG: 325 TABLET, FILM COATED ORAL at 01:46

## 2023-01-04 NOTE — PROGRESS NOTES
Cardiology Progress Note - Team 1  Joaquin Travis 78 y o  male MRN: [id number]  Unit/Bed#: [facility] 505-01 Encounter: [id number]      Assessment/Plan:  1  Acute thoracoabdominal aortic dissection  - CTA CAP dissection (12/30/2022) - aortic dissection originating distal to takeoff of left subclavian artry & extending to b/l common iliac arteries, diminished perfusion of right kidney, pulm emphysema, post-op changes of bowel w/o obstruction, TAA 4 5cm   - echo (12/31/2022) - LVEF 55%, RWM cannot be excluded on the basis of the study, RV mildly dilated, moderately dilated LA, mild AI, moderate MR, aortic root mildly dilated at 4 0 cm  - evaluated by CT surgery - recommend medical management + outpatient CTA imaging in 1 month  - HR/BP remain well controlled  - continue Toprol- mg twice daily, Cardizem CD     2  Persistent  atrial fibrillation  - s/p PVI/right atrial tachycardia ablation (2019, [facility]) - maintained on Tikosyn and metoprolol following  - Tikosyn discontinued (1/2021) - continued on metoprolol 100 mg twice daily   - telemetry review - afib, HR 70-80s; controlled  - cardizem gtt weaned to off - transition to Cardizem  mg today, continue Toprol-XL for rate control  - continue Eliquis 5 mg twice daily for systemic anticoagulation  - continue monitor on telemetry   - patient will follow up with [facility] cardiologist     3  CKD stage 3  - creatinine on arrival 1 62 - 2 00 today  - baseline 1 2-1 4  - recommend outpatient BMP in 1 week     4  JORGE  - encourage compliance with CPAP     5  Hypothyroidism  - TSH (11/20/2022) -1 65  - continue home levothyroxine     6  Hypertension  - last documented /67  - continue Toprol-XL, Cardizem CD    Subjective:   Patient seen and examined  No significant events overnight  Patient feels well this morning  Denies any shortness of breath, palpitations, or chest pain    He would like to continue to follow with his [facility] cardiologist and was instructed to call the office for follow-up in 1 to 2 months  Objective:   Vitals: Blood pressure 114/67, pulse 88, temperature 98 6 °F (37 °C), temperature source Oral, resp  rate 18, height 5' 5" (1 651 m), weight 85 3 kg (188 lb), SpO2 96 %  , Body mass index is 31 28 kg/m² ,   Orthostatic Blood Pressures    Flowsheet Row Most Recent Value   Blood Pressure 114/67 filed at 01/04/2023 0710   Patient Position - Orthostatic VS Sitting filed at 01/04/2023 0710          Intake/Output Summary (Last 24 hours) at 1/4/2023 1003  Last data filed at 1/4/2023 0831  Gross per 24 hour   Intake 2467 41 ml   Output --   Net 2467 41 ml     Physical Exam  Constitutional:       General: He is not in acute distress  Appearance: He is obese  He is not ill-appearing  HENT:      Head: Normocephalic and atraumatic  Nose: Nose normal       Mouth/Throat:      Mouth: Mucous membranes are moist       Pharynx: Oropharynx is clear  Eyes:      Pupils: Pupils are equal, round, and reactive to light  Cardiovascular:      Rate and Rhythm: Normal rate  Rhythm irregular  Pulses: Normal pulses  Heart sounds: Normal heart sounds  No murmur heard  Pulmonary:      Effort: Pulmonary effort is normal  No respiratory distress  Breath sounds: Normal breath sounds  No wheezing or rales  Comments: On RA  Abdominal:      General: Bowel sounds are normal  There is no distension  Palpations: Abdomen is soft  Musculoskeletal:         General: Normal range of motion  Cervical back: Normal range of motion  Right lower leg: No edema  Left lower leg: No edema  Skin:     General: Skin is warm and dry  Capillary Refill: Capillary refill takes less than 2 seconds  Neurological:      General: No focal deficit present  Mental Status: He is alert and oriented to person, place, and time     Psychiatric:         Mood and Affect: Mood normal          Behavior: Behavior normal      Medications:    Current Facility-Administered Medications:   •  acetaminophen (TYLENOL) tablet 975 mg, 975 mg, Oral, Q8H PRN, KAREN Hawk-GUERA, 975 mg at 01/04/23 0146  •  albuterol (PROVENTIL HFA,VENTOLIN HFA) inhaler 2 puff, 2 puff, Inhalation, Q4H PRN, Alma Villalobos PA-C  •  allopurinol (ZYLOPRIM) tablet 100 mg, 100 mg, Oral, Daily, KAREN Hawk-C, 100 mg at 01/04/23 9308  •  apixaban (ELIQUIS) tablet 5 mg, 5 mg, Oral, BID, KAREN Hawk-C, 5 mg at 01/04/23 7021  •  atorvastatin (LIPITOR) tablet 20 mg, 20 mg, Oral, Daily, Alma Villalobos PA-C, 20 mg at 01/04/23 9470  •  diltiazem (CARDIZEM) 125 mg in sodium chloride 0 9 % 125 mL infusion, 1-15 mg/hr, Intravenous, Titrated, Chandni Olvera MD, Stopped at 01/03/23 1155  •  diltiazem (CARDIZEM) tablet 30 mg, 30 mg, Oral, 4x Daily, Nery Thakkar DO, 30 mg at 01/04/23 4054  •  hydrALAZINE (APRESOLINE) injection 10 mg, 10 mg, Intravenous, Q6H PRN, Alma Villalobos PA-C, 10 mg at 01/01/23 1419  •  labetalol (NORMODYNE) injection 10 mg, 10 mg, Intravenous, Q6H PRN, Victor Manuel Huang MD, 10 mg at 01/01/23 1645  •  levothyroxine tablet 75 mcg, 75 mcg, Oral, Daily, Alma Villalobos PA-C, 75 mcg at 01/04/23 3300  •  melatonin tablet 6 mg, 6 mg, Oral, Q24H, Alma Villalobos PA-C, 6 mg at 01/03/23 2017  •  metoprolol succinate (TOPROL-XL) 24 hr tablet 100 mg, 100 mg, Oral, BID, KAREN Hawk-GUERA, 100 mg at 01/04/23 0941  •  multi-electrolyte (PLASMALYTE-A/ISOLYTE-S PH 7 4) IV solution, 75 mL/hr, Intravenous, Continuous, Ligia Lazcano PA-C, Last Rate: 75 mL/hr at 01/04/23 0523, 75 mL/hr at 01/04/23 0523  •  oxyCODONE (ROXICODONE) IR tablet 5 mg, 5 mg, Oral, Q6H PRN **OR** [DISCONTINUED] oxyCODONE (ROXICODONE) immediate release tablet 10 mg, 10 mg, Oral, Q6H PRN, Christian Beatty PA-C  •  QUEtiapine (SEROquel) tablet 12 5 mg, 12 5 mg, Oral, HS, Alma Villalobos PA-C, 12 5 mg at 01/03/23 2106  •  umeclidinium 62 5 mcg/actuation inhaler AEPB 1 puff, 1 puff, Inhalation, Daily, Alma Villalobos PA-C, 1 puff at 01/04/23 0830     Labs & Results:      Results from last 7 days   Lab Units 01/02/23  0519 01/01/23  0421 12/31/22  0458   WBC Thousand/uL 5 45 10 89* 9 45   HEMOGLOBIN g/dL 13 2 13 3 13 0   HEMATOCRIT % 40 1 41 9 41 2   PLATELETS Thousands/uL 177 157 172         Results from last 7 days   Lab Units 01/04/23  0438 01/03/23  0525 01/02/23  0519 01/01/23  0421 12/31/22 0458 12/30/22  1542   POTASSIUM mmol/L 3 4* 3 8 3 6   < > 4 2 4 4   CHLORIDE mmol/L 108 110* 112*   < > 109* 110*   CO2 mmol/L 25 24 22   < > 25 24   BUN mg/dL 34* 31* 26*   < > 23 29*   CREATININE mg/dL 2 00* 2 07* 1 61*   < > 1 54* 1 62*   CALCIUM mg/dL 9 5 10 0 9 8   < > 9 4 9 6   ALK PHOS U/L  --   --   --   --  85 76   ALT U/L  --   --   --   --  22 16   AST U/L  --   --   --   --  32 21    < > = values in this interval not displayed       Results from last 7 days   Lab Units 12/30/22  2124   INR  1 16   PTT seconds 30     Results from last 7 days   Lab Units 12/31/22  0458   MAGNESIUM mg/dL 2 2

## 2023-01-04 NOTE — ASSESSMENT & PLAN NOTE
History of chronic atrial fibrillation maintained on metoprolol and Eliquis outpatient  A  fib with RVR on admission requiring esmolol infusion in ICU    · Appreciate ongoing cardiology recommendations  · Initiated on Cardizem drip 1/1/23 - weaned off as of 1/3/23 late morning   · Transitioned from short acting Cardizem 30 mg 4 times daily to long acting Cardizem 120 mg daily upon discharge   · Continued on home dose metoprolol succinate 100 mg twice daily  · Close outpatient follow-up with primary cardiologist at [facility]  · [address] with Eliquis

## 2023-01-04 NOTE — DISCHARGE SUMMARY
[address] [facility]  Discharge- Angel Vaughn 1945, 78 y o  male MRN: [id number]  Unit/Bed#: [facility] 505-01 Encounter: [id number]  Primary Care Provider: Deja Staley MD   Date and time admitted to hospital: 12/30/2022  6:17 PM    * Aortic dissection, thoracoabdominal [facility])  Assessment & Plan  Initially presented to [facility] with sudden onset back pain associated with left lower extremity pain and intermittent numbness  He was found to have type B aortic dissection and transferred to [facility] for vascular and CT surgery evaluations  · Appreciate vascular surgery and CT surgery consult  · Recommending medical management at this time  · Initiated on ASA and statin   · Outpatient follow-up with repeat CTA CAP in 1 month  · Goal SBP < 120 and goal heart rate < 70   · See plan under Afib for med adjustments     Atrial fibrillation with RVR (HCC)-resolved as of 1/4/2023  Assessment & Plan  History of chronic atrial fibrillation maintained on metoprolol and Eliquis outpatient  A  fib with RVR on admission requiring esmolol infusion in ICU    · Appreciate ongoing cardiology recommendations  · Initiated on Cardizem drip 1/1/23 - weaned off as of 1/3/23 late morning   · Transitioned from short acting Cardizem 30 mg 4 times daily to long acting Cardizem 120 mg daily upon discharge   · Continued on home dose metoprolol succinate 100 mg twice daily  · Close outpatient follow-up with primary cardiologist at [facility]  · [address] with Eliquis    Stage 3a chronic kidney disease [facility])  Assessment & Plan  Lab Results   Component Value Date    EGFR 31 01/04/2023    EGFR 30 01/03/2023    EGFR 41 01/02/2023    CREATININE 2 00 (H) 01/04/2023    CREATININE 2 07 (H) 01/03/2023    CREATININE 1 61 (H) 01/02/2023     · Creatinine 1 6 on admission, which is slightly above baseline of approximately 1 2-1 4  · Note recent admission in November with CRIS secondary to diarrhea and poor oral intake, creatinine on discharge was 2 08  · CTA AP on 12/30 noting diminished perfusion of the right kidney compared with the left   The right kidney also seems somewhat atrophic and therefore this might be a somewhat chronic condition   No hydronephrosis or kidney stones on either side  · Creatinine noted to be up-trending to 2 07 yesterday 1/3/23  Provided gentle IVF overnight without significant improvement  · Patient endorses poor oral intake due to dislike of hospital food  Notes chronic diarrhea at baseline given history of colectomy  Encouraged PO intake and hydration on discharge and discussed recommendation for repeat BMP next week  · Recommended close outpatient follow-up with primary Nephrologist with [facility]    Left hip pain  Assessment & Plan  Per ICU documentation "Patient with chronic arthritis of left hip  Was worse today leading to ambulatory dysfunction   Per the patient's daughter, it may have been dislocated and put back in place by EMS "  · Hip x-ray without acute findings  · Analgesics as needed     Severe obstructive sleep apnea  Assessment & Plan  · Encourage compliance with CPAP    Ulcerative pancol[facility] without complication ([facility] )  Assessment & Plan  Status post total colectomy  · Supportive cares    Hypothyroidism  Assessment & Plan  · Continue home levothyroxine    Hypertension  Assessment & Plan  · Goal SBP  <120 given aortic dissection as above   · Medication adjustments as per cardiology, outlined above    COPD (chronic obstructive pulmonary disease) ([facility] )  Assessment & Plan  Not in acute exacerbation  · Continue home medical management    Medical Problems     Resolved Problems  Date Reviewed: 1/4/2023          Resolved    Atrial fibrillation with RVR ([facility] ) 1/4/2023     Resolved by  Brian Lazcano PA-C    Overview Signed 6/18/2020  9:46 AM by Que Au MD     History of newly diagnosed atrial fibrillation refractory to medical therapy, S/p SUNDAR/cardioversion (12/26/2018), with recurrent atrial fibrillation  S/p A  fib ablation with pulmonary vein isolation/focal right atrial tachycardia ablation (1/25/25), 2019  On antiarrhythmic medical regimen  SUNDAR/Cardioversion (12/26/18): Nondilated LV, LVH, EF:65%  Grade 1 Diastolic dysfunction  Dilated LA appendage free of thrumbi/mass, MAC, aortic sclerosis, LA+ 5 cm  Aortic root sclerosis  Sinus rhythm was restored after administration of 200 J DC countershock  Nuclear SPECT stress test (10/2/18): Exercise capacity below average  Evidence  for atrial fibrillation at baseline and thorughout the study  Moderately abnormal, nontransmural inferior myocardial infarction  No reversible myocardial ischemia  Left entricle was mildly dilated with mildly systolic function 29%  Electrophysiology study, A  fib ablation with pulmonary vein isolation (1/25/19): 1  Baseline 12 lead EKG showed AF with moderate to rapid ventricular response  No evidence for ventricular preexcitation  2  Sinus rhythm was restored after administration of a synchronized 360 J DC countershock  During sinus rhythm, there were several premature atrial beats originating from right pulmonary vein and left common pulmonary vein, and occasional premature beats from right atrium near the lower portion of the joby terminalis  3  Baseline intracardiac intervals are normal (HV=48 ms)  4  Borderline/abnormal SA node function  5  Normal AV node function  6  No evidence for a dual AV node physiology or accessory pathway  7   Successful pulmonary vein isolation  8  There were premature APB is from lower right atrium near the joby terminalis  Successful "triger" RF applications were also applied in the lower joby terminalis area  Holter Monitor (9/25/18):  Atrial fibrillation with an average ventricular response of 93 BPM (range:  BPM  Few episodes of atrial fibrillation with rapid ventricular response and heart rate up to 163 BPM  Few episodes of atrial flutter ablation with controlled ventricular response and 3 asymptomatic 2 second pauses, during early a m  hours  Rare single VPCs (0 3%)  One ventricular couplet  CT heart (1/14/19): No LA appendage thrombosis  Normal pulmonary veins  Aneurysmal enlargement of the ascending thoracic aorta, no dissection identified  Bilateral lower lobe atelectasis and regions of bronchiectasis are present in both lower lobes  Cardiomegaly  No pleural effusion or acute heart failure  Last Assessment & Plan:   Stable on current medical therapy, continue anticoagulation and antiarrhythmic therapy  Discharging Physician / Practitioner: Hailee Kumar PA-C  PCP: Brenda Yates MD  Admission Date:   Admission Orders (From admission, onward)     Ordered        12/30/22 1843  Inpatient Admission  Once                      Discharge Date: 01/04/23    Consultations During Hospital Stay:  · Cardiology  · Cardiothoracic surgery  · Vascular surgery    Procedures Performed:   · None    Significant Findings / Test Results:   · Outlined above    Incidental Findings:   · None    Test Results Pending at Discharge (will require follow up): · None     Outpatient Tests Requested:  · BMP in 5 days  · Outpatient follow-up with primary cardiologist and nephrologist  · Outpatient follow-up with CT surgery with repeat CTA in 1 month     Complications: None    Reason for Admission: Thoracoabdominal aortic dissection    Hospital Course:   Holly Orellana is a 78 y o  male patient who originally presented to the hospital on 12/30/2022 as a transfer for vascular and CT surgery evaluations in the setting of thoracoabdominal aortic dissection  Both surgical teams recommended conservative/medical management at this time  Patient was initiated on aspirin in addition to his statin  They recommended tight blood pressure and heart rate control  Patient was seen in consultation by cardiology given A  fib with RVR    He required Cardizem drip for rate control  He was eventually transitioned to p o  Cardizem  HR and BP within goal at time of discharge  Cardiology cleared for discharge with outpatient follow-up  CT surgery recommending outpatient follow-up with repeat CTA in 1 month  Patient with history of CKD  Creatinine slightly above baseline at time of discharge as outlined above  Patient was encouraged to hydrate adequately and get repeat lab work in 5 days to reassess  He was also advised to schedule outpatient follow-up with his primary nephrologist and PCP as soon as possible  Please see above list of diagnoses and related plan for additional information  Condition at Discharge: stable    Discharge Day Visit / Exam:   Subjective: Patient offers no complaints today aside from soreness in his arms from lab draws and his back from the hospital bed  Ludwig Gao for discharge  Vitals: Blood Pressure: 119/75 (01/04/23 1047)  Pulse: 88 (01/04/23 1047)  Temperature: 98 8 °F (37 1 °C) (01/04/23 1047)  Temp Source: Oral (01/04/23 1047)  Respirations: 18 (01/04/23 1047)  Height: 5' 5" (165 1 cm) (12/31/22 1002)  Weight - Scale: 85 3 kg (188 lb) (01/04/23 0440)  SpO2: 93 % (01/04/23 1047)  Exam:   Physical Exam  Vitals reviewed  Constitutional:       General: He is not in acute distress  Cardiovascular:      Rate and Rhythm: Normal rate  Rhythm irregularly irregular  Pulmonary:      Effort: Pulmonary effort is normal  No respiratory distress  Breath sounds: No wheezing, rhonchi or rales  Neurological:      General: No focal deficit present  Mental Status: He is alert and oriented to person, place, and time  Mental status is at baseline  Discussion with Family: Patient declined call to   Discharge instructions/Information to patient and family:   See after visit summary for information provided to patient and family        Provisions for Follow-Up Care:  See after visit summary for information related to follow-up care and any pertinent home health orders  Disposition:   Home    Planned Readmission: no     Discharge Statement:  I spent 35 minutes discharging the patient  This time was spent on the day of discharge  I had direct contact with the patient on the day of discharge  Greater than 50% of the total time was spent examining patient, answering all patient questions, arranging and discussing plan of care with patient as well as directly providing post-discharge instructions  Additional time then spent on discharge activities  Discharge Medications:  See after visit summary for reconciled discharge medications provided to patient and/or family        **Please Note: This note may have been constructed using a voice recognition system**

## 2023-01-04 NOTE — ASSESSMENT & PLAN NOTE
Lab Results   Component Value Date    EGFR 31 01/04/2023    EGFR 30 01/03/2023    EGFR 41 01/02/2023    CREATININE 2 00 (H) 01/04/2023    CREATININE 2 07 (H) 01/03/2023    CREATININE 1 61 (H) 01/02/2023     · Creatinine 1 6 on admission, which is slightly above baseline of approximately 1 2-1 4  · Note recent admission in November with CRIS secondary to diarrhea and poor oral intake, creatinine on discharge was 2 08  · CTA AP on 12/30 noting diminished perfusion of the right kidney compared with the left   The right kidney also seems somewhat atrophic and therefore this might be a somewhat chronic condition   No hydronephrosis or kidney stones on either side  · Creatinine noted to be up-trending to 2 07 yesterday 1/3/23  Provided gentle IVF overnight without significant improvement  · Patient endorses poor oral intake due to dislike of hospital food  Notes chronic diarrhea at baseline given history of colectomy  Encouraged PO intake and hydration on discharge and discussed recommendation for repeat BMP next week    · Recommended close outpatient follow-up with primary Nephrologist with [facility]

## 2023-01-04 NOTE — ASSESSMENT & PLAN NOTE
Initially presented to [address] [place] [address] with sudden onset back pain associated with left lower extremity pain and intermittent numbness  He was found to have type B aortic dissection and transferred to [facility] for vascular and CT surgery evaluations    · Appreciate vascular surgery and CT surgery consult  · Recommending medical management at this time  · Initiated on ASA and statin   · Outpatient follow-up with repeat CTA CAP in 1 month  · Goal SBP < 120 and goal heart rate < 70   · See plan under Afib for med adjustments

## 2023-01-05 ENCOUNTER — TRANSITIONAL CARE MANAGEMENT (OUTPATIENT)
Dept: FAMILY MEDICINE CLINIC | Facility: CLINIC | Age: 78
End: 2023-01-05

## 2023-01-05 NOTE — UTILIZATION REVIEW
NOTIFICATION OF ADMISSION DISCHARGE   This is a Notification of Discharge from [address]  Please be advised that this patient has been discharge from our facility  Below you will find the admission and discharge date and time including the patient’s disposition  UTILIZATION REVIEW CONTACT:  Kaela Logan  Utilization   Network Utilization Review Department  Phone: [phone] x carefully listen to the prompts  All voicemails are confidential   Email: [email or web address]     ADMISSION INFORMATION  PRESENTATION DATE: 12/30/2022  6:17 PM  OBERVATION ADMISSION DATE:   INPATIENT ADMISSION DATE: 12/30/22  6:17 PM   DISCHARGE DATE: 1/4/2023  1:58 PM   DISPOSITION:Home/Self Care    IMPORTANT INFORMATION:  Send all requests for admission clinical reviews, approved or denied determinations and any other requests to dedicated fax number below belonging to the campus where the patient is receiving treatment   List of dedicated fax numbers:  [address] DENIALS (Administrative/Medical Necessity) [phone]   [address] (Maternity/NICU/Pediatrics) [phone]   [facility] [phone]   [place] [address] [phone]   [address] [phone]   220 [facility] [phone]   90 [facility] [phone]   [facility] [address] [phone]   [facility]  [phone]   [address] [facility] [phone] [address] [facility] [address] [facility] [phone]

## 2023-01-06 ENCOUNTER — RA CDI HCC (OUTPATIENT)
Dept: OTHER | Facility: HOSPITAL | Age: 78
End: 2023-01-06

## 2023-01-06 DIAGNOSIS — M54.50 CHRONIC LOW BACK PAIN, UNSPECIFIED BACK PAIN LATERALITY, UNSPECIFIED WHETHER SCIATICA PRESENT: ICD-10-CM

## 2023-01-06 DIAGNOSIS — G89.29 CHRONIC LOW BACK PAIN, UNSPECIFIED BACK PAIN LATERALITY, UNSPECIFIED WHETHER SCIATICA PRESENT: ICD-10-CM

## 2023-01-06 RX ORDER — OXYCODONE HYDROCHLORIDE AND ACETAMINOPHEN 5; 325 MG/1; MG/1
1 TABLET ORAL EVERY 4 HOURS PRN
Qty: 180 TABLET | Refills: 0 | Status: SHIPPED | OUTPATIENT
Start: 2023-01-06

## 2023-01-06 NOTE — PROGRESS NOTES
[facility]  coding opportunities       Chart reviewed, no opportunity found:   [address]        Patients Insurance     Medicare Insurance: [employer]age

## 2023-01-12 ENCOUNTER — APPOINTMENT (OUTPATIENT)
Dept: LAB | Facility: CLINIC | Age: 78
End: 2023-01-12

## 2023-01-12 ENCOUNTER — OFFICE VISIT (OUTPATIENT)
Dept: FAMILY MEDICINE CLINIC | Facility: CLINIC | Age: 78
End: 2023-01-12

## 2023-01-12 VITALS
SYSTOLIC BLOOD PRESSURE: 116 MMHG | OXYGEN SATURATION: 99 % | DIASTOLIC BLOOD PRESSURE: 76 MMHG | BODY MASS INDEX: 31.32 KG/M2 | HEIGHT: 65 IN | HEART RATE: 85 BPM | RESPIRATION RATE: 16 BRPM | WEIGHT: 188 LBS

## 2023-01-12 DIAGNOSIS — N18.31 STAGE 3A CHRONIC KIDNEY DISEASE (HCC): ICD-10-CM

## 2023-01-12 DIAGNOSIS — I48.19 ATRIAL FIBRILLATION, PERSISTENT (HCC): ICD-10-CM

## 2023-01-12 DIAGNOSIS — I71.03 AORTIC DISSECTION, THORACOABDOMINAL (HCC): Primary | ICD-10-CM

## 2023-01-12 DIAGNOSIS — I48.19 PERSISTENT ATRIAL FIBRILLATION (HCC): ICD-10-CM

## 2023-01-12 LAB
ANION GAP SERPL CALCULATED.3IONS-SCNC: 7 MMOL/L (ref 4–13)
BUN SERPL-MCNC: 18 MG/DL (ref 5–25)
CALCIUM SERPL-MCNC: 9.9 MG/DL (ref 8.3–10.1)
CHLORIDE SERPL-SCNC: 108 MMOL/L (ref 96–108)
CO2 SERPL-SCNC: 22 MMOL/L (ref 21–32)
CREAT SERPL-MCNC: 1.63 MG/DL (ref 0.6–1.3)
GFR SERPL CREATININE-BSD FRML MDRD: 40 ML/MIN/1.73SQ M
GLUCOSE SERPL-MCNC: 88 MG/DL (ref 65–140)
POTASSIUM SERPL-SCNC: 3.9 MMOL/L (ref 3.5–5.3)
SODIUM SERPL-SCNC: 137 MMOL/L (ref 135–147)

## 2023-01-12 NOTE — PROGRESS NOTES
Assessment/Plan:    Get the CT scan done   Follow up with CTS   Recheck the BMP   And call cardio for followup   No pain today   And cont with uro and neprho         1  Aortic dissection, thoracoabdominal (HCC)    2  Persistent atrial fibrillation (HCC)    3  Stage 3a chronic kidney disease ([facility] [address] )  -     Basic metabolic panel; Future    4  Atrial fibrillation, persistent (HCC)           Subjective:      Patient ID: Louie Quinteros is a 78 y o  male  HPI     Was on the comp and felt a knife like pain to the back   Then went to the front door and the left hip gave out   "EMS put it back in" he says      Here for tcm   Aortic dissection  toracoabdominal type B - CT 2/2 2/9 CTS     BP control and HR control   Goal < 120   Goal < 70     afib was on cardizem drip  CKD seeing nephro cr 2 00 1/4     Hx of nephrolithiasis - follows with anterio     The following portions of the patient's history were reviewed and updated as appropriate: allergies, current medications, past family history, past medical history, past social history, past surgical history and problem list     Review of Systems   Constitutional: Negative for activity change, appetite change and fever  HENT: Negative for congestion, nosebleeds and trouble swallowing  Eyes: Negative for itching  Respiratory: Negative for cough and chest tightness  Cardiovascular: Negative for chest pain and palpitations  Gastrointestinal: Negative for abdominal pain, constipation, diarrhea and nausea  Endocrine: Negative for cold intolerance  Genitourinary: Negative for frequency  Musculoskeletal: Negative for arthralgias, gait problem and joint swelling  Skin: Negative for rash  Allergic/Immunologic: Negative for immunocompromised state  Neurological: Negative for dizziness, tremors, seizures, syncope and headaches  Psychiatric/Behavioral: Negative for hallucinations and suicidal ideas           Objective:      /76 (BP Location: Right arm, Patient Position: Sitting, Cuff Size: Standard)   Pulse 85   Resp 16   Ht 5' 5" (1 651 m)   Wt 85 3 kg (188 lb)   SpO2 99%   BMI 31 28 kg/m²     Appointment on 01/12/2023   Component Date Value   • Sodium 01/12/2023 137    • Potassium 01/12/2023 3 9    • Chloride 01/12/2023 108    • CO2 01/12/2023 22    • ANION GAP 01/12/2023 7    • BUN 01/12/2023 18    • Creatinine 01/12/2023 1 63 (H)    • Glucose 01/12/2023 88    • Calcium 01/12/2023 9 9    • eGFR 01/12/2023 40    Admission on 12/30/2022, Discharged on 01/04/2023   Component Date Value   • ABO Grouping 12/30/2022 AB    • Rh Factor 12/30/2022 Positive    • Antibody Screen 12/30/2022 Negative    • Specimen Expiration Date 12/30/2022 [id number]    • Protime 12/30/2022 15 0 (H)    • INR 12/30/2022 1 16    • PTT 12/30/2022 30    • ABO Grouping 12/30/2022 AB    • Rh Factor 12/30/2022 Positive    • A4C EF 12/31/2022 48    • LVIDd 12/31/2022 4 60    • LVIDS 12/31/2022 3 60    • IVSd 12/31/2022 1 80    • LVPWd 12/31/2022 1 60    • FS 12/31/2022 22    • MV E' Tissue Velocity Se* 12/31/2022 8    • E wave deceleration time 12/31/2022 122    • MV Peak E Arslan 12/31/2022 126    • AV LVOT peak gradient 12/31/2022 2    • LVOT peak VTI 12/31/2022 12 54    • LVOT peak arslan 12/31/2022 0 77    • RVID d 12/31/2022 4 6    • LA size 12/31/2022 4 8    • LA length (A2C) 12/31/2022 6 60    • RAA A4C 12/31/2022 17 1    • Aortic valve peak veloci* 12/31/2022 1 15    • Ao VTI 12/31/2022 20 12    • AV mean gradient 12/31/2022 4    • LVOT mn grad 12/31/2022 1 0    • AV peak gradient 12/31/2022 5    • AV Deceleration Time 12/31/2022 1,703    • AV regurgitation pressur* 12/31/2022 494    • MV stenosis pressure 1/2* 12/31/2022 35    • MV valve area p 1/2 meth* 12/31/2022 6 29    • Radius 12/31/2022 0 4    • Mr max arslan 12/31/2022 0 36    • Ao root 12/31/2022 3 90    • Asc Ao 12/31/2022 3 6    • AV peak gradient 12/31/2022 44    • Pulmonary regurgitation * 12/31/2022 0 02    • Aortic valve mean veloci* 12/31/2022 9 30    • Tricuspid valve peak reg* 12/31/2022 2 46    • Left ventricular stroke * 12/31/2022 44 00    • IVS 12/31/2022 1 8    • LEFT VENTRICLE SYSTOLIC * 56/82/2164 53    • LV DIASTOLIC VOLUME (MOD* 66/50/3569 98    • Left Atrium Area-systoli* 12/31/2022 19 2    • Left Atrium Area-systoli* 12/31/2022 26 2    • LVSV, 2D 12/31/2022 44    • Dimensionless velociy in* 12/31/2022 0 67    • LV EF 12/31/2022 55    • ABO Grouping 12/30/2022 AB    • Rh Factor 12/30/2022 Positive    • WBC 12/31/2022 9 45    • RBC 12/31/2022 4 35    • Hemoglobin 12/31/2022 13 0    • Hematocrit 12/31/2022 41 2    • MCV 12/31/2022 95    • MCH 12/31/2022 29 9    • MCHC 12/31/2022 31 6    • RDW 12/31/2022 16 7 (H)    • Platelets 02/74/5289 172    • MPV 12/31/2022 10 7    • Phosphorus 12/31/2022 2 9    • Magnesium 12/31/2022 2 2    • Sodium 12/31/2022 138    • Potassium 12/31/2022 4 2    • Chloride 12/31/2022 109 (H)    • CO2 12/31/2022 25    • ANION GAP 12/31/2022 4    • BUN 12/31/2022 23    • Creatinine 12/31/2022 1 54 (H)    • Glucose 12/31/2022 117    • Calcium 12/31/2022 9 4    • Corrected Calcium 12/31/2022 10 1    • AST 12/31/2022 32    • ALT 12/31/2022 22    • Alkaline Phosphatase 12/31/2022 85    • Total Protein 12/31/2022 6 5    • Albumin 12/31/2022 3 1 (L)    • Total Bilirubin 12/31/2022 1 45 (H)    • eGFR 12/31/2022 43    • Sodium 01/01/2023 144    • Potassium 01/01/2023 4 1    • Chloride 01/01/2023 115 (H)    • CO2 01/01/2023 23    • ANION GAP 01/01/2023 6    • BUN 01/01/2023 26 (H)    • Creatinine 01/01/2023 1 57 (H)    • Glucose 01/01/2023 140    • Calcium 01/01/2023 9 5    • eGFR 01/01/2023 42    • WBC 01/01/2023 10 89 (H)    • RBC 01/01/2023 4 53    • Hemoglobin 01/01/2023 13 3    • Hematocrit 01/01/2023 41 9    • MCV 01/01/2023 93    • MCH 01/01/2023 29 4    • MCHC 01/01/2023 31 7    • RDW 01/01/2023 16 5 (H)    • Platelets 87/84/0555 157    • MPV 01/01/2023 11 0    • Ventricular Rate 01/01/2023 73    • Atrial Rate 01/01/2023 227    • QRSD Interval 01/01/2023 80    • QT Interval 01/01/2023 444    • QTC Interval 01/01/2023 489    • QRS Axis 01/01/2023 61    • T Wave Axis 01/01/2023 113    • WBC 01/02/2023 5 45    • RBC 01/02/2023 4 38    • Hemoglobin 01/02/2023 13 2    • Hematocrit 01/02/2023 40 1    • MCV 01/02/2023 92    • MCH 01/02/2023 30 1    • MCHC 01/02/2023 32 9    • RDW 01/02/2023 16 5 (H)    • Platelets 38/17/9369 177    • MPV 01/02/2023 11 5    • Sodium 01/02/2023 141    • Potassium 01/02/2023 3 6    • Chloride 01/02/2023 112 (H)    • CO2 01/02/2023 22    • ANION GAP 01/02/2023 7    • BUN 01/02/2023 26 (H)    • Creatinine 01/02/2023 1 61 (H)    • Glucose 01/02/2023 125    • Calcium 01/02/2023 9 8    • eGFR 01/02/2023 41    • Sodium 01/03/2023 141    • Potassium 01/03/2023 3 8    • Chloride 01/03/2023 110 (H)    • CO2 01/03/2023 24    • ANION GAP 01/03/2023 7    • BUN 01/03/2023 31 (H)    • Creatinine 01/03/2023 2 07 (H)    • Glucose 01/03/2023 121    • Calcium 01/03/2023 10 0    • eGFR 01/03/2023 30    • Sodium 01/04/2023 138    • Potassium 01/04/2023 3 4 (L)    • Chloride 01/04/2023 108    • CO2 01/04/2023 25    • ANION GAP 01/04/2023 5    • BUN 01/04/2023 34 (H)    • Creatinine 01/04/2023 2 00 (H)    • Glucose 01/04/2023 106    • Calcium 01/04/2023 9 5    • eGFR 01/04/2023 31    Admission on 12/30/2022, Discharged on 12/30/2022   Component Date Value   • WBC 12/30/2022 11 20 (H)    • RBC 12/30/2022 4 54    • Hemoglobin 12/30/2022 13 4    • Hematocrit 12/30/2022 41 8    • MCV 12/30/2022 92    • MCH 12/30/2022 29 5    • MCHC 12/30/2022 32 1    • RDW 12/30/2022 16 3 (H)    • MPV 12/30/2022 10 8    • Platelets 10/14/6723 190    • nRBC 12/30/2022 0    • Neutrophils Relative 12/30/2022 81 (H)    • Immat GRANS % 12/30/2022 1    • Lymphocytes Relative 12/30/2022 11 (L)    • Monocytes Relative 12/30/2022 7    • Eosinophils Relative 12/30/2022 0    • Basophils Relative 12/30/2022 0    • Neutrophils Absolute 12/30/2022 8 97 (H)    • Immature Grans Absolute 12/30/2022 0 11    • Lymphocytes Absolute 12/30/2022 1 22    • Monocytes Absolute 12/30/2022 0 83    • Eosinophils Absolute 12/30/2022 0 02    • Basophils Absolute 12/30/2022 0 05    • Sodium 12/30/2022 144    • Potassium 12/30/2022 4 4    • Chloride 12/30/2022 110 (H)    • CO2 12/30/2022 24    • ANION GAP 12/30/2022 10    • BUN 12/30/2022 29 (H)    • Creatinine 12/30/2022 1 62 (H)    • Glucose 12/30/2022 96    • Calcium 12/30/2022 9 6    • AST 12/30/2022 21    • ALT 12/30/2022 16    • Alkaline Phosphatase 12/30/2022 76    • Total Protein 12/30/2022 6 8    • Albumin 12/30/2022 3 8    • Total Bilirubin 12/30/2022 0 85    • eGFR 12/30/2022 40    • Lipase 12/30/2022 24    • hs TnI 0hr 12/30/2022 13    • Ventricular Rate 12/30/2022 87    • Atrial Rate 12/30/2022 82    • QRSD Interval 12/30/2022 78    • QT Interval 12/30/2022 396    • QTC Interval 12/30/2022 476    • QRS Axis 12/30/2022 50    • T Wave Axis 12/30/2022 52    • Ventricular Rate 12/30/2022 95    • Atrial Rate 12/30/2022 357    • QRSD Interval 12/30/2022 78    • QT Interval 12/30/2022 414    • QTC Interval 12/30/2022 520    • QRS Axis 12/30/2022 55    • T Wave Axis 12/30/2022 17           Physical Exam  Vitals and nursing note reviewed  Constitutional:       General: He is not in acute distress  Appearance: He is well-developed  He is obese  HENT:      Head: Normocephalic and atraumatic  Cardiovascular:      Rate and Rhythm: Normal rate and regular rhythm  Heart sounds: Normal heart sounds  No murmur heard  Pulmonary:      Effort: Pulmonary effort is normal       Breath sounds: Normal breath sounds  No wheezing or rales  Abdominal:      General: Bowel sounds are normal  There is no distension  Palpations: Abdomen is soft  Tenderness: There is no abdominal tenderness  There is no guarding or rebound  Musculoskeletal:         General: No tenderness  Normal range of motion        Cervical back: Normal range of motion and neck supple  Lymphadenopathy:      Cervical: No cervical adenopathy  Skin:     General: Skin is warm and dry  Capillary Refill: Capillary refill takes less than 2 seconds  Findings: No rash  Neurological:      Mental Status: He is alert and oriented to person, place, and time  Cranial Nerves: No cranial nerve deficit  Sensory: No sensory deficit  Motor: No abnormal muscle tone  Psychiatric:         Behavior: Behavior normal          Thought Content:  Thought content normal          Judgment: Judgment normal              Danette Sultana MD  [place] [id number]

## 2023-01-13 ENCOUNTER — TELEPHONE (OUTPATIENT)
Dept: FAMILY MEDICINE CLINIC | Facility: CLINIC | Age: 78
End: 2023-01-13

## 2023-01-13 NOTE — TELEPHONE ENCOUNTER
----- Message from Mini Wallace MD sent at 1/12/2023  1:50 PM EST -----  Kidneys have come back down since being home     All good things

## 2023-02-03 DIAGNOSIS — I10 ESSENTIAL HYPERTENSION: Primary | ICD-10-CM

## 2023-02-03 RX ORDER — VALSARTAN 160 MG/1
160 TABLET ORAL DAILY
Qty: 30 TABLET | Refills: 1 | Status: SHIPPED | OUTPATIENT
Start: 2023-02-03

## 2023-02-07 ENCOUNTER — APPOINTMENT (OUTPATIENT)
Dept: LAB | Facility: CLINIC | Age: 78
End: 2023-02-07

## 2023-02-07 DIAGNOSIS — I10 ESSENTIAL HYPERTENSION: ICD-10-CM

## 2023-02-07 LAB
ANION GAP SERPL CALCULATED.3IONS-SCNC: 9 MMOL/L (ref 4–13)
BUN SERPL-MCNC: 24 MG/DL (ref 5–25)
CALCIUM SERPL-MCNC: 10.7 MG/DL (ref 8.3–10.1)
CHLORIDE SERPL-SCNC: 108 MMOL/L (ref 96–108)
CO2 SERPL-SCNC: 23 MMOL/L (ref 21–32)
CREAT SERPL-MCNC: 1.55 MG/DL (ref 0.6–1.3)
GFR SERPL CREATININE-BSD FRML MDRD: 43 ML/MIN/1.73SQ M
GLUCOSE P FAST SERPL-MCNC: 132 MG/DL (ref 65–99)
POTASSIUM SERPL-SCNC: 3.5 MMOL/L (ref 3.5–5.3)
SODIUM SERPL-SCNC: 140 MMOL/L (ref 135–147)

## 2023-02-08 ENCOUNTER — TELEPHONE (OUTPATIENT)
Dept: FAMILY MEDICINE CLINIC | Facility: CLINIC | Age: 78
End: 2023-02-08

## 2023-02-08 NOTE — TELEPHONE ENCOUNTER
Patient called and stated he has been short of breath after the simplest of tasks, like walking to the mailbox  His BP has also been high, 147 to 137/100  He would also like to review the results of his labs

## 2023-02-09 NOTE — TELEPHONE ENCOUNTER
You need to take 2 of the new blood pressure pill called valsartan  And I would like you to see a cardiologist  As your blood pressure is pretty elevated even though you are on multiple agents the blood test showed that the kidney function was pretty normal the potassium was on the slightly lower side reading a banana each day will help

## 2023-02-14 ENCOUNTER — TELEPHONE (OUTPATIENT)
Dept: CARDIAC SURGERY | Facility: CLINIC | Age: 78
End: 2023-02-14

## 2023-02-14 ENCOUNTER — HOSPITAL ENCOUNTER (OUTPATIENT)
Dept: CT IMAGING | Facility: HOSPITAL | Age: 78
Discharge: HOME | End: 2023-02-14

## 2023-02-14 DIAGNOSIS — I71.21 ANEURYSM OF ASCENDING AORTA WITHOUT RUPTURE: ICD-10-CM

## 2023-02-14 DIAGNOSIS — I71.03 AORTIC DISSECTION, THORACOABDOMINAL (HCC): ICD-10-CM

## 2023-02-14 RX ADMIN — IOHEXOL 100 ML: 350 INJECTION, SOLUTION INTRAVENOUS at 08:12

## 2023-02-14 NOTE — TELEPHONE ENCOUNTER
Received results of repeat CTA chest, abdomen, pelvis indicating significant changes from prior scan in December  Called patient today as he requires an appointment ASAP with surgeon  He denies acute shortness of breath, chest or back pain, claudication  Offered patient an appointment for Thursday morning, which after much deliberation, he decided to accept  He will need a Lyft for transportation to the appointment  Discussed with Dr Kevin Peres and Sarmad Rosenberg, surgical coordinator

## 2023-02-16 ENCOUNTER — OFFICE VISIT (OUTPATIENT)
Dept: CARDIAC SURGERY | Facility: CLINIC | Age: 78
End: 2023-02-16

## 2023-02-16 VITALS
BODY MASS INDEX: 31.31 KG/M2 | HEIGHT: 65 IN | DIASTOLIC BLOOD PRESSURE: 105 MMHG | SYSTOLIC BLOOD PRESSURE: 148 MMHG | WEIGHT: 187.9 LBS | OXYGEN SATURATION: 100 % | HEART RATE: 120 BPM

## 2023-02-16 DIAGNOSIS — I71.03 AORTIC DISSECTION, THORACOABDOMINAL (HCC): Primary | ICD-10-CM

## 2023-02-16 DIAGNOSIS — I71.21 ANEURYSM OF ASCENDING AORTA WITHOUT RUPTURE: Primary | ICD-10-CM

## 2023-02-16 DIAGNOSIS — I71.03 AORTIC DISSECTION, THORACOABDOMINAL (HCC): ICD-10-CM

## 2023-02-16 RX ORDER — SENNOSIDES 8.6 MG
650 CAPSULE ORAL EVERY 8 HOURS PRN
COMMUNITY

## 2023-02-16 NOTE — LETTER
February 16, 2023     Austin Vidal MD  [address] [facility] [address]    Patient: Jorje Singh   YOB: 1945   Date of Visit: 2/16/2023       Dear Dr Amena Hoyt:    Thank you for referring Bakari Gates to me for evaluation  Below are my notes for this consultation  If you have questions, please do not hesitate to call me  I look forward to following your patient along with you  Sincerely,        Felicity Raman MD        CC: MD Evonne Park DO Denis Holts, PA-C  2/16/2023 10:33 AM  Attested  Aortic Surveillance - Cardiothoracic Surgery   Jorje Singh 78 y o  male MRN: [id number]    History of Present Illness: Jorje Singh is a 78y o  year old male who presents today for initial outpatient surveillance of previously identified descending aortic dissection  During interview today, Jorje Singh is somewhat confused on my questioning but easily redirected  He presents with his daughter who is also able to provide details of his historical medical history  Upon specific questioning he admits to mild continuance of his back pain, however this is significantly improved from his previous admission symptoms  He denies any postprandial abdominal pain  He denies any claudication bilaterally  He denies any lightheadedness, dizziness, or syncope  He is compliant with his hypertensive medications  His Losartan was recently increased by his primary care physician  He checks his blood pressure daily and maintains a log  He shares these findings with his primary care doctor      Past Medical History:  Past Medical History:   Diagnosis Date   • Arthritis    • H/O echocardiogram 12/2018 9/2018   • History of EKG 2019    numerous tests between 9/6/2018 through 2/7/2019   • History of exercise stress test 10/2018   • History of Holter monitoring 09/2018   • Hyperlipidemia    • Hypertension    • Irregular heart beat    • Kidney stone          Past Surgical History:   Past Surgical History:   Procedure Laterality Date   • CARDIAC ELECTROPHYSIOLOGY STUDY AND ABLATION  2019   • CARDIOVERSION  2019   • FL RETROGRADE PYELOGRAM  2022   • FL RETROGRADE PYELOGRAM  2022   • HERNIA REPAIR     • IR JOINT INJECTION  2019   • LAPAROSCOPIC ASSISSTED TOTAL COLECTOMY W/ J-POUCH     • VA CYSTO/URETERO W/LITHOTRIPSY &INDWELL STENT INSRT N/A 2022    Procedure: BILATERAL RETROGRADE PYELOGRAM, CYSTOSCOPY,  LEFT URETEROSCOPY, LEFT LASER LITHOTRIPSY BASKET EXTRACTION, LEFT STENT;  Surgeon: Henrry Nunes MD;  Location: [address] [place] [address];  Service: Urology   • VA CYSTO/URETERO W/LITHOTRIPSY &INDWELL STENT INSRT Left 2022    Procedure: CYSTOSCOPY, URETEROSCOPY STENT REMOVAL, LASER LITHOTRIPSY, WITH BASKET, INSERTION OF URETERAL STENT;  Surgeon: Henrry Nunes MD;  Location: [facility];  Service: Urology   • THORACOTOMY Left    • VASECTOMY           Family History:  Family History   Problem Relation Age of Onset   • Arthritis Father          Social History:    Social History     Substance and Sexual Activity   Alcohol Use Yes    Comment: Occas  Social History     Substance and Sexual Activity   Drug Use Never     Social History     Tobacco Use   Smoking Status Former   • Packs/day: 1 50   • Years: 15 00   • Pack years: 22 50   • Types: Cigarettes   • Quit date: 12   • Years since quittin 1   Smokeless Tobacco Never       Home Medications:   Prior to Admission medications    Medication Sig Start Date End Date Taking?  Authorizing Provider   allopurinol (ZYLOPRIM) 100 mg tablet Take 1 tablet (100 mg total) by mouth daily 22   Jaun Shelton MD   amLODIPine (NORVASC) 10 mg tablet Take 1 tablet (10 mg total) by mouth every evening 23   Ligia Lazcano PA-C   aspirin (ECOTRIN LOW STRENGTH) 81 mg EC tablet Take 1 tablet (81 mg total) by mouth daily 1/4/23 2/3/23  Ligia Lazcano PA-C   atorvastatin (LIPITOR) 20 mg tablet TAKE 1 TABLET DAILY 10/24/22   Yohana Mcknight MD   B Complex-C (B COMPLEX-VITAMIN C PO) Take 1 tablet by mouth daily    Historical Provider, MD   Cholecalciferol (VITAMIN D3) 50 MCG (2000 UT) capsule Take 1 capsule by mouth    Historical Provider, MD   diltiazem (CARDIZEM CD) 120 mg 24 hr capsule Take 1 capsule (120 mg total) by mouth daily 1/4/23 2/3/23  Ligia Lazcano PA-C   Eliquis 5 MG TAKE 1 TABLET TWICE A DAY  Patient taking differently: Last dose is 8/28/22- will check with cardiologist to be sure 8/15/22   Yohana Mcknight MD   INCRUSE ELLIPTA 62 5 MCG/INH AEPB inhaler 1 puff every evening   5/5/20   Historical Provider, MD   levothyroxine 75 mcg tablet TAKE 1 TABLET DAILY 9/13/22   Yohana Mcknight MD   metoprolol succinate (TOPROL-XL) 100 mg 24 hr tablet 100 mg 2 (two) times a day 2/16/21   Historical Provider, MD   oxyCODONE-acetaminophen (PERCOCET) 5-325 mg per tablet Take 1 tablet by mouth every 4 (four) hours as needed for moderate pain or severe pain Max Daily Amount: 6 tablets 1/6/23   Yohana Mcknight MD   valsartan (DIOVAN) 160 mg tablet Take 1 tablet (160 mg total) by mouth daily 2/3/23   Yohana Mcknight MD       Allergies:  No Known Allergies    Review of Systems:     Review of Systems   Constitutional: Positive for fatigue  HENT: Negative  Eyes: Negative  Respiratory: Negative for chest tightness and shortness of breath  Cardiovascular: Negative for chest pain and leg swelling  Endocrine: Negative  Genitourinary: Negative  Musculoskeletal: Negative  Skin: Negative  Neurological: Negative  Psychiatric/Behavioral: Positive for confusion         Vital Signs:     Vitals:    02/16/23 0951 02/16/23 0952   BP: 140/96 (!) 148/105   BP Location: Right arm Left arm   Patient Position: Sitting Sitting   Cuff Size: Standard Standard   Pulse: (!) 120    SpO2: 100%    Weight: 85 2 kg (187 lb 14 4 oz)    Height: 5' 5" (1 651 m)        Physical Exam:    Physical Exam  Constitutional:       Appearance: Normal appearance  He is well-developed  HENT:      Head: Normocephalic and atraumatic  Eyes:      Conjunctiva/sclera: Conjunctivae normal    Neck:      Thyroid: No thyromegaly  Vascular: No carotid bruit or JVD  Trachea: No tracheal deviation  Cardiovascular:      Rate and Rhythm: Tachycardia present  Rhythm irregular  Pulses:           Carotid pulses are 2+ on the right side and 2+ on the left side  Dorsalis pedis pulses are 2+ on the right side and 2+ on the left side  Posterior tibial pulses are 2+ on the right side and 2+ on the left side  Heart sounds: S1 normal and S2 normal    Pulmonary:      Effort: No accessory muscle usage or respiratory distress  Breath sounds: No wheezing or rales  Comments: Well-healed thoracotomy incision overlying the left chest at approximately the fifth intercostal space  Chest:      Chest wall: No tenderness  Abdominal:      General: Bowel sounds are normal       Palpations: Abdomen is soft  Tenderness: There is no abdominal tenderness  Musculoskeletal:         General: Normal range of motion  Cervical back: Full passive range of motion without pain and normal range of motion  Skin:     General: Skin is warm and dry  Neurological:      Mental Status: He is alert and oriented to person, place, and time  Cranial Nerves: No cranial nerve deficit  Sensory: No sensory deficit  Psychiatric:         Speech: Speech normal          Behavior: Behavior normal          Lab Results:               Invalid input(s): LABGLOM      Lab Results   Component Value Date    HGBA1C 5 6 05/17/2022     No results found for: CKTOTAL, CKMB, CKMBINDEX, TROPONINI    Imaging Studies:     CT Chest:     1  Stable fusiform ascending aortic aneurysm measuring 4 5 cm       2  The mid descending thoracic aortic true lumen is severely narrowed, which has worsened from prior   Currently the true lumen minimum diameter measures only 5 mm, previously it was up to 1 cm  See key axial image series 501 image 353  This occurs in a   region of apex left angulation of the aorta      3  The aorta at the level of the diaphragm is increased in size compared to prior, currently measuring 4 3 cm, previously 3 9 cm      4  The overall configuration of the type B dissection is unchanged      5  Interval increase in size of bilateral common iliac arteries, currently measuring 2 cm each, previously 1 7 cm      6  6 mm hypodense lesion within the mid body of the pancreas that is more conspicuous on the current exam compared to prior  Echocardiogram: EF 55%  Mild aortic valve regurgitation  Moderate mitral regurgitation  I have personally reviewed pertinent reports     and I have personally reviewed pertinent films in PACS    Assessment:  Patient Active Problem List    Diagnosis Date Noted   • Atrial fibrillation ([facility] ) 01/04/2023   • Aortic dissection, thoracoabdominal ([facility] ) 12/30/2022   • Continuous opioid dependence ([facility] ) 02/10/2022   • Chronic pain syndrome 07/05/2021   • Ulcerative pancolitis without complication ([facility] ) 14/44/3540   • Thoracic aortic aneurysm without rupture 03/06/2021   • Benign prostatic hyperplasia without lower urinary tract symptoms 03/06/2021   • Severe obstructive sleep apnea 12/03/2020   • Chronic right shoulder pain 06/18/2020   • Chronic left shoulder pain 06/18/2020   • Left hip pain 06/18/2020   • COPD (chronic obstructive pulmonary disease) ([facility] ) 06/18/2020   • Hoarse voice quality 06/18/2020   • Chronic lumbar pain 06/18/2020   • Stage 3a chronic kidney disease ([facility] ) 06/18/2020   • Hyperlipidemia 06/18/2020   • Hyperparathyroidism ([facility] ) 03/17/2020   • Hypertension 03/17/2020   • Impaired fasting glucose 03/27/2014   • Hypertensive heart disease without heart failure 10/25/2013   • Hypothyroidism 10/25/2013   • Osteoarthrosis involving multiple sites 08/14/2013     Descending thoracic aortic dissection    Plan:     CT chest/abdomen/pelvis with contrast performed prior to the visit today was reviewed  Findings of increased abdominal aortic dimensions as well as increased bilateral iliac artery dimensions were confirmed and shared with the patient  Also noted was decreased size in the descending thoracic aortic true lumen  These findings were shared with the patient during his consultation today  Risk and benefits of thoracoabdominal surgical intervention with arch to branching and aorta replacement were discussed in detail with the patient and his daughter  In light of his advanced age, previous thoracotomy surgery, and baseline medical issues, this would be accompanied with significant surgical risk  We will refer for outpatient vascular surgery consultation for endovascular intervention considerations  Lia Navarro was comfortable with our recommendations, and their questions were answered to their satisfaction  Thank you for allowing us to participate in the care of this patient  Aortic Aneurysm Instructions were provided to the patient as follows:    1  No lifting more than 50 pounds  2  Maintain a controlled blood pressure with a goal of 120/80  3  Follow up in Aortic Clinic as recommended with radiology follow up as instructed  4  Report to the ER or call 911 immediately with the following signs / symptoms: sudden onset of back pain, chest pain or shortness of breath  SIGNATURE: Humble Pretty PA-C  DATE: February 16, 2023  TIME: 10:17 AM  Attestation signed by Jess Chavez MD at 2/16/2023 12:49 PM:  Patient seen and evaluated with [place] / EDWINA  I agree with the above assessment and plan with the following additions  The patient is a 74-year-old man with history of type B dissection on 12/30/2022, I evaluated him at the time and recommended medical management  He returns today for a routine follow-up  He is currently asymptomatic  He denies claudication, he denies postprandial pain    He has significant history for a left thoracotomy for complications of a pneumothorax and a total colectomy for ulcerative colitis, he is on anticoagulation for A  Fib  On physical exam there are no signs of malperfusion  I have personally review his most recent diagnostic images that include a CTA of the chest abdomen and pelvis on 12/14/2023 that shows no changes on the ascending aorta, there is no change in the configuration of the type B dissection, the false lumen in the proximal descending thoracic aorta has thrombosed and has caused a near complete occlusion of the true lumen  There are multiple collaterals in the upper chest   There is enlargement of the DTA proximal to the area of near occlusion  I explained the findings to the patient and his daughter  We spoke about different options at this point  Option #1 would be to continue observation with serial imaging, he is asymptomatic but if he were to have a complete aortic occlusion he could develop malperfusion which could lead to death  Option #2 would be to perform a TEVAR, unfortunately I do not think this is a good option as it would not be possible to expand the graft which could worsen the obstruction, if we were to try to balloon a TEVAR graft I think he would be a very high risk of aortic rupture  Option #3 would be to perform an arch replacement with an elephant trunk to be followed by a thoracoabdominal, I think this would be a prohibitively risky operation in this patient based on his age, comorbidities, history of a left thoracotomy and history of laparotomy with total colectomy and J-pouch  Option #4 would be to perform an axillofemoral bypass to deal with near total aortic occlusion, I am not certain about the indication of the procedure at this point but I will refer him to vascular surgery for their opinion    The patient and his daughter understood the diagnosis and the different options, at this point I will refer him to vascular surgery, I will also schedule follow-up in 6 months with a CTA of the chest abdomen and pelvis  This note was completed in part utilizing [employer] fluency direct voice recognition software  Grammatical errors, random word insertion, spelling mistakes, and incomplete sentences may be an occasional consequence of the system secondary to software limitations, ambient noise and hardware issues  At the time of dictation, efforts were made to edit, clarify and /or correct errors  Please read the chart carefully and recognize, using context, where substitutions have occurred  If you have any questions or concerns about the context, text or information contained within the body of this dictation, please contact myself, the provider, for further clarification

## 2023-02-16 NOTE — PROGRESS NOTES
Aortic Surveillance - Cardiothoracic Surgery   Josué Lomeli 78 y o  male MRN: [id number]    History of Present Illness: Josué Lomeli is a 78y o  year old male who presents today for initial outpatient surveillance of previously identified descending aortic dissection  During interview today, Josué Lomeli is somewhat confused on my questioning but easily redirected  He presents with his daughter who is also able to provide details of his historical medical history  Upon specific questioning he admits to mild continuance of his back pain, however this is significantly improved from his previous admission symptoms  He denies any postprandial abdominal pain  He denies any claudication bilaterally  He denies any lightheadedness, dizziness, or syncope  He is compliant with his hypertensive medications  His Losartan was recently increased by his primary care physician  He checks his blood pressure daily and maintains a log  He shares these findings with his primary care doctor      Past Medical History:  Past Medical History:   Diagnosis Date   • Arthritis    • H/O echocardiogram 12/2018 9/2018   • History of EKG 2019    numerous tests between 9/6/2018 through 2/7/2019   • History of exercise stress test 10/2018   • History of Holter monitoring 09/2018   • Hyperlipidemia    • Hypertension    • Irregular heart beat    • Kidney stone          Past Surgical History:   Past Surgical History:   Procedure Laterality Date   • CARDIAC ELECTROPHYSIOLOGY STUDY AND ABLATION  01/2019   • CARDIOVERSION  02/2019   • FL RETROGRADE PYELOGRAM  05/12/2022   • FL RETROGRADE PYELOGRAM  09/01/2022   • HERNIA REPAIR     • IR JOINT INJECTION  09/30/2019   • LAPAROSCOPIC ASSISSTED TOTAL COLECTOMY W/ J-POUCH     • WI CYSTO/URETERO W/LITHOTRIPSY &INDWELL STENT INSRT N/A 05/12/2022    Procedure: BILATERAL RETROGRADE PYELOGRAM, CYSTOSCOPY,  LEFT URETEROSCOPY, LEFT LASER LITHOTRIPSY BASKET EXTRACTION, LEFT STENT;  Surgeon: Syed Alvarez MD;  Location: [facility];  Service: Urology   • MN CYSTO/URETERO W/LITHOTRIPSY &INDWELL STENT INSRT Left 2022    Procedure: CYSTOSCOPY, URETEROSCOPY STENT REMOVAL, LASER LITHOTRIPSY, WITH BASKET, INSERTION OF URETERAL STENT;  Surgeon: Syed Alvarez MD;  Location: [facility];  Service: Urology   • THORACOTOMY Left    • VASECTOMY           Family History:  Family History   Problem Relation Age of Onset   • Arthritis Father          Social History:    Social History     Substance and Sexual Activity   Alcohol Use Yes    Comment: Kristian Meneses  Social History     Substance and Sexual Activity   Drug Use Never     Social History     Tobacco Use   Smoking Status Former   • Packs/day: 1 50   • Years: 15 00   • Pack years: 22 50   • Types: Cigarettes   • Quit date: 12   • Years since quittin 1   Smokeless Tobacco Never       Home Medications:   Prior to Admission medications    Medication Sig Start Date End Date Taking?  Authorizing Provider   allopurinol (ZYLOPRIM) 100 mg tablet Take 1 tablet (100 mg total) by mouth daily 22   Franky Jeans, MD   amLODIPine (NORVASC) 10 mg tablet Take 1 tablet (10 mg total) by mouth every evening 23   Ligia E Held, PA-C   aspirin (ECOTRIN LOW STRENGTH) 81 mg EC tablet Take 1 tablet (81 mg total) by mouth daily 1/4/23 2/3/23  Ligia E Held, PA-C   atorvastatin (LIPITOR) 20 mg tablet TAKE 1 TABLET DAILY 10/24/22   Franky Jeans, MD   B Complex-C (B COMPLEX-VITAMIN C PO) Take 1 tablet by mouth daily    Historical Provider, MD   Cholecalciferol (VITAMIN D3) 50 MCG ( UT) capsule Take 1 capsule by mouth    Historical Provider, MD   diltiazem (CARDIZEM CD) 120 mg 24 hr capsule Take 1 capsule (120 mg total) by mouth daily 1/4/23 2/3/23  Ligia E Held, PA-C   Eliquis 5 MG TAKE 1 TABLET TWICE A DAY  Patient taking differently: Last dose is 22- will check with cardiologist to be sure 8/15/22   Franky Jeans, MD   INCRUSE ELLIPTA 62 5 MCG/INH AEPB inhaler 1 puff every evening   5/5/20   Historical Provider, MD   levothyroxine 75 mcg tablet TAKE 1 TABLET DAILY 9/13/22   Xin Bruce MD   metoprolol succinate (TOPROL-XL) 100 mg 24 hr tablet 100 mg 2 (two) times a day 2/16/21   Historical Provider, MD   oxyCODONE-acetaminophen (PERCOCET) 5-325 mg per tablet Take 1 tablet by mouth every 4 (four) hours as needed for moderate pain or severe pain Max Daily Amount: 6 tablets 1/6/23   Xin Bruce MD   valsartan (DIOVAN) 160 mg tablet Take 1 tablet (160 mg total) by mouth daily 2/3/23   Xin Bruce MD       Allergies:  No Known Allergies    Review of Systems:     Review of Systems   Constitutional: Positive for fatigue  HENT: Negative  Eyes: Negative  Respiratory: Negative for chest tightness and shortness of breath  Cardiovascular: Negative for chest pain and leg swelling  Endocrine: Negative  Genitourinary: Negative  Musculoskeletal: Negative  Skin: Negative  Neurological: Negative  Psychiatric/Behavioral: Positive for confusion  Vital Signs:     Vitals:    02/16/23 0951 02/16/23 0952   BP: 140/96 (!) 148/105   BP Location: Right arm Left arm   Patient Position: Sitting Sitting   Cuff Size: Standard Standard   Pulse: (!) 120    SpO2: 100%    Weight: 85 2 kg (187 lb 14 4 oz)    Height: 5' 5" (1 651 m)        Physical Exam:    Physical Exam  Constitutional:       Appearance: Normal appearance  He is well-developed  HENT:      Head: Normocephalic and atraumatic  Eyes:      Conjunctiva/sclera: Conjunctivae normal    Neck:      Thyroid: No thyromegaly  Vascular: No carotid bruit or JVD  Trachea: No tracheal deviation  Cardiovascular:      Rate and Rhythm: Tachycardia present  Rhythm irregular  Pulses:           Carotid pulses are 2+ on the right side and 2+ on the left side  Dorsalis pedis pulses are 2+ on the right side and 2+ on the left side          Posterior tibial pulses are 2+ on the right side and 2+ on the left side  Heart sounds: S1 normal and S2 normal    Pulmonary:      Effort: No accessory muscle usage or respiratory distress  Breath sounds: No wheezing or rales  Comments: Well-healed thoracotomy incision overlying the left chest at approximately the fifth intercostal space  Chest:      Chest wall: No tenderness  Abdominal:      General: Bowel sounds are normal       Palpations: Abdomen is soft  Tenderness: There is no abdominal tenderness  Musculoskeletal:         General: Normal range of motion  Cervical back: Full passive range of motion without pain and normal range of motion  Skin:     General: Skin is warm and dry  Neurological:      Mental Status: He is alert and oriented to person, place, and time  Cranial Nerves: No cranial nerve deficit  Sensory: No sensory deficit  Psychiatric:         Speech: Speech normal          Behavior: Behavior normal          Lab Results:               Invalid input(s): LABGLOM      Lab Results   Component Value Date    HGBA1C 5 6 05/17/2022     No results found for: CKTOTAL, CKMB, CKMBINDEX, TROPONINI    Imaging Studies:     CT Chest:     1  Stable fusiform ascending aortic aneurysm measuring 4 5 cm       2  The mid descending thoracic aortic true lumen is severely narrowed, which has worsened from prior  Currently the true lumen minimum diameter measures only 5 mm, previously it was up to 1 cm  See key axial image series 501 image 353  This occurs in a   region of apex left angulation of the aorta      3  The aorta at the level of the diaphragm is increased in size compared to prior, currently measuring 4 3 cm, previously 3 9 cm      4  The overall configuration of the type B dissection is unchanged      5   Interval increase in size of bilateral common iliac arteries, currently measuring 2 cm each, previously 1 7 cm      6  6 mm hypodense lesion within the mid body of the pancreas that is more conspicuous on the current exam compared to prior  Echocardiogram: EF 55%  Mild aortic valve regurgitation  Moderate mitral regurgitation  I have personally reviewed pertinent reports  and I have personally reviewed pertinent films in PACS    Assessment:  Patient Active Problem List    Diagnosis Date Noted   • Atrial fibrillation ([facility] ) 01/04/2023   • Aortic dissection, thoracoabdominal ([facility] ) 12/30/2022   • Continuous opioid dependence ([facility] ) 02/10/2022   • Chronic pain syndrome 07/05/2021   • Ulcerative pancolitis without complication ([facility] ) 91/20/6971   • Thoracic aortic aneurysm without rupture 03/06/2021   • Benign prostatic hyperplasia without lower urinary tract symptoms 03/06/2021   • Severe obstructive sleep apnea 12/03/2020   • Chronic right shoulder pain 06/18/2020   • Chronic left shoulder pain 06/18/2020   • Left hip pain 06/18/2020   • COPD (chronic obstructive pulmonary disease) ([facility] ) 06/18/2020   • Hoarse voice quality 06/18/2020   • Chronic lumbar pain 06/18/2020   • Stage 3a chronic kidney disease ([facility] ) 06/18/2020   • Hyperlipidemia 06/18/2020   • Hyperparathyroidism ([facility]  ) 03/17/2020   • Hypertension 03/17/2020   • Impaired fasting glucose 03/27/2014   • Hypertensive heart disease without heart failure 10/25/2013   • Hypothyroidism 10/25/2013   • Osteoarthrosis involving multiple sites 08/14/2013     Descending thoracic aortic dissection    Plan:     CT chest/abdomen/pelvis with contrast performed prior to the visit today was reviewed  Findings of increased abdominal aortic dimensions as well as increased bilateral iliac artery dimensions were confirmed and shared with the patient  Also noted was decreased size in the descending thoracic aortic true lumen  These findings were shared with the patient during his consultation today  Risk and benefits of thoracoabdominal surgical intervention with arch to branching and aorta replacement were discussed in detail with the patient and his daughter    In light of his advanced age, previous thoracotomy surgery, and baseline medical issues, this would be accompanied with significant surgical risk  We will refer for outpatient vascular surgery consultation for endovascular intervention considerations  Alan Naranjo was comfortable with our recommendations, and their questions were answered to their satisfaction  Thank you for allowing us to participate in the care of this patient  Aortic Aneurysm Instructions were provided to the patient as follows:    1  No lifting more than 50 pounds  2  Maintain a controlled blood pressure with a goal of 120/80  3  Follow up in Aortic Clinic as recommended with radiology follow up as instructed  4  Report to the ER or call 911 immediately with the following signs / symptoms: sudden onset of back pain, chest pain or shortness of breath        SIGNATURE: Sawyer Lucas PA-C  DATE: February 16, 2023  TIME: 10:17 AM

## 2023-02-17 ENCOUNTER — OFFICE VISIT (OUTPATIENT)
Dept: FAMILY MEDICINE CLINIC | Facility: CLINIC | Age: 78
End: 2023-02-17

## 2023-02-17 VITALS
BODY MASS INDEX: 31.16 KG/M2 | WEIGHT: 187 LBS | OXYGEN SATURATION: 98 % | RESPIRATION RATE: 16 BRPM | HEART RATE: 102 BPM | HEIGHT: 65 IN | SYSTOLIC BLOOD PRESSURE: 138 MMHG | DIASTOLIC BLOOD PRESSURE: 86 MMHG

## 2023-02-17 DIAGNOSIS — I10 ESSENTIAL HYPERTENSION: ICD-10-CM

## 2023-02-17 DIAGNOSIS — I48.91 ATRIAL FIBRILLATION WITH RVR (HCC): ICD-10-CM

## 2023-02-17 DIAGNOSIS — F11.20 CONTINUOUS OPIOID DEPENDENCE (HCC): ICD-10-CM

## 2023-02-17 DIAGNOSIS — I71.03 AORTIC DISSECTION, THORACOABDOMINAL (HCC): ICD-10-CM

## 2023-02-17 DIAGNOSIS — K86.2 PANCREATIC CYST: ICD-10-CM

## 2023-02-17 DIAGNOSIS — E21.3 HYPERPARATHYROIDISM (HCC): ICD-10-CM

## 2023-02-17 DIAGNOSIS — I48.19 ATRIAL FIBRILLATION, PERSISTENT (HCC): ICD-10-CM

## 2023-02-17 DIAGNOSIS — I71.23 ANEURYSM OF DESCENDING THORACIC AORTA WITHOUT RUPTURE: ICD-10-CM

## 2023-02-17 DIAGNOSIS — G47.01 INSOMNIA DUE TO MEDICAL CONDITION: Primary | ICD-10-CM

## 2023-02-17 DIAGNOSIS — E66.09 CLASS 1 OBESITY DUE TO EXCESS CALORIES WITH SERIOUS COMORBIDITY AND BODY MASS INDEX (BMI) OF 31.0 TO 31.9 IN ADULT: ICD-10-CM

## 2023-02-17 RX ORDER — APIXABAN 5 MG/1
5 TABLET, FILM COATED ORAL 2 TIMES DAILY
Qty: 180 TABLET | Refills: 1 | Status: ON HOLD | OUTPATIENT
Start: 2023-02-17

## 2023-02-17 RX ORDER — DILTIAZEM HYDROCHLORIDE 120 MG/1
120 CAPSULE, COATED, EXTENDED RELEASE ORAL DAILY
Qty: 90 CAPSULE | Refills: 1 | Status: ON HOLD | OUTPATIENT
Start: 2023-02-17 | End: 2023-03-19

## 2023-02-17 RX ORDER — MIRTAZAPINE 7.5 MG/1
7.5 TABLET, FILM COATED ORAL
Qty: 30 TABLET | Refills: 5 | Status: ON HOLD | OUTPATIENT
Start: 2023-02-17

## 2023-02-17 RX ORDER — VALSARTAN 160 MG/1
160 TABLET ORAL 2 TIMES DAILY
Qty: 180 TABLET | Refills: 1 | Status: ON HOLD | OUTPATIENT
Start: 2023-02-17 | End: 2023-05-18

## 2023-02-17 NOTE — PROGRESS NOTES
Assessment/Plan:  Lets get him a antidepressant that can help with sleep   Get the pressure down more   He only had diltiazem for 1 month   Then never got a refill     Also it was noted to have a hypodensity on pancreas   We will follow up with MRI MRCP to eval further   He will choose to just monitor at this point       1  Insomnia due to medical condition  -     mirtazapine (REMERON) 7 5 MG tablet; Take 1 tablet (7 5 mg total) by mouth daily at bedtime    2  Essential hypertension  -     valsartan (DIOVAN) 160 mg tablet; Take 1 tablet (160 mg total) by mouth 2 (two) times a day Twice a day  3  Atrial fibrillation with RVR (HCC)  -     diltiazem (CARDIZEM CD) 120 mg 24 hr capsule; Take 1 capsule (120 mg total) by mouth daily    4  Continuous opioid dependence ([address] )    5  Hyperparathyroidism ([address] )    6  Atrial fibrillation, persistent (HCC)  -     Eliquis 5 MG; Take 1 tablet (5 mg total) by mouth 2 (two) times a day    7  Pancreatic cyst  -     ; Future  -     MRI abdomen wo contrast and mrcp; Future; Expected date: 02/17/2023    8  Aortic dissection, thoracoabdominal ([address] )    9  Aneurysm of descending thoracic aorta without rupture    10  Class 1 obesity due to excess calories with serious comorbidity and body mass index (BMI) of 31 0 to 31 9 in adult       Subjective:      Patient ID: Natalia Reyes is a 78 y o  male  HPI  Here to go over recent CT scan   And CTS consult     CTA of the chest abdomen and pelvis on 12/14/2023 that shows no changes on the ascending aorta, there is no change in the configuration of the type B dissection, the false lumen in the proximal descending thoracic aorta has thrombosed and has caused a near complete occlusion of the true lumen  There are multiple collaterals in the upper chest   There is enlargement of the DTA proximal to the area of near occlusion      They went over options  Observation   TEVAR   Arch replacement followed by thoracoabdominal   Axillofemoral bypass - need vascular input     The following portions of the patient's history were reviewed and updated as appropriate: allergies, current medications, past family history, past medical history, past social history, past surgical history and problem list     Review of Systems   Constitutional: Negative for activity change, appetite change and fever  HENT: Negative for congestion, nosebleeds and trouble swallowing  Eyes: Negative for itching  Respiratory: Negative for cough and chest tightness  Cardiovascular: Negative for chest pain and palpitations  Gastrointestinal: Negative for abdominal pain, constipation, diarrhea and nausea  Endocrine: Negative for cold intolerance  Genitourinary: Negative for frequency and hematuria  Musculoskeletal: Positive for arthralgias  Negative for gait problem and joint swelling  Skin: Negative for rash  Allergic/Immunologic: Negative for immunocompromised state  Neurological: Negative for dizziness, tremors, seizures, syncope and headaches  Psychiatric/Behavioral: Negative for hallucinations and suicidal ideas  Objective:      /86 (BP Location: Right arm, Patient Position: Sitting, Cuff Size: Large)   Pulse 102   Resp 16   Ht 5' 5" (1 651 m)   Wt 84 8 kg (187 lb)   SpO2 98%   BMI 31 12 kg/m²     Appointment on 02/07/2023   Component Date Value   • Sodium 02/07/2023 140    • Potassium 02/07/2023 3 5    • Chloride 02/07/2023 108    • CO2 02/07/2023 23    • ANION GAP 02/07/2023 9    • BUN 02/07/2023 24    • Creatinine 02/07/2023 1 55 (H)    • Glucose, Fasting 02/07/2023 132 (H)    • Calcium 02/07/2023 10 7 (H)    • eGFR 02/07/2023 43           Physical Exam  Vitals and nursing note reviewed  Constitutional:       General: He is not in acute distress  Appearance: He is well-developed  He is obese  HENT:      Head: Normocephalic and atraumatic  Cardiovascular:      Rate and Rhythm: Normal rate and regular rhythm        Heart sounds: Normal heart sounds  No murmur heard  Pulmonary:      Effort: Pulmonary effort is normal       Breath sounds: Normal breath sounds  No wheezing or rales  Abdominal:      General: Bowel sounds are normal  There is no distension  Palpations: Abdomen is soft  Tenderness: There is no abdominal tenderness  There is no guarding or rebound  Musculoskeletal:         General: No tenderness  Normal range of motion  Cervical back: Normal range of motion and neck supple  Lymphadenopathy:      Cervical: No cervical adenopathy  Skin:     General: Skin is warm and dry  Capillary Refill: Capillary refill takes less than 2 seconds  Findings: No rash  Neurological:      Mental Status: He is alert and oriented to person, place, and time  Cranial Nerves: No cranial nerve deficit  Sensory: No sensory deficit  Motor: No abnormal muscle tone  Psychiatric:         Behavior: Behavior normal          Thought Content: Thought content normal          Judgment: Judgment normal          BMI Counseling: Body mass index is 31 12 kg/m²  The BMI is above normal  Nutrition recommendations include decreasing portion sizes, encouraging healthy choices of fruits and vegetables, decreasing fast food intake, consuming healthier snacks and limiting drinks that contain sugar  Exercise recommendations include exercising 3-5 times per week  No pharmacotherapy was ordered  Rationale for BMI follow-up plan is due to patient being overweight or obese  Depression Screening and Follow-up Plan: Patient was screened for depression during today's encounter  They screened negative with a PHQ-2 score of 0  Falls Plan of Care: balance, strength, and gait training instructions were provided  Medications that increase falls were reviewed         Dorota Randall MD  [place] [id number]

## 2023-03-04 ENCOUNTER — HOSPITAL ENCOUNTER (INPATIENT)
Facility: HOSPITAL | Age: 78
LOS: 5 days | Discharge: HOME HEALTH | End: 2023-03-10
Attending: EMERGENCY MEDICINE | Admitting: INTERNAL MEDICINE

## 2023-03-04 ENCOUNTER — APPOINTMENT (EMERGENCY)
Dept: RADIOLOGY | Facility: HOSPITAL | Age: 78
End: 2023-03-04

## 2023-03-04 DIAGNOSIS — N18.9 ACUTE KIDNEY INJURY SUPERIMPOSED ON CKD (HCC): ICD-10-CM

## 2023-03-04 DIAGNOSIS — I48.19 PERSISTENT ATRIAL FIBRILLATION (HCC): ICD-10-CM

## 2023-03-04 DIAGNOSIS — N17.9 ACUTE KIDNEY INJURY SUPERIMPOSED ON CKD (HCC): ICD-10-CM

## 2023-03-04 DIAGNOSIS — E21.0 HYPERPARATHYROIDISM, PRIMARY (HCC): ICD-10-CM

## 2023-03-04 DIAGNOSIS — I71.03 AORTIC DISSECTION, THORACOABDOMINAL (HCC): ICD-10-CM

## 2023-03-04 DIAGNOSIS — R41.82 ALTERED MENTAL STATUS: Primary | ICD-10-CM

## 2023-03-04 DIAGNOSIS — I71.012 DISSECTION OF DESCENDING THORACIC AORTA: ICD-10-CM

## 2023-03-04 DIAGNOSIS — G89.29 CHRONIC LOW BACK PAIN, UNSPECIFIED BACK PAIN LATERALITY, UNSPECIFIED WHETHER SCIATICA PRESENT: ICD-10-CM

## 2023-03-04 DIAGNOSIS — R41.0 CONFUSION: ICD-10-CM

## 2023-03-04 DIAGNOSIS — N18.9 CHRONIC RENAL INSUFFICIENCY: ICD-10-CM

## 2023-03-04 DIAGNOSIS — M54.50 CHRONIC LOW BACK PAIN, UNSPECIFIED BACK PAIN LATERALITY, UNSPECIFIED WHETHER SCIATICA PRESENT: ICD-10-CM

## 2023-03-04 DIAGNOSIS — I10 PRIMARY HYPERTENSION: ICD-10-CM

## 2023-03-04 DIAGNOSIS — E83.52 HYPERCALCEMIA: ICD-10-CM

## 2023-03-04 DIAGNOSIS — R41.89 COGNITIVE IMPAIRMENT: ICD-10-CM

## 2023-03-04 PROBLEM — G93.41 METABOLIC ENCEPHALOPATHY: Status: ACTIVE | Noted: 2023-03-04

## 2023-03-04 PROBLEM — N39.0 UTI (URINARY TRACT INFECTION): Status: ACTIVE | Noted: 2023-03-04

## 2023-03-04 LAB
2HR DELTA HS TROPONIN: 1 NG/L
ALBUMIN SERPL BCP-MCNC: 3.3 G/DL (ref 3.5–5)
ALP SERPL-CCNC: 122 U/L (ref 46–116)
ALT SERPL W P-5'-P-CCNC: 25 U/L (ref 12–78)
ANION GAP SERPL CALCULATED.3IONS-SCNC: 4 MMOL/L (ref 4–13)
AST SERPL W P-5'-P-CCNC: 21 U/L (ref 5–45)
ATRIAL RATE: 394 BPM
BACTERIA UR QL AUTO: ABNORMAL /HPF
BASOPHILS # BLD AUTO: 0.04 THOUSANDS/ÂΜL (ref 0–0.1)
BASOPHILS NFR BLD AUTO: 1 % (ref 0–1)
BILIRUB SERPL-MCNC: 0.82 MG/DL (ref 0.2–1)
BILIRUB UR QL STRIP: NEGATIVE
BUN SERPL-MCNC: 22 MG/DL (ref 5–25)
CALCIUM ALBUM COR SERPL-MCNC: 11.7 MG/DL (ref 8.3–10.1)
CALCIUM SERPL-MCNC: 11.1 MG/DL (ref 8.3–10.1)
CARDIAC TROPONIN I PNL SERPL HS: 15 NG/L
CARDIAC TROPONIN I PNL SERPL HS: 16 NG/L
CHLORIDE SERPL-SCNC: 107 MMOL/L (ref 96–108)
CLARITY UR: CLEAR
CO2 SERPL-SCNC: 25 MMOL/L (ref 21–32)
COLOR UR: YELLOW
CREAT SERPL-MCNC: 1.69 MG/DL (ref 0.6–1.3)
EOSINOPHIL # BLD AUTO: 0.15 THOUSAND/ÂΜL (ref 0–0.61)
EOSINOPHIL NFR BLD AUTO: 2 % (ref 0–6)
ERYTHROCYTE [DISTWIDTH] IN BLOOD BY AUTOMATED COUNT: 15.5 % (ref 11.6–15.1)
FLUAV RNA RESP QL NAA+PROBE: NEGATIVE
FLUBV RNA RESP QL NAA+PROBE: NEGATIVE
GFR SERPL CREATININE-BSD FRML MDRD: 38 ML/MIN/1.73SQ M
GLUCOSE SERPL-MCNC: 82 MG/DL (ref 65–140)
GLUCOSE SERPL-MCNC: 96 MG/DL (ref 65–140)
GLUCOSE UR STRIP-MCNC: NEGATIVE MG/DL
HCT VFR BLD AUTO: 42.4 % (ref 36.5–49.3)
HGB BLD-MCNC: 13.4 G/DL (ref 12–17)
HGB UR QL STRIP.AUTO: ABNORMAL
HYALINE CASTS #/AREA URNS LPF: ABNORMAL /LPF
IMM GRANULOCYTES # BLD AUTO: 0.04 THOUSAND/UL (ref 0–0.2)
IMM GRANULOCYTES NFR BLD AUTO: 1 % (ref 0–2)
KETONES UR STRIP-MCNC: NEGATIVE MG/DL
LEUKOCYTE ESTERASE UR QL STRIP: ABNORMAL
LYMPHOCYTES # BLD AUTO: 1.16 THOUSANDS/ÂΜL (ref 0.6–4.47)
LYMPHOCYTES NFR BLD AUTO: 15 % (ref 14–44)
MCH RBC QN AUTO: 28.8 PG (ref 26.8–34.3)
MCHC RBC AUTO-ENTMCNC: 31.6 G/DL (ref 31.4–37.4)
MCV RBC AUTO: 91 FL (ref 82–98)
MONOCYTES # BLD AUTO: 0.73 THOUSAND/ÂΜL (ref 0.17–1.22)
MONOCYTES NFR BLD AUTO: 10 % (ref 4–12)
MUCOUS THREADS UR QL AUTO: ABNORMAL
NEUTROPHILS # BLD AUTO: 5.51 THOUSANDS/ÂΜL (ref 1.85–7.62)
NEUTS SEG NFR BLD AUTO: 71 % (ref 43–75)
NITRITE UR QL STRIP: NEGATIVE
NON-SQ EPI CELLS URNS QL MICRO: ABNORMAL /HPF
NRBC BLD AUTO-RTO: 0 /100 WBCS
P AXIS: 78 DEGREES
PH UR STRIP.AUTO: 5.5 [PH] (ref 4.5–8)
PLATELET # BLD AUTO: 260 THOUSANDS/UL (ref 149–390)
PMV BLD AUTO: 10.5 FL (ref 8.9–12.7)
POTASSIUM SERPL-SCNC: 4.5 MMOL/L (ref 3.5–5.3)
PROT SERPL-MCNC: 7.7 G/DL (ref 6.4–8.4)
PROT UR STRIP-MCNC: NEGATIVE MG/DL
QRS AXIS: -5 DEGREES
QRSD INTERVAL: 80 MS
QT INTERVAL: 326 MS
QTC INTERVAL: 387 MS
RBC # BLD AUTO: 4.65 MILLION/UL (ref 3.88–5.62)
RBC #/AREA URNS AUTO: ABNORMAL /HPF
RSV RNA RESP QL NAA+PROBE: NEGATIVE
SARS-COV-2 RNA RESP QL NAA+PROBE: NEGATIVE
SODIUM SERPL-SCNC: 136 MMOL/L (ref 135–147)
SP GR UR STRIP.AUTO: 1.02 (ref 1–1.03)
T WAVE AXIS: 80 DEGREES
TSH SERPL DL<=0.05 MIU/L-ACNC: 1.64 UIU/ML (ref 0.45–4.5)
UROBILINOGEN UR QL STRIP.AUTO: 0.2 E.U./DL
VENTRICULAR RATE: 85 BPM
WBC # BLD AUTO: 7.63 THOUSAND/UL (ref 4.31–10.16)
WBC #/AREA URNS AUTO: ABNORMAL /HPF

## 2023-03-04 RX ORDER — LEVOTHYROXINE SODIUM 0.07 MG/1
75 TABLET ORAL DAILY
Status: DISCONTINUED | OUTPATIENT
Start: 2023-03-04 | End: 2023-03-10 | Stop reason: HOSPADM

## 2023-03-04 RX ORDER — IODIXANOL 320 MG/ML
80 INJECTION, SOLUTION INTRAVASCULAR
Status: COMPLETED | OUTPATIENT
Start: 2023-03-04 | End: 2023-03-04

## 2023-03-04 RX ORDER — OXYCODONE HYDROCHLORIDE AND ACETAMINOPHEN 5; 325 MG/1; MG/1
1 TABLET ORAL EVERY 6 HOURS PRN
Status: DISCONTINUED | OUTPATIENT
Start: 2023-03-04 | End: 2023-03-10 | Stop reason: HOSPADM

## 2023-03-04 RX ORDER — ALLOPURINOL 100 MG/1
100 TABLET ORAL DAILY
Status: DISCONTINUED | OUTPATIENT
Start: 2023-03-04 | End: 2023-03-10 | Stop reason: HOSPADM

## 2023-03-04 RX ORDER — ACETAMINOPHEN 325 MG/1
650 TABLET ORAL EVERY 6 HOURS PRN
Status: DISCONTINUED | OUTPATIENT
Start: 2023-03-04 | End: 2023-03-10 | Stop reason: HOSPADM

## 2023-03-04 RX ORDER — LOSARTAN POTASSIUM 50 MG/1
100 TABLET ORAL DAILY
Status: DISCONTINUED | OUTPATIENT
Start: 2023-03-04 | End: 2023-03-05

## 2023-03-04 RX ORDER — MIRTAZAPINE 15 MG/1
7.5 TABLET, FILM COATED ORAL
Status: DISCONTINUED | OUTPATIENT
Start: 2023-03-04 | End: 2023-03-10 | Stop reason: HOSPADM

## 2023-03-04 RX ORDER — AMLODIPINE BESYLATE 5 MG/1
10 TABLET ORAL DAILY
Status: DISCONTINUED | OUTPATIENT
Start: 2023-03-04 | End: 2023-03-04

## 2023-03-04 RX ORDER — SODIUM CHLORIDE 9 MG/ML
100 INJECTION, SOLUTION INTRAVENOUS CONTINUOUS
Status: DISCONTINUED | OUTPATIENT
Start: 2023-03-04 | End: 2023-03-05

## 2023-03-04 RX ORDER — ATORVASTATIN CALCIUM 20 MG/1
20 TABLET, FILM COATED ORAL DAILY
Status: DISCONTINUED | OUTPATIENT
Start: 2023-03-04 | End: 2023-03-10 | Stop reason: HOSPADM

## 2023-03-04 RX ORDER — DILTIAZEM HYDROCHLORIDE 120 MG/1
120 CAPSULE, COATED, EXTENDED RELEASE ORAL DAILY
Status: DISCONTINUED | OUTPATIENT
Start: 2023-03-04 | End: 2023-03-06

## 2023-03-04 RX ORDER — METOPROLOL SUCCINATE 100 MG/1
100 TABLET, EXTENDED RELEASE ORAL 2 TIMES DAILY
Status: DISCONTINUED | OUTPATIENT
Start: 2023-03-04 | End: 2023-03-07

## 2023-03-04 RX ADMIN — IODIXANOL 80 ML: 320 INJECTION, SOLUTION INTRAVASCULAR at 11:38

## 2023-03-04 RX ADMIN — APIXABAN 5 MG: 5 TABLET, FILM COATED ORAL at 17:00

## 2023-03-04 RX ADMIN — METOPROLOL SUCCINATE 100 MG: 100 TABLET, EXTENDED RELEASE ORAL at 22:03

## 2023-03-04 RX ADMIN — ATORVASTATIN CALCIUM 20 MG: 20 TABLET, FILM COATED ORAL at 16:48

## 2023-03-04 RX ADMIN — MIRTAZAPINE 7.5 MG: 15 TABLET, FILM COATED ORAL at 22:03

## 2023-03-04 RX ADMIN — CEFTRIAXONE SODIUM 1000 MG: 10 INJECTION, POWDER, FOR SOLUTION INTRAVENOUS at 14:46

## 2023-03-04 RX ADMIN — SODIUM CHLORIDE 100 ML/HR: 0.9 INJECTION, SOLUTION INTRAVENOUS at 16:29

## 2023-03-04 NOTE — H&P
[address] [facility]  H&P- Aiea Schwab 1945, 78 y o  male MRN: [id number]  Unit/Bed#: ED 27 Encounter: [id number]  Primary Care Provider: Dina Calix MD   Date and time admitted to hospital: 3/4/2023 86:71 AM    Metabolic encephalopathy  Assessment & Plan  · Patient and daughter endorsing about 1 week of increasing confusion  · Currently speaking, they feel as though over the past 3 months or so, Julien Cosby may have had a slight steady decline in his cognition  Last weeks where the symptoms seem more precipitous  · No focal neurologic deficit and neuroimaging as a pertains to the brain negative at this time  Low suspicion for CVA, suspect metabolic encephalopathy  Differential may include UTI and/or hypercalcemia  Daughter also states blood pressures tend to be labile at home  See treatment plans for these issues    UTI (urinary tract infection)  Assessment & Plan  · Patient with increasing confusion, cystitis, and pyuria noted on microscopy  · Also notes urinary frequency, especially today  No faith dysuria  · May be reasonable to cover with Rocephin for now and await urine culture    Hypercalcemia  Assessment & Plan  · Mild at 11 1, and I suspect not severe enough to contribute to this patient's confusion, albeit perhaps it could be a minor contributor  · Gently hydrate with isotonic saline and repeat BMP in the morning, for now would simply do IV fluids  · Suspected to have primary hyperparathyroidism    Thoracic aortic aneurysm without rupture  Assessment & Plan  · Patient with known type B dissection following up with Dr Rebecca Reveles on 3/9/23   CTA CVA for AMS noting possible increased intima disruption  · Will reach out to cardiac surgery for opinion  · Hemodynamically stable and without chest pain, see Hypertension section  · Continue Eliquis    Hyperlipidemia  Assessment & Plan  · Continue atorvastatin 20    Osteoarthrosis involving multiple sites  Assessment & Plan  Tylenol and home percocet PRN  Do not exceed 4g tylenol daily    Hypothyroidism  Assessment & Plan  · Continue levothyroxine    Hypertension  Assessment & Plan  · Patient appears to have had some issues with hearing to his antihypertensives in the outpatient setting, on review of recent note it seems as though he was not taking his Cardizem after the initial prescription ran out  · Continue Cardizem, substitute valsartan for losartan, continue Lopressor  Given presence of dissection, would aim to maintain systolic pressure of around 1 20-1 40 and heart rate around 65    · Monitor on Telemetry    Hyperparathyroidism [facility])  Assessment & Plan  · Appears as though this is suspected from outpatient work-up with elevated PTH, elevated calcium, and low Phos  · Mild at this time, can continue outpatient follow-up    Stage 3a chronic kidney disease [facility])  Assessment & Plan  Lab Results   Component Value Date    EGFR 38 03/04/2023    EGFR 43 02/07/2023    EGFR 40 01/12/2023    CREATININE 1 69 (H) 03/04/2023    CREATININE 1 55 (H) 02/07/2023    CREATININE 1 63 (H) 01/12/2023     · Renal function appears within baseline readings  · Renally dose meds and repeat BMP in am      VTE Pharmacologic Prophylaxis:   Moderate Risk (Score 3-4) - Pharmacological DVT Prophylaxis Ordered: apixaban (Eliquis)  Code Status: Level 1 - Full Code   Discussion with family: Updated  (daughter) at bedside  Anticipated Length of Stay: Patient will be admitted on an observation basis with an anticipated length of stay of less than 2 midnights secondary to AMS      Total Time Spent on Date of Encounter in care of patient: 55 minutes This time was spent on one or more of the following: performing physical exam; counseling and coordination of care; obtaining or reviewing history; documenting in the medical record; reviewing/ordering tests, medications or procedures; communicating with other healthcare professionals and discussing with patient's family/caregivers  Chief Complaint: Confusion    History of Present Illness:  Glenn Barrett is a 78 y o  male with a PMH of type B aortic dissection, pretension, and polyarthritis who presents with infusion  He is accompanied by his daughter who provides some collateral history  They state that since the beginning of this year, Prashanth Hill appears to have had some mild decline in his mental state  Around this time he was diagnosed with a type B aortic dissection, they have opted for medical management and conversation with her cardiovascular surgeon  He had generally been doing well at home however over the past week or so, his daughter states she feels like he has been a little more confused  Prashanth Hill himself agrees with this notion, stating that he has had some trouble navigating the house and doing some of his basic ADLs  Today, things seem to be at the worst where he was grabbing television remotes and acting like they were the phone and not knowing his way around the house or outdoors  He was brought to the ED for further evaluation where during his stay here, both Prashanth Hill and his daughter feel that he is had some improvement in his mental state  Neuroimaging was obtained and shows possible extension of known dissection  Prashanth Hill endorses a little bit of palpable chest discomfort, but is otherwise hemodynamically stable  Denies dysuria but endorses increased urinary frequency    Review of Systems:  Review of Systems   Constitutional: Negative for chills and fever  Respiratory: Negative for shortness of breath  Cardiovascular: Positive for chest pain (MSK)  Negative for leg swelling  Gastrointestinal: Positive for constipation and diarrhea  Negative for vomiting  Genitourinary: Positive for frequency  Negative for dysuria and flank pain  Musculoskeletal: Positive for arthralgias  Psychiatric/Behavioral: Positive for confusion  Negative for agitation         Past Medical and Surgical History:   Past Medical History:   Diagnosis Date   • Arthritis    • H/O echocardiogram 12/2018 9/2018   • History of EKG 2019    numerous tests between 9/6/2018 through 2/7/2019   • History of exercise stress test 10/2018   • History of Holter monitoring 09/2018   • Hyperlipidemia    • Hypertension    • Irregular heart beat    • Kidney stone        Past Surgical History:   Procedure Laterality Date   • CARDIAC ELECTROPHYSIOLOGY STUDY AND ABLATION  01/2019   • CARDIOVERSION  02/2019   • FL RETROGRADE PYELOGRAM  05/12/2022   • FL RETROGRADE PYELOGRAM  09/01/2022   • HERNIA REPAIR     • IR JOINT INJECTION  09/30/2019   • LAPAROSCOPIC ASSISSTED TOTAL COLECTOMY W/ J-POUCH     • HI CYSTO/URETERO W/LITHOTRIPSY &INDWELL STENT INSRT N/A 05/12/2022    Procedure: BILATERAL RETROGRADE PYELOGRAM, CYSTOSCOPY,  LEFT URETEROSCOPY, LEFT LASER LITHOTRIPSY BASKET EXTRACTION, LEFT STENT;  Surgeon: Imelda Estevez MD;  Location: [address] [place] [address];  Service: Urology   • HI CYSTO/URETERO W/LITHOTRIPSY &INDWELL STENT INSRT Left 09/01/2022    Procedure: CYSTOSCOPY, URETEROSCOPY STENT REMOVAL, LASER LITHOTRIPSY, WITH BASKET, INSERTION OF URETERAL STENT;  Surgeon: Imelda Estevez MD;  Location: [address] [place] [address];  Service: Urology   • THORACOTOMY Left 2001   • VASECTOMY         Meds/Allergies:  Prior to Admission medications    Medication Sig Start Date End Date Taking?  Authorizing Provider   acetaminophen (TYLENOL) 650 mg CR tablet Take 650 mg by mouth every 8 (eight) hours as needed for mild pain   Yes Historical Provider, MD   allopurinol (ZYLOPRIM) 100 mg tablet Take 1 tablet (100 mg total) by mouth daily 12/2/22  Yes Gissell Perez MD   atorvastatin (LIPITOR) 20 mg tablet TAKE 1 TABLET DAILY 10/24/22  Yes Gissell Perez MD   diltiazem (CARDIZEM CD) 120 mg 24 hr capsule Take 1 capsule (120 mg total) by mouth daily 2/17/23 3/19/23 Yes Gissell Perez MD   Eliquis 5 MG Take 1 tablet (5 mg total) by mouth 2 (two) times a day 2/17/23  Yes Itzel Maria Rosa Rivas MD   INCRUSE ELLIPTA 62 5 MCG/INH AEPB inhaler 1 puff every evening   20  Yes Historical Provider, MD   levothyroxine 75 mcg tablet TAKE 1 TABLET DAILY 22  Yes Gissell Perez MD   metoprolol succinate (TOPROL-XL) 100 mg 24 hr tablet 100 mg 2 (two) times a day 21  Yes Historical Provider, MD   mirtazapine (REMERON) 7 5 MG tablet Take 1 tablet (7 5 mg total) by mouth daily at bedtime 23  Yes Gissell Perez MD   oxyCODONE-acetaminophen (PERCOCET) 5-325 mg per tablet Take 1 tablet by mouth every 4 (four) hours as needed for moderate pain or severe pain Max Daily Amount: 6 tablets 23  Yes Gissell Perez MD   valsartan (DIOVAN) 160 mg tablet Take 1 tablet (160 mg total) by mouth 2 (two) times a day Twice a day  23 Yes Gissell Perez MD   aspirin (ECOTRIN LOW STRENGTH) 81 mg EC tablet Take 1 tablet (81 mg total) by mouth daily  Patient not taking: Reported on 2023  KAREN Slaughter-GUERA   B Complex-C (B COMPLEX-VITAMIN C PO) Take 1 tablet by mouth daily  Patient not taking: Reported on 2023    Historical Provider, MD   Cholecalciferol (VITAMIN D3) 50 MCG ( UT) capsule Take 1 capsule by mouth  Patient not taking: Reported on 2023    Historical Provider, MD     I have reviewed home medications with patient personally  Allergies: No Known Allergies    Social History:  Marital Status: /Civil Union   Occupation: Retired  Patient Pre-hospital Living Situation: Home  Patient Pre-hospital Level of Mobility: walks  Patient Pre-hospital Diet Restrictions: None  Substance Use History:   Social History     Substance and Sexual Activity   Alcohol Use Yes    Comment: Occas        Social History     Tobacco Use   Smoking Status Former   • Packs/day: 1 50   • Years: 15 00   • Pack years: 22 50   • Types: Cigarettes   • Quit date: 12   • Years since quittin 1   Smokeless Tobacco Never     Social History     Substance and Sexual Activity   Drug Use Never Family History:  Family History   Problem Relation Age of Onset   • Arthritis Father        Physical Exam:     Vitals:   Blood Pressure: 116/78 (03/04/23 1300)  Pulse: 82 (03/04/23 1300)  Temperature: 98 3 °F (36 8 °C) (03/04/23 1016)  Temp Source: Oral (03/04/23 1016)  Respirations: 16 (03/04/23 1300)  SpO2: 97 % (03/04/23 1300)    Physical Exam  Vitals reviewed  Constitutional:       General: He is not in acute distress  HENT:      Head: Normocephalic and atraumatic  Mouth/Throat:      Mouth: Mucous membranes are moist    Eyes:      General: No scleral icterus  Pupils: Pupils are equal, round, and reactive to light  Cardiovascular:      Rate and Rhythm: Normal rate and regular rhythm  Heart sounds: No murmur heard  No gallop  Pulmonary:      Effort: Pulmonary effort is normal       Breath sounds: Normal breath sounds  No wheezing, rhonchi or rales  Abdominal:      General: Abdomen is flat  There is no distension  Palpations: Abdomen is soft  Tenderness: There is no abdominal tenderness  There is no right CVA tenderness, left CVA tenderness or guarding  Musculoskeletal:         General: Tenderness present  Right lower leg: No edema  Left lower leg: No edema  Comments: Pain at the left chest wall reproducible to palpation   Skin:     General: Skin is warm  Capillary Refill: Capillary refill takes less than 2 seconds  Neurological:      General: No focal deficit present  Mental Status: He is alert and oriented to person, place, and time  Sensory: No sensory deficit  Motor: No weakness     Psychiatric:         Mood and Affect: Mood normal          Behavior: Behavior normal           Additional Data:     Lab Results:  Results from last 7 days   Lab Units 03/04/23  1018   WBC Thousand/uL 7 63   HEMOGLOBIN g/dL 13 4   HEMATOCRIT % 42 4   PLATELETS Thousands/uL 260   NEUTROS PCT % 71   LYMPHS PCT % 15   MONOS PCT % 10   EOS PCT % 2     Results from last 7 days   Lab Units 03/04/23  1018   SODIUM mmol/L 136   POTASSIUM mmol/L 4 5   CHLORIDE mmol/L 107   CO2 mmol/L 25   BUN mg/dL 22   CREATININE mg/dL 1 69*   ANION GAP mmol/L 4   CALCIUM mg/dL 11 1*   ALBUMIN g/dL 3 3*   TOTAL BILIRUBIN mg/dL 0 82   ALK PHOS U/L 122*   ALT U/L 25   AST U/L 21   GLUCOSE RANDOM mg/dL 96         Results from last 7 days   Lab Units 03/04/23  1018   POC GLUCOSE mg/dl 82               Lines/Drains:  Invasive Devices     Peripheral Intravenous Line  Duration           Peripheral IV 03/04/23 Left Antecubital <1 day          Drain  Duration           Ureteral Internal Stent Left ureter 6 Fr  184 days                    Imaging: Personally reviewed the following imaging: chest xray  CTA head and neck with and without contrast   Final Result by Carolyne Reyes MD (03/04 1234)      No acute intracranial abnormality  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 with new partial contrast opacification of partially thrombosed false lumen in the distal aortic arch just after the takeoff of left subclavian artery possible due to    new intimal disruption  Recommend evaluation by cardiothoracic or vascular surgery  No large vessel occlusion, dissection, aneurysm, or high-grade stenosis in the head or neck  Additional chronic/incidental findings as detailed above  The study was marked in [facility] for immediate notification  Workstation performed: [id number]         XR chest portable    (Results Pending)       EKG and Other Studies Reviewed on Admission:   · EKG: Atrial fibrillation  HR 85     ** Please Note: This note has been constructed using a voice recognition system   **

## 2023-03-04 NOTE — ASSESSMENT & PLAN NOTE
· Patient appears to have had some issues with hearing to his antihypertensives in the outpatient setting, on review of recent note it seems as though he was not taking his Cardizem after the initial prescription ran out  · Continue Cardizem, substitute valsartan for losartan, continue Lopressor    Given presence of dissection, would aim to maintain systolic pressure of around 1 20-1 40 and heart rate around 65    · Monitor on Telemetry

## 2023-03-04 NOTE — ASSESSMENT & PLAN NOTE
· Appears as though this is suspected from outpatient work-up with elevated PTH, elevated calcium, and low Phos    · Mild at this time, can continue outpatient follow-up

## 2023-03-04 NOTE — ASSESSMENT & PLAN NOTE
· Patient with increasing confusion, cystitis, and pyuria noted on microscopy  · Also notes urinary frequency, especially today    No faith dysuria  · May be reasonable to cover with Rocephin for now and await urine culture

## 2023-03-04 NOTE — ASSESSMENT & PLAN NOTE
· Mild at 11 1, and I suspect not severe enough to contribute to this patient's confusion, albeit perhaps it could be a minor contributor  · Gently hydrate with isotonic saline and repeat BMP in the morning, for now would simply do IV fluids  · Suspected to have primary hyperparathyroidism

## 2023-03-04 NOTE — ED PROVIDER NOTES
History  Chief Complaint   Patient presents with   • Altered Mental Status     Per EMS called to pt residence for confusion that happened this am  Per EMS pt was unable to use coffee machine this am  Pt is A&Ox4, but still repeats that something is off      79 yo M PMHx of HTN, PAFib (on eliquis and cardizem), hyperparathyroidism, recently diagnosed type B aortic dissection (12/2021 s/p recent medical management) presents to the ED with daughter for complaint of confusion  Per daughter, patient went to make coffee this morning and didn't know how to use his coffee machine that he has had in his house and has been part of his routine for a long time  Patient's daughter mentions that she has noted a marker increase in confusion and unusual behavior over the past week  She notes examples such as randomly not being able to use his cell phone or his remote control which he has not had trouble with before, thinking his phone controls his TV and vice versa  Daughter at first thought confusion might be related to his recent diagnosis of Type B dissection which is being managed medically, however with the marked changed, she is concerned  Patient denies any head strikes or head trauma  No recent illnesses or infections, no fevers chills  Daughter notes numerous positive findings on ROS such as a broad-based gait, urinary frequency and incontinence, sleep disturbance and decreased sleep however she cannot tell me if these findings are new or acute  No recent changes in meds, meds reviewed and no psychotropic medications taken  Prior to Admission Medications   Prescriptions Last Dose Informant Patient Reported? Taking?    B Complex-C (B COMPLEX-VITAMIN C PO)   Yes No   Sig: Take 1 tablet by mouth daily   Patient not taking: Reported on 2/16/2023   Cholecalciferol (VITAMIN D3) 50 MCG (2000 UT) capsule   Yes No   Sig: Take 1 capsule by mouth   Patient not taking: Reported on 2/16/2023   Eliquis 5 MG   No Yes   Sig: Take 1 tablet (5 mg total) by mouth 2 (two) times a day   INCRUSE ELLIPTA 62 5 MCG/INH AEPB inhaler   Yes Yes   Si puff every evening     acetaminophen (TYLENOL) 650 mg CR tablet   Yes Yes   Sig: Take 650 mg by mouth every 8 (eight) hours as needed for mild pain   allopurinol (ZYLOPRIM) 100 mg tablet   No Yes   Sig: Take 1 tablet (100 mg total) by mouth daily   aspirin (ECOTRIN LOW STRENGTH) 81 mg EC tablet   No No   Sig: Take 1 tablet (81 mg total) by mouth daily   Patient not taking: Reported on 2023   atorvastatin (LIPITOR) 20 mg tablet   No Yes   Sig: TAKE 1 TABLET DAILY   diltiazem (CARDIZEM CD) 120 mg 24 hr capsule   No Yes   Sig: Take 1 capsule (120 mg total) by mouth daily   levothyroxine 75 mcg tablet   No Yes   Sig: TAKE 1 TABLET DAILY   metoprolol succinate (TOPROL-XL) 100 mg 24 hr tablet   Yes Yes   Si mg 2 (two) times a day   mirtazapine (REMERON) 7 5 MG tablet   No Yes   Sig: Take 1 tablet (7 5 mg total) by mouth daily at bedtime   oxyCODONE-acetaminophen (PERCOCET) 5-325 mg per tablet   No Yes   Sig: Take 1 tablet by mouth every 4 (four) hours as needed for moderate pain or severe pain Max Daily Amount: 6 tablets   valsartan (DIOVAN) 160 mg tablet   No Yes   Sig: Take 1 tablet (160 mg total) by mouth 2 (two) times a day Twice a day        Facility-Administered Medications: None       Past Medical History:   Diagnosis Date   • Arthritis    • H/O echocardiogram 2018   • History of EKG 2019    numerous tests between 2018 through 2019   • History of exercise stress test 10/2018   • History of Holter monitoring 2018   • Hyperlipidemia    • Hypertension    • Irregular heart beat    • Kidney stone        Past Surgical History:   Procedure Laterality Date   • CARDIAC ELECTROPHYSIOLOGY STUDY AND ABLATION  2019   • CARDIOVERSION  2019   • FL RETROGRADE PYELOGRAM  2022   • FL RETROGRADE PYELOGRAM  2022   • HERNIA REPAIR     • IR JOINT INJECTION  2019 • LAPAROSCOPIC ASSISSTED TOTAL COLECTOMY W/ J-POUCH     • AZ CYSTO/URETERO W/LITHOTRIPSY &INDWELL STENT INSRT N/A 2022    Procedure: BILATERAL RETROGRADE PYELOGRAM, CYSTOSCOPY,  LEFT URETEROSCOPY, LEFT LASER LITHOTRIPSY BASKET EXTRACTION, LEFT STENT;  Surgeon: Elena Hinkle MD;  Location: [address] [place] [address];  Service: Urology   • AZ CYSTO/URETERO W/LITHOTRIPSY &INDWELL STENT INSRT Left 2022    Procedure: CYSTOSCOPY, URETEROSCOPY STENT REMOVAL, LASER LITHOTRIPSY, WITH BASKET, INSERTION OF URETERAL STENT;  Surgeon: Elena Hinkle MD;  Location: [facility];  Service: Urology   • THORACOTOMY Left    • VASECTOMY         Family History   Problem Relation Age of Onset   • Arthritis Father      I have reviewed and agree with the history as documented  E-Cigarette/Vaping   • E-Cigarette Use Never User      E-Cigarette/Vaping Substances   • Nicotine No    • THC No    • CBD No    • Flavoring No    • Other No    • Unknown No      Social History     Tobacco Use   • Smoking status: Former     Packs/day: 1 50     Years: 15 00     Pack years: 22 50     Types: Cigarettes     Quit date:      Years since quittin 1   • Smokeless tobacco: Never   Vaping Use   • Vaping Use: Never used   Substance Use Topics   • Alcohol use: Yes     Comment: Occas  • Drug use: Never        Review of Systems   Constitutional: Negative for chills and fever  HENT: Negative for ear pain and sore throat  Eyes: Negative for pain and visual disturbance  Respiratory: Negative for cough and shortness of breath  Cardiovascular: Negative for chest pain and palpitations  Gastrointestinal: Negative for abdominal pain and vomiting  Genitourinary: Negative for dysuria and hematuria  Musculoskeletal: Negative for arthralgias and back pain  Skin: Negative for color change and rash  Neurological: Negative for seizures and syncope  Psychiatric/Behavioral: Positive for confusion     All other systems reviewed and are negative  Physical Exam  ED Triage Vitals [03/04/23 1016]   Temperature Pulse Respirations Blood Pressure SpO2   98 3 °F (36 8 °C) 79 18 155/88 97 %      Temp Source Heart Rate Source Patient Position - Orthostatic VS BP Location FiO2 (%)   Oral Monitor Lying Right arm --      Pain Score       No Pain             Orthostatic Vital Signs  Vitals:    03/04/23 1016 03/04/23 1200   BP: 155/88 126/78   Pulse: 79 78   Patient Position - Orthostatic VS: Lying Lying       Physical Exam    ED Medications  Medications   iodixanol (VISIPAQUE) 320 MG/ML injection 80 mL (80 mL Intravenous Given 3/4/23 1138)       Diagnostic Studies  Results Reviewed     Procedure Component Value Units Date/Time    HS Troponin 0hr (reflex protocol) [[id number]]  (Normal) Collected: 03/04/23 1151    Lab Status: Final result Specimen: Blood from Arm, Left Updated: 03/04/23 1228     hs TnI 0hr 15 ng/L     HS Troponin I 2hr [[id number]]     Lab Status: No result Specimen: Blood     HS Troponin I 4hr [[id number]]     Lab Status: No result Specimen: Blood     Urine Microscopic [[id number]]  (Abnormal) Collected: 03/04/23 1106    Lab Status: Final result Specimen: Urine, Clean Catch Updated: 03/04/23 1127     RBC, UA 1-2 /hpf      WBC, UA 10-20 /hpf      Epithelial Cells Occasional /hpf      Bacteria, UA None Seen /hpf      MUCUS THREADS Occasional     Hyaline Casts, UA 5-10 /lpf     Urine culture [[id number]] Collected: 03/04/23 1106    Lab Status:  In process Specimen: Urine, Clean Catch Updated: 03/04/23 1127    Urine Macroscopic, POC [[id number]]  (Abnormal) Collected: 03/04/23 1106    Lab Status: Final result Specimen: Urine Updated: 03/04/23 1108     Color, UA Yellow     Clarity, UA Clear     pH, UA 5 5     Leukocytes, UA Trace     Nitrite, UA Negative     Protein, UA Negative mg/dl      Glucose, UA Negative mg/dl      Ketones, UA Negative mg/dl      Urobilinogen, UA 0 2 E U /dl      Bilirubin, UA Negative     Occult Blood, UA Trace     Specific [place], UA 1 025    Narrative:      CLINITEK RESULT    TSH [[id number]]  (Normal) Collected: 03/04/23 1018    Lab Status: Final result Specimen: Blood from Arm, Left Updated: 03/04/23 1050     TSH 3RD GENERATON 1 640 uIU/mL     Narrative:      Patients undergoing fluorescein dye angiography may retain small amounts of fluorescein in the body for 48-72 hours post procedure  Samples containing fluorescein can produce falsely depressed TSH values  If the patient had this procedure,a specimen should be resubmitted post fluorescein clearance        Comprehensive metabolic panel [[id number]]  (Abnormal) Collected: 03/04/23 1018    Lab Status: Final result Specimen: Blood from Arm, Left Updated: 03/04/23 1043     Sodium 136 mmol/L      Potassium 4 5 mmol/L      Chloride 107 mmol/L      CO2 25 mmol/L      ANION GAP 4 mmol/L      BUN 22 mg/dL      Creatinine 1 69 mg/dL      Glucose 96 mg/dL      Calcium 11 1 mg/dL      Corrected Calcium 11 7 mg/dL      AST 21 U/L      ALT 25 U/L      Alkaline Phosphatase 122 U/L      Total Protein 7 7 g/dL      Albumin 3 3 g/dL      Total Bilirubin 0 82 mg/dL      eGFR 38 ml/min/1 73sq m     Narrative:      [place] guidelines for Chronic Kidney Disease (CKD):   •  Stage 1 with normal or high GFR (GFR > 90 mL/min/1 73 square meters)  •  Stage 2 Mild CKD (GFR = 60-89 mL/min/1 73 square meters)  •  Stage 3A Moderate CKD (GFR = 45-59 mL/min/1 73 square meters)  •  Stage 3B Moderate CKD (GFR = 30-44 mL/min/1 73 square meters)  •  Stage 4 Severe CKD (GFR = 15-29 mL/min/1 73 square meters)  •  Stage 5 End Stage CKD (GFR <15 mL/min/1 73 square meters)  Note: GFR calculation is accurate only with a steady state creatinine    CBC and differential [[id number]]  (Abnormal) Collected: 03/04/23 1018    Lab Status: Final result Specimen: Blood from Arm, Left Updated: 03/04/23 1025     WBC 7 63 Thousand/uL      RBC 4 65 Million/uL      Hemoglobin 13 4 g/dL      Hematocrit 42 4 % MCV 91 fL      MCH 28 8 pg      MCHC 31 6 g/dL      RDW 15 5 %      MPV 10 5 fL      Platelets 779 Thousands/uL      nRBC 0 /100 WBCs      Neutrophils Relative 71 %      Immat GRANS % 1 %      Lymphocytes Relative 15 %      Monocytes Relative 10 %      Eosinophils Relative 2 %      Basophils Relative 1 %      Neutrophils Absolute 5 51 Thousands/µL      Immature Grans Absolute 0 04 Thousand/uL      Lymphocytes Absolute 1 16 Thousands/µL      Monocytes Absolute 0 73 Thousand/µL      Eosinophils Absolute 0 15 Thousand/µL      Basophils Absolute 0 04 Thousands/µL     Fingerstick Glucose (POCT) [[id number]]  (Normal) Collected: 03/04/23 1018    Lab Status: Final result Updated: 03/04/23 1019     POC Glucose 82 mg/dl                  CTA head and neck with and without contrast   Final Result by Deblert Stevens MD (03/04 1234)      No acute intracranial abnormality  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 with new partial contrast opacification of partially thrombosed false lumen in the distal aortic arch just after the takeoff of left subclavian artery possible due to    new intimal disruption  Recommend evaluation by cardiothoracic or vascular surgery  No large vessel occlusion, dissection, aneurysm, or high-grade stenosis in the head or neck  Additional chronic/incidental findings as detailed above  The study was marked in [facility] for immediate notification  Workstation performed: [id number]         XR chest portable    (Results Pending)         Procedures  Procedures      ED Course               Identification of Seniors at [address] [place] [address] Most Recent Value   (ISAR) Identification of Seniors at Risk    Before the illness or injury that brought you to the Emergency, did you need someone to help you on a regular basis?  0 Filed at: 03/04/2023 1012   In the last 24 hours, have you needed more help than usual? 0 Filed at: 03/04/2023 1012   Have you been hospitalized for one or more nights during the past 6 months? 1 Filed at: 03/04/2023 1012   In general, do you see well? 0 Filed at: 03/04/2023 1012   In general, do you have serious problems with your memory? 0 Filed at: 03/04/2023 1012   Do you take more than three different medications every day? 1 Filed at: 03/04/2023 1012   ISAR Score 2 Filed at: 03/04/2023 1012                              Medical Decision Making  79 yo M PMHx of HTN, PAFib (on eliquis and cardizem), hyperparathyroidism, recently diagnosed type B aortic dissection (12/2021 s/p recent medical management) presents to the ED with daughter for complaint of confusion  DDx: Congestion, UTI, electrolyte disturbance, stroke intracranial mass, developing dementia, medication side effect  We will obtain broad work-up including labs, urine, CTA head and neck to rule out ascending dissection from known descending dissection, intracranial bleed stroke or mass  If all within normal limits, will plan to admit patient to medicine for neurology consult and need for MRI  Amount and/or Complexity of Data Reviewed  Labs: ordered  Radiology: ordered  Risk  Prescription drug management  Decision regarding hospitalization  Disposition  Final diagnoses:    Altered mental status   Confusion   Dissection of descending thoracic aorta   Chronic renal insufficiency     Time reflects when diagnosis was documented in both MDM as applicable and the Disposition within this note     Time User Action Codes Description Comment    3/4/2023 12:54 PM Angel Flakes Add [R41 82] Altered mental status     3/4/2023 12:54 PM Angel Flakes Add [R41 0] Confusion     3/4/2023  1:18 PM Siria Fregosoyle Add [I71 012] Dissection of descending thoracic aorta     3/4/2023  1:19 PM Guinevere Jad Add [N18 9] Chronic renal insufficiency       ED Disposition     ED Disposition   Admit    Condition   Stable    Date/Time   Sat Mar 4, 2023 12:54 PM    Comment   Case was discussed with Dr Rebeca Rosado and the patient's admission status was agreed to be Admission Status: observation status to the service of Dr Rebeca Rosado  Follow-up Information    None         Patient's Medications   Discharge Prescriptions    No medications on file     No discharge procedures on file  PDMP Review       Value Time User    PDMP Reviewed  Yes 2/19/2023  8:52 PM Raul Dyer MD           ED Provider  Attending physically available and evaluated Evy Mendoza I managed the patient along with the ED Attending      Electronically Signed by         Louis Persaud MD  03/04/23 Alla Hoyt MD  03/04/23 [id number]

## 2023-03-04 NOTE — ASSESSMENT & PLAN NOTE
· Patient and daughter endorsing about 1 week of increasing confusion  · Currently speaking, they feel as though over the past 3 months or so, Heriberto Camilo may have had a slight steady decline in his cognition  Last weeks where the symptoms seem more precipitous  · No focal neurologic deficit and neuroimaging as a pertains to the brain negative at this time  Low suspicion for CVA, suspect metabolic encephalopathy  Differential may include UTI and/or hypercalcemia  Daughter also states blood pressures tend to be labile at home   See treatment plans for these issues

## 2023-03-04 NOTE — ASSESSMENT & PLAN NOTE
· Patient with known type B dissection following up with Dr Candance Large on 3/9/23   CTA CVA for AMS noting possible increased intima disruption  · Will reach out to cardiac surgery for opinion  · Hemodynamically stable and without chest pain, see Hypertension section  · Continue Eliquis

## 2023-03-04 NOTE — QUICK NOTE
Discussed with Dr Caorlyne Rivera of Cardiovascular Surgery, recommended dedicated CT chest/abdomen/pelvis to assess    Patient already had dye load today, so we will order it for tomorrow

## 2023-03-04 NOTE — ASSESSMENT & PLAN NOTE
Lab Results   Component Value Date    EGFR 38 03/04/2023    EGFR 43 02/07/2023    EGFR 40 01/12/2023    CREATININE 1 69 (H) 03/04/2023    CREATININE 1 55 (H) 02/07/2023    CREATININE 1 63 (H) 01/12/2023     · Renal function appears within baseline readings  · Renally dose meds and repeat BMP in am

## 2023-03-04 NOTE — ED ATTENDING ATTESTATION
3/4/2023  ILucy MD, saw and evaluated the patient  I have discussed the patient with the resident/non-physician practitioner and agree with the resident's/non-physician practitioner's findings, Plan of Care, and MDM as documented in the resident's/non-physician practitioner's note, except where noted  All available labs and Radiology studies were reviewed  I was present for key portions of any procedure(s) performed by the resident/non-physician practitioner and I was immediately available to provide assistance  At this point I agree with the current assessment done in the Emergency Department  I have conducted an independent evaluation of this patient a history and physical is as follows:    ED Course     16-year-old male, history of hypertension, paroxysmal atrial fibrillation, hyperparathyroidism, recent dissection of type B aortic dissection, followed by cardiothoracic surgery with referral to vascular surgery for potential axillary bifemoral bypass, presenting to the emergency department for evaluation of 1 week history of confusion  Describes confusion includes episodes of not being able to use the  appropriately  Confusion about what day he has appointments  Confusion about taking medications  Patient reports continued chest discomfort  No significant shortness of breath, fever, cough, abdominal pain, nausea, vomiting, diarrhea  No recent changes in medications  10 systems reviewed and negative except as noted  The patient is resting comfortably on a stretcher in no acute respiratory distress  The patient appears nontoxic  HEENT reveals moist mucous membranes  Patient has some mild ptosis of the right eye lid  Pupils are 2 mm bilaterally reactive  Extraocular movements are intact and full  Head is normocephalic and atraumatic  Conjunctiva and sclera are normal  Neck is nontender and supple with full range of motion to flexion, extension, lateral rotation   No meningismus appreciated  No masses are appreciated  Lungs are clear to auscultation bilaterally without any wheezes, rales or rhonchi  Heart is regular rate and rhythm without any murmurs, rubs or gallops  Abdomen is soft and nontender without any rebound or guarding  Extremities appear grossly normal without any significant arthropathy  Patient is awake, alert, and oriented x3  The patient has normal interaction  Cranial nerves 2 through 12 are intact     Motor is 5 out of 5 bilateral upper and lower extremities  No pronator drift  Normal finger-to-nose bilaterally  Normal gait  MEDICAL DECISION MAKING    Number and Complexity of Problems  • Differential diagnosis: Given the right eye ptosis and the altered mental status, concern for carotid dissection  Other differentials include stroke, intracranial mass, infection, electrolyte abnormality, uremia  Medical Decision Making Data  • External documents reviewed: Reviewed most recent office visit with Dr Candance Large from cardiothoracic surgery  Reviewed CT scan demonstrating thrombosis of the false lumen of the type B dissection with near occlusion of the true lumen of the thoracic aorta  • My EKG interpretation: Atrial fibrillation  No acute ST or T wave changes  • My CT interpretation: No intracranial bleed  No carotid dissection  • My X-ray interpretation: Widened mediastinum  No acute infiltrates  XR chest portable   ED Interpretation   Wide mediastinum  No cardiopulmonary disease  CTA head and neck with and without contrast   Final Result      No acute intracranial abnormality  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 with new partial contrast opacification of partially thrombosed false lumen in the distal aortic arch just after the takeoff of left subclavian artery possible due to    new intimal disruption  Recommend evaluation by cardiothoracic or vascular surgery        No large vessel occlusion, dissection, aneurysm, or high-grade stenosis in the head or neck  Additional chronic/incidental findings as detailed above  The study was marked in [facility] for immediate notification  Workstation performed: [id number]             Labs Reviewed   CBC AND DIFFERENTIAL - Abnormal       Result Value Ref Range Status    WBC 7 63  4 31 - 10 16 Thousand/uL Final    RBC 4 65  3 88 - 5 62 Million/uL Final    Hemoglobin 13 4  12 0 - 17 0 g/dL Final    Hematocrit 42 4  36 5 - 49 3 % Final    MCV 91  82 - 98 fL Final    MCH 28 8  26 8 - 34 3 pg Final    MCHC 31 6  31 4 - 37 4 g/dL Final    RDW 15 5 (*) 11 6 - 15 1 % Final    MPV 10 5  8 9 - 12 7 fL Final    Platelets 210  117 - 390 Thousands/uL Final    nRBC 0  /100 WBCs Final    Neutrophils Relative 71  43 - 75 % Final    Immat GRANS % 1  0 - 2 % Final    Lymphocytes Relative 15  14 - 44 % Final    Monocytes Relative 10  4 - 12 % Final    Eosinophils Relative 2  0 - 6 % Final    Basophils Relative 1  0 - 1 % Final    Neutrophils Absolute 5 51  1 85 - 7 62 Thousands/µL Final    Immature Grans Absolute 0 04  0 00 - 0 20 Thousand/uL Final    Lymphocytes Absolute 1 16  0 60 - 4 47 Thousands/µL Final    Monocytes Absolute 0 73  0 17 - 1 22 Thousand/µL Final    Eosinophils Absolute 0 15  0 00 - 0 61 Thousand/µL Final    Basophils Absolute 0 04  0 00 - 0 10 Thousands/µL Final   COMPREHENSIVE METABOLIC PANEL - Abnormal    Sodium 136  135 - 147 mmol/L Final    Potassium 4 5  3 5 - 5 3 mmol/L Final    Chloride 107  96 - 108 mmol/L Final    CO2 25  21 - 32 mmol/L Final    ANION GAP 4  4 - 13 mmol/L Final    BUN 22  5 - 25 mg/dL Final    Creatinine 1 69 (*) 0 60 - 1 30 mg/dL Final    Comment: Standardized to IDMS reference method    Glucose 96  65 - 140 mg/dL Final    Comment: If the patient is fasting, the ADA then defines impaired fasting glucose as > 100 mg/dL and diabetes as > or equal to 123 mg/dL    Specimen collection should occur prior to Sulfasalazine administration due to the potential for falsely depressed results  Specimen collection should occur prior to Sulfapyridine administration due to the potential for falsely elevated results  Calcium 11 1 (*) 8 3 - 10 1 mg/dL Final    Corrected Calcium 11 7 (*) 8 3 - 10 1 mg/dL Final    AST 21  5 - 45 U/L Final    Comment: Specimen collection should occur prior to Sulfasalazine administration due to the potential for falsely depressed results  ALT 25  12 - 78 U/L Final    Comment: Specimen collection should occur prior to Sulfasalazine and/or Sulfapyridine administration due to the potential for falsely depressed results  Alkaline Phosphatase 122 (*) 46 - 116 U/L Final    Total Protein 7 7  6 4 - 8 4 g/dL Final    Albumin 3 3 (*) 3 5 - 5 0 g/dL Final    Total Bilirubin 0 82  0 20 - 1 00 mg/dL Final    Comment: Use of this assay is not recommended for patients undergoing treatment with eltrombopag due to the potential for falsely elevated results      eGFR 38  ml/min/1 73sq m Final    Narrative:     [place] guidelines for Chronic Kidney Disease (CKD):   •  Stage 1 with normal or high GFR (GFR > 90 mL/min/1 73 square meters)  •  Stage 2 Mild CKD (GFR = 60-89 mL/min/1 73 square meters)  •  Stage 3A Moderate CKD (GFR = 45-59 mL/min/1 73 square meters)  •  Stage 3B Moderate CKD (GFR = 30-44 mL/min/1 73 square meters)  •  Stage 4 Severe CKD (GFR = 15-29 mL/min/1 73 square meters)  •  Stage 5 End Stage CKD (GFR <15 mL/min/1 73 square meters)  Note: GFR calculation is accurate only with a steady state creatinine   URINE MICROSCOPIC - Abnormal    RBC, UA 1-2  None Seen, 1-2 /hpf Final    WBC, UA 10-20 (*) None Seen, 1-2 /hpf Final    Epithelial Cells Occasional  None Seen, Occasional /hpf Final    Bacteria, UA None Seen  None Seen, Occasional /hpf Final    MUCUS THREADS Occasional (*) None Seen Final    Hyaline Casts, UA 5-10 (*) None Seen /lpf Final   URINE MACROSCOPIC, POC - Abnormal    Color, UA Yellow   Final    Clarity, UA Clear   Final    pH, UA 5 5  4 5 - 8 0 Final    Leukocytes, UA Trace (*) Negative Final    Nitrite, UA Negative  Negative Final    Protein, UA Negative  Negative mg/dl Final    Glucose, UA Negative  Negative mg/dl Final    Ketones, UA Negative  Negative mg/dl Final    Urobilinogen, UA 0 2  0 2, 1 0 E U /dl E U /dl Final    Bilirubin, UA Negative  Negative Final    Occult Blood, UA Trace (*) Negative Final    Specific Gravity, UA 1 025  1 003 - 1 030 Final    Narrative:     CLINITEK RESULT   TSH, 3RD GENERATION - Normal    TSH 3RD GENERATON 1 640  0 450 - 4 500 uIU/mL Final    Comment: Adult TSH (3rd generation) reference range follows the recommended guidelines of the [employer], January, 2020  Narrative:     Patients undergoing fluorescein dye angiography may retain small amounts of fluorescein in the body for 48-72 hours post procedure  Samples containing fluorescein can produce falsely depressed TSH values  If the patient had this procedure,a specimen should be resubmitted post fluorescein clearance  HS TROPONIN I 0HR - Normal    hs TnI 0hr 15  "Refer to ACS Flowchart"- see link ng/L Final    Comment:                                              Initial (time 0) result  If >=50 ng/L, Myocardial injury suggested ;  Type of myocardial injury and treatment strategy  to be determined  If 5-49 ng/L, a delta result at 2 hours and or 4 hours will be needed to further evaluate  If <4 ng/L, and chest pain has been >3 hours since onset, patient may qualify for discharge based on the HEART score in the ED  If <5 ng/L and <3hours since onset of chest pain, a delta result at 2 hours will be needed to further evaluate  HS Troponin 99th Percentile URL of a Health Population=12 ng/L with a 95% Confidence Interval of 8-18 ng/L      Second Troponin (time 2 hours)  If calculated delta >= 20 ng/L,  Myocardial injury suggested ; Type of myocardial injury and treatment strategy to be determined  If 5-49 ng/L and the calculated delta is 5-19 ng/L, consult medical service for evaluation  Continue evaluation for ischemia on ecg and other possible etiology and repeat hs troponin at 4 hours  If delta is <5 ng/L at 2 hours, consider discharge based on risk stratification via the HEART score (if in ED), or YULIA risk score in IP/Observation  HS Troponin 99th Percentile URL of a Health Population=12 ng/L with a 95% Confidence Interval of 8-18 ng/L    HS TROPONIN I 2HR - Normal    hs TnI 2hr 16  "Refer to ACS Flowchart"- see link ng/L Final    Comment:                                              Initial (time 0) result  If >=50 ng/L, Myocardial injury suggested ;  Type of myocardial injury and treatment strategy  to be determined  If 5-49 ng/L, a delta result at 2 hours and or 4 hours will be needed to further evaluate  If <4 ng/L, and chest pain has been >3 hours since onset, patient may qualify for discharge based on the HEART score in the ED  If <5 ng/L and <3hours since onset of chest pain, a delta result at 2 hours will be needed to further evaluate  HS Troponin 99th Percentile URL of a Health Population=12 ng/L with a 95% Confidence Interval of 8-18 ng/L  Second Troponin (time 2 hours)  If calculated delta >= 20 ng/L,  Myocardial injury suggested ; Type of myocardial injury and treatment strategy to be determined  If 5-49 ng/L and the calculated delta is 5-19 ng/L, consult medical service for evaluation  Continue evaluation for ischemia on ecg and other possible etiology and repeat hs troponin at 4 hours  If delta is <5 ng/L at 2 hours, consider discharge based on risk stratification via the HEART score (if in ED), or YULIA risk score in IP/Observation  HS Troponin 99th Percentile URL of a Health Population=12 ng/L with a 95% Confidence Interval of 8-18 ng/L      Delta 2hr hsTnI 1  <20 ng/L Final   POCT GLUCOSE - Normal    POC Glucose 82  65 - 140 mg/dl Final    Comment: CRITICAL VALUE NOTED   URINE CULTURE   COVID19, INFLUENZA A/B, RSV PCR, [facility]   HS TROPONIN I 4HR       • Labs reviewed by me are significant for: Hypercalcemia with corrected calcium of 11 2  Elevated troponin with delta troponin of 1   • Discussed case with: Admitting hospitalist physician  • Considered admission for: Altered mental status  Treatment and Disposition  ED course: Patient underwent evaluation with radiographic studies which were reviewed by me  Lab work was performed and reviewed by me  No identifiable reason for altered mental status  Patient will be admitted for MRI and neurology consultation  Shared decision making: Patient and daughter are agreeable to admission  Code status: Full code                Critical Care Time  Procedures

## 2023-03-05 ENCOUNTER — APPOINTMENT (OUTPATIENT)
Dept: RADIOLOGY | Facility: HOSPITAL | Age: 78
End: 2023-03-05

## 2023-03-05 LAB
ANION GAP SERPL CALCULATED.3IONS-SCNC: 4 MMOL/L (ref 4–13)
BUN SERPL-MCNC: 22 MG/DL (ref 5–25)
CA-I BLD-SCNC: 1.27 MMOL/L (ref 1.12–1.32)
CALCIUM SERPL-MCNC: 10 MG/DL (ref 8.3–10.1)
CHLORIDE SERPL-SCNC: 112 MMOL/L (ref 96–108)
CO2 SERPL-SCNC: 24 MMOL/L (ref 21–32)
CREAT SERPL-MCNC: 1.62 MG/DL (ref 0.6–1.3)
GFR SERPL CREATININE-BSD FRML MDRD: 40 ML/MIN/1.73SQ M
GLUCOSE SERPL-MCNC: 90 MG/DL (ref 65–140)
POTASSIUM SERPL-SCNC: 4.4 MMOL/L (ref 3.5–5.3)
SODIUM SERPL-SCNC: 140 MMOL/L (ref 135–147)

## 2023-03-05 RX ORDER — ACETYLCYSTEINE 200 MG/ML
1200 SOLUTION ORAL; RESPIRATORY (INHALATION) 2 TIMES DAILY
Status: DISCONTINUED | OUTPATIENT
Start: 2023-03-05 | End: 2023-03-05

## 2023-03-05 RX ORDER — SODIUM CHLORIDE, SODIUM GLUCONATE, SODIUM ACETATE, POTASSIUM CHLORIDE, MAGNESIUM CHLORIDE, SODIUM PHOSPHATE, DIBASIC, AND POTASSIUM PHOSPHATE .53; .5; .37; .037; .03; .012; .00082 G/100ML; G/100ML; G/100ML; G/100ML; G/100ML; G/100ML; G/100ML
75 INJECTION, SOLUTION INTRAVENOUS CONTINUOUS
Status: DISPENSED | OUTPATIENT
Start: 2023-03-05 | End: 2023-03-06

## 2023-03-05 RX ADMIN — METOPROLOL SUCCINATE 100 MG: 100 TABLET, EXTENDED RELEASE ORAL at 08:48

## 2023-03-05 RX ADMIN — ALLOPURINOL 100 MG: 100 TABLET ORAL at 08:48

## 2023-03-05 RX ADMIN — IOHEXOL 100 ML: 350 INJECTION, SOLUTION INTRAVENOUS at 12:58

## 2023-03-05 RX ADMIN — LOSARTAN POTASSIUM 100 MG: 50 TABLET, FILM COATED ORAL at 08:48

## 2023-03-05 RX ADMIN — ATORVASTATIN CALCIUM 20 MG: 20 TABLET, FILM COATED ORAL at 08:47

## 2023-03-05 RX ADMIN — SODIUM CHLORIDE, SODIUM GLUCONATE, SODIUM ACETATE, POTASSIUM CHLORIDE AND MAGNESIUM CHLORIDE 75 ML/HR: 526; 502; 368; 37; 30 INJECTION, SOLUTION INTRAVENOUS at 15:35

## 2023-03-05 RX ADMIN — APIXABAN 5 MG: 5 TABLET, FILM COATED ORAL at 08:48

## 2023-03-05 RX ADMIN — LEVOTHYROXINE SODIUM 75 MCG: 75 TABLET ORAL at 06:10

## 2023-03-05 RX ADMIN — APIXABAN 5 MG: 5 TABLET, FILM COATED ORAL at 16:47

## 2023-03-05 RX ADMIN — METOPROLOL SUCCINATE 100 MG: 100 TABLET, EXTENDED RELEASE ORAL at 22:15

## 2023-03-05 RX ADMIN — CEFTRIAXONE SODIUM 1000 MG: 10 INJECTION, POWDER, FOR SOLUTION INTRAVENOUS at 14:00

## 2023-03-05 RX ADMIN — MIRTAZAPINE 7.5 MG: 15 TABLET, FILM COATED ORAL at 22:12

## 2023-03-05 RX ADMIN — UMECLIDINIUM 1 PUFF: 62.5 AEROSOL, POWDER ORAL at 02:39

## 2023-03-05 RX ADMIN — DILTIAZEM HYDROCHLORIDE 120 MG: 120 CAPSULE, COATED, EXTENDED RELEASE ORAL at 08:48

## 2023-03-05 NOTE — ASSESSMENT & PLAN NOTE
Lab Results   Component Value Date    EGFR 40 03/05/2023    EGFR 38 03/04/2023    EGFR 43 02/07/2023    CREATININE 1 62 (H) 03/05/2023    CREATININE 1 69 (H) 03/04/2023    CREATININE 1 55 (H) 02/07/2023     · Renal function appears within baseline readings  ·   · Stable from yesterday from 1 69-1 62 this morning  · Patient ordered and received CT with contrast again this afternoon-will repeat bmp tomorrow AM  · We will continue gentle fluid hydration, will also add Mucomyst

## 2023-03-05 NOTE — CONSULTS
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  He was last seen in the office 2/16/23  At that time he was asymptomatic without any complaints  Imaging reviewed then without change in his Type B dissection and options provided to patient as documented in the notes  He was ultimately scheduled for a 6 month f/u at that time  Yesterday, he was seen in the ER for worsening confusion and decline in his mental state  During his workup he had a CTA head and neck showing new partial contrast of partially thrombosed false lumen  He was then directed by Dr Vance Negrete for repeat imaging with CTA CAP for better imaging  He was then transferred to [facility] for evaluation and imaging      Past Medical History:  Past Medical History:   Diagnosis Date   • Arthritis    • H/O echocardiogram 12/2018 9/2018   • History of EKG 2019    numerous tests between 9/6/2018 through 2/7/2019   • History of exercise stress test 10/2018   • History of Holter monitoring 09/2018   • Hyperlipidemia    • Hypertension    • Irregular heart beat    • Kidney stone      Past Surgical History:   Past Surgical History:   Procedure Laterality Date   • CARDIAC ELECTROPHYSIOLOGY STUDY AND ABLATION  01/2019   • CARDIOVERSION  02/2019   • FL RETROGRADE PYELOGRAM  05/12/2022   • FL RETROGRADE PYELOGRAM  09/01/2022   • HERNIA REPAIR     • IR JOINT INJECTION  09/30/2019   • LAPAROSCOPIC ASSISSTED TOTAL COLECTOMY W/ J-POUCH     • CO CYSTO/URETERO W/LITHOTRIPSY &INDWELL STENT INSRT N/A 2022    Procedure: BILATERAL RETROGRADE PYELOGRAM, CYSTOSCOPY,  LEFT URETEROSCOPY, LEFT LASER LITHOTRIPSY BASKET EXTRACTION, LEFT STENT;  Surgeon: Aguila Jaramillo MD;  Location: [address] [place] [address];  Service: Urology   • ID CYSTO/URETERO W/LITHOTRIPSY &INDWELL STENT INSRT Left 2022    Procedure: CYSTOSCOPY, URETEROSCOPY STENT REMOVAL, LASER LITHOTRIPSY, WITH BASKET, INSERTION OF URETERAL STENT;  Surgeon: Aguila Jaramillo MD;  Location: [facility];  Service: Urology   • THORACOTOMY Left    • VASECTOMY       Family History:  Family History   Problem Relation Age of Onset   • Arthritis Father        Social History:  Social History     Substance and Sexual Activity   Alcohol Use Yes    Comment: Truman Dyer  Social History     Substance and Sexual Activity   Drug Use Never     Social History     Tobacco Use   Smoking Status Former   • Packs/day: 1 50   • Years: 15 00   • Pack years: 22 50   • Types: Cigarettes   • Quit date: 12   • Years since quittin 1   Smokeless Tobacco Never     Marital Status: /Civil Union      Home Medications:   Prior to Admission medications    Medication Sig Start Date End Date Taking?  Authorizing Provider   acetaminophen (TYLENOL) 650 mg CR tablet Take 650 mg by mouth every 8 (eight) hours as needed for mild pain   Yes Historical Provider, MD   allopurinol (ZYLOPRIM) 100 mg tablet Take 1 tablet (100 mg total) by mouth daily 22  Yes Mickie Orellana MD   atorvastatin (LIPITOR) 20 mg tablet TAKE 1 TABLET DAILY 10/24/22  Yes Mickie Orellana MD   diltiazem (CARDIZEM CD) 120 mg 24 hr capsule Take 1 capsule (120 mg total) by mouth daily 2/17/23 3/19/23 Yes Mickie Orellana MD   Eliquis 5 MG Take 1 tablet (5 mg total) by mouth 2 (two) times a day 23  Yes Mickie Orellana MD   INCRUSE ELLIPTA 62 5 MCG/INH AEPB inhaler 1 puff every evening   20  Yes Historical Provider, MD   levothyroxine 75 mcg tablet TAKE 1 TABLET DAILY 22  Yes Mickie Orellana MD   metoprolol succinate (TOPROL-XL) 100 mg 24 hr tablet 100 mg 2 (two) times a day 2/16/21  Yes Historical Provider, MD   mirtazapine (REMERON) 7 5 MG tablet Take 1 tablet (7 5 mg total) by mouth daily at bedtime 2/17/23  Yes Therese Barlow MD   oxyCODONE-acetaminophen (PERCOCET) 5-325 mg per tablet Take 1 tablet by mouth every 4 (four) hours as needed for moderate pain or severe pain Max Daily Amount: 6 tablets 1/6/23  Yes Therese Barlow MD   valsartan (DIOVAN) 160 mg tablet Take 1 tablet (160 mg total) by mouth 2 (two) times a day Twice a day   2/17/23 5/18/23 Yes Therese Barlow MD   aspirin (ECOTRIN LOW STRENGTH) 81 mg EC tablet Take 1 tablet (81 mg total) by mouth daily  Patient not taking: Reported on 2/16/2023 1/4/23 2/16/23  Ligia Lazcano PA-C   B Complex-C (B COMPLEX-VITAMIN C PO) Take 1 tablet by mouth daily  Patient not taking: Reported on 2/16/2023    Historical Provider, MD   Cholecalciferol (VITAMIN D3) 50 MCG (2000 UT) capsule Take 1 capsule by mouth  Patient not taking: Reported on 2/16/2023    Historical Provider, MD       Inpatient Medications:  Scheduled Meds:   Current Facility-Administered Medications   Medication Dose Route Frequency Provider Last Rate   • acetaminophen  650 mg Oral Q6H PRN Karime Culloden     • allopurinol  100 mg Oral Daily Karime Culloden     • apixaban  5 mg Oral BID Karime Culloden     • atorvastatin  20 mg Oral Daily Karime Culloden     • cefTRIAXone  1,000 mg Intravenous Q24H Karime Culloden Stopped (03/04/23 1518)   • diltiazem  120 mg Oral Daily Karime Culloden     • levothyroxine  75 mcg Oral Daily Karime Culloden     • losartan  100 mg Oral Daily Karime Culloden     • metoprolol succinate  100 mg Oral BID Karime Culloden     • mirtazapine  7 5 mg Oral HS Karime Culloden     • oxyCODONE-acetaminophen  1 tablet Oral Q6H PRN Karime Culloden     • sodium chloride  100 mL/hr Intravenous Continuous [facility] 100 mL/hr (03/04/23 1629)   • umeclidinium  1 puff Inhalation Daily Loreta Stubbs PA-C       Continuous Infusions: sodium chloride, 100 mL/hr, Last Rate: 100 mL/hr (03/04/23 1629)      PRN Meds:  acetaminophen, 650 mg, Q6H PRN  oxyCODONE-acetaminophen, 1 tablet, Q6H PRN        Allergies:  No Known Allergies    Review of Systems:  Review of Systems   Constitutional: Negative  HENT: Negative  Eyes: Negative  Respiratory:        Left lateral pain with movement in the morning    Cardiovascular: Negative  Gastrointestinal: Negative  Endocrine: Negative  Genitourinary: Negative  Musculoskeletal: Negative  Skin: Negative  Allergic/Immunologic: Negative  Neurological: Negative  Hematological: Negative  Psychiatric/Behavioral: Positive for confusion and decreased concentration  Vital Signs:     Vitals:    03/04/23 1935 03/04/23 2205 03/04/23 2323 03/05/23 0539   BP: 112/73 127/98 132/97 134/96   BP Location: Right arm      Pulse: 87 93 73 86   Resp:   15 15   Temp: 97 9 °F (36 6 °C)  98 3 °F (36 8 °C) 98 °F (36 7 °C)   TempSrc: Oral      SpO2: 97% 96% 96% 96%   Weight:         Invasive Devices     Peripheral Intravenous Line  Duration           Peripheral IV 03/05/23 Left Antecubital <1 day          Drain  Duration           Ureteral Internal Stent Left ureter 6 Fr  185 days                Physical Exam:  Physical Exam  Constitutional:       General: He is not in acute distress  Appearance: He is normal weight  He is not ill-appearing or toxic-appearing  HENT:      Head: Normocephalic and atraumatic  Right Ear: External ear normal       Left Ear: External ear normal       Nose: Nose normal       Mouth/Throat:      Mouth: Mucous membranes are moist       Pharynx: Oropharynx is clear  Eyes:      General: No scleral icterus  Right eye: No discharge  Left eye: No discharge  Extraocular Movements: Extraocular movements intact        Conjunctiva/sclera: Conjunctivae normal       Pupils: Pupils are equal, round, and reactive to light  Neck:      Vascular: No carotid bruit  Cardiovascular:      Rate and Rhythm: Rhythm irregular  Pulses: Normal pulses  Comments: 2+ dp pulses bilaterally, 2 + radial pulses bilaterally  Warm extremities x 4   Well perfused   Pulmonary:      Effort: Pulmonary effort is normal  No respiratory distress  Breath sounds: Normal breath sounds  No wheezing  Abdominal:      General: Abdomen is flat  Bowel sounds are normal  There is no distension  Tenderness: There is no abdominal tenderness  There is no guarding  Musculoskeletal:      Cervical back: Normal range of motion and neck supple  Right lower leg: No edema  Left lower leg: No edema  Skin:     General: Skin is warm  Neurological:      General: No focal deficit present  Mental Status: He is alert and oriented to person, place, and time  Cranial Nerves: No cranial nerve deficit  Sensory: No sensory deficit  Motor: No weakness  Coordination: Coordination normal       Gait: Gait normal       Comments: He was walking in the hallway upon arrival following his CT scan back to his room no deficits and completely normal ambulation   Psychiatric:         Mood and Affect: Mood normal          Behavior: Behavior normal          Thought Content:  Thought content normal          Judgment: Judgment normal          Lab Results:     Results from last 7 days   Lab Units 03/04/23  1018   WBC Thousand/uL 7 63   HEMOGLOBIN g/dL 13 4   HEMATOCRIT % 42 4   PLATELETS Thousands/uL 260     Results from last 7 days   Lab Units 03/05/23  0252 03/04/23  1018   POTASSIUM mmol/L 4 4 4 5   CHLORIDE mmol/L 112* 107   CO2 mmol/L 24 25   BUN mg/dL 22 22   CREATININE mg/dL 1 62* 1 69*   CALCIUM mg/dL 10 0 11 1*         Lab Results   Component Value Date    HGBA1C 5 6 05/17/2022     No results found for: CKTOTAL, CKMB, CKMBINDEX, TROPONINI    Imaging Studies:   CTA head and neck:   IMPRESSION:     No acute intracranial abnormality      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 with new partial contrast opacification of partially thrombosed false lumen in the distal aortic arch just after the takeoff of left subclavian artery possible due to   new intimal disruption  Recommend evaluation by cardiothoracic or vascular surgery      No large vessel occlusion, dissection, aneurysm, or high-grade stenosis in the head or neck  I have personally reviewed pertinent reports  Assessment:  Active Problems:    Stage 3a chronic kidney disease (HCC)    Hyperparathyroidism ([address] )    Hypertension    Hypothyroidism    Osteoarthrosis involving multiple sites    Hyperlipidemia    Thoracic aortic aneurysm without rupture    Hypercalcemia    UTI (urinary tract infection)    Metabolic encephalopathy      Plan:  CTA CAP pending, will follow up report  Adriane Parkinson was comfortable with our recommendations, and their questions were answered to their satisfaction  We will continue to evaluate the patient daily with further recommendations as work up is completed  Thank you for allowing us to participate in the care of this patient  SIGNATURE: XiomaraKendall Strong  DATE: March 5, 2023  TIME: 10:35 AM    * This note was completed in part utilizing [employer] direct voice recognition software  Grammatical errors, random word insertion, spelling mistakes, and incomplete sentences may be an occasional consequence of the system secondary to software limitations, ambient noise and hardware issues  At the time of dictation, efforts were made to edit, clarify and /or correct errors  Please read the chart carefully and recognize, using context, where substitutions have occurred  If you have any questions or concerns about the context, text or information contained within the body of this dictation, please contact myself, the provider, for further clarification

## 2023-03-05 NOTE — ASSESSMENT & PLAN NOTE
· Patient with increasing confusion, cystitis, and pyuria noted on microscopy  · Also notes urinary frequency, especially today    No faith dysuria  · Continue on ceftriaxone pending urine culture  · Patient is alert and oriented x3 during my evaluation today on 3/5/23

## 2023-03-05 NOTE — ASSESSMENT & PLAN NOTE
· Patient appears to have had some issues with hearing to his antihypertensives in the outpatient setting, on review of recent note it seems as though he was not taking his Cardizem after the initial prescription ran out  · Continue Cardizem,  continue Lopressor    Given presence of dissection, would aim to maintain systolic pressure of around 1 20-1 40 and heart rate around 65    · Monitor on Telemetry  · Hold patient's losartan in the setting of contrast exposure

## 2023-03-05 NOTE — ASSESSMENT & PLAN NOTE
· Mild at 11 1, and I suspect not severe enough to contribute to this patient's confusion, albeit perhaps it could be a minor contributor  · Calcium improved this morning to 10 after gentle fluid hydration ionized calcium within normal limits

## 2023-03-05 NOTE — ASSESSMENT & PLAN NOTE
· Appears as though this is suspected from outpatient work-up with elevated PTH, elevated calcium, and low Phos    · Calcium improved this morning with gentle fluid hydration

## 2023-03-05 NOTE — QUICK NOTE
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  He was last seen in the office 2/16/23  At that time he was asymptomatic without any complaints  Imaging reviewed then without change in his Type B dissection and options provided to patient as documented in the notes  He was ultimately scheduled for a 6 month f/u at that time  Yesterday, he was seen in the ER for worsening confusion and decline in his mental state  During his workup he had a CTA head and neck showing new partial contrast of partially thrombosed false lumen  Dr Buddy George recommended repeat imaging with CTA CAP for better imaging  He is currently getting imaging studies  We will see the patient in full consult then and review films once CTA completed

## 2023-03-05 NOTE — PROGRESS NOTES
[facility]  Progress Note Wade Vilchis 1945, 78 y o  male MRN: [id number]  Unit/Bed#: 2 214-01 Encounter: [id number]  Primary Care Provider: Sirena Massey MD   Date and time admitted to hospital: 3/4/2023 50:28 AM    Metabolic encephalopathy  Assessment & Plan  · Patient and daughter endorsing about 1 week of increasing confusion  · Currently speaking, they feel as though over the past 3 months or so, Francisco Back may have had a slight steady decline in his cognition  Last weeks where the symptoms seem more precipitous  · No focal neurologic deficit and neuroimaging as a pertains to the brain negative at this time  Low suspicion for CVA, suspect metabolic encephalopathy  Differential may include UTI and/or hypercalcemia  Daughter also states blood pressures tend to be labile at home  See treatment plans for these issues  · Patient's mental status is alert and oriented x3 today on 3/5, although he does note some memory difficulties  Check B12 levels, TSH within normal limits    UTI (urinary tract infection)  Assessment & Plan  · Patient with increasing confusion, cystitis, and pyuria noted on microscopy  · Also notes urinary frequency, especially today  No faith dysuria  · Continue on ceftriaxone pending urine culture  · Patient is alert and oriented x3 during my evaluation today on 3/5/23    Hypercalcemia  Assessment & Plan  · Mild at 11 1, and I suspect not severe enough to contribute to this patient's confusion, albeit perhaps it could be a minor contributor  · Calcium improved this morning to 10 after gentle fluid hydration ionized calcium within normal limits    Thoracic aortic aneurysm without rupture  Assessment & Plan  · Patient with known type B dissection following up with Dr Gabby Jefferson on 3/9/23   CTA CVA for AMS noting possible increased intima disruption  · Will reach out to cardiac surgery for opinion  · Hemodynamically stable and without chest pain, see Hypertension section  · Continue Eliquis  · Patient pending cta chest abdomen pelvis    Hyperlipidemia  Assessment & Plan  · Continue atorvastatin 20    Osteoarthrosis involving multiple sites  Assessment & Plan  Tylenol and home percocet PRN      Hypothyroidism  Assessment & Plan  · Continue levothyroxine    Hypertension  Assessment & Plan  · Patient appears to have had some issues with hearing to his antihypertensives in the outpatient setting, on review of recent note it seems as though he was not taking his Cardizem after the initial prescription ran out  · Continue Cardizem,  continue Lopressor  Given presence of dissection, would aim to maintain systolic pressure of around 1 20-1 40 and heart rate around 65    · Monitor on Telemetry  · Hold patient's losartan in the setting of contrast exposure    Hyperparathyroidism [facility])  Assessment & Plan  · Appears as though this is suspected from outpatient work-up with elevated PTH, elevated calcium, and low Phos  · Calcium improved this morning with gentle fluid hydration    Stage 3a chronic kidney disease [facility])  Assessment & Plan  Lab Results   Component Value Date    EGFR 40 03/05/2023    EGFR 38 03/04/2023    EGFR 43 02/07/2023    CREATININE 1 62 (H) 03/05/2023    CREATININE 1 69 (H) 03/04/2023    CREATININE 1 55 (H) 02/07/2023     · Renal function appears within baseline readings  ·   · Stable from yesterday from 1 69-1 62 this morning  · Patient ordered and received CT with contrast again this afternoon-will repeat bmp tomorrow AM  · We will continue gentle fluid hydration, will also add Mucomyst          VTE Pharmacologic Prophylaxis:   High Risk (Score >/= 5) - Pharmacological DVT Prophylaxis Ordered: apixaban (Eliquis)  Sequential Compression Devices Ordered  Patient Centered Rounds: I performed bedside rounds with nursing staff today    Discussions with Specialists or Other Care Team Provider: n/a    Education and Discussions with Family / Patient: Updated  (daughter) via phone  Total Time Spent on Date of Encounter in care of patient: 35 minutes This time was spent on one or more of the following: performing physical exam; counseling and coordination of care; obtaining or reviewing history; documenting in the medical record; reviewing/ordering tests, medications or procedures; communicating with other healthcare professionals and discussing with patient's family/caregivers  Current Length of Stay: 0 day(s)  Current Patient Status: Observation   Certification Statement: The patient will continue to require additional inpatient hospital stay due to IV antibiotics, cardiothoracic surgery evaluation  Discharge Plan: Anticipate discharge in 48-72 hrs to discharge location to be determined pending rehab evaluations  Code Status: Level 1 - Full Code    Subjective:   he is alert and orient x3, reports dysuria, otherwise denies any acute complaints denies any chest pain back pain lightheadedness dizziness    Objective:     Vitals:   Temp (24hrs), Av 3 °F (36 8 °C), Min:97 9 °F (36 6 °C), Max:98 7 °F (37 1 °C)    Temp:  [97 9 °F (36 6 °C)-98 7 °F (37 1 °C)] 98 °F (36 7 °C)  HR:  [] 86  Resp:  [15-18] 15  BP: ()/(56-98) 95/62  SpO2:  [94 %-98 %] 94 %  Body mass index is 31 62 kg/m²  Input and Output Summary (last 24 hours): Intake/Output Summary (Last 24 hours) at 3/5/2023 1301  Last data filed at 3/5/2023 0900  Gross per 24 hour   Intake 1510 ml   Output 1250 ml   Net 260 ml       Physical Exam:   Physical Exam  Vitals and nursing note reviewed  Constitutional:       General: He is not in acute distress  Appearance: He is well-developed  He is not ill-appearing, toxic-appearing or diaphoretic  HENT:      Head: Normocephalic and atraumatic  Eyes:      General: No scleral icterus  Conjunctiva/sclera: Conjunctivae normal    Cardiovascular:      Rate and Rhythm: Normal rate and regular rhythm  Heart sounds:  No murmur heard  No friction rub  No gallop  Pulmonary:      Effort: Pulmonary effort is normal  No respiratory distress  Breath sounds: Normal breath sounds  No stridor  No wheezing or rhonchi  Abdominal:      General: There is no distension  Palpations: Abdomen is soft  There is no mass  Tenderness: There is no abdominal tenderness  There is no guarding or rebound  Hernia: No hernia is present  Musculoskeletal:         General: No swelling, tenderness, deformity or signs of injury  Cervical back: Neck supple  Skin:     General: Skin is warm and dry  Capillary Refill: Capillary refill takes less than 2 seconds  Coloration: Skin is not jaundiced or pale  Neurological:      Mental Status: He is alert and oriented to person, place, and time     Psychiatric:         Mood and Affect: Mood normal           Additional Data:     Labs:  Results from last 7 days   Lab Units 03/04/23  1018   WBC Thousand/uL 7 63   HEMOGLOBIN g/dL 13 4   HEMATOCRIT % 42 4   PLATELETS Thousands/uL 260   NEUTROS PCT % 71   LYMPHS PCT % 15   MONOS PCT % 10   EOS PCT % 2     Results from last 7 days   Lab Units 03/05/23  0252 03/04/23  1018   SODIUM mmol/L 140 136   POTASSIUM mmol/L 4 4 4 5   CHLORIDE mmol/L 112* 107   CO2 mmol/L 24 25   BUN mg/dL 22 22   CREATININE mg/dL 1 62* 1 69*   ANION GAP mmol/L 4 4   CALCIUM mg/dL 10 0 11 1*   ALBUMIN g/dL  --  3 3*   TOTAL BILIRUBIN mg/dL  --  0 82   ALK PHOS U/L  --  122*   ALT U/L  --  25   AST U/L  --  21   GLUCOSE RANDOM mg/dL 90 96         Results from last 7 days   Lab Units 03/04/23  1018   POC GLUCOSE mg/dl 82               Lines/Drains:  Invasive Devices     Peripheral Intravenous Line  Duration           Peripheral IV 03/05/23 Left Antecubital <1 day          Drain  Duration           Ureteral Internal Stent Left ureter 6 Fr  185 days                  Telemetry:  Telemetry Orders (From admission, onward)             24 Hour Telemetry Monitoring Continuous x 24 Hours (Telem)        References:    Telemetry Guidelines   Question:  Reason for 24 Hour Telemetry  Answer:  Acute CVA (>24 hrs old)                              Imaging: No pertinent imaging reviewed  Recent Cultures (last 7 days):         Last 24 Hours Medication List:   Current Facility-Administered Medications   Medication Dose Route Frequency Provider Last Rate   • acetaminophen  650 mg Oral Q6H PRN Soraida Brooks     • acetylcysteine  1,200 mg Oral BID Jeet Barnard DO     • allopurinol  100 mg Oral Daily Soraida Brooks     • apixaban  5 mg Oral BID Soraida Brooks     • atorvastatin  20 mg Oral Daily Soraida Todd     • cefTRIAXone  1,000 mg Intravenous Q24H Soraida Brooks Stopped (03/04/23 9918)   • diltiazem  120 mg Oral Daily Soraida Brooks     • levothyroxine  75 mcg Oral Daily Soraida Brooks     • metoprolol succinate  100 mg Oral BID Soraida Brooks     • mirtazapine  7 5 mg Oral HS Soraida Brooks     • multi-electrolyte  75 mL/hr Intravenous Continuous Jeet Barnard DO     • oxyCODONE-acetaminophen  1 tablet Oral Q6H PRN Soraida Brooks     • umeclidinium  1 puff Inhalation Daily James Andres PA-C          Today, Patient Was Seen By: Lennox Mason, DO    **Please Note: This note may have been constructed using a voice recognition system  **

## 2023-03-05 NOTE — ASSESSMENT & PLAN NOTE
· Patient and daughter endorsing about 1 week of increasing confusion  · Currently speaking, they feel as though over the past 3 months or so, Albin Varela may have had a slight steady decline in his cognition  Last weeks where the symptoms seem more precipitous  · No focal neurologic deficit and neuroimaging as a pertains to the brain negative at this time  Low suspicion for CVA, suspect metabolic encephalopathy  Differential may include UTI and/or hypercalcemia  Daughter also states blood pressures tend to be labile at home  See treatment plans for these issues  · Patient's mental status is alert and oriented x3 today on 3/5, although he does note some memory difficulties    Check B12 levels, TSH within normal limits

## 2023-03-05 NOTE — UTILIZATION REVIEW
Initial Clinical Review    OBSERVATION WRITTEN 3/4/23 @ 1255 CONVERTED TO INPATIENT ADMISSION 3/5/23 @ 1306 DUE TO FURTHER DIAGNOSTIC WORKUP REQUIRED FOR ALTERED MENTAL STATUS, REQUIRING AT LEAST A 2 MIDNIGHT STAY  Admission: Date/Time/Statement:   Admission Orders (From admission, onward)     Ordered        03/05/23 1306  Inpatient Admission  Once            03/04/23 1255  Place in Observation  Once                      Orders Placed This Encounter   Procedures   • Inpatient Admission     Standing Status:   Standing     Number of Occurrences:   1     Order Specific Question:   Level of Care     Answer:   Med Surg [16]     Order Specific Question:   Estimated length of stay     Answer:   More than 2 Midnights     Order Specific Question:   Certification     Answer:   I certify that inpatient services are medically necessary for this patient for a duration of greater than two midnights  See H&P and MD Progress Notes for additional information about the patient's course of treatment  ED Arrival Information     Expected   3/4/2023     Arrival   3/4/2023 10:03    Acuity   Urgent            Means of arrival   Ambulance    Escorted by   [facility]    Service   Hospitalist    Admission type   Emergency            Arrival complaint   Confused           Chief Complaint   Patient presents with   • Altered Mental Status     Per EMS called to pt residence for confusion that happened this am  Per EMS pt was unable to use coffee machine this am  Pt is A&Ox4, but still repeats that something is off  Initial Presentation: 78 y o  male who presented via EMS to [address] [place] [address] ED  Initially admitted Observation status then changed to Inpatient to med surg telemetry dt Altered Mental Status  PMHx: type B aortic dissection, pretension, and polyarthritis  Presented with declining in mental state since beginning of 2023 with more confusion in last week   Pt has had some trouble navigating the house and doing some of his basic ADLs  Today, things seem to be at the worst where he was grabbing television remotes and acting like they were the phone and not knowing his way around the house or outdoors  Brought to ED for further eval  Neuroimaging shows possible extension of known dissection  Pt endorses a little bit of palpable chest discomfort, but is otherwise hemodynamically stable  Plan:  Pt c/o urinary frequency, fu on urine cx and start abx  Give IVF, monitor labs and renal function, cardiology recommends CT CAP, continue Eliquis and home meds  PT OT eval     3/5 Inpatient Admission:  Pt A&O x3 today, check B12 levels, fu on urine cx and continue abx, monitor electrolytes and continue IVF, continue telemetry and hold home losartan dt contrast exposure for CT CAP  Date:  3/6   Day 2:    Pt A&O x3 although voices concerns over his memory and daily functions at home  Daughter is working on placement to assisted living later this week  Start supplemental B12 and monitor mental status  Continue home meds except losartan  Repeat bmp in am    Date: 3/6    Day 3: Has surpassed a 2nd midnight with active treatments and services  Continue to monitor mental status and labs  Not safe to discharge to home, daughter working on placement        ED Triage Vitals [03/04/23 1016]   Temperature Pulse Respirations Blood Pressure SpO2   98 3 °F (36 8 °C) 79 18 155/88 97 %      Temp Source Heart Rate Source Patient Position - Orthostatic VS BP Location FiO2 (%)   Oral Monitor Lying Right arm --      Pain Score       No Pain          Wt Readings from Last 1 Encounters:   03/04/23 86 2 kg (190 lb)     Additional Vital Signs:   Date/Time Temp Pulse Resp BP MAP (mmHg) SpO2 O2 Device Patient Position - Orthostatic VS   03/06/23 06:23:45 97 6 °F (36 4 °C) 83 18 139/86 104 96 % None (Room air) Lying   03/06/23 02:14:36 97 5 °F (36 4 °C) 75 18 109/75 86 97 % None (Room air) Lying   03/05/23 23:18:56 98 2 °F (36 8 °C) 92 16 149/97 114 96 % None (Room air) Lying   03/05/23 22:14:46 -- 79 -- 117/75 89 97 % -- --   03/05/23 18:38:50 99 1 °F (37 3 °C) 96 -- 102/64 77 91 % -- --   03/05/23 15:17:12 98 8 °F (37 1 °C) 78 -- 96/66 76 95 % None (Room air) Lying   03/05/23 11:12:42 -- 86 -- 95/62 73 94 % -- --   03/05/23 0800 -- -- -- -- -- -- None (Room air) --   03/05/23 05:39:56 98 °F (36 7 °C) 86 15 134/96 109 96 % -- --   03/04/23 23:23:53 98 3 °F (36 8 °C) 73 15 132/97 109 96 % -- --   03/04/23 22:05:29 -- 93 -- 127/98 108 96 % -- --   03/04/23 19:35:19 97 9 °F (36 6 °C) 87 -- 112/73 86 97 % None (Room air) Lying   03/04/23 16:47:26 98 7 °F (37 1 °C) 73 -- 112/73 86 95 % -- --   03/04/23 16:46:26 98 7 °F (37 1 °C) -- 17 112/73 86 -- -- --   03/04/23 1500 -- 88 18 101/56 74 98 % None (Room air) --   03/04/23 1445 -- 102 16 113/64 83 97 % None (Room air) --   03/04/23 1400 -- 82 18 162/88 116 97 % None (Room air) Lying   03/04/23 1300 -- 82 16 116/78 92 97 % None (Room air) Lying   03/04/23 1200 -- 78 18 126/78 96 97 % None (Room air) Lying   03/04/23 1016 98 3 °F (36 8 °C) 79 18 155/88 -- 97 % None (Room air) Lying     Pertinent Labs/Diagnostic Test Results:   3/4 EKG: Atrial fibrillation  Nonspecific T wave abnormality  Abnormal ECG    CTA chest abdomen pelvis w wo contrast   Final Result by Adriane Seth MD (03/05 5176)      1) Vascular findings:      -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 extending from the takeoff of the left subclavian artery throughout the descending thoracic aorta, the abdominal aorta, and into both common iliac arteries  Increase in diameter of the proximal    portion of the aneurysm compared to February 2023, now measuring 5 1 cm proximally (compared to 4 6 cm) and 4 5 x 4 5 cm more distally (compared to 4 0 x 3 9 cm)        -Most of the false lumen in descending aorta thrombosed as in February 2023, with one unchanged area of partial opacification, however with a new region of opacification in the proximal portion of the aneurysm, which is also increased in size as    mentioned above, corresponding to the finding on CTA head and neck from 3/4/2023       -Mild opacification of the false lumen in the descending aorta more distally, with overall decreased compared to February 2023  Grossly unchanged degree of mild opacification of the false lumen in the abdominal aorta and the right common iliac artery,    and thrombosis of the false lumen in the left common iliac artery  -Extent of dissection unchanged from February 2023  Patent great vessels off the aortic arch with no intramural hematoma or dissection  Celiac trunk, SMA, bilateral renal, accessory left renal artery and TJ arising from the true lumen and patent       -Stable ascending thoracic aortic aneurysm measuring up to 4 6 cm with no intramural hematoma or dissection       -Stable bilateral common iliac artery aneurysms, measuring 2 0 cm on the right and 1 8 cm in the left  2) 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, which is somewhat distended with stool  Several dilated, fecalized loops of distal small bowel, however without a discrete transition point to suggest a definite bowel obstruction  Early bowel    obstruction or ileus are possible  3) No other acute abdominal or pelvic pathology  4) Overall stable 7 mm cystic lesion in the body of the pancreas  Follow-up is recommended in 2 years based on the size of the lesion and the age of the patient, preferably with MRI/MRCP if there are no contraindications  Considerations related to the    patient's age and/or comorbidities may be used to alter these recommendations  5) No acute pulmonary pathology  6) Additional findings as above  The study was marked in [facility] for immediate notification  Workstation performed: [id number]         XR chest portable   ED Interpretation by Omid Solorzano MD (03/04 4618)   Wide mediastinum    No cardiopulmonary disease  Final Result by Pat Geronimo MD (03/04 1635)      No acute cardiopulmonary disease  Workstation performed: [id number]         CTA head and neck with and without contrast   Final Result by Karina Jansen MD (03/04 1234)      No acute intracranial abnormality  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 with new partial contrast opacification of partially thrombosed false lumen in the distal aortic arch just after the takeoff of left subclavian artery possible due to    new intimal disruption  Recommend evaluation by cardiothoracic or vascular surgery  No large vessel occlusion, dissection, aneurysm, or high-grade stenosis in the head or neck  Additional chronic/incidental findings as detailed above  The study was marked in [facility] for immediate notification                    Workstation performed: [id number]           Results from last 7 days   Lab Units 03/04/23  1416   SARS-COV-2  Negative     Results from last 7 days   Lab Units 03/06/23  0503 03/04/23  1018   WBC Thousand/uL 7 00 7 63   HEMOGLOBIN g/dL 12 4 13 4   HEMATOCRIT % 39 7 42 4   PLATELETS Thousands/uL 230 260   NEUTROS ABS Thousands/µL 5 09 5 51         Results from last 7 days   Lab Units 03/06/23  0503 03/05/23  0252 03/05/23  0241 03/04/23  1018   SODIUM mmol/L 139 140  --  136   POTASSIUM mmol/L 4 0 4 4  --  4 5   CHLORIDE mmol/L 110* 112*  --  107   CO2 mmol/L 25 24  --  25   ANION GAP mmol/L 4 4  --  4   BUN mg/dL 19 22  --  22   CREATININE mg/dL 1 53* 1 62*  --  1 69*   EGFR ml/min/1 73sq m 43 40  --  38   CALCIUM mg/dL 10 1 10 0  --  11 1*   CALCIUM, IONIZED mmol/L  --   --  1 27  --      Results from last 7 days   Lab Units 03/04/23  1018   AST U/L 21   ALT U/L 25   ALK PHOS U/L 122*   TOTAL PROTEIN g/dL 7 7   ALBUMIN g/dL 3 3*   TOTAL BILIRUBIN mg/dL 0 82     Results from last 7 days   Lab Units 03/04/23  1018   POC GLUCOSE mg/dl 82     Results from last 7 days   Lab Units 03/06/23  0503 03/05/23  0252 03/04/23  1018   GLUCOSE RANDOM mg/dL 110 90 96     Results from last 7 days   Lab Units 03/04/23  1333 03/04/23  1151   HS TNI 0HR ng/L  --  15   HS TNI 2HR ng/L 16  --    HSTNI D2 ng/L 1  --      Results from last 7 days   Lab Units 03/04/23  1018   TSH 3RD GENERATON uIU/mL 1 640     Results from last 7 days   Lab Units 03/04/23  1106   CLARITY UA  Clear   COLOR UA  Yellow   SPEC GRAV UA  1 025   PH UA  5 5   GLUCOSE UA mg/dl Negative   KETONES UA mg/dl Negative   BLOOD UA  Trace*   PROTEIN UA mg/dl Negative   NITRITE UA  Negative   BILIRUBIN UA  Negative   UROBILINOGEN UA E U /dl 0 2   LEUKOCYTES UA  Trace*   WBC UA /hpf 10-20*   RBC UA /hpf 1-2   BACTERIA UA /hpf None Seen   EPITHELIAL CELLS WET PREP /hpf Occasional   MUCUS THREADS  Occasional*     Results from last 7 days   Lab Units 03/04/23  1416   INFLUENZA A PCR  Negative   INFLUENZA B PCR  Negative   RSV PCR  Negative     Results from last 7 days   Lab Units 03/04/23  1106   URINE CULTURE  40,000-49,000 cfu/ml Staphylococcus aureus*     ED Treatment:   Medication Administration from 03/04/2023 1003 to 03/04/2023 1636       Date/Time Order Dose Route Action     03/04/2023 1138 EST iodixanol (VISIPAQUE) 320 MG/ML injection 80 mL 80 mL Intravenous Given     03/04/2023 1446 EST cefTRIAXone (ROCEPHIN) 1,000 mg in dextrose 5 % 50 mL IVPB 1,000 mg Intravenous New Bag     03/04/2023 1629 EST sodium chloride 0 9 % infusion 100 mL/hr Intravenous New Bag        Past Medical History:   Diagnosis Date   • Aneurysm of common iliac artery (HCC)     b/l, 2 0cm on right, 1 8cm on left   • Aortic dissection, thoracoabdominal (HCC)     type B, from takeoff of left subclavian throughout DTA/abdominal aorta/both common iliac arteries, patent great vessels off the aortic arch with no IMH/dissection & celiac trunk/SMA/bilateral renal/accessory left renal artery/TJ arising from the true lumen & patent   • Arthritis    • Ascending aortic aneurysm     4 6cm, trileaflet AV   • Descending thoracic aortic aneurysm     5 1cm proximally, 4 5x4 5cm distally   • History of nephrolithiasis    • Hyperlipidemia    • Hypertension    • Obesity (BMI 30 0-34  9)    • PAF (paroxysmal atrial fibrillation) ([facility])     Eliquis   • Pancreatic cyst     7mm, pancreatic body     Present on Admission:  • Stage 3a chronic kidney disease ([facility])  • Hypertension  • Hypothyroidism  • Hyperparathyroidism ([address] )  • Thoracic aortic aneurysm without rupture  • Hyperlipidemia  • Osteoarthrosis involving multiple sites  • Aortic dissection, thoracoabdominal ([facility])      Admitting Diagnosis: Confusion [R41 0]  Altered mental status [R41 82]  Chronic renal insufficiency [N18 9]  Dissection of descending thoracic aorta [I71 012]  Age/Sex: 78 y o  male  Admission Orders:  Scheduled Medications:  allopurinol, 100 mg, Oral, Daily  apixaban, 5 mg, Oral, BID  atorvastatin, 20 mg, Oral, Daily  cefTRIAXone, 1,000 mg, Intravenous, Q24H  cyanocobalamin, 1,000 mcg, Intramuscular, Daily  [START ON 3/7/2023] diltiazem, 180 mg, Oral, Daily  diltiazem, 60 mg, Oral, Once  levothyroxine, 75 mcg, Oral, Daily  metoprolol succinate, 100 mg, Oral, BID  mirtazapine, 7 5 mg, Oral, HS  umeclidinium, 1 puff, Inhalation, Daily      Continuous IV Infusions:     multi-electrolyte (PLASMALYTE-A/ISOLYTE-S PH 7 4) IV solution  Rate: 75 mL/hr Dose: 75 mL/hr  Freq: Continuous Route: IV  Last Dose: Stopped (03/06/23 0220)  Start: 03/05/23 1300 End: 03/06/23 0134    PRN Meds:  acetaminophen, 650 mg, Oral, Q6H PRN  oxyCODONE-acetaminophen, 1 tablet, Oral, Q6H PRN    scd    IP CONSULT TO CARDIOTHORACIC SURGERY  IP CONSULT TO GERONTOLOGY    Network Utilization Review Department  ATTENTION: Please call with any questions or concerns to [phone] and carefully listen to the prompts so that you are directed to the right person   All voicemails are confidential   Mercedes Avelar all requests for admission clinical reviews, approved or denied determinations and any other requests to dedicated fax number below belonging to the campus where the patient is receiving treatment   List of dedicated fax numbers for the Facilities:  [address] [place] [address] DENIALS (Administrative/Medical Necessity) [phone]   [address] (Maternity/NICU/Pediatrics) [phone]   [address] [phone]   [facility] [phone]   [address] [address] [phone]   155 [facility] 134 815 [facility] [phone]

## 2023-03-05 NOTE — PHYSICAL THERAPY NOTE
Physical Therapy Cancellation Note                 03/05/23 0805   Note Type   Note type Cancelled Session   Cancel Reasons Medical status         Pt orders received and chart review complete  Pt is awaiting CTA CAP  Will hold evaluation at this time until further medical workup is complete      Keily Urias, PT

## 2023-03-05 NOTE — ASSESSMENT & PLAN NOTE
· Patient with known type B dissection following up with Dr Nilda Decker on 3/9/23   CTA CVA for AMS noting possible increased intima disruption  · Will reach out to cardiac surgery for opinion  · Hemodynamically stable and without chest pain, see Hypertension section  · Continue Eliquis  · Patient pending cta chest abdomen pelvis

## 2023-03-05 NOTE — OCCUPATIONAL THERAPY NOTE
Occupational Therapy Cancellation Note       03/05/23 0801   Note Type   Note type Cancelled Session   Cancel Reasons Medical status     Orders received and chart reviewed  Per EMR, Pt pending CT Sx consult  Will continue to follow to be seen for OT evaluation as appropriate/when medically cleared         Sofy Sanchez MS, OTR/L

## 2023-03-06 LAB
ANION GAP SERPL CALCULATED.3IONS-SCNC: 4 MMOL/L (ref 4–13)
BACTERIA UR CULT: ABNORMAL
BASOPHILS # BLD AUTO: 0.04 THOUSANDS/ÂΜL (ref 0–0.1)
BASOPHILS NFR BLD AUTO: 1 % (ref 0–1)
BUN SERPL-MCNC: 19 MG/DL (ref 5–25)
CALCIUM SERPL-MCNC: 10.1 MG/DL (ref 8.3–10.1)
CHLORIDE SERPL-SCNC: 110 MMOL/L (ref 96–108)
CO2 SERPL-SCNC: 25 MMOL/L (ref 21–32)
CREAT SERPL-MCNC: 1.53 MG/DL (ref 0.6–1.3)
EOSINOPHIL # BLD AUTO: 0.18 THOUSAND/ÂΜL (ref 0–0.61)
EOSINOPHIL NFR BLD AUTO: 3 % (ref 0–6)
ERYTHROCYTE [DISTWIDTH] IN BLOOD BY AUTOMATED COUNT: 15.2 % (ref 11.6–15.1)
GFR SERPL CREATININE-BSD FRML MDRD: 43 ML/MIN/1.73SQ M
GLUCOSE SERPL-MCNC: 110 MG/DL (ref 65–140)
HCT VFR BLD AUTO: 39.7 % (ref 36.5–49.3)
HGB BLD-MCNC: 12.4 G/DL (ref 12–17)
IMM GRANULOCYTES # BLD AUTO: 0.03 THOUSAND/UL (ref 0–0.2)
IMM GRANULOCYTES NFR BLD AUTO: 0 % (ref 0–2)
LYMPHOCYTES # BLD AUTO: 0.95 THOUSANDS/ÂΜL (ref 0.6–4.47)
LYMPHOCYTES NFR BLD AUTO: 14 % (ref 14–44)
MCH RBC QN AUTO: 28.4 PG (ref 26.8–34.3)
MCHC RBC AUTO-ENTMCNC: 31.2 G/DL (ref 31.4–37.4)
MCV RBC AUTO: 91 FL (ref 82–98)
MONOCYTES # BLD AUTO: 0.71 THOUSAND/ÂΜL (ref 0.17–1.22)
MONOCYTES NFR BLD AUTO: 10 % (ref 4–12)
NEUTROPHILS # BLD AUTO: 5.09 THOUSANDS/ÂΜL (ref 1.85–7.62)
NEUTS SEG NFR BLD AUTO: 72 % (ref 43–75)
NRBC BLD AUTO-RTO: 0 /100 WBCS
PLATELET # BLD AUTO: 230 THOUSANDS/UL (ref 149–390)
PMV BLD AUTO: 10.5 FL (ref 8.9–12.7)
POTASSIUM SERPL-SCNC: 4 MMOL/L (ref 3.5–5.3)
RBC # BLD AUTO: 4.37 MILLION/UL (ref 3.88–5.62)
SODIUM SERPL-SCNC: 139 MMOL/L (ref 135–147)
VIT B12 SERPL-MCNC: 284 PG/ML (ref 100–900)
WBC # BLD AUTO: 7 THOUSAND/UL (ref 4.31–10.16)

## 2023-03-06 RX ORDER — DILTIAZEM HYDROCHLORIDE 60 MG/1
60 CAPSULE, EXTENDED RELEASE ORAL ONCE
Status: COMPLETED | OUTPATIENT
Start: 2023-03-06 | End: 2023-03-06

## 2023-03-06 RX ORDER — CYANOCOBALAMIN 1000 UG/ML
1000 INJECTION, SOLUTION INTRAMUSCULAR; SUBCUTANEOUS DAILY
Status: COMPLETED | OUTPATIENT
Start: 2023-03-06 | End: 2023-03-08

## 2023-03-06 RX ORDER — DILTIAZEM HYDROCHLORIDE 180 MG/1
180 CAPSULE, COATED, EXTENDED RELEASE ORAL DAILY
Status: DISCONTINUED | OUTPATIENT
Start: 2023-03-07 | End: 2023-03-07

## 2023-03-06 RX ADMIN — CEFTRIAXONE SODIUM 1000 MG: 10 INJECTION, POWDER, FOR SOLUTION INTRAVENOUS at 14:22

## 2023-03-06 RX ADMIN — LEVOTHYROXINE SODIUM 75 MCG: 75 TABLET ORAL at 05:59

## 2023-03-06 RX ADMIN — METOPROLOL SUCCINATE 100 MG: 100 TABLET, EXTENDED RELEASE ORAL at 09:55

## 2023-03-06 RX ADMIN — APIXABAN 5 MG: 5 TABLET, FILM COATED ORAL at 17:14

## 2023-03-06 RX ADMIN — DILTIAZEM HYDROCHLORIDE 60 MG: 60 CAPSULE, EXTENDED RELEASE ORAL at 19:32

## 2023-03-06 RX ADMIN — DILTIAZEM HYDROCHLORIDE 120 MG: 120 CAPSULE, COATED, EXTENDED RELEASE ORAL at 09:55

## 2023-03-06 RX ADMIN — UMECLIDINIUM 1 PUFF: 62.5 AEROSOL, POWDER ORAL at 17:14

## 2023-03-06 RX ADMIN — CYANOCOBALAMIN 1000 MCG: 1000 INJECTION, SOLUTION INTRAMUSCULAR at 17:19

## 2023-03-06 RX ADMIN — APIXABAN 5 MG: 5 TABLET, FILM COATED ORAL at 09:55

## 2023-03-06 RX ADMIN — ALLOPURINOL 100 MG: 100 TABLET ORAL at 09:55

## 2023-03-06 RX ADMIN — MIRTAZAPINE 7.5 MG: 15 TABLET, FILM COATED ORAL at 22:45

## 2023-03-06 RX ADMIN — ATORVASTATIN CALCIUM 20 MG: 20 TABLET, FILM COATED ORAL at 09:55

## 2023-03-06 NOTE — ASSESSMENT & PLAN NOTE
· Patient with known type B dissection following up with Dr Rosalio Gutierrez on 3/9/23   CTA CVA for AMS noting possible increased intima disruption  · Will reach out to cardiac surgery for opinion-patient will be followed up outpatient, will continue to monitor heart rate and blood pressure closely while admitted  · Hemodynamically stable and without chest pain, see Hypertension section  · Continue Eliquis  ·

## 2023-03-06 NOTE — ASSESSMENT & PLAN NOTE
· Patient and daughter endorsing about 1 week of increasing confusion  · Currently speaking, they feel as though over the past 3 months or so, Angelika Rosar may have had a slight steady decline in his cognition  Last weeks where the symptoms seem more precipitous  · No focal neurologic deficit and neuroimaging as a pertains to the brain negative at this time  Low suspicion for CVA, suspect metabolic encephalopathy  Differential may include UTI and/or hypercalcemia  Daughter also states blood pressures tend to be labile at home  See treatment plans for these issues  · Patient's mental status is alert and oriented x3 today on 3/6, although he does note some memory difficulties      · b12 is borderline low, will start on supplementation and monitor mental status on discharge

## 2023-03-06 NOTE — PLAN OF CARE
Problem: OCCUPATIONAL THERAPY ADULT  Goal: Performs self-care activities at highest level of function for planned discharge setting  See evaluation for individualized goals  Description: Treatment Interventions: Cognitive reorientation          See flowsheet documentation for full assessment, interventions and recommendations  Note: Limitation: Decreased Safe judgement during ADL, Decreased self-care trans, Decreased high-level ADLs, Decreased cognition  Prognosis: Good  Assessment: Pt is a 78 y o  male seen for OT evaluation s/p admission to [facility] on 3/4/2023 due to confusion  Pt has a significant PMH impacting occupational performance including: type B aortic dissection, pretension, polyarthritis, osteoarthritis, hypothyroidism, and hypertension  PTA, pt living alone in [facility], (I) with ADLs, (I) with IADLs, (-) falls, (-) driving, and no use SPC for functional mobility when out in community  Pt is motivated to return home  Personal and environmental factors supporting pt at time of IE include age and social support  Personal and environmental factors inhibiting engagement in occupations include decreased mental status  During evaluation pt performed as is outlined above in flowsheet  Pt required VC for attention to task  Standardized assessments used to assist in identifying performance deficits include [facility] 6-Clicks  Performance deficits that affect the pt’s occupational performance during the initial evaluation include impaired  attention to task, safety awareness and insight into deficits  Goals are listed below  Upon discharge from acute care setting recommend d/c to Home with family support    Recommendation: Geriatric Consult (due to AMS and difficulty complete IADLs and ADLs around the house)  OT Discharge Recommendation: No rehabilitation needs

## 2023-03-06 NOTE — PLAN OF CARE
Problem: PHYSICAL THERAPY ADULT  Goal: Performs mobility at highest level of function for planned discharge setting  See evaluation for individualized goals  Description: Treatment/Interventions: OT, Spoke to case management, Spoke to nursing, Gait training, Bed mobility, Patient/family training, Endurance training, LE strengthening/ROM, Functional transfer training          See flowsheet documentation for full assessment, interventions and recommendations  Note: Prognosis: Good  Problem List: Decreased strength, Decreased endurance, Impaired balance, Decreased mobility, Decreased coordination, Decreased cognition, Impaired judgement, Decreased safety awareness  Assessment: Pt is 78 y o  male seen for PT evaluation s/p admit to [facility] on 4/7/2966 w/ metabolic encephalopathy  PT consulted to assess pt's functional mobility and d/c needs  Order placed for PT eval and tx, w/ up w/ A order  Comorbidities affecting pt's physical performance at time of assessment include:  has a past medical history of Aneurysm of common iliac artery ([facility] ), Aortic dissection, thoracoabdominal ([address] ), Arthritis, Ascending aortic aneurysm, Descending thoracic aortic aneurysm, History of nephrolithiasis, Hyperlipidemia, Hypertension, Obesity (BMI 30 0-34 9), PAF (paroxysmal atrial fibrillation) ([address] ), and Pancreatic cyst  PTA, pt was lives alone in 2 level house  Pt report using SPC in community  Personal factors affecting pt at time of IE include: ambulating w/ assistive device, stairs to enter home, inability to ambulate household distances, inability to navigate level surfaces w/o external assistance, unable to perform physical activity, inability to perform IADLs and inability to perform ADLs  Please find objective findings from PT assessment regarding body systems outlined above with impairments and limitations including weakness, impaired balance, decreased endurance, gait deviations and decreased activity tolerance  Pt ambulated with mildly unsteady gait without LOB noted during gait  Required occasional re-direction back to task  The following objective measures performed on IE also reveal limitations: The patient's AM-PAC Basic Mobility Inpatient Short Form Raw Score is 17, Standardized Score is 39 67  A standardized score less than 42 9 suggests the patient may benefit from discharge to post-acute rehabilitation services although anticipate pt will progress to achieve goals, pending OT cog testing as pt lives alone  Please also refer to the recommendation of the Physical Therapist for safe discharge planning  Pt's clinical presentation is currently unstable/unpredictable seen in pt's presentation of ongoing medical workup  Pt to benefit from continued PT tx to address deficits as defined above and maximize level of functional independent mobility and consistency  From PT/mobility standpoint, recommendation at time of d/c would be home with home health rehabilitation pending clearance from OT cog testing pending progress in order to facilitate return to PLOF  PT Discharge Recommendation: Home with home health rehabilitation (pending OT cog testing)    See flowsheet documentation for full assessment

## 2023-03-06 NOTE — ASSESSMENT & PLAN NOTE
Lab Results   Component Value Date    EGFR 43 03/06/2023    EGFR 40 03/05/2023    EGFR 38 03/04/2023    CREATININE 1 53 (H) 03/06/2023    CREATININE 1 62 (H) 03/05/2023    CREATININE 1 69 (H) 03/04/2023     · Renal function appears within baseline readings  ·   Creatinine is improved today to 1 53    Received contrast yesterday we will continue to monitor for another 24 hours for MARIAH  · repeat BMP tomorrow morning

## 2023-03-06 NOTE — OCCUPATIONAL THERAPY NOTE
Occupational Therapy Evaluation     Patient Name: Tanya Logan  UABZM'V Date: 3/6/2023  Problem List  Active Problems:    Stage 3a chronic kidney disease ([address] )    Hyperparathyroidism ([address] )    Hypertension    Hypothyroidism    Osteoarthrosis involving multiple sites    Hyperlipidemia    Thoracic aortic aneurysm without rupture    Aortic dissection, thoracoabdominal (HCC)    Hypercalcemia    UTI (urinary tract infection)    Metabolic encephalopathy    Past Medical History  Past Medical History:   Diagnosis Date    Aneurysm of common iliac artery (HCC)     b/l, 2 0cm on right, 1 8cm on left    Aortic dissection, thoracoabdominal (HCC)     type B, from takeoff of left subclavian throughout DTA/abdominal aorta/both common iliac arteries, patent great vessels off the aortic arch with no IMH/dissection & celiac trunk/SMA/bilateral renal/accessory left renal artery/TJ arising from the true lumen & patent    Arthritis     Ascending aortic aneurysm     4 6cm, trileaflet AV    Descending thoracic aortic aneurysm     5 1cm proximally, 4 5x4 5cm distally    History of nephrolithiasis     Hyperlipidemia     Hypertension     Obesity (BMI 30 0-34  9)     PAF (paroxysmal atrial fibrillation) ([facility])     Eliquis    Pancreatic cyst     7mm, pancreatic body     Past Surgical History  Past Surgical History:   Procedure Laterality Date    CARDIAC ELECTROPHYSIOLOGY STUDY AND ABLATION  01/2019    CARDIOVERSION  02/2019    FL RETROGRADE PYELOGRAM  05/12/2022    FL RETROGRADE PYELOGRAM  09/01/2022    HERNIA REPAIR      IR JOINT INJECTION  09/30/2019    LAPAROSCOPIC ASSISSTED TOTAL COLECTOMY W/ J-POUCH      [place] CYSTO/URETERO W/LITHOTRIPSY &INDWELL STENT INSRT N/A 05/12/2022    Procedure: BILATERAL RETROGRADE PYELOGRAM, CYSTOSCOPY,  LEFT URETEROSCOPY, LEFT LASER LITHOTRIPSY BASKET EXTRACTION, LEFT STENT;  Surgeon: Margarita Egan MD;  Location: [address] [place] [address];  Service: Urology    [place] CYSTO/URETERO W/LITHOTRIPSY &INDWELL STENT INSRT Left 09/01/2022    Procedure: CYSTOSCOPY, URETEROSCOPY STENT REMOVAL, LASER LITHOTRIPSY, WITH BASKET, INSERTION OF URETERAL STENT;  Surgeon: Wiley Estrella MD;  Location: [address] [place] [address];  Service: Urology    THORACOTOMY Left 2001    VASECTOMY             03/06/23 1154   OT Last Visit   OT Visit Date 03/06/23   Note Type   Note type Evaluation   Pain Assessment   Pain Assessment Tool 0-10   Pain Score No Pain   Restrictions/Precautions   Weight Bearing Precautions Per Order No   Other Precautions Cognitive;Telemetry; Fall Risk   Home Living   Type of [facility] Two level;Stairs to enter with rails  (1 [address])   Bathroom Shower/Tub Walk-in shower   Bathroom Toilet Standard   Bathroom Equipment Grab bars in [address]   Additional Comments pt reports no use of AD in home, but uses SPC when out in community  Prior Function   Level of [place] Independent with ADLs; Independent with functional mobility; Independent with IADLS   Lives With (S)  Alone   Receives Help From Family  (daughter)   IADLs Family/Friend/Other provides transportation; Independent with meal prep; Independent with medication management   Falls in the last 6 months 0   Vocational Retired   Lifestyle   Autonomy PTA, pt living alone in [facility], (I) with ADLs, (I) with IADLs, (-) falls, (-) driving, and no use SPC for functional mobility when out in community     Reciprocal Relationships supportive daughter   Service to Others retired   Subjective   Subjective "I bet you think nothing is wrong with me just by looking at me physicially"   ADL   Eating Assistance 5  Supervision/Setup   Eating Deficit Setup;Supervision/safety   Grooming Assistance 5  Supervision/Setup   UB Bathing Assistance 5  Supervision/Setup   LB Bathing Assistance 5  Supervision/Setup   [address]  [address]  [address]  5  Supervision/Setup   Functional Assistance 5  Supervision/Setup   Bed Mobility   Supine to Sit 5  Supervision   Additional items HOB elevated   Additional Comments pt sat at EOB for 1-2 minutes to don shoes   Transfers   Sit to Stand 5  Supervision   Additional items Verbal cues   Stand to Sit 5  Supervision   Additional items Verbal cues   Additional Comments no AD   Functional Mobility   Functional Mobility 5  Supervision   Additional Comments functional household distances   Additional items   (no use of AD, but pt held on to railings on the wall for support)   Balance   Static Sitting Good   Dynamic Sitting Fair +   Static Standing Fair   Ambulatory Fair -   Activity Tolerance   Activity Tolerance Patient tolerated treatment well   Medical Staff Made Aware PT Rose Medina   Nurse Made Aware JOHAN ARNOLD Assessment   RUE Assessment WFL   LUE Assessment   LUE Assessment WFL   Hand Function   Gross Motor Coordination Functional   Fine Motor Coordination Functional   Cognition   Overall Cognitive Status (S)  Impaired   Arousal/Participation Alert; Cooperative   Attention Attends with cues to redirect   Orientation Level Oriented X4   Memory Within functional limits   Following Commands Follows one step commands without difficulty   Comments pt pleasant and cooperative during session  pt requring cues for re-direction to tasks  per H&P, pt experiencing difficulty using coffee ,mistaking a TV remote for a cell phone, forgetful of appointments, and difficulty managing meds  Assessment   Limitation Decreased Safe judgement during ADL;Decreased self-care trans;Decreased high-level ADLs; Decreased cognition   Prognosis Good   Assessment Pt is a 78 y o  male seen for OT evaluation s/p admission to [facility] on 3/4/2023 due to confusion  Pt has a significant PMH impacting occupational performance including: type B aortic dissection, pretension, polyarthritis, osteoarthritis, hypothyroidism, and hypertension   [facility], (I) with IADLs, (-) falls, (-) driving, and no use SPC for functional mobility when out in community  Pt is motivated to return home  Personal and environmental factors supporting pt at time of IE include age and social support  Personal and environmental factors inhibiting engagement in occupations include decreased mental status  During evaluation pt performed as is outlined above in flowsheet  Pt required VC for attention to task  Standardized assessments used to assist in identifying performance deficits include AMPAC 6-Clicks  Performance deficits that affect the pt’s occupational performance during the initial evaluation include impaired  attention to task, safety awareness and insight into deficits  Goals are listed below  Upon discharge from acute care setting recommend d/c to Home with family support  Goals   Patient Goals to go home   LTG Time Frame 3-7   Plan   Treatment Interventions Cognitive reorientation   Goal Expiration Date 03/13/23   OT Frequency 1-2x/wk   Recommendation   Recommendation Geriatric Consult  (due to AMS and difficulty complete IADLs and ADLs around the house)   OT Discharge Recommendation No rehabilitation needs   AM-PAC Daily Activity Inpatient   Lower Body Dressing 4   Bathing 4   Toileting 4   Upper Body Dressing 4   Grooming 4   Eating 4   Daily Activity Raw Score 24   Daily Activity Standardized Score (Calc for Raw Score >=11) 57 54   AM-PAC Applied Cognition Inpatient   Following a Speech/Presentation 3   Understanding Ordinary Conversation 4   Taking Medications 3   Remembering Where Things Are Placed or Put Away 3   Remembering List of 4-5 Errands 2   Taking Care of Complicated Tasks 3   Applied Cognition Raw Score 18   Applied Cognition Standardized Score 38 07   End of Consult   Patient Position at End of Consult Seated edge of bed; All needs within reach   Nurse Communication Nurse aware of consult     Goal:     Pt will participate in on-going cognitive assessment to ensure safe discharge planning  The patient's raw score on the AM-PAC Daily Activity Inpatient Short Form is 24  A raw score of greater than or equal to 19 suggests the patient may benefit from discharge to home  Please refer to the recommendation of the Occupational Therapist for safe discharge planning  This session, pt required and most appropriately benefited from skilled OT/PT co-eval due to unpredictable medical and/or functional status  OT and PT goals were addressed separately as seen in documentation       LEDY Hanson

## 2023-03-06 NOTE — PROGRESS NOTES
Progress Note - Cardiothoracic Surgery   Evy Mendoza 78 y o  male MRN: [id number]  Unit/Bed#: CW2 214-01 Encounter: [id number]      24 Hour Events: Repeat CTA complete, per surgeon read stable findings  No other events per charting      Medications:   Scheduled Meds:  Current Facility-Administered Medications   Medication Dose Route Frequency Provider Last Rate   • acetaminophen  650 mg Oral Q6H PRN [facility]     • allopurinol  100 mg Oral Daily [facility]     • apixaban  5 mg Oral BID Chikis Burnside     • atorvastatin  20 mg Oral Daily Chikis Burnside     • cefTRIAXone  1,000 mg Intravenous Q24H Quillian Helm Webster 1,000 mg (03/05/23 1400)   • diltiazem  120 mg Oral Daily Chikis Burnside     • levothyroxine  75 mcg Oral Daily Chikis Burnside     • metoprolol succinate  100 mg Oral BID [facility]     • mirtazapine  7 5 mg Oral HS [facility]     • oxyCODONE-acetaminophen  1 tablet Oral Q6H PRN [facility]     • umeclidinium  1 puff Inhalation Daily Francisco Ghotra PA-C       Continuous Infusions:     Results:   Results from last 7 days   Lab Units 03/06/23  0503 03/04/23  1018   WBC Thousand/uL 7 00 7 63   HEMOGLOBIN g/dL 12 4 13 4   HEMATOCRIT % 39 7 42 4   PLATELETS Thousands/uL 230 260     Results from last 7 days   Lab Units 03/06/23  0503 03/05/23  0252 03/04/23  1018   POTASSIUM mmol/L 4 0 4 4 4 5   CHLORIDE mmol/L 110* 112* 107   CO2 mmol/L 25 24 25   BUN mg/dL 19 22 22   CREATININE mg/dL 1 53* 1 62* 1 69*   CALCIUM mg/dL 10 1 10 0 11 1*           Studies:     CTA head/neck 3/4: chronic type B aortic dissection w/ 4 9mm aneurysmal dilation of distal aortic arch w/ new partial contrast opacification of partially thrombosed false lumen in distal aortic arch just after takeoff of left subclavian artery    CTA CAP w/ 3/5: type B DTAA w/ dissection from takeoff of left subclavian throughout DTA/abdominal aorta/both common iliac arteries, increase in diameter of the proximal   portion of the aneurysm compared to February 2023 (5 1cm from 4 6cm) & distally (4 5x4 5cm from 4 0x3 9), most of false lumen in descending aorta thrombosed w/ one unchanged area of partial opacification, mild opacification of false lumen in the descending aorta more distally (decreased compared to 2/2023), grossly unchanged degree of mild opacification of the false lumen in the abdominal aorta & right common iliac artery,   thrombosis of the false lumen in the left common iliac artery, extent of dissection unchanged from 2/2023 (patent great vessels off the aortic arch with no IMH/dissection & celiac trunk/SMA/bilateral renal/accessory left renal artery/TJ arising from the true lumen & patent), stable ascending thoracic aortic aneurysm (4 6cm, no IMH/dissection), stable bilateral common iliac artery aneurysms (2 0cm on right, 1 8cm on left), s/p total colectomy w/ creation of J pouch which is distended w/ stool, several dilated/fecalized loops of distal small bowel w/o suggestion of obstruction, 7mm pancreatic body cyst    Vitals:   Vitals:    03/05/23 2214 03/05/23 2318 03/06/23 0214 03/06/23 0623   BP: 117/75 149/97 109/75 139/86   BP Location:  Right arm Right arm Right arm   Pulse: 79 92 75 83   Resp:  16 18 18   Temp:  98 2 °F (36 8 °C) 97 5 °F (36 4 °C) 97 6 °F (36 4 °C)   TempSrc:  Oral Oral Oral   SpO2: 97% 96% 97% 96%   Weight:         Assessment:    Type B Aortic Dissection    Plan:  Plan for medical management w/ goal SBP <120 & HR <70   Current Regimen: Toprol 100mg BID   Outpatient regimen: Toprol 100mg BID, Valsartan 160mg QDay, CArdizem 120mg QDay   HR over 24 hours: 70-90   SBP over 24 hours:    If having issues w/ blood pressure/heart rate control, consider changing beta blocker to Labetolol   Should continue to follow w/ o/p cardiologist Dr Foley [facility])   Still needs vascular sx evaluation as o/p (appt 3/9/2023)   Will d/w primary surgeon Dr Haddad Nurse need to move up appt or not (f/u scheduled for 8/24/2023)    Cardiac sx will sign off, please call w/ questions or concerns  Case discussed w/ JANE Green PA-C  DATE: March 6, 2023  TIME: 10:47 AM    * This note was completed in part utilizing m-modal fluency direct voice recognition software  Grammatical errors, random word insertion, spelling mistakes, and incomplete sentences may be an occasional consequence of the system secondary to software limitations, ambient noise and hardware issues  At the time of dictation, efforts were made to edit, clarify and /or correct errors  Please read the chart carefully and recognize, using context, where substitutions have occurred  If you have any questions or concerns about the context, text or information contained within the body of this dictation, please contact myself, the provider, for further clarification

## 2023-03-06 NOTE — ASSESSMENT & PLAN NOTE
· Patient appears to have had some issues with hearing to his antihypertensives in the outpatient setting, on review of recent note it seems as though he was not taking his Cardizem after the initial prescription ran out  · Continue Cardizem,  continue Lopressor  Given presence of dissection, would aim to maintain systolic pressure of below 120 and heart rate below 70  · Monitor on Telemetry  · Hold patient's losartan in the setting of contrast exposure  · Continue on Cardizem and metoprolol-but will increase Cardizem from 120 to 180 mg for lower heart rate target    We will give an extra dose of 60 mg today and start at 180 mg tomorrow

## 2023-03-06 NOTE — PHYSICAL THERAPY NOTE
Physical Therapy Evaluation     Patient's Name: Vicki Palacios    Admitting Diagnosis  Confusion [R41 0]  Altered mental status [R41 82]  Chronic renal insufficiency [N18 9]  Dissection of descending thoracic aorta [I71 012]    Problem List  Patient Active Problem List   Diagnosis    Chronic right shoulder pain    Chronic left shoulder pain    Left hip pain    COPD (chronic obstructive pulmonary disease) ([facility])    Hoarse voice quality    Chronic lumbar pain    Stage 3a chronic kidney disease ([facility] )    Hyperparathyroidism ([facility] )    Hypertensive heart disease without heart failure    Hypertension    Hypothyroidism    Osteoarthrosis involving multiple sites    Impaired fasting glucose    Hyperlipidemia    Thoracic aortic aneurysm without rupture    Benign prostatic hyperplasia without lower urinary tract symptoms    Ulcerative pancolitis without complication ([facility])    Chronic pain syndrome    Severe obstructive sleep apnea    Continuous opioid dependence ([facility] )    Aortic dissection, thoracoabdominal ([facility])    Atrial fibrillation ([facility])    Hypercalcemia    UTI (urinary tract infection)    Metabolic encephalopathy       Past Medical History  Past Medical History:   Diagnosis Date    Aneurysm of common iliac artery ([facility])     b/l, 2 0cm on right, 1 8cm on left    Aortic dissection, thoracoabdominal ([facility])     type B, from takeoff of left subclavian throughout DTA/abdominal aorta/both common iliac arteries, patent great vessels off the aortic arch with no IMH/dissection & celiac trunk/SMA/bilateral renal/accessory left renal artery/TJ arising from the true lumen & patent    Arthritis     Ascending aortic aneurysm     4 6cm, trileaflet AV    Descending thoracic aortic aneurysm     5 1cm proximally, 4 5x4 5cm distally    History of nephrolithiasis     Hyperlipidemia     Hypertension     Obesity (BMI 30 0-34  9)     PAF (paroxysmal atrial fibrillation) ([facility])     Eliquis    Pancreatic cyst     7mm, pancreatic body       Past Surgical History  Past Surgical History:   Procedure Laterality Date    CARDIAC ELECTROPHYSIOLOGY STUDY AND ABLATION  01/2019    CARDIOVERSION  02/2019    FL RETROGRADE PYELOGRAM  05/12/2022    FL RETROGRADE PYELOGRAM  09/01/2022    HERNIA REPAIR      IR JOINT INJECTION  09/30/2019    LAPAROSCOPIC ASSISSTED TOTAL COLECTOMY W/ J-POUCH      [place] CYSTO/URETERO W/LITHOTRIPSY &INDWELL STENT INSRT N/A 05/12/2022    Procedure: BILATERAL RETROGRADE PYELOGRAM, CYSTOSCOPY,  LEFT URETEROSCOPY, LEFT LASER LITHOTRIPSY BASKET EXTRACTION, LEFT STENT;  Surgeon: Serg Liang MD;  Location: [address] [place] [address];  Service: Urology    [place] CYSTO/URETERO W/LITHOTRIPSY &INDWELL STENT INSRT Left 09/01/2022    Procedure: CYSTOSCOPY, URETEROSCOPY STENT REMOVAL, LASER LITHOTRIPSY, WITH BASKET, INSERTION OF URETERAL STENT;  Surgeon: Serg Liang MD;  Location: [address] [place] [address];  Service: Urology    THORACOTOMY Left 2001    VASECTOMY            03/06/23 1205   PT Last Visit   PT Visit Date 03/06/23   Note Type   Note type Evaluation   Pain Assessment   Pain Assessment Tool 0-10   Pain Score No Pain   Restrictions/Precautions   Weight Bearing Precautions Per Order No   Other Precautions Cognitive;Telemetry; Fall Risk;Pain;Bed Alarm; Chair Alarm; Impulsive   Home Living   Type of [address] [place] [address] Two level;Stairs to enter with rails   Bathroom Shower/Tub Walk-in shower   Bathroom Toilet Standard   Bathroom Equipment Grab bars in [address]   Prior Function   Level of [place] Independent with functional mobility   Lives With (S)  Alone   Receives Help From Family  (local daughter)   Falls in the last 6 months 0   Vocational Retired   Cognition   Orientation Level Oriented X4   Subjective   Subjective Pt willing and agreeable to PT session   RLE Assessment   RLE Assessment WFL   LLE Assessment   LLE Assessment WFL   Coordination   Movements are Fluid and Coordinated 0   Coordination and Movement Description slow and guarded   Bed Mobility   Supine to Sit 5  Supervision   Additional items HOB elevated   Transfers   Sit to Stand 5  Supervision   Additional items Assist x 1   Stand to Sit 5  Supervision   Additional items Assist x 1   Ambulation/Elevation   Gait pattern Excessively slow; Short stride; Shuffling   Gait Assistance 4  Minimal assist   Additional items Assist x 1   Assistive Device Other (Comment)  (HR in hallway)   Distance 60+60   Balance   Static Sitting Good   Dynamic Sitting Fair +   Static Standing Fair   Ambulatory Fair -   Endurance Deficit   Endurance Deficit Yes   Activity Tolerance   Activity Tolerance Patient limited by fatigue;Patient limited by pain   Medical Staff Made Aware OT for D/C planning   Assessment   Prognosis Good   Problem List Decreased strength;Decreased endurance; Impaired balance;Decreased mobility; Decreased coordination;Decreased cognition; Impaired judgement;Decreased safety awareness   Assessment Pt is 78 y o  male seen for PT evaluation s/p admit to [facility] on 8/2/7180 w/ metabolic encephalopathy  PT consulted to assess pt's functional mobility and d/c needs  Order placed for PT eval and tx, w/ up w/ A order  Comorbidities affecting pt's physical performance at time of assessment include:  has a past medical history of Aneurysm of common iliac artery ([facility] ), Aortic dissection, thoracoabdominal ([address] ), Arthritis, Ascending aortic aneurysm, Descending thoracic aortic aneurysm, History of nephrolithiasis, Hyperlipidemia, Hypertension, Obesity (BMI 30 0-34 9), PAF (paroxysmal atrial fibrillation) ([address] ), and Pancreatic cyst  PTA, pt was lives alone in 2 level house  Pt report using SPC in community   Personal factors affecting pt at time of IE include: ambulating w/ assistive device, stairs to enter home, inability to ambulate household distances, inability to navigate level surfaces w/o external assistance, unable to perform physical activity, inability to perform IADLs and inability to perform ADLs  Please find objective findings from PT assessment regarding body systems outlined above with impairments and limitations including weakness, impaired balance, decreased endurance, gait deviations and decreased activity tolerance  Pt ambulated with mildly unsteady gait without LOB noted during gait  Required occasional re-direction back to task  The following objective measures performed on IE also reveal limitations: The patient's AM-PAC Basic Mobility Inpatient Short Form Raw Score is 17, Standardized Score is 39 67  A standardized score less than 42 9 suggests the patient may benefit from discharge to post-acute rehabilitation services although anticipate pt will progress to achieve goals, pending OT cog testing as pt lives alone  Please also refer to the recommendation of the Physical Therapist for safe discharge planning  Pt's clinical presentation is currently unstable/unpredictable seen in pt's presentation of ongoing medical workup  Pt to benefit from continued PT tx to address deficits as defined above and maximize level of functional independent mobility and consistency  From PT/mobility standpoint, recommendation at time of d/c would be home with home health rehabilitation pending clearance from OT cog testing pending progress in order to facilitate return to PLOF  Goals   Patient Goals To go home   STG Expiration Date 03/18/23   Short Term Goal #1 1  Complete bed mobility and transfers I to decrease need for caregiver in home  2  Ambulate 300' I to complete household and community mobility without A  3  Improve dynamic balance to good to decrease need for UE support during ambulation  4  Be educated & demonstate 12 steps to be able to enter home without A  Plan   Treatment/Interventions OT; Spoke to case management;Spoke to nursing;Gait training;Bed mobility; Patient/family training; Endurance training;LE strengthening/ROM; Functional transfer training   PT Frequency 2-3x/wk Recommendation   PT Discharge Recommendation Home with home health rehabilitation  (pending OT cog testing)   AM-PAC Basic Mobility Inpatient   Turning in Flat Bed Without Bedrails 3   Lying on Back to Sitting on Edge of Flat Bed Without Bedrails 3   Moving Bed to Chair 3   Standing Up From Chair Using Arms 3   Walk in Room 3   Climb 3-5 Stairs With Railing 2   Basic Mobility Inpatient Raw Score 17   Basic Mobility Standardized Score 39 67   Highest Level Of Mobility   JH-HLM Goal 5: Stand one or more mins   JH-HLM Achieved 7: Walk 25 feet or more         Charissa Brady, PT

## 2023-03-06 NOTE — PROGRESS NOTES
[address] [facility]  Progress Note Dunia Des 1945, 78 y o  male MRN: [id number]  Unit/Bed#: CW2 214-01 Encounter: [id number]  Primary Care Provider: Yovani Rucker MD   Date and time admitted to hospital: 3/4/2023 28:99 AM    Metabolic encephalopathy  Assessment & Plan  · Patient and daughter endorsing about 1 week of increasing confusion  · Currently speaking, they feel as though over the past 3 months or so, Shantelle Craft may have had a slight steady decline in his cognition  Last weeks where the symptoms seem more precipitous  · No focal neurologic deficit and neuroimaging as a pertains to the brain negative at this time  Low suspicion for CVA, suspect metabolic encephalopathy  Differential may include UTI and/or hypercalcemia  Daughter also states blood pressures tend to be labile at home  See treatment plans for these issues  · Patient's mental status is alert and oriented x3 today on 3/6, although he does note some memory difficulties  · b12 is borderline low, will start on supplementation and monitor mental status on discharge    UTI (urinary tract infection)  Assessment & Plan  · Patient with increasing confusion, cystitis, and pyuria noted on microscopy  · Also notes urinary frequency, especially today    No faith dysuria  · Patient completed ceftriaxone course for 3 days will now monitor off antibiotics  · Patient is alert and oriented x3 during my evaluation today on 3/6/23, I do have concern for cognitive deficits reported by family and patient, will consult geriatrics  · B12 is borderline low, will supplement intramuscular and then start on oral    Hypercalcemia  Assessment & Plan  · Mild at 11 1, and I suspect not severe enough to contribute to this patient's confusion, albeit perhaps it could be a minor contributor  · Calcium improved this morning to 10 after gentle fluid hydration ionized calcium within normal limits    Aortic dissection, thoracoabdominal [facility])  Assessment & Plan  CTA reviewed concern for new tear, management per cardiac surgery    Thoracic aortic aneurysm without rupture  Assessment & Plan  · Patient with known type B dissection following up with Dr Dee Rodriguez on 3/9/23  CTA CVA for AMS noting possible increased intima disruption  · Will reach out to cardiac surgery for opinion-patient will be followed up outpatient, will continue to monitor heart rate and blood pressure closely while admitted  · Hemodynamically stable and without chest pain, see Hypertension section  · Continue Eliquis  ·     Hyperlipidemia  Assessment & Plan  · Continue atorvastatin 20    Osteoarthrosis involving multiple sites  Assessment & Plan  Tylenol and home percocet PRN      Hypothyroidism  Assessment & Plan  · Continue levothyroxine    Hypertension  Assessment & Plan  · Patient appears to have had some issues with hearing to his antihypertensives in the outpatient setting, on review of recent note it seems as though he was not taking his Cardizem after the initial prescription ran out  · Continue Cardizem,  continue Lopressor  Given presence of dissection, would aim to maintain systolic pressure of below 120 and heart rate below 70  · Monitor on Telemetry  · Hold patient's losartan in the setting of contrast exposure  · Continue on Cardizem and metoprolol-but will increase Cardizem from 120 to 180 mg for lower heart rate target  We will give an extra dose of 60 mg today and start at 180 mg tomorrow    Hyperparathyroidism [facility])  Assessment & Plan  · Appears as though this is suspected from outpatient work-up with elevated PTH, elevated calcium, and low Phos    · Calcium improved this morning with gentle fluid hydration    Stage 3a chronic kidney disease [facility])  Assessment & Plan  Lab Results   Component Value Date    EGFR 43 03/06/2023    EGFR 40 03/05/2023    EGFR 38 03/04/2023    CREATININE 1 53 (H) 03/06/2023    CREATININE 1 62 (H) 03/05/2023    CREATININE 1 69 (H) 2023     · Renal function appears within baseline readings  ·   Creatinine is improved today to 1 53  Received contrast yesterday we will continue to monitor for another 24 hours for MARIAH  · repeat BMP tomorrow morning          VTE Pharmacologic Prophylaxis:   Moderate Risk (Score 3-4) - Pharmacological DVT Prophylaxis Ordered: apixaban (Eliquis)  Patient Centered Rounds: I performed bedside rounds with nursing staff today  Discussions with Specialists or Other Care Team Provider: n/a    Education and Discussions with Family / Patient: Updated  (daughter) via phone  Total Time Spent on Date of Encounter in care of patient: 35 minutes This time was spent on one or more of the following: performing physical exam; counseling and coordination of care; obtaining or reviewing history; documenting in the medical record; reviewing/ordering tests, medications or procedures; communicating with other healthcare professionals and discussing with patient's family/caregivers  Current Length of Stay: 1 day(s)  Current Patient Status: Inpatient   Certification Statement: The patient will continue to require additional inpatient hospital stay due to Geriatrics consult pending  Discharge Plan: Anticipate discharge in 24-48 hrs to home with home services  Code Status: Level 1 - Full Code    Subjective:   Patient is alert and oriented x3 however does have concerns over his memory and his daily due to function on his own at home  States his daughter is working on placing him to assisted living facility later this week  Objective:     Vitals:   Temp (24hrs), Av 2 °F (36 8 °C), Min:97 5 °F (36 4 °C), Max:99 1 °F (37 3 °C)    Temp:  [97 5 °F (36 4 °C)-99 1 °F (37 3 °C)] 98 7 °F (37 1 °C)  HR:  [75-96] 94  Resp:  [16-20] 20  BP: (102-149)/(64-97) 137/87  SpO2:  [91 %-97 %] 91 %  Body mass index is 31 62 kg/m²  Input and Output Summary (last 24 hours):      Intake/Output Summary (Last 24 hours) at 3/6/2023 1543  Last data filed at 3/6/2023 1259  Gross per 24 hour   Intake 970 ml   Output 1000 ml   Net -30 ml       Physical Exam:   Physical Exam  Vitals and nursing note reviewed  Constitutional:       General: He is not in acute distress  Appearance: He is well-developed  He is not ill-appearing, toxic-appearing or diaphoretic  HENT:      Head: Normocephalic and atraumatic  Eyes:      General: No scleral icterus  Conjunctiva/sclera: Conjunctivae normal    Cardiovascular:      Rate and Rhythm: Normal rate and regular rhythm  Heart sounds: No murmur heard  No friction rub  No gallop  Pulmonary:      Effort: Pulmonary effort is normal  No respiratory distress  Breath sounds: Normal breath sounds  No stridor  No wheezing or rhonchi  Abdominal:      General: There is no distension  Palpations: Abdomen is soft  There is no mass  Tenderness: There is no abdominal tenderness  There is no guarding or rebound  Hernia: No hernia is present  Musculoskeletal:         General: No swelling, tenderness, deformity or signs of injury  Cervical back: Neck supple  Skin:     General: Skin is warm and dry  Capillary Refill: Capillary refill takes less than 2 seconds  Coloration: Skin is not jaundiced or pale  Neurological:      Mental Status: He is alert and oriented to person, place, and time     Psychiatric:         Mood and Affect: Mood normal           Additional Data:     Labs:  Results from last 7 days   Lab Units 03/06/23  0503   WBC Thousand/uL 7 00   HEMOGLOBIN g/dL 12 4   HEMATOCRIT % 39 7   PLATELETS Thousands/uL 230   NEUTROS PCT % 72   LYMPHS PCT % 14   MONOS PCT % 10   EOS PCT % 3     Results from last 7 days   Lab Units 03/06/23  0503 03/05/23  0252 03/04/23  1018   SODIUM mmol/L 139   < > 136   POTASSIUM mmol/L 4 0   < > 4 5   CHLORIDE mmol/L 110*   < > 107   CO2 mmol/L 25   < > 25   BUN mg/dL 19   < > 22   CREATININE mg/dL 1 53*   < > 1 69*   ANION GAP mmol/L 4   < > 4   CALCIUM mg/dL 10 1   < > 11 1*   ALBUMIN g/dL  --   --  3 3*   TOTAL BILIRUBIN mg/dL  --   --  0 82   ALK PHOS U/L  --   --  122*   ALT U/L  --   --  25   AST U/L  --   --  21   GLUCOSE RANDOM mg/dL 110   < > 96    < > = values in this interval not displayed  Results from last 7 days   Lab Units 23  1018   POC GLUCOSE mg/dl 82               Lines/Drains:  Invasive Devices     Peripheral Intravenous Line  Duration           Peripheral IV 23 Left Antecubital 1 day          Drain  Duration           Ureteral Internal Stent Left ureter 6 Fr  186 days                  Telemetry:  Telemetry Orders (From admission, onward)             24 Hour Telemetry Monitoring  Continuous x 24 Hours (Telem)           References:    Telemetry Guidelines   Question:  Reason for 24 Hour Telemetry  Answer:  Acute CVA (>24 hrs old)                 Telemetry Reviewed: Normal Sinus Rhythm  Indication for Continued Telemetry Use: No indication for continued use  Will discontinue  Imaging: No pertinent imaging reviewed      Recent Cultures (last 7 days):   Results from last 7 days   Lab Units 23  1106   URINE CULTURE  40,000-49,000 cfu/ml Staphylococcus aureus*       Last 24 Hours Medication List:   Current Facility-Administered Medications   Medication Dose Route Frequency Provider Last Rate   • acetaminophen  650 mg Oral Q6H PRN Evone Saltillo     • allopurinol  100 mg Oral Daily Evone Saltillo     • apixaban  5 mg Oral BID Evone Saltillo     • atorvastatin  20 mg Oral Daily Evone Saltillo     • cefTRIAXone  1,000 mg Intravenous Q24H Valetta Sink Webster 1,000 mg (23 1422)   • cyanocobalamin  1,000 mcg Intramuscular Daily Jeet Emileeai, DO     • [START ON 3/7/2023] diltiazem  180 mg Oral Daily Jeet Banai, DO     • diltiazem  60 mg Oral Once Jeet Banai, DO     • levothyroxine  75 mcg Oral Daily Evone Saltillo     • metoprolol succinate  100 mg Oral BID Lena Baron     • mirtazapine  7 5 mg Oral HS Lena Baron     • oxyCODONE-acetaminophen  1 tablet Oral Q6H PRN Lena Baron     • umeclidinium  1 puff Inhalation Daily Milton John PA-C          Today, Patient Was Seen By: Cris Macdonald DO    **Please Note: This note may have been constructed using a voice recognition system  **

## 2023-03-06 NOTE — ASSESSMENT & PLAN NOTE
· Patient with increasing confusion, cystitis, and pyuria noted on microscopy  · Also notes urinary frequency, especially today    No faith dysuria  · Patient completed ceftriaxone course for 3 days will now monitor off antibiotics  · Patient is alert and oriented x3 during my evaluation today on 3/6/23, I do have concern for cognitive deficits reported by family and patient, will consult geriatrics  · B12 is borderline low, will supplement intramuscular and then start on oral

## 2023-03-06 NOTE — PLAN OF CARE
Problem: PAIN - ADULT  Goal: Verbalizes/displays adequate comfort level or baseline comfort level  Description: Interventions:  - Encourage patient to monitor pain and request assistance  - Assess pain using appropriate pain scale  - Administer analgesics based on type and severity of pain and evaluate response  - Implement non-pharmacological measures as appropriate and evaluate response  - Consider cultural and social influences on pain and pain management  - Notify physician/advanced practitioner if interventions unsuccessful or patient reports new pain  Outcome: Progressing     Problem: INFECTION - ADULT  Goal: Absence or prevention of progression during hospitalization  Description: INTERVENTIONS:  - Assess and monitor for signs and symptoms of infection  - Monitor lab/diagnostic results  - Monitor all insertion sites, i e  indwelling lines, tubes, and drains  - Monitor endotracheal if appropriate and nasal secretions for changes in amount and color  - [place] appropriate cooling/warming therapies per order  - Administer medications as ordered  - Instruct and encourage patient and family to use good hand hygiene technique  - Identify and instruct in appropriate isolation precautions for identified infection/condition  Outcome: Progressing

## 2023-03-07 PROBLEM — R82.81 PYURIA: Status: ACTIVE | Noted: 2023-03-07

## 2023-03-07 LAB
ANION GAP SERPL CALCULATED.3IONS-SCNC: 3 MMOL/L (ref 4–13)
BASOPHILS # BLD AUTO: 0.01 THOUSANDS/ÂΜL (ref 0–0.1)
BASOPHILS NFR BLD AUTO: 0 % (ref 0–1)
BUN SERPL-MCNC: 23 MG/DL (ref 5–25)
CALCIUM SERPL-MCNC: 10.3 MG/DL (ref 8.3–10.1)
CHLORIDE SERPL-SCNC: 109 MMOL/L (ref 96–108)
CO2 SERPL-SCNC: 26 MMOL/L (ref 21–32)
CREAT SERPL-MCNC: 1.88 MG/DL (ref 0.6–1.3)
EOSINOPHIL # BLD AUTO: 0.05 THOUSAND/ÂΜL (ref 0–0.61)
EOSINOPHIL NFR BLD AUTO: 2 % (ref 0–6)
ERYTHROCYTE [DISTWIDTH] IN BLOOD BY AUTOMATED COUNT: 15.4 % (ref 11.6–15.1)
GFR SERPL CREATININE-BSD FRML MDRD: 34 ML/MIN/1.73SQ M
GLUCOSE SERPL-MCNC: 107 MG/DL (ref 65–140)
HCT VFR BLD AUTO: 39.8 % (ref 36.5–49.3)
HGB BLD-MCNC: 12.1 G/DL (ref 12–17)
IMM GRANULOCYTES # BLD AUTO: 0.01 THOUSAND/UL (ref 0–0.2)
IMM GRANULOCYTES NFR BLD AUTO: 0 % (ref 0–2)
LYMPHOCYTES # BLD AUTO: 0.71 THOUSANDS/ÂΜL (ref 0.6–4.47)
LYMPHOCYTES NFR BLD AUTO: 22 % (ref 14–44)
MCH RBC QN AUTO: 27.7 PG (ref 26.8–34.3)
MCHC RBC AUTO-ENTMCNC: 30.4 G/DL (ref 31.4–37.4)
MCV RBC AUTO: 91 FL (ref 82–98)
MONOCYTES # BLD AUTO: 0.47 THOUSAND/ÂΜL (ref 0.17–1.22)
MONOCYTES NFR BLD AUTO: 14 % (ref 4–12)
NEUTROPHILS # BLD AUTO: 2.01 THOUSANDS/ÂΜL (ref 1.85–7.62)
NEUTS SEG NFR BLD AUTO: 62 % (ref 43–75)
NRBC BLD AUTO-RTO: 0 /100 WBCS
PLATELET # BLD AUTO: 236 THOUSANDS/UL (ref 149–390)
PMV BLD AUTO: 10.7 FL (ref 8.9–12.7)
POTASSIUM SERPL-SCNC: 4.1 MMOL/L (ref 3.5–5.3)
RBC # BLD AUTO: 4.37 MILLION/UL (ref 3.88–5.62)
SODIUM SERPL-SCNC: 138 MMOL/L (ref 135–147)
WBC # BLD AUTO: 3.26 THOUSAND/UL (ref 4.31–10.16)

## 2023-03-07 RX ORDER — DILTIAZEM HYDROCHLORIDE 120 MG/1
120 CAPSULE, COATED, EXTENDED RELEASE ORAL DAILY
Status: DISCONTINUED | OUTPATIENT
Start: 2023-03-08 | End: 2023-03-10 | Stop reason: HOSPADM

## 2023-03-07 RX ORDER — METOPROLOL SUCCINATE 50 MG/1
50 TABLET, EXTENDED RELEASE ORAL 2 TIMES DAILY
Status: DISCONTINUED | OUTPATIENT
Start: 2023-03-08 | End: 2023-03-10 | Stop reason: HOSPADM

## 2023-03-07 RX ORDER — SODIUM CHLORIDE 9 MG/ML
100 INJECTION, SOLUTION INTRAVENOUS CONTINUOUS
Status: DISCONTINUED | OUTPATIENT
Start: 2023-03-07 | End: 2023-03-08

## 2023-03-07 RX ADMIN — OXYCODONE HYDROCHLORIDE AND ACETAMINOPHEN 1 TABLET: 5; 325 TABLET ORAL at 02:13

## 2023-03-07 RX ADMIN — SODIUM CHLORIDE 100 ML/HR: 0.9 INJECTION, SOLUTION INTRAVENOUS at 21:00

## 2023-03-07 RX ADMIN — CYANOCOBALAMIN 1000 MCG: 1000 INJECTION, SOLUTION INTRAMUSCULAR at 09:17

## 2023-03-07 RX ADMIN — APIXABAN 5 MG: 5 TABLET, FILM COATED ORAL at 17:36

## 2023-03-07 RX ADMIN — LEVOTHYROXINE SODIUM 75 MCG: 75 TABLET ORAL at 05:46

## 2023-03-07 RX ADMIN — ATORVASTATIN CALCIUM 20 MG: 20 TABLET, FILM COATED ORAL at 09:15

## 2023-03-07 RX ADMIN — APIXABAN 5 MG: 5 TABLET, FILM COATED ORAL at 09:15

## 2023-03-07 RX ADMIN — ALLOPURINOL 100 MG: 100 TABLET ORAL at 09:15

## 2023-03-07 RX ADMIN — MIRTAZAPINE 7.5 MG: 15 TABLET, FILM COATED ORAL at 20:20

## 2023-03-07 NOTE — ASSESSMENT & PLAN NOTE
Lab Results   Component Value Date    EGFR 34 03/07/2023    EGFR 43 03/06/2023    EGFR 40 03/05/2023    CREATININE 1 88 (H) 03/07/2023    CREATININE 1 53 (H) 03/06/2023    CREATININE 1 62 (H) 03/05/2023   · Bump in creatinine and calcium level today  · Cr in the past as high as 2 17  · Place on gentle fluid hydration  · Urinary retention protocol  · Pt follows with [facility]

## 2023-03-07 NOTE — ASSESSMENT & PLAN NOTE
· Patient and daughter endorsing about 1 week of increasing confusion  · No focal neurologic deficit; s/p CTA head/neck  Low suspicion for CVA, suspect metabolic encephalopathy possibly due to hypercalcemia  · Suspect may have underlying cognitive impairment as dtr notes "forgetfulness, absent mindedness" for the past 3 months  · Patient's mental status is alert and oriented x3 today on 3/6, although he does note some memory difficulties    · Vit B12 is low nml at 287, received IM supplementation   · Referral to neuropsych as outpt

## 2023-03-07 NOTE — CASE MANAGEMENT
Case Management Discharge Planning Note    Patient name Keegan Pacheco  Location CW2 070/ZD2 214-01 MRN [id number]  : 1945 Date 3/7/2023       Current Admission Date: 3/4/2023  Current Admission Diagnosis:Stage 3a chronic kidney disease [facility])   Patient Active Problem List    Diagnosis Date Noted   • Hypercalcemia 2023   • UTI (urinary tract infection)    • Metabolic encephalopathy    • Atrial fibrillation ([address] ) 2023   • Aortic dissection, thoracoabdominal ([address] ) 2022   • Continuous opioid dependence ([address] ) 02/10/2022   • Chronic pain syndrome 2021   • Ulcerative pancolitis without complication ([address] )    • Thoracic aortic aneurysm without rupture 2021   • Benign prostatic hyperplasia without lower urinary tract symptoms 2021   • Severe obstructive sleep apnea 2020   • Chronic right shoulder pain 2020   • Chronic left shoulder pain 2020   • Left hip pain 2020   • COPD (chronic obstructive pulmonary disease) ([address] ) 2020   • Hoarse voice quality 2020   • Chronic lumbar pain 2020   • Stage 3a chronic kidney disease ([address] ) 2020   • Hyperlipidemia 2020   • Hyperparathyroidism ([address] ) 2020   • Hypertension 2020   • Impaired fasting glucose 2014   • Hypertensive heart disease without heart failure 10/25/2013   • Hypothyroidism 10/25/2013   • Osteoarthrosis involving multiple sites 2013      LOS (days): 2  Geometric Mean LOS (GMLOS) (days): 3 80  Days to GMLOS:1 7     OBJECTIVE:  Risk of Unplanned Readmission Score: 17 34         Current admission status: Inpatient   Preferred Pharmacy:   [facility], [address] [place] [address]  Phone: [phone] Fax: [phone]    [place] [address]  Phone: [phone] Fax: [phone]    Primary Care Provider: Dutch Macdonald MD    Primary Insurance: [address] [place] [address],[address] REP  Secondary Insurance:     DISCHARGE DETAILS:    Discharge planning discussed with[de-identified] Fernando Martinez     Comments - Freedom of Choice: Family is moving patients [place] to [place] today  He can d/c directly there tomorrow

## 2023-03-07 NOTE — ASSESSMENT & PLAN NOTE
· Patient appears to have had some issues with hearing to his antihypertensives in the outpatient setting, on review of recent note it seems as though he was not taking his Cardizem after the initial prescription ran out    · BP on the low side though asymptomatic   · Reduce dose of Cardizem and Lopressor, continue meds with holding parameters

## 2023-03-07 NOTE — CASE MANAGEMENT
Case Management Assessment & Discharge Planning Note    Patient name Lia Navarro  Location 2 896/UK5 214-01 MRN [id number]  : 1945 Date 3/7/2023       Current Admission Date: 3/4/2023  Current Admission Diagnosis:Stage 3a chronic kidney disease [facility])   Patient Active Problem List    Diagnosis Date Noted   • Hypercalcemia 2023   • UTI (urinary tract infection) 8230   • Metabolic encephalopathy    • Atrial fibrillation ([address] ) 2023   • Aortic dissection, thoracoabdominal ([address] ) 2022   • Continuous opioid dependence ([address] ) 02/10/2022   • Chronic pain syndrome 2021   • Ulcerative pancolitis without complication ([address] ) 84/15/1008   • Thoracic aortic aneurysm without rupture 2021   • Benign prostatic hyperplasia without lower urinary tract symptoms 2021   • Severe obstructive sleep apnea 2020   • Chronic right shoulder pain 2020   • Chronic left shoulder pain 2020   • Left hip pain 2020   • COPD (chronic obstructive pulmonary disease) ([address] ) 2020   • Hoarse voice quality 2020   • Chronic lumbar pain 2020   • Stage 3a chronic kidney disease ([address] ) 2020   • Hyperlipidemia 2020   • Hyperparathyroidism ([address] ) 2020   • Hypertension 2020   • Impaired fasting glucose 2014   • Hypertensive heart disease without heart failure 10/25/2013   • Hypothyroidism 10/25/2013   • Osteoarthrosis involving multiple sites 2013      LOS (days): 2  Geometric Mean LOS (GMLOS) (days):   Days to GMLOS:     OBJECTIVE:    Risk of Unplanned Readmission Score: 17 48         Current admission status: Inpatient       [facility]   [facility] [address] [facility]  [address] [facility] [address]  Phone: [phone] Fax: [phone]    Kya Paez [address] [place] [address]  Phone: [phone] Fax: [phone]    Primary Care Provider: Antwon Ortiz MD    Primary Insurance: [facility]  Secondary Insurance:     ASSESSMENT:  Nilson Atkinson Proxies    There are no active Health Care Proxies on file  Readmission Root Cause  30 Day Readmission: No    Patient Information  Admitted from[de-identified] Home  Mental Status: Alert  During Assessment patient was accompanied by: Not accompanied during assessment  Assessment information provided by[de-identified] Patient, Daughter  Primary Caregiver: Self  Support Systems: Family members  [place] of Residence: [address] [place] [address] do you live in?: [place] entry access options  Select all that apply : No steps to enter home  Type of Current Residence: 2 story home  Upon entering residence, is there a bedroom on the main floor (no further steps)?: No  A bedroom is located on the following floor levels of residence (select all that apply):: 2nd Floor  Upon entering residence, is there a bathroom on the main floor (no further steps)?: No  Indicate which floors of current residence have a bathroom (select all the apply):: 2nd Floor  Number of steps to 2nd floor from main floor: One Flight  In the last 12 months, was there a time when you were not able to pay the mortgage or rent on time?: No  In the last 12 months, how many places have you lived?: 1 (Pt's family is in the process of moving him to [facility] in [facility])  In the last 12 months, was there a time when you did not have a steady place to sleep or slept in a shelter (including now)?: No  Homeless/housing insecurity resource given?: N/A  Living Arrangements: Lives Alone    Activities of Daily Living Prior to Admission  Functional Status: Independent  Completes ADLs independently?: Yes  Ambulates independently?: Yes  Does patient use assisted devices?: Yes  Assisted Devices (DME) used:  Wheelchair, Delphine Carrerak  Does patient currently own DME?: Yes  What DME does the patient currently own?: Veronica Nunez, Wheelchair  Does patient have a history of Outpatient Therapy (PT/OT)?: No  Does the patient have a history of Short-Term Rehab?: No  Does patient have a history of HHC?: No  Does patient currently have Geno Moncada?: No         Patient Information Continued  Income Source: Pension/[employer]  Does patient have prescription coverage?: Yes  Within the past 12 months, you worried that your food would run out before you got the money to buy more : Never true  Within the past 12 months, the food you bought just didn't last and you didn't have money to get more : Never true  Food insecurity resource given?: N/A  Does patient receive dialysis treatments?: No  Does patient have a history of substance abuse?: No  Does patient have a history of Mental Health Diagnosis?: No         Means of Transportation  Means of Transport to Appts[de-identified] Family transport  In the past 12 months, has lack of transportation kept you from medical appointments or from getting medications?: No  In the past 12 months, has lack of transportation kept you from meetings, work, or from getting things needed for daily living?: No  Was application for public transport provided?: N/A        DISCHARGE DETAILS:    Discharge planning discussed with[de-identified] Patient and called Daughter [place] of Choice: Yes  Comments - Freedom of Choice: Offered VNA for Medication teaching home Pt   Monisha agreeable Pt wanted [facility] referral sent and [facility] accepted  CM contacted family/caregiver?: Yes  Were Treatment Team discharge recommendations reviewed with patient/caregiver?: Yes  Did patient/caregiver verbalize understanding of patient care needs?: Yes  Were patient/caregiver advised of the risks associated with not following Treatment Team discharge recommendations?: Yes    Contacts  Patient Contacts: Latisha Mcpherson  Relationship to Patient[de-identified] Family  Contact Method: Phone  Phone Number: [phone]    [facility]         Is the patient interested in [facility] at discharge?: Yes  [address] requested[de-identified] Nursing, Physical [address] Name[de-identified] [facility] Provider[de-identified] PCP  Home Health Services Needed[de-identified] Gait/ADL Training, Evaluate Functional Status and Safety  Homebound Criteria Met[de-identified] Requires the Assistance of Another Person for Safe Ambulation or to Leave the Home, Uses an Assist Device (i e  cane, walker, etc)  Supporting Clincal Findings[de-identified] Cognitive Deficit Requiring the Assistance of Others, Limited Endurance                   [facility]  Discharge Destination Plan[de-identified] Home with Jared at Discharge : Family                        note is late note from 3/6/2023

## 2023-03-07 NOTE — ASSESSMENT & PLAN NOTE
· Patient with known type B dissection following up with Dr Nilda Decker on 3/9/23  · S/p CTA nead noting possible increased intima disruption  · S/p CTA chest/a/pelvis  · Status post CT surgery evaluation, recommending medical management  Per outpt CT surgery's note had recommended outpt referral to vascular sx for consideration for an axillofemoral bypass  Pt has upcoming appt with vascular sx as outpt but dtr's preference to be evaluated while inpatient  Thus will consult    · Otherwise hemodynamically stable

## 2023-03-07 NOTE — PROGRESS NOTES
[address] [facility]  Progress Note Lupillo Stein 1945, 78 y o  male MRN: [id number]  Unit/Bed#: CW2 214-01 Encounter: [id number]  Primary Care Provider: Tommie Chapman MD   Date and time admitted to hospital: 3/4/2023 07:59 AM    * Metabolic encephalopathy  Assessment & Plan  · Patient and daughter endorsing about 1 week of increasing confusion  · No focal neurologic deficit; s/p CTA head/neck  Low suspicion for CVA, suspect metabolic encephalopathy possibly due to hypercalcemia  · Suspect may have underlying cognitive impairment as dtr notes "forgetfulness, absent mindedness" for the past 3 months  · Patient's mental status is alert and oriented x3 today on 3/6, although he does note some memory difficulties  · Vit B12 is low nml at 287, received IM supplementation   · Referral to neuropsych as outpt    Pyuria  Assessment & Plan  · Status post urine culture, revealing Staph aureus with nonsignificant colony count thus low suspicion for actual UTI  · Regardless patient received IV ceftriaxone x3 days  Monitor off antibiotics    Hypercalcemia  Assessment & Plan  · Prior elevation of PTH w  nml PTH related peptide hormone back in 2021, unclear if had any additional wkup in regards to this  Will repeat  · Consult nephrology  · S/p CTA chest/abd/pelvis  Stable pancreatic cyst for which recommending repeat MRI/MRCP in 2 yrs  Otherwise no mention of underlying tumor  · Gentle fluid hydration, continue to trend     Atrial fibrillation (HCC)  Assessment & Plan  · Rate controlled  · Anticoagulation with Eliquis    Aortic dissection, thoracoabdominal (HCC)  Assessment & Plan  · CTA reviewed concern for new tear  · As above      Thoracic aortic aneurysm without rupture  Assessment & Plan  · Patient with known type B dissection following up with Dr Fitz Rangel on 3/9/23     · S/p CTA nead noting possible increased intima disruption  · S/p CTA chest/a/pelvis  · Status post CT surgery evaluation, recommending medical management  Per outpt CT surgery's note had recommended outpt referral to vascular sx for consideration for an axillofemoral bypass  Pt has upcoming appt with vascular sx as outpt but dtr's preference to be evaluated while inpatient  Thus will consult  · Otherwise hemodynamically stable       Osteoarthrosis involving multiple sites  Assessment & Plan  Tylenol and home percocet PRN      Hypertension  Assessment & Plan  · Patient appears to have had some issues with hearing to his antihypertensives in the outpatient setting, on review of recent note it seems as though he was not taking his Cardizem after the initial prescription ran out  · BP on the low side though asymptomatic   · Reduce dose of Cardizem and Lopressor, continue meds with holding parameters    Hyperparathyroidism ([address] )  Assessment & Plan  · Appears as though this is suspected from outpatient work-up with elevated PTH, elevated calcium, and low Phos  · Bump in calcium today  Gentle fluid hydration  · Referral to endocrinology as outpatient    Stage 3a chronic kidney disease [facility])  Assessment & Plan  Lab Results   Component Value Date    EGFR 34 03/07/2023    EGFR 43 03/06/2023    EGFR 40 03/05/2023    CREATININE 1 88 (H) 03/07/2023    CREATININE 1 53 (H) 03/06/2023    CREATININE 1 62 (H) 03/05/2023   · Bump in creatinine and calcium level today  · Cr in the past as high as 2 17  · Place on gentle fluid hydration  · Urinary retention protocol  · Pt follows with [facility]    VTE Pharmacologic Prophylaxis:   Pharmacologic: Apixaban (Eliquis)  Mechanical VTE Prophylaxis in Place: No    Patient Centered Rounds: I have performed bedside rounds with nursing staff today  Discussions with Specialists or Other Care Team Provider:     Education and Discussions with Family / Patient: Patient  Extensively updated dtr Monisha    Time Spent for Care: 30 minutes    More than 50% of total time spent on counseling and coordination of care as described above  Current Length of Stay: 2 day(s)    Current Patient Status: Inpatient   Certification Statement: The patient will continue to require additional inpatient hospital stay due to acute illness    Discharge Plan:     Code Status: Level 1 - Full Code      Subjective:   Ambulating around, denies any dizziness, lightheadness  Denies any other cardiac symptoms  MS at baseline as confirmed per his dtr    Objective:     Vitals:   Temp (24hrs), Av °F (36 7 °C), Min:98 °F (36 7 °C), Max:98 °F (36 7 °C)    Temp:  [98 °F (36 7 °C)] 98 °F (36 7 °C)  HR:  [74-93] 93  Resp:  [18-19] 19  BP: (84-99)/(53-66) 97/65  SpO2:  [91 %-97 %] 93 %  Body mass index is 31 62 kg/m²  Input and Output Summary (last 24 hours): Intake/Output Summary (Last 24 hours) at 3/7/2023 1941  Last data filed at 3/7/2023 0501  Gross per 24 hour   Intake 462 ml   Output --   Net 462 ml       Physical Exam:     Physical Exam  Cardiovascular:      Rate and Rhythm: Normal rate and regular rhythm  Pulses: Normal pulses  Heart sounds: Normal heart sounds  Pulmonary:      Effort: Pulmonary effort is normal  No respiratory distress  Breath sounds: Normal breath sounds  No wheezing or rales  Abdominal:      General: Bowel sounds are normal  There is no distension  Palpations: Abdomen is soft  Tenderness: There is no abdominal tenderness  There is no guarding  Musculoskeletal:         General: Normal range of motion  Cervical back: Normal range of motion and neck supple  Right lower leg: No edema  Left lower leg: No edema  Skin:     General: Skin is warm and dry  Neurological:      General: No focal deficit present  Mental Status: He is alert and oriented to person, place, and time  Mental status is at baseline  Cranial Nerves: No cranial nerve deficit  Motor: No weakness             Additional Data:     Labs:    Results from last 7 days   Lab Units 03/07/23  0518   WBC Thousand/uL 3 26*   HEMOGLOBIN g/dL 12 1   HEMATOCRIT % 39 8   PLATELETS Thousands/uL 236   NEUTROS PCT % 62   LYMPHS PCT % 22   MONOS PCT % 14*   EOS PCT % 2     Results from last 7 days   Lab Units 03/07/23  0518 03/05/23  0252 03/04/23  1018   SODIUM mmol/L 138   < > 136   POTASSIUM mmol/L 4 1   < > 4 5   CHLORIDE mmol/L 109*   < > 107   CO2 mmol/L 26   < > 25   BUN mg/dL 23   < > 22   CREATININE mg/dL 1 88*   < > 1 69*   ANION GAP mmol/L 3*   < > 4   CALCIUM mg/dL 10 3*   < > 11 1*   ALBUMIN g/dL  --   --  3 3*   TOTAL BILIRUBIN mg/dL  --   --  0 82   ALK PHOS U/L  --   --  122*   ALT U/L  --   --  25   AST U/L  --   --  21   GLUCOSE RANDOM mg/dL 107   < > 96    < > = values in this interval not displayed  Results from last 7 days   Lab Units 03/04/23  1018   POC GLUCOSE mg/dl 82                   * I Have Reviewed All Lab Data Listed Above  * Additional Pertinent Lab Tests Reviewed:  All Labs Within Last 24 Hours Reviewed    Imaging:    Imaging Reports Reviewed Today Include:   Imaging Personally Reviewed by Myself Includes:      Recent Cultures (last 7 days):     Results from last 7 days   Lab Units 03/04/23  1106   URINE CULTURE  40,000-49,000 cfu/ml Staphylococcus aureus*       Last 24 Hours Medication List:   Current Facility-Administered Medications   Medication Dose Route Frequency Provider Last Rate   • acetaminophen  650 mg Oral Q6H PRN Karime Engelhard     • allopurinol  100 mg Oral Daily Karime Engelhard     • apixaban  5 mg Oral BID Karime Engelhard     • atorvastatin  20 mg Oral Daily Kraime Engelhard     • cyanocobalamin  1,000 mcg Intramuscular Daily Jeetvernon Barnard, DO     • [START ON 3/8/2023] diltiazem  120 mg Oral Daily Gaby Simper, DO     • levothyroxine  75 mcg Oral Daily Karime Engelhard     • [START ON 3/8/2023] metoprolol succinate  50 mg Oral BID Gaby Simper, DO     • mirtazapine  7 5 mg Oral HS Karime Engelhard     • oxyCODONE-acetaminophen  1 tablet Oral Q6H PRN Maddison Chan     • sodium chloride  100 mL/hr Intravenous Continuous Keyona Cole DO     • umeclidinium  1 puff Inhalation Daily Sylvia Chavez PA-C          Today, Patient Was Seen By: Keyona Cole DO    ** Please Note: Dictation voice to text software may have been used in the creation of this document   **

## 2023-03-07 NOTE — PHYSICAL THERAPY NOTE
Physical Therapy Progress Note     03/07/23 1220   PT Last Visit   PT Visit Date 03/07/23   Note Type   Note Type Treatment   Pain Assessment   Pain Assessment Tool 0-10   Pain Score No Pain   Restrictions/Precautions   Other Precautions Fall Risk;Cognitive; Chair Alarm; Bed Alarm   Subjective   Subjective The patient is eager to go for a walk  Transfers   Sit to Stand 5  Supervision   Additional items Increased time required   Stand to Sit 5  Supervision   Additional items Increased time required   Ambulation/Elevation   Gait pattern Excessively slow; Short stride; Shuffling   Gait Assistance 5  Supervision   Additional items Verbal cues   Assistive Device Rolling walker   Distance 50 feet, 70 feet, 80 feet  Balance   Static Sitting Good   Dynamic Sitting Fair +   Static Standing Fair   Ambulatory Fair -   Activity Tolerance   Activity Tolerance Patient tolerated treatment well   Assessment   Prognosis Good   Problem List Decreased strength;Decreased endurance; Impaired balance;Decreased mobility; Decreased coordination;Decreased cognition; Impaired judgement;Decreased safety awareness   Assessment The patient was found ambulating around his room without assistance  He requires redirection to task as well continued instruction  The patient was able to ambulate into the hallway with the walker with increased time for turns  He requires instruction for safety at times, and he demonstrated limited carry-over of learned techniques  He was in the bed with the alarm active post session  Goals   Patient Goals To go home  STG Expiration Date 03/18/23   PT Treatment Day 1   Plan   Treatment/Interventions Functional transfer training;LE strengthening/ROM; Therapeutic exercise; Endurance training;Cognitive reorientation;Patient/family training;Bed mobility;Gait training   Progress Progressing toward goals   PT Frequency 2-3x/wk   Recommendation   PT Discharge Recommendation   (Defer to OT for cognition )   Equipment Recommended Wilburt Footman Package Recommended Wheeled walker   AM-PAC Basic Mobility Inpatient   Turning in Flat Bed Without Bedrails 3   Lying on Back to Sitting on Edge of Flat Bed Without Bedrails 3   Moving Bed to Chair 3   Standing Up From Chair Using Arms 3   Walk in Room 3   Climb 3-5 Stairs With Railing 3   Basic Mobility Inpatient Raw Score 18   Basic Mobility Standardized Score 41 05   Highest Level Of Mobility   JH-HLM Goal 6: Walk 10 steps or more   JH-HLM Achieved 7: Walk 25 feet or more         An AM-PAC Basic Mobility raw score less than 16 suggests the patient may benefit from discharge to post-acute rehab services      Silvina Martínez, PTA

## 2023-03-07 NOTE — CASE MANAGEMENT
Case Management Assessment    Patient name Ck Drain  Location CW2 735/VL7 214-01 MRN [id number]  : 1945 Date 3/6/2023       Current Admission Date: 3/4/2023  Current Admission Diagnosis:Stage 3a chronic kidney disease [facility])   Patient Active Problem List    Diagnosis Date Noted   • Hypercalcemia 2023   • UTI (urinary tract infection)    • Metabolic encephalopathy 9194   • Atrial fibrillation ([address] ) 2023   • Aortic dissection, thoracoabdominal ([address] ) 2022   • Continuous opioid dependence ([address] ) 02/10/2022   • Chronic pain syndrome 2021   • Ulcerative pancolitis without complication ([address] )    • Thoracic aortic aneurysm without rupture 2021   • Benign prostatic hyperplasia without lower urinary tract symptoms 2021   • Severe obstructive sleep apnea 2020   • Chronic right shoulder pain 2020   • Chronic left shoulder pain 2020   • Left hip pain 2020   • COPD (chronic obstructive pulmonary disease) ([address] ) 2020   • Hoarse voice quality 2020   • Chronic lumbar pain 2020   • Stage 3a chronic kidney disease ([address] ) 2020   • Hyperlipidemia 2020   • Hyperparathyroidism ([address] ) 2020   • Hypertension 2020   • Impaired fasting glucose 2014   • Hypertensive heart disease without heart failure 10/25/2013   • Hypothyroidism 10/25/2013   • Osteoarthrosis involving multiple sites 2013      LOS (days): 1  Geometric Mean LOS (GMLOS) (days):   Days to GMLOS:     OBJECTIVE:    Risk of Unplanned Readmission Score: 17 49         Current admission status: Inpatient       Preferred Pharmacy:   [facility], [address] [facility] [address]  Phone: [phone] Fax: [phone]    [place] [address]  Phone: [phone] Fax: [phone] Care Provider: Jeanna Briscoe MD    Primary Insurance: Roe More REP  Secondary Insurance:     ASSESSMENT:  [address] Proxies    There are no active Health Care Proxies on file       CM placed TC to daughter/EC, Dave Jauregui to discuss CM role and obtain assessment information and DCP  She reports she s/w Oriana Leyva this morning re: same  Therapy is recommending home PT    Dtr relates [facility]  CM will follow

## 2023-03-07 NOTE — PLAN OF CARE
Problem: PHYSICAL THERAPY ADULT  Goal: Performs mobility at highest level of function for planned discharge setting  See evaluation for individualized goals  Description: Treatment/Interventions: OT, Spoke to case management, Spoke to nursing, Gait training, Bed mobility, Patient/family training, Endurance training, LE strengthening/ROM, Functional transfer training          See flowsheet documentation for full assessment, interventions and recommendations  Note: Prognosis: Good  Problem List: Decreased strength, Decreased endurance, Impaired balance, Decreased mobility, Decreased coordination, Decreased cognition, Impaired judgement, Decreased safety awareness  Assessment: The patient was found ambulating around his room without assistance  He requires redirection to task as well continued instruction  The patient was able to ambulate into the hallway with the walker with increased time for turns  He requires instruction for safety at times, and he demonstrated limited carry-over of learned techniques  He was in the bed with the alarm active post session  PT Discharge Recommendation:  (Defer to OT for cognition )    See flowsheet documentation for full assessment

## 2023-03-07 NOTE — ASSESSMENT & PLAN NOTE
· Appears as though this is suspected from outpatient work-up with elevated PTH, elevated calcium, and low Phos  · Bump in calcium today    Gentle fluid hydration  · Referral to endocrinology as outpatient

## 2023-03-07 NOTE — PLAN OF CARE
Problem: PAIN - ADULT  Goal: Verbalizes/displays adequate comfort level or baseline comfort level  Description: Interventions:  - Encourage patient to monitor pain and request assistance  - Assess pain using appropriate pain scale  - Administer analgesics based on type and severity of pain and evaluate response  - Implement non-pharmacological measures as appropriate and evaluate response  - Consider cultural and social influences on pain and pain management  - Notify physician/advanced practitioner if interventions unsuccessful or patient reports new pain  Outcome: Progressing     Problem: INFECTION - ADULT  Goal: Absence or prevention of progression during hospitalization  Description: INTERVENTIONS:  - Assess and monitor for signs and symptoms of infection  - Monitor lab/diagnostic results  - Monitor all insertion sites, i e  indwelling lines, tubes, and drains  - Monitor endotracheal if appropriate and nasal secretions for changes in amount and color  - [place] appropriate cooling/warming therapies per order  - Administer medications as ordered  - Instruct and encourage patient and family to use good hand hygiene technique  - Identify and instruct in appropriate isolation precautions for identified infection/condition  Outcome: Progressing

## 2023-03-07 NOTE — ASSESSMENT & PLAN NOTE
· Prior elevation of PTH w  nml PTH related peptide hormone back in 2021, unclear if had any additional wkup in regards to this  Will repeat  · Consult nephrology  · S/p CTA chest/abd/pelvis  Stable pancreatic cyst for which recommending repeat MRI/MRCP in 2 yrs   Otherwise no mention of underlying tumor  · Gentle fluid hydration, continue to trend

## 2023-03-07 NOTE — ASSESSMENT & PLAN NOTE
· Status post urine culture, revealing Staph aureus with nonsignificant colony count thus low suspicion for actual UTI  · Regardless patient received IV ceftriaxone x3 days    Monitor of antibiotics

## 2023-03-07 NOTE — OCCUPATIONAL THERAPY NOTE
Occupational Therapy Progress Note     Patient Name: Rockland Schwab  RYUHBSiddharthaN Date: 3/7/2023  Problem List  Active Problems:    Stage 3a chronic kidney disease ([facility] )    Hyperparathyroidism ([facility] )    Hypertension    Osteoarthrosis involving multiple sites    Thoracic aortic aneurysm without rupture    Aortic dissection, thoracoabdominal (HCC)    Atrial fibrillation (HCC)    Hypercalcemia    UTI (urinary tract infection)    Metabolic encephalopathy            03/07/23 0920   OT Last Visit   OT Visit Date 03/07/23   Note Type   Note Type Treatment   Pain Assessment   Pain Assessment Tool 0-10   Pain Score No Pain   Hospital Pain Intervention(s) Repositioned; Ambulation/increased activity; Emotional support   Restrictions/Precautions   Weight Bearing Precautions Per Order No   Other Precautions Cognitive;Telemetry; Fall Risk   Lifestyle   Autonomy PTA, pt living alone in [facility], (I) with ADLs, (I) with IADLs, (-) falls, (-) driving, and no use SPC for functional mobility when out in community  Reciprocal Relationships supportive daughter   Service to Others retired   ADL   Where Assessed Edge of bed   [address] [place] [address] 5  Supervision/Setup   Grooming Deficit Setup;Verbal cueing;Supervision/safety; Increased time to complete;Wash/dry hands; Wash/dry face; Teeth care   UB Bathing Assistance 5  Supervision/Setup   UB Bathing Deficit Setup;Steadying;Verbal cueing;Supervision/safety; Increased time to complete; Chest;Right arm;Left arm; Abdomen;Perineal area; Buttocks;Right upper leg;Left upper leg   LB Bathing Assistance 5  Supervision/Setup   LB Bathing Deficit Setup;Steadying;Verbal cueing;Supervision/safety; Increased time to complete;Right upper leg;Left upper leg;Buttocks; Perineal area   UB Dressing Assistance 5  Supervision/Setup   UB Dressing Deficit Setup;Steadying;Verbal cueing;Supervision/safety; Increased time to complete; Thread RUE; Thread LUE;Pull over head;Pull around back;Pull down in back   LB Dressing Assistance 5 Supervision/Setup   LB Dressing Deficit Setup;Steadying; Don/doff R sock; Don/doff L sock; Thread RLE into pants; Thread LLE into pants; Thread RLE into underwear; Thread LLE into underwear;Pull up over hips   Bed Mobility   Supine to Sit 5  Supervision   Additional items HOB elevated   Sit to Supine Unable to assess   Additional Comments At end of session, pt left sitting upright in chair with all functional needs in reach   Transfers   Sit to Stand 5  Supervision   Additional items Increased time required;Verbal cues   Stand to Sit 5  Supervision   Additional items Increased time required   Additional Comments No DME   Functional Mobility   Functional Mobility 5  Supervision   Additional Comments Pt completed household functional mobility distances @ supervision level w/ no DME   Additional items   (No DME; held onto railing for support, reporting he does the same @ home)   Subjective   Subjective "I called my daughter when I couldn't figure out sorting the pills "   Cognition   Overall Cognitive Status [facility]   Arousal/Participation Alert; Responsive;Arousable; Cooperative   Attention Within functional limits   Orientation Level Oriented X4   Memory Within functional limits   Following Commands Follows one step commands without difficulty   Comments Pt pleasant and cooperative; no cognitive deficits noted in today's ADL sesssion as pt reporting that when he feels he is confused regarding sorting his medication, he calls his daughter for assistance; will continue to monitor; however pt reporting that he is in process of moving to [facility]; will continue to follow w/ CM notes; recommend ACLs if returning to home to determine level of supervision   Activity Tolerance   Activity Tolerance Patient tolerated treatment well   Medical Staff Made Aware RN cleared   Assessment   Assessment Pt is a 77 yo male who actively participated in skilled OT session on 3/7/2023   Treatment focused to improve functional transfers with fall prevention strategies, static/dynamic balance, postural/trunk control, proper body mechanics, functional use of b/l UE's, higher level cognitive functions, safety awareness, and overall increased activity tolerance in ADL/IADL/leisure tasks  Upon arrival, pt found lying supine in bed and was agreeable to OT session  Pt performed supine <> sit @ supervision level and sat EOB for approx 20 minutes to perform UB dressing/bathing, LB dressing/bathing, and grooming tasks @ supervision level  Pt completed STS @ supervision level w/ no DME and completed household functional mobility w/ no DME @ supervision level  At this time, no cognitive deficits noted in today's ADL sesssion as pt reporting that when he feels he is confused regarding sorting his medication, he calls his daughter for assistance; will continue to monitor; however pt reporting that he is in process of moving to [facility], will continue to follow w/ CM notes; recommend ACLs if returning to home to determine level of superivision  The patient's raw score on the AM-PAC Daily Activity Inpatient Short Form is 21  A raw score of greater than or equal to 19 suggests the patient may benefit from discharge to home  Please refer to the recommendation of the Occupational Therapist for safe discharge planning  Established OT goals will be continued 1-2x/wk to address immediate acute care needs and underlying performance skills to maximize occupational performance and safety to return to [facility]     Plan   Treatment Interventions Cognitive reorientation   Goal Expiration Date 03/13/23   OT Treatment Day 1   OT Frequency 1-2x/wk   Recommendation   Recommendation Geriatric Consult   OT Discharge Recommendation No rehabilitation needs  (Pending ACLS score)   AM-PAC Daily Activity Inpatient   Lower Body Dressing 3   Bathing 3   Toileting 3   Upper Body Dressing 4   Grooming 4   Eating 4   Daily Activity Raw Score 21   Daily Activity Standardized Score (Calc for Raw Score >=11) 44 27   AM-PAC Applied Cognition Inpatient   Following a Speech/Presentation 3   Understanding Ordinary Conversation 4   Taking Medications 3   Remembering Where Things Are Placed or Put Away 3   Remembering List of 4-5 Errands 3   Taking Care of Complicated Tasks 3   Applied Cognition Raw Score 19   Applied Cognition Standardized Score 39 77   End of Consult   Education Provided Yes   Patient Position at End of Consult Bedside chair; All needs within reach   Nurse Communication Nurse aware of consult       Randall Escobar MS, OTR/L

## 2023-03-07 NOTE — CONSULTS
Consultation - Geriatrics   Stefano Celaya 78 y o  male MRN: [id number]  Unit/Bed#: CW2 214-01 Encounter: [id number]      Assessment/Plan  Encephalopathy  Improved  Alert and oriented x 3, periods of confusion  Provide frequent redirection, reorientation, distraction techniques  Avoid deliriogenic medications such as tramadol, benzodiazepines, anticholinergics,  Benadryl  Treat pain, See geriatric pain protocol  Monitor for constipation and urinary retention  Encourage early and frequent moblization, OOB  Encourage Hydration/ Nutrition  Implement sleep hygiene, limit night time interuptions, group activities    Cognitive impairment  Declining memory over past 3 months, per daughter  Significant word finding on exam  At risk secondary to CAD, afib,  JORGE  Cannot exclude underlying progressing mild to moderate dementia  Maintain use of CPAP  TSH 1 640 (3/4/23)  Vitamin B 12- 284  CT head (1/1/23) mild microangiopathic changes  ACLS eval with OT pending    Frailty  Clinical Frail Scale: 5  Lives alone, agree with assisted living/increased support  Albumin 3 3, recommend protein supplementation  PT/OT    Ambulatory dysfunction  Fall precautions  PT/OT    Insomnia  Started on mirtazapine 7 5 mg po QHS  Encourage daytime activity  Maintain circadian rhythm    UTI  UA CS, positive staph aureus  Given IV rocephin    Advanced Care Planning  Full code    Home medication review  [facility]  Allopurinol 100 mg po daily  Asa 81 mg po daily  Atorvastatin 20 mg po daily  Levothyroxine 75 mcg po daily  Valsartan 160 mg po daily  Diltiazem  mg po daily  Percocet 5-325 mg po prn    History of Present Illness   Physician Requesting Consult: Jacinto Chang  Reason for Consult / Principal Problem: altered mental status  Hx and PE limited by: NA  HPI: Stefano Celaya is a 78y o  year old male who presents with worsening mental status  He has had a decline over the past 3 months   He was found to have UTI, started on IV rocephin  He has HTN, hyperlipidemia, PAF, hypothyroidism, CKD3, COPD, JORGE  Hx of total colectomy    Prior to arrival pt lives at home alone  He is independent with IADLs and ADLs  He ambulates independently  He denies prior falls  He reports not sleeping well at night  He states he has difficulty remembering things, he makes list to remember things and calls his daughter when he needs help    Upon exam pt is ambulating in the [address]  He reports he is sleeping well here, he was not at home  He has significant word finding on exam  He states his daughter is taking care of everything      Inpatient consult to Gerontology  Consult performed by: CHRIS Gregg  Consult ordered by: Uche Conner DO          Review of Systems   Constitutional: Negative for unexpected weight change  HENT: Positive for hearing loss  Eyes: Negative for visual disturbance  Respiratory: Negative for cough  Cardiovascular: Negative for chest pain  Gastrointestinal: Negative for constipation  Genitourinary: Negative for difficulty urinating  Musculoskeletal: Positive for gait problem  Neurological: Negative for dizziness  Psychiatric/Behavioral: Positive for sleep disturbance  Historical Information   Past Medical History:   Diagnosis Date   • Aneurysm of common iliac artery (HCC)     b/l, 2 0cm on right, 1 8cm on left   • Aortic dissection, thoracoabdominal (HCC)     type B, from takeoff of left subclavian throughout DTA/abdominal aorta/both common iliac arteries, patent great vessels off the aortic arch with no IMH/dissection & celiac trunk/SMA/bilateral renal/accessory left renal artery/TJ arising from the true lumen & patent   • Arthritis    • Ascending aortic aneurysm     4 6cm, trileaflet AV   • Descending thoracic aortic aneurysm     5 1cm proximally, 4 5x4 5cm distally   • History of nephrolithiasis    • Hyperlipidemia    • Hypertension    • Obesity (BMI 30 0-34  9)    • PAF (paroxysmal atrial fibrillation) ([facility] [address] )     Eliquis   • Pancreatic cyst     7mm, pancreatic body     Past Surgical History:   Procedure Laterality Date   • CARDIAC ELECTROPHYSIOLOGY STUDY AND ABLATION  2019   • CARDIOVERSION  2019   • FL RETROGRADE PYELOGRAM  2022   • FL RETROGRADE PYELOGRAM  2022   • HERNIA REPAIR     • IR JOINT INJECTION  2019   • LAPAROSCOPIC ASSISSTED TOTAL COLECTOMY W/ J-POUCH     • ID CYSTO/URETERO W/LITHOTRIPSY &INDWELL STENT INSRT N/A 2022    Procedure: BILATERAL RETROGRADE PYELOGRAM, CYSTOSCOPY,  LEFT URETEROSCOPY, LEFT LASER LITHOTRIPSY BASKET EXTRACTION, LEFT STENT;  Surgeon: Esa Glynn MD;  Location: [address] [place] [address];  Service: Urology   • ID CYSTO/URETERO W/LITHOTRIPSY &INDWELL STENT INSRT Left 2022    Procedure: CYSTOSCOPY, URETEROSCOPY STENT REMOVAL, LASER LITHOTRIPSY, WITH BASKET, INSERTION OF URETERAL STENT;  Surgeon: Esa Glynn MD;  Location: [address] [place] [address];  Service: Urology   • THORACOTOMY Left    • VASECTOMY       Social History   Social History     Substance and Sexual Activity   Alcohol Use Yes    Comment: Occas        Social History     Substance and Sexual Activity   Drug Use Never     Social History     Tobacco Use   Smoking Status Former   • Packs/day: 1 50   • Years: 15 00   • Pack years: 22 50   • Types: Cigarettes   • Quit date: 12   • Years since quittin 2   Smokeless Tobacco Never         Family History:   Family History   Problem Relation Age of Onset   • Arthritis Father        Meds/Allergies   Current meds:   Current Facility-Administered Medications   Medication Dose Route Frequency   • acetaminophen (TYLENOL) tablet 650 mg  650 mg Oral Q6H PRN   • allopurinol (ZYLOPRIM) tablet 100 mg  100 mg Oral Daily   • apixaban (ELIQUIS) tablet 5 mg  5 mg Oral BID   • atorvastatin (LIPITOR) tablet 20 mg  20 mg Oral Daily   • cyanocobalamin injection 1,000 mcg  1,000 mcg Intramuscular Daily   • diltiazem (CARDIZEM CD) 24 hr capsule 180 mg  180 mg Oral Daily   • levothyroxine tablet 75 mcg  75 mcg Oral Daily   • metoprolol succinate (TOPROL-XL) 24 hr tablet 100 mg  100 mg Oral BID   • mirtazapine (REMERON) tablet 7 5 mg  7 5 mg Oral HS   • oxyCODONE-acetaminophen (PERCOCET) 5-325 mg per tablet 1 tablet  1 tablet Oral Q6H PRN   • umeclidinium 62 5 mcg/actuation inhaler AEPB 1 puff  1 puff Inhalation Daily           No Known Allergies    Objective   Vitals: Blood pressure 98/57, pulse 90, temperature 98 °F (36 7 °C), resp  rate 18, weight 86 2 kg (190 lb), SpO2 93 %  ,Body mass index is 31 62 kg/m²  Physical Exam  Vitals and nursing note reviewed  HENT:      Head: Normocephalic  Nose: No congestion  Mouth/Throat:      Mouth: Mucous membranes are moist    Eyes:      General:         Right eye: No discharge  Left eye: No discharge  Cardiovascular:      Rate and Rhythm: Normal rate and regular rhythm  Pulses: Normal pulses  Heart sounds: Normal heart sounds  Pulmonary:      Effort: Pulmonary effort is normal    Abdominal:      General: Bowel sounds are normal       Palpations: Abdomen is soft  Musculoskeletal:         General: Normal range of motion  Cervical back: Normal range of motion  Skin:     General: Skin is warm and dry  Neurological:      Mental Status: He is alert and oriented to person, place, and time  Comments:  Word finding on exam   Psychiatric:         Mood and Affect: Mood normal          Lab Results:   Results from last 7 days   Lab Units 03/07/23  0518   WBC Thousand/uL 3 26*   HEMOGLOBIN g/dL 12 1   HEMATOCRIT % 39 8   PLATELETS Thousands/uL 236        Results from last 7 days   Lab Units 03/07/23  0518 03/05/23  0252 03/04/23  1018   POTASSIUM mmol/L 4 1   < > 4 5   CHLORIDE mmol/L 109*   < > 107   CO2 mmol/L 26   < > 25   BUN mg/dL 23   < > 22   CREATININE mg/dL 1 88*   < > 1 69*   CALCIUM mg/dL 10 3*   < > 11 1*   ALK PHOS U/L  --   --  122*   ALT U/L  --   --  25   AST U/L  --   --  21    < > = values in this interval not displayed  Imaging Studies: I have personally reviewed pertinent reports  EKG, Pathology, and Other Studies: I have personally reviewed pertinent reports      VTE Prophylaxis: Sequential compression device (Venodyne)     Code Status: Level 1 - Full Code

## 2023-03-07 NOTE — PLAN OF CARE
Problem: OCCUPATIONAL THERAPY ADULT  Goal: Performs self-care activities at highest level of function for planned discharge setting  See evaluation for individualized goals  Description: Treatment Interventions: Cognitive reorientation          See flowsheet documentation for full assessment, interventions and recommendations  Outcome: Progressing  Note: Limitation: Decreased Safe judgement during ADL, Decreased self-care trans, Decreased high-level ADLs, Decreased cognition  Prognosis: Good  Assessment: Pt is a 77 yo male who actively participated in skilled OT session on 3/7/2023  Treatment focused to improve functional transfers with fall prevention strategies, static/dynamic balance, postural/trunk control, proper body mechanics, functional use of b/l UE's, higher level cognitive functions, safety awareness, and overall increased activity tolerance in ADL/IADL/leisure tasks  Upon arrival, pt found lying supine in bed and was agreeable to OT session  Pt performed supine <> sit @ supervision level and sat EOB for approx 20 minutes to perform UB dressing/bathing, LB dressing/bathing, and grooming tasks @ supervision level  Pt completed STS @ supervision level w/ no DME and completed household functional mobility w/ no DME @ supervision level  At this time, no cognitive deficits noted in today's ADL sesssion as pt reporting that when he feels he is confused regarding sorting his medication, he calls his daughter for assistance; will continue to monitor; however pt reporting that he is in process of moving to [facility]; will continue to follow w/ CM notes; recommend ACLs if returning to home to determine level of superivision  The patient's raw score on the AM-PAC Daily Activity Inpatient Short Form is 21  A raw score of greater than or equal to 19 suggests the patient may benefit from discharge to home  Please refer to the recommendation of the Occupational Therapist for safe discharge planning  Established OT goals will be continued 1-2x/wk to address immediate acute care needs and underlying performance skills to maximize occupational performance and safety to return to PLOF    Recommendation: Geriatric Consult  OT Discharge Recommendation: No rehabilitation needs (Pending ACLS score)

## 2023-03-08 PROBLEM — N17.9 ACUTE KIDNEY INJURY SUPERIMPOSED ON CKD (HCC): Status: ACTIVE | Noted: 2023-03-08

## 2023-03-08 PROBLEM — N18.9 ACUTE KIDNEY INJURY SUPERIMPOSED ON CKD (HCC): Status: ACTIVE | Noted: 2023-03-08

## 2023-03-08 LAB
ANION GAP SERPL CALCULATED.3IONS-SCNC: 2 MMOL/L (ref 4–13)
BUN SERPL-MCNC: 30 MG/DL (ref 5–25)
CALCIUM SERPL-MCNC: 10.2 MG/DL (ref 8.3–10.1)
CHLORIDE SERPL-SCNC: 109 MMOL/L (ref 96–108)
CO2 SERPL-SCNC: 26 MMOL/L (ref 21–32)
CREAT SERPL-MCNC: 2.38 MG/DL (ref 0.6–1.3)
GFR SERPL CREATININE-BSD FRML MDRD: 25 ML/MIN/1.73SQ M
GLUCOSE SERPL-MCNC: 99 MG/DL (ref 65–140)
POTASSIUM SERPL-SCNC: 3.7 MMOL/L (ref 3.5–5.3)
PTH-INTACT SERPL-MCNC: 89.8 PG/ML (ref 18.4–80.1)
SODIUM SERPL-SCNC: 137 MMOL/L (ref 135–147)

## 2023-03-08 RX ORDER — SODIUM CHLORIDE, SODIUM GLUCONATE, SODIUM ACETATE, POTASSIUM CHLORIDE, MAGNESIUM CHLORIDE, SODIUM PHOSPHATE, DIBASIC, AND POTASSIUM PHOSPHATE .53; .5; .37; .037; .03; .012; .00082 G/100ML; G/100ML; G/100ML; G/100ML; G/100ML; G/100ML; G/100ML
75 INJECTION, SOLUTION INTRAVENOUS CONTINUOUS
Status: DISCONTINUED | OUTPATIENT
Start: 2023-03-08 | End: 2023-03-09

## 2023-03-08 RX ADMIN — MIRTAZAPINE 7.5 MG: 15 TABLET, FILM COATED ORAL at 21:34

## 2023-03-08 RX ADMIN — LEVOTHYROXINE SODIUM 75 MCG: 75 TABLET ORAL at 06:15

## 2023-03-08 RX ADMIN — METOPROLOL SUCCINATE 50 MG: 50 TABLET, EXTENDED RELEASE ORAL at 21:34

## 2023-03-08 RX ADMIN — UMECLIDINIUM 1 PUFF: 62.5 AEROSOL, POWDER ORAL at 09:53

## 2023-03-08 RX ADMIN — SODIUM CHLORIDE, SODIUM GLUCONATE, SODIUM ACETATE, POTASSIUM CHLORIDE, MAGNESIUM CHLORIDE, SODIUM PHOSPHATE, DIBASIC, AND POTASSIUM PHOSPHATE 75 ML/HR: .53; .5; .37; .037; .03; .012; .00082 INJECTION, SOLUTION INTRAVENOUS at 18:24

## 2023-03-08 RX ADMIN — APIXABAN 5 MG: 5 TABLET, FILM COATED ORAL at 18:07

## 2023-03-08 RX ADMIN — CYANOCOBALAMIN 1000 MCG: 1000 INJECTION, SOLUTION INTRAMUSCULAR at 09:53

## 2023-03-08 RX ADMIN — ALLOPURINOL 100 MG: 100 TABLET ORAL at 09:53

## 2023-03-08 RX ADMIN — APIXABAN 5 MG: 5 TABLET, FILM COATED ORAL at 09:52

## 2023-03-08 RX ADMIN — ATORVASTATIN CALCIUM 20 MG: 20 TABLET, FILM COATED ORAL at 09:53

## 2023-03-08 RX ADMIN — SODIUM CHLORIDE 100 ML/HR: 0.9 INJECTION, SOLUTION INTRAVENOUS at 08:43

## 2023-03-08 NOTE — PROGRESS NOTES
[facility]  Progress Note Dunia Des 1945, 78 y o  male MRN: [id number]  Unit/Bed#: CW2 214-01 Encounter: [id number]  Primary Care Provider: Yovani Rucker MD   Date and time admitted to hospital: 3/4/2023 77:44 AM    * Metabolic encephalopathy  Assessment & Plan  · Patient and daughter endorsing about 1 week of increasing confusion  · No focal neurologic deficit; s/p CTA head/neck  Low suspicion for CVA, suspect metabolic encephalopathy possibly due to hypercalcemia  · Suspect may have underlying cognitive impairment as dtr notes "forgetfulness, absent mindedness" for the past 3 months and has periods of sundowning  · Patient's mental status is alert and oriented x3 although he does note some memory difficulties and overnight w/ sundowning  · Vit B12 is low nml at 287, received IM supplementation   · In euthyroid state  · Referral to neuropsych as outpt  · Plan is for pt to be discharged to independent living in more supervised setting    Acute kidney injury superimposed on CKD [facility])  Assessment & Plan  Lab Results   Component Value Date    EGFR 25 03/08/2023    EGFR 34 03/07/2023    EGFR 43 03/06/2023    CREATININE 2 38 (H) 03/08/2023    CREATININE 1 88 (H) 03/07/2023    CREATININE 1 53 (H) 03/06/2023   Likely secondary to contrast induced nephropathy as pt received CTA head/neck/chest/a/pelvis  Nephrology following  On IVF hydration  Avoid nephrotoxins, hypotension  [facility]      Pyuria  Assessment & Plan  · Status post urine culture, revealing Staph aureus with nonsignificant colony count thus low suspicion for actual UTI  · Regardless patient received IV ceftriaxone x3 days  Monitor off antibiotics    Hypercalcemia  Assessment & Plan  · Prior elevation of PTH w  nml PTH related peptide hormone back in 2021  · Nephrology on board, appreciate input   Secondary to primary hyperparathyroidism  · S/p CTA chest/abd/pelvis  Stable pancreatic cyst for which recommending repeat MRI/MRCP in 2 yrs  Otherwise no mention of underlying tumor  · IVF changed accordingly  · Cont to trend  · Pending SPEP, UPEP, vit d levels    Atrial fibrillation (HCC)  Assessment & Plan  · Rate controlled  · Anticoagulation with Eliquis    Aortic dissection, thoracoabdominal (HCC)  Assessment & Plan  · CTA reviewed concern for new tear  · As above      Thoracic aortic aneurysm without rupture  Assessment & Plan  · Patient with known type B dissection following up with Dr Alberto Nixon on 3/9/23  · S/p CTA nead noting possible increased intima disruption, increase in size to 5 1cm  · S/p CTA chest/a/pelvis  · Status post CT surgery evaluation, recommending medical management  Per outpt CT surgery's note had recommended outpt referral to vascular sx for consideration for an axillofemoral bypass  Pt has upcoming appt with vascular sx as outpt but dtr's preference to be evaluated while inpatient  Appreciate input per vascular surgery, recommending continued outpt follow up  May need eventual surgical intervention in the future, will cont to address as outpt  · Pt otherwise hemodynamically stable       Osteoarthrosis involving multiple sites  Assessment & Plan  Tylenol and home percocet PRN      Hypertension  Assessment & Plan  · Patient appears to have had some issues adhering to his antihypertensives in the outpatient setting, on review of recent note it seems as though he was not taking his Cardizem after the initial prescription ran out    · BP on the low side though asymptomatic from yesterday thus reduced dose of Cardizem and Lopressor, continue meds with holding parameters    Hyperparathyroidism ([address] )  Assessment & Plan  · Chronic hx as per labs in 2021 but per pt's preference wanted to just monitor  · S/p repeat labs; pending peptide related PTH, Vit D levels, SPEP, UPEP  · Nephrology on board, appreciate input      VTE Pharmacologic Prophylaxis:   Pharmacologic: Apixaban (Eliquis)  Mechanical VTE Prophylaxis in Place: No    Patient Centered Rounds: I have performed bedside rounds with nursing staff today  Discussions with Specialists or Other Care Team Provider:     Education and Discussions with Family / Patient: patient  Called his dtr Monisha and updated at length    Time Spent for Care: 30 minutes  More than 50% of total time spent on counseling and coordination of care as described above  Current Length of Stay: 3 day(s)    Current Patient Status: Inpatient   Certification Statement: The patient will continue to require additional inpatient hospital stay due to acute illness    Discharge Plan: to independently living once medically cleared    Code Status: Level 1 - Full Code      Subjective:   Per RN yesterday had period of  but states was still largely coherent  This am, oriented x 3  Has no other complaints  Denies any cardiac symptoms    Objective:     Vitals:   Temp (24hrs), Av 1 °F (36 7 °C), Min:98 °F (36 7 °C), Max:98 2 °F (36 8 °C)    Temp:  [98 °F (36 7 °C)-98 2 °F (36 8 °C)] 98 °F (36 7 °C)  HR:  [] 80  Resp:  [16-19] 18  BP: ()/(60-71) 100/69  SpO2:  [93 %-98 %] 95 %  Body mass index is 31 62 kg/m²  Input and Output Summary (last 24 hours): Intake/Output Summary (Last 24 hours) at 3/8/2023 1131  Last data filed at 3/8/2023 0956  Gross per 24 hour   Intake 1771 67 ml   Output 400 ml   Net 1371 67 ml       Physical Exam:     Physical Exam  Cardiovascular:      Rate and Rhythm: Normal rate  Rhythm irregular  Pulses: Normal pulses  Heart sounds: Normal heart sounds  No murmur heard  Pulmonary:      Effort: Pulmonary effort is normal  No respiratory distress  Breath sounds: Normal breath sounds  No wheezing or rales  Abdominal:      General: Bowel sounds are normal  There is no distension  Palpations: Abdomen is soft  Tenderness:  There is no abdominal tenderness  There is no guarding  Musculoskeletal:         General: Normal range of motion  Cervical back: Normal range of motion and neck supple  Right lower leg: No edema  Left lower leg: No edema  Skin:     General: Skin is warm and dry  Neurological:      General: No focal deficit present  Mental Status: He is alert  Mental status is at baseline  Cranial Nerves: No cranial nerve deficit  Motor: No weakness  Additional Data:     Labs:    Results from last 7 days   Lab Units 03/07/23  0518   WBC Thousand/uL 3 26*   HEMOGLOBIN g/dL 12 1   HEMATOCRIT % 39 8   PLATELETS Thousands/uL 236   NEUTROS PCT % 62   LYMPHS PCT % 22   MONOS PCT % 14*   EOS PCT % 2     Results from last 7 days   Lab Units 03/08/23  0551 03/05/23  0252 03/04/23  1018   SODIUM mmol/L 137   < > 136   POTASSIUM mmol/L 3 7   < > 4 5   CHLORIDE mmol/L 109*   < > 107   CO2 mmol/L 26   < > 25   BUN mg/dL 30*   < > 22   CREATININE mg/dL 2 38*   < > 1 69*   ANION GAP mmol/L 2*   < > 4   CALCIUM mg/dL 10 2*   < > 11 1*   ALBUMIN g/dL  --   --  3 3*   TOTAL BILIRUBIN mg/dL  --   --  0 82   ALK PHOS U/L  --   --  122*   ALT U/L  --   --  25   AST U/L  --   --  21   GLUCOSE RANDOM mg/dL 99   < > 96    < > = values in this interval not displayed  Results from last 7 days   Lab Units 03/04/23  1018   POC GLUCOSE mg/dl 82                   * I Have Reviewed All Lab Data Listed Above  * Additional Pertinent Lab Tests Reviewed:  All Labs Within Last 24 Hours Reviewed    Imaging:    Imaging Reports Reviewed Today Include:   Imaging Personally Reviewed by Myself Includes:      Recent Cultures (last 7 days):     Results from last 7 days   Lab Units 03/04/23  1106   URINE CULTURE  40,000-49,000 cfu/ml Staphylococcus aureus*       Last 24 Hours Medication List:   Current Facility-Administered Medications   Medication Dose Route Frequency Provider Last Rate   • acetaminophen  650 mg Oral Q6H PRN Andreia Fontan     • allopurinol  100 mg Oral Daily [facility] Fontan     • apixaban  5 mg Oral BID [facility] Fontan     • atorvastatin  20 mg Oral Daily Andreia Fontan     • diltiazem  120 mg Oral Daily Sofia Cardoso, DO     • levothyroxine  75 mcg Oral Daily Andreia Fontan     • metoprolol succinate  50 mg Oral BID Sofia Cardoso, DO     • mirtazapine  7 5 mg Oral HS [facility] Fontan     • multi-electrolyte  75 mL/hr Intravenous Continuous Med Drop, DO     • oxyCODONE-acetaminophen  1 tablet Oral Q6H PRN Andreia Fonpalmer     • umeclidinium  1 puff Inhalation Daily Randal Yeboah PA-C          Today, Patient Was Seen By: Sofia Cardoso DO    ** Please Note: Dictation voice to text software may have been used in the creation of this document   **

## 2023-03-08 NOTE — CONSULTS
[address] [place] [address] 78 y o  male MRN: [id number]  Unit/Bed#: CW2 214-01 Encounter: [id number]    ASSESSMENT and PLAN:  Acute kidney injury on CKD:  · Etiology: Multifactorial with underlying CKD/atrophic kidney/hypotension/contrast x2 with underlying alteration in hemodynamics due to ARB  · Baseline creatinine appears to be between 1 6 to 2 1 g/dL  · 3/4: Creatinine 1 69 on admission remaining within baseline up until 3/8 prompting nephrology consult  · Contrast 3/4, 3/5: After which creatinine increased to 1 88 on 3/7 and 2 38 on 3/8  · Renal imaging: CT with contrast   Right kidney somewhat atrophic  Several punctate intrarenal calculi noted bilaterally  No hydronephrosis  No lesions  · Urinalysis: Trace leukocytes, trace blood, 1-2 RBCs, 10-20 WBCs, 5-10 hyaline casts  · Plan/recommendations:  · Supportive care  · Receiving isotonic saline 100 mL/h-agree with current plan  · Avoid hypotension, nephrotoxic agents  · Check labs in the a m   · Urinary retention protocol    Chronic kidney disease, stage IIIb  · Baseline creatinine recently: 1 6 to 2 1 mg/dL  · Outpatient nephrologist: Dr Montelongo Holland  · Appears that he was last seen in the office in 2020 and at that time creatinine between 1 2-1 4  · Progression of disease noted over the last several years  · Urine protein creatinine ratio 2021: 0 16    Mild hypercalcemia:  · Etiology: Non-PTH mediated hypercalcemia  · Previously evaluated by Dr Kalia SKY  Noted calcium mildly elevated with unremarkable PTH RP and vitamin D levels  Free light chain ratio was unremarkable    · 3/5 ionized calcium normal  · Current PTH: 89 8   · Prior PTH elevated: 126 6, 151 4  · Prior PTH RP <2 August 2020  · PTH RP, vitamin D level pending  · No osseous lesions on imaging  · Check SPEP and UPEP    Blood pressure/volume status:  · Likely primary hypertension medications: Diltiazem 120 mg daily, metoprolol succinate 50 mg twice a day  · Low blood pressure improving with IV fluids  · Volume status: Appears euvolemic  · Echocardiogram: Ejection fraction 55%, mildly reduced systolic function  Moderate MR  Aortic root mildly dilated  · Chest x-ray: No acute cardiopulmonary disease    Encephalopathy:  · Presented with confusion x1 week  · Multifactorial  · Primary service following    Pyuria  · Urine culture: 40,000-49,000 CFU/mL Staphylococcus aureus  · Management per primary team    Other problems:  · History of aortic dissection  · Atrial fibrillation on Eliquis and beta-blocker also on Lipitor    HISTORY OF PRESENT ILLNESS:  Requesting Physician: Mirella Whatley  Reason for Consult: CRIS on CKD    Wilfred Patton is a 78 y o  male with a past medical history of CKD, hypercalcemia, nephrolithiasis, colitis, hypertension, atrial fibrillation, type B aortic dissection who was admitted to [facility] after presenting with confusion which was worsening over several weeks  Most of the information taken from medical record  Per Siomara Knox he relates that he was getting more forgetful  He keeps talking about issues with colitis and diarrhea but he does mention problems with his aorta  He states that he feels anxious but other than that he offers no complaints  He is eating and drinking  No edema  Denies shortness of breath  Denies chest pains  He currently denies urinary complaints although on admission there was reports of increased frequency  On admission creatinine at baseline  Patient relates that he saw kidney doctor but cannot elaborate further  Per review of records in [facility] he was previously seen by Dr Ladonna Henry of [facility] nephrology  Creatinine was within recent baseline on admission although noted some progression of disease over the last few years  Since admission creatinine increasing following 2 imaging with contrast   A renal consultation is requested today for assistance in the management of CRIS on CKD      PAST MEDICAL HISTORY:  Past Medical History:   Diagnosis Date   • Aneurysm of common iliac artery (HCC)     b/l, 2 0cm on right, 1 8cm on left   • Aortic dissection, thoracoabdominal (HCC)     type B, from takeoff of left subclavian throughout DTA/abdominal aorta/both common iliac arteries, patent great vessels off the aortic arch with no IMH/dissection & celiac trunk/SMA/bilateral renal/accessory left renal artery/TJ arising from the true lumen & patent   • Arthritis    • Ascending aortic aneurysm     4 6cm, trileaflet AV   • Descending thoracic aortic aneurysm     5 1cm proximally, 4 5x4 5cm distally   • History of nephrolithiasis    • Hyperlipidemia    • Hypertension    • Obesity (BMI 30 0-34  9)    • PAF (paroxysmal atrial fibrillation) ([facility])     Eliquis   • Pancreatic cyst     7mm, pancreatic body       PAST SURGICAL HISTORY:  Past Surgical History:   Procedure Laterality Date   • CARDIAC ELECTROPHYSIOLOGY STUDY AND ABLATION  01/2019   • CARDIOVERSION  02/2019   • FL RETROGRADE PYELOGRAM  05/12/2022   • FL RETROGRADE PYELOGRAM  09/01/2022   • HERNIA REPAIR     • IR JOINT INJECTION  09/30/2019   • LAPAROSCOPIC ASSISSTED TOTAL COLECTOMY W/ J-POUCH     • NV CYSTO/URETERO W/LITHOTRIPSY &INDWELL STENT INSRT N/A 05/12/2022    Procedure: BILATERAL RETROGRADE PYELOGRAM, CYSTOSCOPY,  LEFT URETEROSCOPY, LEFT LASER LITHOTRIPSY BASKET EXTRACTION, LEFT STENT;  Surgeon: Carolina Meza MD;  Location: [address] [place] [address];  Service: Urology   • NV CYSTO/URETERO W/LITHOTRIPSY &INDWELL STENT INSRT Left 09/01/2022    Procedure: CYSTOSCOPY, URETEROSCOPY STENT REMOVAL, LASER LITHOTRIPSY, WITH BASKET, INSERTION OF URETERAL STENT;  Surgeon: Carolina Meza MD;  Location: [address] [place] [address];  Service: Urology   • THORACOTOMY Left 2001   • VASECTOMY         ALLERGIES:  No Known Allergies    SOCIAL HISTORY:  Social History     Substance and Sexual Activity   Alcohol Use Yes    Comment: Occas        Social History     Substance and Sexual Activity   Drug Use Never Social History     Tobacco Use   Smoking Status Former   • Packs/day: 1 50   • Years: 15 00   • Pack years: 22 50   • Types: Cigarettes   • Quit date: 12   • Years since quittin 2   Smokeless Tobacco Never       FAMILY HISTORY:  Family History   Problem Relation Age of Onset   • Arthritis Father        MEDICATIONS:    Current Facility-Administered Medications:   •  acetaminophen (TYLENOL) tablet 650 mg, 650 mg, Oral, Q6H PRN, [place] Pancho  •  allopurinol (ZYLOPRIM) tablet 100 mg, 100 mg, Oral, Daily, [place] Pancho, 100 mg at 23 0915  •  apixaban (ELIQUIS) tablet 5 mg, 5 mg, Oral, BID, [place] Pancho, 5 mg at 23 1736  •  atorvastatin (LIPITOR) tablet 20 mg, 20 mg, Oral, Daily, [place] Pancho, 20 mg at 23 0915  •  cyanocobalamin injection 1,000 mcg, 1,000 mcg, Intramuscular, Daily, Jeet Evon, DO, 1,000 mcg at 23 9149  •  diltiazem (CARDIZEM CD) 24 hr capsule 120 mg, 120 mg, Oral, Daily, Louis Reynolds, DO  •  levothyroxine tablet 75 mcg, 75 mcg, Oral, Daily, [place] Pancho, 75 mcg at 23 8296  •  metoprolol succinate (TOPROL-XL) 24 hr tablet 50 mg, 50 mg, Oral, BID, Louis Reynolds, DO  •  mirtazapine (REMERON) tablet 7 5 mg, 7 5 mg, Oral, HS, [place] Pancho, 7 5 mg at 23  •  oxyCODONE-acetaminophen (PERCOCET) 5-325 mg per tablet 1 tablet, 1 tablet, Oral, Q6H PRN, [place] Pancho, 1 tablet at 23 8392  •  sodium chloride 0 9 % infusion, 100 mL/hr, Intravenous, Continuous, Louis Reynolds DO, Last Rate: 100 mL/hr at 23 2100, 100 mL/hr at 23  •  umeclidinium 62 5 mcg/actuation inhaler AEPB 1 puff, 1 puff, Inhalation, Daily, Uriel Rowell PA-C, 1 puff at 23 1714    REVIEW OF SYSTEMS:  Difficult to elicit complete review of systems since underlying mental status change/encephalopathy  Constitutional: Patient denies fatigue at this time    He denies fevers or chills  HENT: Negative for postnasal drip  Eyes: Negative for visual disturbance  Respiratory: Negative for cough, shortness of breath and wheezing  Cardiovascular: Negative for chest pain, palpitations and leg swelling  Gastrointestinal: Positive for diarrhea  Genitourinary: Urinary frequency on admission  Currently denies dysuria  He reports that he usually urinates when he has stool  Endocrine: Negative for polyuria  Musculoskeletal: Negative for unusual arthralgia or myalgia  Skin: Negative for rash  Neurological: Negative for focal weakness  Hematological: Negative for bleeding  Psychiatric/Behavioral: Positive for confusion  All the systems were reviewed and were negative except as documented on the HPI  PHYSICAL EXAM:  Current Weight: Weight - Scale: 86 2 kg (190 lb)  First Weight: Weight - Scale: 86 2 kg (190 lb)  Vitals:    03/07/23 2120 03/07/23 2318 03/07/23 2321 03/08/23 0708   BP:  93/60 93/60 121/71   BP Location:   Left arm Left arm   Pulse:  63 103 89   Resp:  16 16 18   Temp:  98 2 °F (36 8 °C) 98 2 °F (36 8 °C) 98 °F (36 7 °C)   TempSrc:   Oral Oral   SpO2: 98% 96% 93% 98%   Weight:           Intake/Output Summary (Last 24 hours) at 3/8/2023 0816  Last data filed at 3/8/2023 0616  Gross per 24 hour   Intake 480 ml   Output 400 ml   Net 80 ml     Physical Exam  Vitals reviewed  Constitutional:       General: He is not in acute distress  Appearance: He is well-developed  He is not ill-appearing, toxic-appearing or diaphoretic  HENT:      Head: Normocephalic and atraumatic  Nose: Nose normal  No congestion  Mouth/Throat:      Mouth: Mucous membranes are moist       Pharynx: No oropharyngeal exudate  Eyes:      General: No scleral icterus  Right eye: No discharge  Left eye: No discharge  Extraocular Movements: Extraocular movements intact  Conjunctiva/sclera: Conjunctivae normal    Neck:      Thyroid: No thyromegaly  Vascular: No carotid bruit or JVD  Trachea: No tracheal deviation  Cardiovascular:      Rate and Rhythm: Normal rate and regular rhythm  Heart sounds: No murmur heard  No friction rub  No gallop  Pulmonary:      Effort: Pulmonary effort is normal  No respiratory distress  Breath sounds: Normal breath sounds  No stridor  No wheezing or rhonchi  Abdominal:      General: Bowel sounds are normal  There is no distension  Palpations: Abdomen is soft  There is no mass  Tenderness: There is no abdominal tenderness  There is no guarding or rebound  Musculoskeletal:         General: No tenderness or signs of injury  Normal range of motion  Cervical back: Normal range of motion and neck supple  No rigidity or tenderness  Right lower leg: No edema  Left lower leg: No edema  Skin:     General: Skin is warm and dry  Capillary Refill: Capillary refill takes less than 2 seconds  Coloration: Skin is not jaundiced or pale  Findings: No erythema or rash  Neurological:      General: No focal deficit present  Mental Status: He is alert  Psychiatric:         Mood and Affect: Mood normal          Behavior: Behavior normal          Cognition and Memory: Cognition is impaired  He exhibits impaired recent memory           Invasive Devices:      Lab Results:   Results from last 7 days   Lab Units 03/08/23  0551 03/07/23  0518 03/06/23  0503 03/05/23  0252 03/04/23  1018   WBC Thousand/uL  --  3 26* 7 00  --  7 63   HEMOGLOBIN g/dL  --  12 1 12 4  --  13 4   HEMATOCRIT %  --  39 8 39 7  --  42 4   PLATELETS Thousands/uL  --  236 230  --  260   POTASSIUM mmol/L 3 7 4 1 4 0   < > 4 5   CHLORIDE mmol/L 109* 109* 110*   < > 107   CO2 mmol/L 26 26 25   < > 25   BUN mg/dL 30* 23 19   < > 22   CREATININE mg/dL 2 38* 1 88* 1 53*   < > 1 69*   CALCIUM mg/dL 10 2* 10 3* 10 1   < > 11 1*   ALK PHOS U/L  --   --   --   --  122*   ALT U/L  --   --   --   --  25   AST U/L  --   --   --   --  21    < > = values in this interval not displayed       Other Studies:

## 2023-03-08 NOTE — ASSESSMENT & PLAN NOTE
· Status post urine culture, revealing Staph aureus with nonsignificant colony count thus low suspicion for actual UTI  · Regardless patient received IV ceftriaxone x3 days    Monitor off antibiotics

## 2023-03-08 NOTE — CASE MANAGEMENT
Case Management Assessment & Discharge Planning Note    Patient name Jorje Singh  Location [facility] 451/2 214-01 MRN [id number]  : 1945 Date 3/8/2023       Current Admission Date: 3/4/2023  Current Admission Diagnosis:Metabolic encephalopathy   Patient Active Problem List    Diagnosis Date Noted   • Acute kidney injury superimposed on CKD ([address] ) 2023   • Pyuria 2023   • Hypercalcemia 2023   • UTI (urinary tract infection)    • Metabolic encephalopathy 383   • Atrial fibrillation ([address] ) 2023   • Aortic dissection, thoracoabdominal ([address] ) 2022   • Continuous opioid dependence ([address] ) 02/10/2022   • Chronic pain syndrome 2021   • Ulcerative pancolitis without complication ([address] )    • Thoracic aortic aneurysm without rupture 2021   • Benign prostatic hyperplasia without lower urinary tract symptoms 2021   • Severe obstructive sleep apnea 2020   • Chronic right shoulder pain 2020   • Chronic left shoulder pain 2020   • Left hip pain 2020   • COPD (chronic obstructive pulmonary disease) ([address] ) 2020   • Hoarse voice quality 2020   • Chronic lumbar pain 2020   • Stage 3a chronic kidney disease ([address] ) 2020   • Hyperlipidemia 2020   • Hyperparathyroidism ([address] ) 2020   • Hypertension 2020   • Impaired fasting glucose 2014   • Hypertensive heart disease without heart failure 10/25/2013   • Hypothyroidism 10/25/2013   • Osteoarthrosis involving multiple sites 2013      LOS (days): 3  Geometric Mean LOS (GMLOS) (days): 3 80  Days to GMLOS:0 9     OBJECTIVE:    Risk of Unplanned Readmission Score: 19 92         Current admission status: Inpatient       Preferred Pharmacy:   [facility], [address] [address]  Phone: [phone] Fax: [phone]    [address] [facility], [address] [facility] [address] [facility] [address]  Phone: [phone] Fax: [phone]    Primary Care Provider: Jimmy Lechuga MD    Primary Insurance: Shasha Kovacs [facility]  Secondary Insurance:     ASSESSMENT:  Nilson Atkinson Proxies    There are no active Health Care Proxies on file  DISCHARGE DETAILS:    Discharge planning discussed with[de-identified] Cm rec'd TT from medical team, per medical team, pt is not medically stable for discharged at this time  Pt to discharged to [facility] when medically cleared  Cm reached out to pt's dtr Monisha to update on same, no response, left VM  Cm will continue to follow

## 2023-03-08 NOTE — PHYSICAL THERAPY NOTE
PT Treatment       03/08/23 1200   PT Last Visit   PT Visit Date 03/08/23   Note Type   Note Type Treatment  (and d/c inpt PT)   Pain Assessment   Pain Assessment Tool 0-10   Pain Score No Pain   Restrictions/Precautions   Other Precautions Cognitive;Multiple lines; Fall Risk   General   Chart Reviewed Yes   Response to Previous Treatment Patient with no complaints from previous session  Family/Caregiver Present No   Cognition   Arousal/Participation Alert   Attention Within functional limits   Orientation Level Oriented X4   Following Commands Follows multistep commands with increased time or repetition   Comments mildly impulsive; found standing at door largely undressed with IV tubing twisted around; perseverated on speaking about how he poops and pees at the same time   Subjective   Subjective "it didn't happen to me before but now it does- I pee and poop at the same time"   Bed Mobility   Supine to Sit 7  Independent   Sit to Supine 7  Independent   Transfers   Sit to Stand 7  Independent   Stand to Sit 7  Independent   Additional Comments performed with and without RW   Ambulation/Elevation   Gait pattern Excessively slow   Gait Assistance   (I for ambulation within room; required S today for mobility in hallway with therapist managing IV pole)   Additional items Verbal cues   Assistive Device Rolling walker;None   Distance 60 feet x 2 w/ RW + 50 feet x 2 w/o RW   Ambulation/Elevation Additional Comments trialed ambulation both with and without RW and patient demonstrated improved safety without use of RW  with use of RW he did get caught on object in hallway one time and lifts walker from floor during turns  gait is more fluid with improved speed and safety without use of AD  no LOB noted     Balance   Static Sitting Normal   Static Standing Good   Ambulatory Fair   Activity Tolerance   Activity Tolerance Patient tolerated treatment well   Medical Staff Made Aware confirmed with CM that patient does not require further PT notes this admission for transition to [facility]- he is functioning I/S level and will have this in [facility] upon d/c   Nurse Made Aware shelley to see per RN   Exercises   Neuro re-ed patient participated in the following falls risk assessments: Timed Up and Go (without use of AD): 17 seconds during both 1st and 2nd attempt (>13 5 seconds is indicative of falls risk); this test as performed with therapist managing IV pole which could have added time; patient is not discharging home- d/c to [facility] to have assist as needed; AND 5 Time Sit to Stand: without using arm rests patient scored 14 seconds (> 15 seconds indicative of falls risk)   Assessment   Prognosis Good   Problem List Decreased cognition   Assessment PT initiated treatment session in order to assist patient in achieving goals to improve transfers, gait training, and overall activity tolerance  Patient received independently ambulating within room  Within hallway S was provided in setting of therapist managing IV pole and in cognitive deficits in unfamiliar envt  Patient walked 120 feet with RW + 100 feet w/o use of AD and demonstrates increased safety of mobility without use of RW (running into objects, lifting from floor at times)  This patient will d/c to [facility] facility  He does not present with further inpt PT needs this admission and will be d/c from caseload  Recommend continued ambulation this admission I within room and S level with RN staff     Goals   Patient Goals "I am going to assisted living"   STG Expiration Date 03/18/23   PT Treatment Day 2   Plan   Progress (S)  Discontinue PT   Recommendation   PT Discharge Recommendation (S)    (per chart review, patient will d/c to [facility]; recommend therapy services there)   Equipment Recommended   (none)   AM-PAC Basic Mobility Inpatient   Turning in Flat Bed Without Bedrails 4   Lying on Back to Sitting on Edge of Flat Bed Without Bedrails 4   Moving Bed to Chair 4   Standing Up From Chair Using Arms 4   Walk in Room 4   Climb 3-5 Stairs With Railing 3   Basic Mobility Inpatient Raw Score 23   Basic Mobility Standardized Score 50 88   Highest Level Of Mobility   JH-HLM Goal 7: Walk 25 feet or more   JH-HLM Achieved 7: Walk 25 feet or more     The patient's AM-PAC Basic Mobility Inpatient Standardized Score is greater than 42 9, suggesting this patient may benefit from discharge to home  Please also refer to the recommendation of the Physical Therapist for safe discharge planning      GARTH FLORENTINO PT, DPT

## 2023-03-08 NOTE — CONSULTS
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, hypertension, OA, Afib,  COPD, CKD3a who presents for altered mental status found to be pyuric treated for UTI and treated for metabolic encephalopathy  Vascular surgery consulted for input on possible future intervention that was planned to be discussed outpatient    PLAN:  - Patient with chronic Type B dissection without signs of malperfusion on exam, without complaint of motor or sensory loss to bilateral LE, without complaint of abdominal pain; no acute vascular intervention indicated at this time  - Patient with plans for outpatient consultation for management of type B dissection, asked to be seen while inpatient  - Will discuss with Dr Magda Kovacs    Consulting Service: SL[facility]    HPI: [id number] PMHx of Type B dissection of decending thoracic aortic aneurysm from takeoff of L subclavian throughout DTA/abdominal aorta, bilateral ASAEL, and increase in proximal aneurysm as compared to Feb 2023, hypertension, OA, Afib, thoracic aortic aneurysm, COPD, CKD3a, total colectomy with J pouch for ulcerative colitis 2001, L thoracostomy c/b pneumothorax who presents for altered mental status found to be pyuric treated for UTI and thought to have component of metabolic encephalopathy  ER work-up with CTA head and neck showing new partial contrast of partially thrombosed false lumen    With patient's history of Type B Dissection, CTS consulted; with new imaging, and possibility of new tear, medical management was encouraged and true lumen was though to be prohibitive of transcatheter intervention  Vascular surgery consulted in February as an outpatient for input on possible axillofemoral bypass "to deal with near total aortic occlusion"   CTS with previous consideration for continued observation, TEVAR, Arch replacement with elephant trunk followed by thoracoabdominal, ax-fem bypass    Patient denies chest pain, abdominal pain, back pain, UE/LE pain, paresthesias, history of claudication symptoms    Review of Systems:  General: negative for - chills or fever  Cardiovascular: negative for - chest pain  Respiratory: negative for - cough  Gastrointestinal: negative for - abdominal pain  Genitourinary ROS: positive for - urinary frequency/urgency   Musculoskeletal ROS: negative for - gait disturbance or muscular weakness  Neurological ROS: negative for - numbness/tingling  Hematological and Lymphatic ROS: negative  Dermatological ROS: negative for rash and mottling, excess pallor  Psychological ROS: negative  Ophthalmic ROS: negative  ENT ROS: negative    Past Medical History:  Past Medical History:   Diagnosis Date   • Aneurysm of common iliac artery (HCC)     b/l, 2 0cm on right, 1 8cm on left   • Aortic dissection, thoracoabdominal (HCC)     type B, from takeoff of left subclavian throughout DTA/abdominal aorta/both common iliac arteries, patent great vessels off the aortic arch with no IMH/dissection & celiac trunk/SMA/bilateral renal/accessory left renal artery/TJ arising from the true lumen & patent   • Arthritis    • Ascending aortic aneurysm     4 6cm, trileaflet AV   • Descending thoracic aortic aneurysm     5 1cm proximally, 4 5x4 5cm distally   • History of nephrolithiasis    • Hyperlipidemia    • Hypertension    • Obesity (BMI 30 0-34  9)    • PAF (paroxysmal atrial fibrillation) (HCC)     Eliquis   • Pancreatic cyst     7mm, pancreatic body       Past Surgical History:  Past Surgical History:   Procedure Laterality Date   • CARDIAC ELECTROPHYSIOLOGY STUDY AND ABLATION  01/2019   • CARDIOVERSION  02/2019   • FL RETROGRADE PYELOGRAM  05/12/2022   • FL RETROGRADE PYELOGRAM  09/01/2022   • HERNIA REPAIR     • IR JOINT INJECTION  09/30/2019   • LAPAROSCOPIC ASSISSTED TOTAL COLECTOMY W/ J-POUCH     • AL CYSTO/URETERO W/LITHOTRIPSY &INDWELL STENT INSRT N/A 2022    Procedure: BILATERAL RETROGRADE PYELOGRAM, CYSTOSCOPY,  LEFT URETEROSCOPY, LEFT LASER LITHOTRIPSY BASKET EXTRACTION, LEFT STENT;  Surgeon: Bryanna Loco MD;  Location: [address] [place] [address];  Service: Urology   • PA CYSTO/URETERO W/LITHOTRIPSY &INDWELL STENT INSRT Left 2022    Procedure: CYSTOSCOPY, URETEROSCOPY STENT REMOVAL, LASER LITHOTRIPSY, WITH BASKET, INSERTION OF URETERAL STENT;  Surgeon: Bryanna Loco MD;  Location: [address] [place] [address];  Service: Urology   • THORACOTOMY Left    • VASECTOMY         Social History:  Social History     Substance and Sexual Activity   Alcohol Use Yes    Comment: Tommie Wakefield        Social History     Substance and Sexual Activity   Drug Use Never     Social History     Tobacco Use   Smoking Status Former   • Packs/day: 1 50   • Years: 15 00   • Pack years: 22 50   • Types: Cigarettes   • Quit date: 12   • Years since quittin 2   Smokeless Tobacco Never       Family History:  Family History   Problem Relation Age of Onset   • Arthritis Father        Allergies:  No Known Allergies    Medications:  Current Facility-Administered Medications   Medication Dose Route Frequency   • acetaminophen (TYLENOL) tablet 650 mg  650 mg Oral Q6H PRN   • allopurinol (ZYLOPRIM) tablet 100 mg  100 mg Oral Daily   • apixaban (ELIQUIS) tablet 5 mg  5 mg Oral BID   • atorvastatin (LIPITOR) tablet 20 mg  20 mg Oral Daily   • cyanocobalamin injection 1,000 mcg  1,000 mcg Intramuscular Daily   • diltiazem (CARDIZEM CD) 24 hr capsule 120 mg  120 mg Oral Daily   • levothyroxine tablet 75 mcg  75 mcg Oral Daily   • metoprolol succinate (TOPROL-XL) 24 hr tablet 50 mg  50 mg Oral BID   • mirtazapine (REMERON) tablet 7 5 mg  7 5 mg Oral HS   • oxyCODONE-acetaminophen (PERCOCET) 5-325 mg per tablet 1 tablet  1 tablet Oral Q6H PRN   • sodium chloride 0 9 % infusion  100 mL/hr Intravenous Continuous   • umeclidinium 62 5 mcg/actuation inhaler AEPB 1 puff  1 puff Inhalation Daily Vitals:  BP 93/60 (BP Location: Left arm)   Pulse 103   Temp 98 2 °F (36 8 °C) (Oral)   Resp 16   Wt 86 2 kg (190 lb)   SpO2 93%   BMI 31 62 kg/m²     I/Os:  I/O last 24 hours: In: 12 [P O :462]  Out: -     Lab Results and Cultures:   Lab Results   Component Value Date    WBC 3 26 (L) 03/07/2023    HGB 12 1 03/07/2023    HCT 39 8 03/07/2023    MCV 91 03/07/2023     03/07/2023     Lab Results   Component Value Date    CALCIUM 10 3 (H) 03/07/2023    K 4 1 03/07/2023    CO2 26 03/07/2023     (H) 03/07/2023    BUN 23 03/07/2023    CREATININE 1 88 (H) 03/07/2023     Lab Results   Component Value Date    INR 1 16 12/30/2022    INR 1 17 05/03/2022    PROTIME 15 0 (H) 12/30/2022    PROTIME 13 8 05/03/2022    PROTIME 14 4 05/03/2022       Lipid Panel: No results found for: CHOL,     Blood Culture: No results found for: BLOODCX,   Urinalysis:   Lab Results   Component Value Date    COLORU Yellow 03/04/2023    CLARITYU Clear 03/04/2023    SPECGRAV 1 025 03/04/2023    PHUR 5 5 03/04/2023    LEUKOCYTESUR Trace (A) 03/04/2023    NITRITE Negative 03/04/2023    GLUCOSEU Negative 03/04/2023    KETONESU Negative 03/04/2023    BILIRUBINUR Negative 03/04/2023    BLOODU Trace (A) 03/04/2023   ,   Urine Culture:   Lab Results   Component Value Date    URINECX 40,000-49,000 cfu/ml Staphylococcus aureus (A) 03/04/2023   ,   Wound Culure: No results found for: WOUNDCULT    Imaging:  3/5/23 CTA C/A/P  1) Vascular findings:  -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 extending from the takeoff of the left subclavian artery throughout the descending thoracic aorta, the abdominal aorta, and into both common iliac arteries  Increase in diameter of the proximal   portion of the aneurysm compared to February 2023, now measuring 5 1 cm proximally (compared to 4 6 cm) and 4 5 x 4 5 cm more distally (compared to 4 0 x 3 9 cm)    -Most of the false lumen in descending aorta thrombosed as in February 2023, with one unchanged area of partial opacification, however with a new region of opacification in the proximal portion of the aneurysm, which is also increased in size as   mentioned above, corresponding to the finding on CTA head and neck from 3/4/2023   -Mild opacification of the false lumen in the descending aorta more distally, with overall decreased compared to February 2023  Grossly unchanged degree of mild opacification of the false lumen in the abdominal aorta and the right common iliac artery,   and thrombosis of the false lumen in the left common iliac artery  -Extent of dissection unchanged from February 2023  Patent great vessels off the aortic arch with no intramural hematoma or dissection  Celiac trunk, SMA, bilateral renal, accessory left renal artery and TJ arising from the true lumen and patent   -Stable ascending thoracic aortic aneurysm measuring up to 4 6 cm with no intramural hematoma or dissection   -Stable bilateral common iliac artery aneurysms, measuring 2 0 cm on the right and 1 8 cm in the left     2) 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, which is somewhat distended with stool  Several dilated, fecalized loops of distal small bowel, however without a discrete transition point to suggest a definite bowel obstruction  Early bowel   obstruction or ileus are possible  3) No other acute abdominal or pelvic pathology  4) Overall stable 7 mm cystic lesion in the body of the pancreas  Follow-up is recommended in 2 years based on the size of the lesion and the age of the patient, preferably with MRI/MRCP if there are no contraindications  Considerations related to the   patient's age and/or comorbidities may be used to alter these recommendations  5) No acute pulmonary pathology      Physical Exam:  General appearance: Alert, able to respond to questions appropriately  Skin: Skin color, texture, turgor normal  No rashes or lesions  Neurologic: Grossly neurologically intact  Head: Normocephalic, without obvious abnormality, atraumatic  Eyes: EOMI bilaterally  Neck: supple, symmetrical, trachea midline  Lungs: non-labored breathing on RA  Chest wall: no tenderness  Heart: Regular rate, grossly warm and well-perfused  Abdomen: soft, non-distended, non-tender abdomen   Extremities: extremities normal, warm and well-perfused; no cyanosis, clubbing, or edema    Pulse exam:  Radial: Right: 2+ Left[de-identified] 2+  Brachial 2+ bilaterally  Femoral: Right: 2+ Left: 2+  Popliteal: Right: 2+ Left: 2+  DP: Right: 1+ Left: 1+  PT: Right: 2+ Left: 2+      Adina Gonzalez PA-C  3/8/2023

## 2023-03-08 NOTE — PLAN OF CARE
Problem: PHYSICAL THERAPY ADULT  Goal: Performs mobility at highest level of function for planned discharge setting  See evaluation for individualized goals  Description: Treatment/Interventions: OT, Spoke to case management, Spoke to nursing, Gait training, Bed mobility, Patient/family training, Endurance training, LE strengthening/ROM, Functional transfer training          See flowsheet documentation for full assessment, interventions and recommendations  Outcome: Adequate for Discharge  Note: Prognosis: Good  Problem List: Decreased cognition  Assessment: PT initiated treatment session in order to assist patient in achieving goals to improve transfers, gait training, and overall activity tolerance  Patient received independently ambulating within room  Within hallway S was provided in setting of therapist managing IV pole and in cognitive deficits in unfamiliar envt  Patient walked 120 feet with RW + 100 feet w/o use of AD and demonstrates increased safety of mobility without use of RW (running into objects, lifting from floor at times)  [facility]  He does not present with further inpt PT needs this admission and will be d/c from caseload  Recommend continued ambulation this admission I within room and S level with RN staff  PT Discharge Recommendation: (S)  (per chart review, patient will d/c to [facility]; recommend therapy services there)    See flowsheet documentation for full assessment

## 2023-03-08 NOTE — ASSESSMENT & PLAN NOTE
· Prior elevation of PTH w  nml PTH related peptide hormone back in 2021  · Nephrology on board, appreciate input  Secondary to primary hyperparathyroidism  · S/p CTA chest/abd/pelvis  Stable pancreatic cyst for which recommending repeat MRI/MRCP in 2 yrs   Otherwise no mention of underlying tumor  · IVF changed accordingly  · Cont to trend  · Pending SPEP, UPEP, vit d levels

## 2023-03-08 NOTE — ASSESSMENT & PLAN NOTE
· Chronic hx as per labs in 2021 but per pt's preference wanted to just monitor  · S/p repeat labs; pending peptide related PTH, Vit D levels, SPEP, UPEP  · Nephrology on board, appreciate input

## 2023-03-08 NOTE — ASSESSMENT & PLAN NOTE
Lab Results   Component Value Date    EGFR 25 03/08/2023    EGFR 34 03/07/2023    EGFR 43 03/06/2023    CREATININE 2 38 (H) 03/08/2023    CREATININE 1 88 (H) 03/07/2023    CREATININE 1 53 (H) 03/06/2023   Likely secondary to contrast induced nephropathy as pt received CTA head/neck/chest/a/pelvis  Nephrology following  On IVF hydration  Avoid nephrotoxins, hypotension  Pt follows with [facility] but per dtr wishes to establish care with [facility]

## 2023-03-08 NOTE — ASSESSMENT & PLAN NOTE
· Patient appears to have had some issues adhering to his antihypertensives in the outpatient setting, on review of recent note it seems as though he was not taking his Cardizem after the initial prescription ran out    · BP on the low side though asymptomatic from yesterday thus reduced dose of Cardizem and Lopressor, continue meds with holding parameters

## 2023-03-08 NOTE — PROGRESS NOTES
Progress Note - Annalisa Charlton 78 y o  male MRN: [id number]    Unit/Bed#: CW2 214-01 Encounter: [id number]      Assessment/Plan:  Encephalopathy  Improved  Alert and oriented x 3, periods of confusion  Suspect progressing underlying dementia as contributing  Provide frequent redirection, reorientation, distraction techniques  Avoid deliriogenic medications such as tramadol, benzodiazepines, anticholinergics,  Benadryl  Treat pain, See geriatric pain protocol  Monitor for constipation and urinary retention  Encourage early and frequent moblization, OOB  Encourage Hydration/ Nutrition  Implement sleep hygiene, limit night time interuptions, group activities     Cognitive impairment  Declining memory over past 3 months, per daughter  Significant word finding on exam  At risk secondary to CAD, afib,  JORGE  Cannot exclude underlying progressing mild to moderate dementia  Maintain use of CPAP  TSH 1 640 (3/4/23)  Vitamin B 12- 284  CT head (1/1/23) mild microangiopathic changes  OT for ACLS pending     Frailty  Clinical Frail Scale: 5  Lives alone, agree with assisted living/increased support  Albumin 3 3, recommend protein supplementation  PT/OT  Discharge planning to [facility], agree with increased supervision  Further cognitive testing can be done at [id number] dysfunction  Fall precautions  PT/OT     Insomnia  Started on mirtazapine 7 5 mg po QHS  Encourage daytime activity  Maintain circadian rhythm     UTI  UA CS, positive staph aureus  Completed course of abx       Subjective:   Upon exam pt is oob in chair  He is alert and oriented, calm and cooperative  Periods of confusion    Objective:     Vitals: Blood pressure 100/69, pulse 80, temperature 98 °F (36 7 °C), temperature source Oral, resp  rate 18, weight 86 2 kg (190 lb), SpO2 95 %  ,Body mass index is 31 62 kg/m²        Intake/Output Summary (Last 24 hours) at 3/8/2023 1322  Last data filed at 3/8/2023 0956  Gross per 24 hour   Intake 1771 67 ml   Output 400 ml   Net 1371 67 ml       Physical Exam:   General : NAD  HEENT : MMM  Heart : Normal rate, no murmur rub or gallop  Lungs : CTA no wheezes, rales or rhonchi  Abdomen : Soft, NT/ND, BS auscultated in all 4 quads  Ext :  no edema  Skin : Pink, warm, dry, age appropriate turgor and mobility  Neuro : Nonfocal  Psych : Alert and O x 3       Invasive Devices     Peripheral Intravenous Line  Duration           Peripheral IV 03/05/23 Left Antecubital 3 days          Drain  Duration           Ureteral Internal Stent Left ureter 6 Fr  188 days                Lab, Imaging and other studies: I have personally reviewed pertinent reports      VTE Pharmacologic Prophylaxis: Sequential compression device (Venodyne)   VTE Mechanical Prophylaxis: sequential compression device

## 2023-03-08 NOTE — ASSESSMENT & PLAN NOTE
· Patient and daughter endorsing about 1 week of increasing confusion  · No focal neurologic deficit; s/p CTA head/neck  Low suspicion for CVA, suspect metabolic encephalopathy possibly due to hypercalcemia  · Suspect may have underlying cognitive impairment as dtr notes "forgetfulness, absent mindedness" for the past 3 months and has periods of sundowning  · Patient's mental status is alert and oriented x3 although he does note some memory difficulties and overnight w/ sundowning    · Vit B12 is low nml at 287, received IM supplementation   · In euthyroid state  · Referral to neuropsych as outpt  · Plan is for pt to be discharged to independent living in more supervised setting

## 2023-03-08 NOTE — ASSESSMENT & PLAN NOTE
· Patient with known type B dissection following up with Dr Mejia Humphries on 3/9/23  · S/p CTA nead noting possible increased intima disruption, increase in size to 5 1cm  · S/p CTA chest/a/pelvis  · Status post CT surgery evaluation, recommending medical management  Per outpt CT surgery's note had recommended outpt referral to vascular sx for consideration for an axillofemoral bypass  Pt has upcoming appt with vascular sx as outpt but dtr's preference to be evaluated while inpatient  Appreciate input per vascular surgery, recommending continued outpt follow up  May need eventual surgical intervention in the future, will cont to address as outpt     · Pt otherwise hemodynamically stable

## 2023-03-09 LAB
ALBUMIN SERPL BCP-MCNC: 3.1 G/DL (ref 3.5–5)
ALBUMIN UR ELPH-MCNC: 100 %
ALP SERPL-CCNC: 110 U/L (ref 46–116)
ALPHA1 GLOB MFR UR ELPH: 0 %
ALPHA2 GLOB MFR UR ELPH: 0 %
ALT SERPL W P-5'-P-CCNC: 17 U/L (ref 12–78)
ANION GAP SERPL CALCULATED.3IONS-SCNC: 3 MMOL/L (ref 4–13)
AST SERPL W P-5'-P-CCNC: 18 U/L (ref 5–45)
B-GLOBULIN MFR UR ELPH: 0 %
BILIRUB SERPL-MCNC: 0.66 MG/DL (ref 0.2–1)
BUN SERPL-MCNC: 25 MG/DL (ref 5–25)
CALCIUM ALBUM COR SERPL-MCNC: 11 MG/DL (ref 8.3–10.1)
CALCIUM SERPL-MCNC: 10.3 MG/DL (ref 8.3–10.1)
CHLORIDE SERPL-SCNC: 111 MMOL/L (ref 96–108)
CO2 SERPL-SCNC: 26 MMOL/L (ref 21–32)
CREAT SERPL-MCNC: 1.84 MG/DL (ref 0.6–1.3)
GAMMA GLOB MFR UR ELPH: 0 %
GFR SERPL CREATININE-BSD FRML MDRD: 35 ML/MIN/1.73SQ M
GLUCOSE SERPL-MCNC: 89 MG/DL (ref 65–140)
POTASSIUM SERPL-SCNC: 4 MMOL/L (ref 3.5–5.3)
PROT PATTERN UR ELPH-IMP: NORMAL
PROT SERPL-MCNC: 7.2 G/DL (ref 6.4–8.4)
PROT UR-MCNC: 8 MG/DL
SODIUM SERPL-SCNC: 140 MMOL/L (ref 135–147)

## 2023-03-09 RX ADMIN — METOPROLOL SUCCINATE 50 MG: 50 TABLET, EXTENDED RELEASE ORAL at 21:48

## 2023-03-09 RX ADMIN — APIXABAN 5 MG: 5 TABLET, FILM COATED ORAL at 08:45

## 2023-03-09 RX ADMIN — APIXABAN 5 MG: 5 TABLET, FILM COATED ORAL at 17:03

## 2023-03-09 RX ADMIN — UMECLIDINIUM 1 PUFF: 62.5 AEROSOL, POWDER ORAL at 08:45

## 2023-03-09 RX ADMIN — ATORVASTATIN CALCIUM 20 MG: 20 TABLET, FILM COATED ORAL at 08:45

## 2023-03-09 RX ADMIN — MIRTAZAPINE 7.5 MG: 15 TABLET, FILM COATED ORAL at 21:48

## 2023-03-09 RX ADMIN — LEVOTHYROXINE SODIUM 75 MCG: 75 TABLET ORAL at 06:53

## 2023-03-09 RX ADMIN — ALLOPURINOL 100 MG: 100 TABLET ORAL at 08:45

## 2023-03-09 NOTE — PROGRESS NOTES
[address] [facility]  Progress Note Akila Acuña 1945, 78 y o  male MRN: [id number]  Unit/Bed#: CW2 214-01 Encounter: [id number]  Primary Care Provider: Oma Begum MD   Date and time admitted to hospital: 3/4/2023 64:95 AM    * Metabolic encephalopathy  Assessment & Plan  · Patient and daughter endorsing about 1 week of increasing confusion  · No focal neurologic deficit; s/p CTA head/neck  Low suspicion for CVA, suspect metabolic encephalopathy possibly due to hypercalcemia  · Suspect may have underlying cognitive impairment as dtr notes "forgetfulness, absent mindedness" for the past 3 months and has periods of sundowning  · Patient's mental status is alert and oriented x3 although he does note some memory difficulties and overnight w/ sundowning  · Vit B12 is low nml at 287, received IM supplementation   · In euthyroid state  · Referral to neuropsych as outpt  · Plan is for pt to be discharged to independent living in more supervised setting    Acute kidney injury superimposed on CKD [facility])  Assessment & Plan  Lab Results   Component Value Date    EGFR 35 03/09/2023    EGFR 25 03/08/2023    EGFR 34 03/07/2023    CREATININE 1 84 (H) 03/09/2023    CREATININE 2 38 (H) 03/08/2023    CREATININE 1 88 (H) 03/07/2023   Likely secondary to contrast induced nephropathy as pt received CTA head/neck/chest/a/pelvis  Nephrology following  Cr trended down  Avoid nephrotoxins, hypotension  [facility] with [facility] but per dtr wishes to establish care with [facility]      Pyuria  Assessment & Plan  · Status post urine culture, revealing Staph aureus with nonsignificant colony count thus low suspicion for actual UTI  · Regardless patient received IV ceftriaxone x3 days  Monitor off antibiotics    Hypercalcemia  Assessment & Plan  · Mild  · Prior elevation of PTH w  nml PTH related peptide hormone back in 2021  · Nephrology on board   Secondary to primary hyperparathyroidism  · S/p CTA chest/abd/pelvis  Stable pancreatic cyst for which recommending repeat MRI/MRCP in 2 yrs  Otherwise no mention of underlying tumor  · Off IVF  · Cont to trend  · Pending SPEP, UPEP, vit d levels    Atrial fibrillation (HCC)  Assessment & Plan  · Rate controlled  · Anticoagulation with Eliquis    Aortic dissection, thoracoabdominal (HCC)  Assessment & Plan  · CTA reviewed concern for new tear  · As above      Thoracic aortic aneurysm without rupture  Assessment & Plan  · Patient with known type B dissection following up with Dr Randell Landaverde on 3/9/23  · S/p CTA nead noting possible increased intima disruption, increase in size to 5 1cm  · S/p CTA chest/a/pelvis  · Status post CT surgery evaluation, recommending medical management  Per outpt CT surgery's note had recommended outpt referral to vascular sx for consideration for an axillofemoral bypass  Pt has upcoming appt with vascular sx as outpt but dtr's preference to be evaluated while inpatient  Appreciate input per vascular surgery, recommending continued outpt follow up  May need eventual surgical intervention in the future, will cont to address as outpt  · Pt otherwise hemodynamically stable       Osteoarthrosis involving multiple sites  Assessment & Plan  Tylenol and home percocet PRN      Hypertension  Assessment & Plan  · Patient appears to have had some issues adhering to his antihypertensives in the outpatient setting, on review of recent note it seems as though he was not taking his Cardizem after the initial prescription ran out    · BP on the low side though asymptomatic thus reduced dose of Cardizem and Lopressor, continue meds with holding parameters    Hyperparathyroidism ([address] )  Assessment & Plan  · Chronic hx as per labs in 2021 but per pt's preference wanted to just monitor  · S/p repeat labs; pending peptide related PTH, Vit D levels, SPEP, UPEP  · Nephrology on board    VTE Pharmacologic Prophylaxis: Pharmacologic: Apixaban (Eliquis)  Mechanical VTE Prophylaxis in Place: No    Patient Centered Rounds: I have performed bedside rounds with nursing staff today  Discussions with Specialists or Other Care Team Provider:     Education and Discussions with Family / Patient: Patient, Called his dtr Kim Mendez and updated at length    Time Spent for Care: 30 minutes  More than 50% of total time spent on counseling and coordination of care as described above  Current Length of Stay: 4 day(s)    Current Patient Status: Inpatient   Certification Statement: The patient will continue to require additional inpatient hospital stay due to hypercalcemia    Discharge Plan:     Code Status: Level 1 - Full Code      Subjective:   Pt seen and examined  Has no complaints  Per RN had owning overnight    Objective:     Vitals:   Temp (24hrs), Av 4 °F (36 9 °C), Min:98 1 °F (36 7 °C), Max:98 8 °F (37 1 °C)    Temp:  [98 1 °F (36 7 °C)-98 8 °F (37 1 °C)] 98 8 °F (37 1 °C)  HR:  [87-99] 87  Resp:  [14-19] 19  BP: (100-127)/(73-76) 115/73  SpO2:  [94 %-98 %] 98 %  Body mass index is 31 62 kg/m²  Input and Output Summary (last 24 hours): Intake/Output Summary (Last 24 hours) at 3/9/2023 1716  Last data filed at 3/9/2023 1513  Gross per 24 hour   Intake 1020 ml   Output 250 ml   Net 770 ml       Physical Exam:     Physical Exam  Cardiovascular:      Rate and Rhythm: Normal rate and regular rhythm  Pulses: Normal pulses  Heart sounds: Normal heart sounds  No murmur heard  Pulmonary:      Effort: Pulmonary effort is normal  No respiratory distress  Breath sounds: Normal breath sounds  No wheezing or rales  Abdominal:      General: Bowel sounds are normal  There is no distension  Palpations: Abdomen is soft  Tenderness: There is no abdominal tenderness  There is no guarding  Musculoskeletal:         General: Normal range of motion  Cervical back: Normal range of motion and neck supple  Right lower leg: No edema  Left lower leg: No edema  Skin:     General: Skin is warm and dry  Neurological:      General: No focal deficit present  Mental Status: He is alert  Mental status is at baseline  Cranial Nerves: No cranial nerve deficit  Motor: No weakness  Additional Data:     Labs:    Results from last 7 days   Lab Units 03/07/23  0518   WBC Thousand/uL 3 26*   HEMOGLOBIN g/dL 12 1   HEMATOCRIT % 39 8   PLATELETS Thousands/uL 236   NEUTROS PCT % 62   LYMPHS PCT % 22   MONOS PCT % 14*   EOS PCT % 2     Results from last 7 days   Lab Units 03/09/23  0622   SODIUM mmol/L 140   POTASSIUM mmol/L 4 0   CHLORIDE mmol/L 111*   CO2 mmol/L 26   BUN mg/dL 25   CREATININE mg/dL 1 84*   ANION GAP mmol/L 3*   CALCIUM mg/dL 10 3*   ALBUMIN g/dL 3 1*   TOTAL BILIRUBIN mg/dL 0 66   ALK PHOS U/L 110   ALT U/L 17   AST U/L 18   GLUCOSE RANDOM mg/dL 89         Results from last 7 days   Lab Units 03/04/23  1018   POC GLUCOSE mg/dl 82                   * I Have Reviewed All Lab Data Listed Above  * Additional Pertinent Lab Tests Reviewed:  All Labs Within Last 24 Hours Reviewed    Imaging:    Imaging Reports Reviewed Today Include:   Imaging Personally Reviewed by Myself Includes:      Recent Cultures (last 7 days):     Results from last 7 days   Lab Units 03/04/23  1106   URINE CULTURE  40,000-49,000 cfu/ml Staphylococcus aureus*       Last 24 Hours Medication List:   Current Facility-Administered Medications   Medication Dose Route Frequency Provider Last Rate   • acetaminophen  650 mg Oral Q6H PRN Soraida Brooks     • allopurinol  100 mg Oral Daily Soraida Brokos     • apixaban  5 mg Oral BID Soraida Brooks     • atorvastatin  20 mg Oral Daily Soraida Brooks     • diltiazem  120 mg Oral Daily Adriania Brooklyn, DO     • levothyroxine  75 mcg Oral Daily Soraida Brooks     • metoprolol succinate  50 mg Oral BID Adriania Tarah, DO     • mirtazapine  7 5 mg Oral HS Tranquillity Relic     • oxyCODONE-acetaminophen  1 tablet Oral Q6H PRN Robbie Relic     • umeclidinium  1 puff Inhalation Daily Lara Friedman PA-C          Today, Patient Was Seen By: Dixie Rodriguez DO    ** Please Note: Dictation voice to text software may have been used in the creation of this document   **

## 2023-03-09 NOTE — PROGRESS NOTES
NEPHROLOGY PROGRESS NOTE   Beena Adame 78 y o  male MRN: [id number]  Unit/Bed#: 2 214-01 Encounter: [id number]  Reason for Consult: Acute kidney injury    Assessment:  1  Acute kidney injury on chronic kidney disease, etiology most likely secondary to contrast associated nephropathy given to recent contrast studies within the last 48 to 72 hours  Additional component secondary to aortic dissection with decreased right renal perfusion  2  Chronic kidney disease, previous baseline creatinine between 1 6-2 1  3  Mild hypercalcemia, may be secondary to primary hyperparathyroidism non-suppressed PTH, anticipating repeat serum and urine electrophoresis  4  Hypertension blood pressure currently appears stable given aortic dissection  5  Type B aortic dissection, as per vascular surgery  6  Atrial fibrillation currently on anticoagulation, Eliquis      PLAN:  · Overall renal function has significantly improved  · Discontinue further IV fluids  · SPEP/UPEP are pending although suspect likely primary hyperparathyroidism  · No other changes from nephrology standpoint    SUBJECTIVE:  Seen and examined  Patient awake and alert  Offers no new complaints  Denies any chest pain shortness of breath  No nausea or vomiting  Review of Systems    OBJECTIVE:  Current Weight: Weight - Scale: 86 2 kg (190 lb)  Vitals:    03/08/23 2129 03/08/23 2132 03/09/23 0010 03/09/23 0838   BP:  127/73 123/74 100/73   BP Location:   Right arm    Pulse:  91 96 99   Resp:   14    Temp:   98 3 °F (36 8 °C) 98 1 °F (36 7 °C)   TempSrc:   Oral    SpO2: 94% 94% 96% 95%   Weight:           Intake/Output Summary (Last 24 hours) at 3/9/2023 0945  Last data filed at 3/8/2023 1700  Gross per 24 hour   Intake 840 ml   Output 100 ml   Net 740 ml       Physical Exam  Constitutional:       Appearance: He is not ill-appearing  Eyes:      General: No scleral icterus  Cardiovascular:      Rate and Rhythm: Normal rate and regular rhythm     Pulmonary: Effort: Pulmonary effort is normal       Breath sounds: Normal breath sounds  Abdominal:      General: There is no distension  Palpations: Abdomen is soft  Musculoskeletal:      Right lower leg: No edema  Left lower leg: No edema  Skin:     General: Skin is warm and dry  Neurological:      Mental Status: He is alert and oriented to person, place, and time           Medications:    Current Facility-Administered Medications:   •  acetaminophen (TYLENOL) tablet 650 mg, 650 mg, Oral, Q6H PRN, Staci Cover  •  allopurinol (ZYLOPRIM) tablet 100 mg, 100 mg, Oral, Daily, Staci Cover, 100 mg at 03/09/23 0845  •  apixaban (ELIQUIS) tablet 5 mg, 5 mg, Oral, BID, Staci Cover, 5 mg at 03/09/23 0845  •  atorvastatin (LIPITOR) tablet 20 mg, 20 mg, Oral, Daily, Staci Cover, 20 mg at 03/09/23 0845  •  diltiazem (CARDIZEM CD) 24 hr capsule 120 mg, 120 mg, Oral, Daily, Michelle Piña DO  •  levothyroxine tablet 75 mcg, 75 mcg, Oral, Daily, Staci Cover, 75 mcg at 03/09/23 4398  •  metoprolol succinate (TOPROL-XL) 24 hr tablet 50 mg, 50 mg, Oral, BID, Michelle Piña DO, 50 mg at 03/08/23 2134  •  mirtazapine (REMERON) tablet 7 5 mg, 7 5 mg, Oral, HS, Staci Cover, 7 5 mg at 03/08/23 2134  •  multi-electrolyte (PLASMALYTE-A/ISOLYTE-S PH 7 4) IV solution, 75 mL/hr, Intravenous, Continuous, Melonie Lee DO, Last Rate: 75 mL/hr at 03/08/23 1824, 75 mL/hr at 03/08/23 1824  •  oxyCODONE-acetaminophen (PERCOCET) 5-325 mg per tablet 1 tablet, 1 tablet, Oral, Q6H PRN, Staci Cover, 1 tablet at 03/07/23 0213  •  umeclidinium 62 5 mcg/actuation inhaler AEPB 1 puff, 1 puff, Inhalation, Daily, Thong Dow PA-C, 1 puff at 03/09/23 0845    Laboratory Results:  Results from last 7 days   Lab Units 03/09/23  0622 03/08/23  0551 03/07/23  0518 03/06/23  0503 03/05/23  0252 03/04/23  1018   WBC Thousand/uL  --   --  3 26* 7 00  --  7 63   HEMOGLOBIN g/dL  --   -- 12 1 12 4  --  13 4   HEMATOCRIT %  --   --  39 8 39 7  --  42 4   PLATELETS Thousands/uL  --   --  236 230  --  260   POTASSIUM mmol/L 4 0 3 7 4 1 4 0 4 4 4 5   CHLORIDE mmol/L 111* 109* 109* 110* 112* 107   CO2 mmol/L 26 26 26 25 24 25   BUN mg/dL 25 30* 23 19 22 22   CREATININE mg/dL 1 84* 2 38* 1 88* 1 53* 1 62* 1 69*   CALCIUM mg/dL 10 3* 10 2* 10 3* 10 1 10 0 11 1*

## 2023-03-09 NOTE — ASSESSMENT & PLAN NOTE
Lab Results   Component Value Date    EGFR 35 03/09/2023    EGFR 25 03/08/2023    EGFR 34 03/07/2023    CREATININE 1 84 (H) 03/09/2023    CREATININE 2 38 (H) 03/08/2023    CREATININE 1 88 (H) 03/07/2023   Likely secondary to contrast induced nephropathy as pt received CTA head/neck/chest/a/pelvis  Nephrology following  Cr trended down  Avoid nephrotoxins, hypotension  Pt follows with [facility] but per dtr wishes to establish care with [facility]

## 2023-03-09 NOTE — ASSESSMENT & PLAN NOTE
· Mild  · Prior elevation of PTH w  nml PTH related peptide hormone back in 2021  · Nephrology on board  Secondary to primary hyperparathyroidism  · S/p CTA chest/abd/pelvis  Stable pancreatic cyst for which recommending repeat MRI/MRCP in 2 yrs   Otherwise no mention of underlying tumor  · Off IVF  · Cont to trend  · Pending SPEP, UPEP, vit d levels

## 2023-03-09 NOTE — PLAN OF CARE
Problem: OCCUPATIONAL THERAPY ADULT  Goal: Performs self-care activities at highest level of function for planned discharge setting  See evaluation for individualized goals  Description: Treatment Interventions: Cognitive reorientation          See flowsheet documentation for full assessment, interventions and recommendations  Outcome: Adequate for Discharge  Note: Limitation: Decreased Safe judgement during ADL, Decreased self-care trans, Decreased high-level ADLs, Decreased cognition  Prognosis: Good  Assessment: Pt was seen on 3/9/2023 for skilled OT session  OT session was focused on ADLs, functional mobility, functional transfers, static/dynamic balance, safety awareness, education, increased activity tolerance, and energy conservation  Pt was agreeable to OT session  Pt was able to demonstrate bed mobility and transfers independently, and functional mobility with S  Pt was able to tolerate standing at sink for ~ 3 mins to complete grooming tasks with S and was able to complete UB dressing and LB dressing with S   Pt showed increase insight into deficits, good safety awareness, and was Ox3  Pt does not require further acute OT services while in the hospital  Pt with no questions or concerns about d/c to shelter  Pt will continue to benefit from practice with ADLs and functional mobility with staff until d/c  The patient's raw score on the AM-PAC Daily Activity Inpatient Short Form is 24  A raw score of greater than or equal to 19 suggests the patient may benefit from discharge to home  Please refer to the recommendation of the Occupational Therapist for safe discharge planning  OT recommendations for d/c include Home with increased social support    Recommendation: Geriatric Consult  OT Discharge Recommendation: No rehabilitation needs (home with increased social support and assistance)

## 2023-03-09 NOTE — ASSESSMENT & PLAN NOTE
· Patient with known type B dissection following up with Dr Vicky Kennedy on 3/9/23  · S/p CTA nead noting possible increased intima disruption, increase in size to 5 1cm  · S/p CTA chest/a/pelvis  · Status post CT surgery evaluation, recommending medical management  Per outpt CT surgery's note had recommended outpt referral to vascular sx for consideration for an axillofemoral bypass  Pt has upcoming appt with vascular sx as outpt but dtr's preference to be evaluated while inpatient  Appreciate input per vascular surgery, recommending continued outpt follow up  May need eventual surgical intervention in the future, will cont to address as outpt     · Pt otherwise hemodynamically stable

## 2023-03-09 NOTE — PLAN OF CARE
Problem: PAIN - ADULT  Goal: Verbalizes/displays adequate comfort level or baseline comfort level  Description: Interventions:  - Encourage patient to monitor pain and request assistance  - Assess pain using appropriate pain scale  - Administer analgesics based on type and severity of pain and evaluate response  - Implement non-pharmacological measures as appropriate and evaluate response  - Consider cultural and social influences on pain and pain management  - Notify physician/advanced practitioner if interventions unsuccessful or patient reports new pain  Outcome: Progressing     Problem: INFECTION - ADULT  Goal: Absence or prevention of progression during hospitalization  Description: INTERVENTIONS:  - Assess and monitor for signs and symptoms of infection  - Monitor lab/diagnostic results  - Monitor all insertion sites, i e  indwelling lines, tubes, and drains  - Monitor endotracheal if appropriate and nasal secretions for changes in amount and color  - Rayne appropriate cooling/warming therapies per order  - Administer medications as ordered  - Instruct and encourage patient and family to use good hand hygiene technique  - Identify and instruct in appropriate isolation precautions for identified infection/condition  Outcome: Progressing     Problem: INFECTION - ADULT  Goal: Absence or prevention of progression during hospitalization  Description: INTERVENTIONS:  - Assess and monitor for signs and symptoms of infection  - Monitor lab/diagnostic results  - Monitor all insertion sites, i e  indwelling lines, tubes, and drains  - Monitor endotracheal if appropriate and nasal secretions for changes in amount and color  - Rayne appropriate cooling/warming therapies per order  - Administer medications as ordered  - Instruct and encourage patient and family to use good hand hygiene technique  - Identify and instruct in appropriate isolation precautions for identified infection/condition  Outcome: Progressing

## 2023-03-09 NOTE — ASSESSMENT & PLAN NOTE
· Patient appears to have had some issues adhering to his antihypertensives in the outpatient setting, on review of recent note it seems as though he was not taking his Cardizem after the initial prescription ran out    · BP on the low side though asymptomatic thus reduced dose of Cardizem and Lopressor, continue meds with holding parameters

## 2023-03-09 NOTE — OCCUPATIONAL THERAPY NOTE
Occupational Therapy Progress Note     Patient Name: Annalisa Charlton  HMTZL'A Date: 3/9/2023  Problem List  Principal Problem:    Metabolic encephalopathy  Active Problems:    [address] ([facility] [address] )    Hypertension    Osteoarthrosis involving multiple sites    Thoracic aortic aneurysm without rupture    Aortic dissection, thoracoabdominal (HCC)    Atrial fibrillation (HCC)    Hypercalcemia    Pyuria    Acute kidney injury superimposed on CKD ([facility] [address] )              03/09/23 1316   OT Last Visit   OT Visit Date 03/09/23   Note Type   Note Type Treatment   Pain Assessment   Pain Assessment Tool 0-10   Restrictions/Precautions   Weight Bearing Precautions Per Order No   Other Precautions Multiple lines; Fall Risk;Cognitive   Lifestyle   Autonomy PTA, pt living alone in [facility], (I) with ADLs, (I) with IADLs, (-) falls, (-) driving, and no use SPC for functional mobility when out in community  Reciprocal Relationships supportive daughter   Service to Others retired   ADL   Where Assessed Standing at [address] [facility] 5  Supervision/Setup   Grooming Deficit Setup;Supervision/safety; Increased time to complete;Wash/dry hands; Wash/dry face;Brushing hair   UB Dressing Assistance 5  Supervision/Setup   UB Dressing Deficit Setup; Thread RUE; Thread LUE   Functional Standing Tolerance   Time ~ 3 min   Activity Dynamic Standing Balance   Comments Pt was able to stand for ~ 3 mins to complete grooming tasks while standing at the sink  Bed Mobility   Supine to Sit 7  Independent   Additional items Increased time required   Sit to Supine 7  Independent   Additional items Increased time required   Additional Comments Pt was lying supine in bed upon arrival  Pt was left lying supine in bed at the end of the session with all necessary items within reach     Transfers   Sit to Stand 7  Independent   Additional items Increased time required   Stand to Sit 7  Independent   Additional items Increased time required   Additional Comments transfers with no AD   Functional Mobility   Functional Mobility 5  Supervision   Additional Comments Pt was able to demonstrate household mobility with S and no AD  Subjective   Subjective "I am very luis, I am prepared to leave"   Cognition   Overall Cognitive Status [facility]   Arousal/Participation Alert   Attention Within functional limits   Orientation Level Oriented to person;Oriented to place;Oriented to time   Memory Within functional limits   Following Commands Follows multistep commands with increased time or repetition   Comments Pt was pleasant and cooperative t/o session  Pt demonstrated good safety awareness and insight t/o session  Pt was Ox3  Pt d/c to [facility], will have increased supervision and assistance  Activity Tolerance   Activity Tolerance Patient tolerated treatment well   Medical Staff Made Aware RN made aware   Assessment   Assessment Pt was seen on 3/9/2023 for skilled OT session  OT session was focused on ADLs, functional mobility, functional transfers, static/dynamic balance, safety awareness, education, increased activity tolerance, and energy conservation  Pt was agreeable to OT session  Pt was able to demonstrate bed mobility and transfers independently, and functional mobility with S  Pt was able to tolerate standing at sink for ~ 3 mins to complete grooming tasks with S and was able to complete UB dressing and LB dressing with S   Pt showed increase insight into deficits, good safety awareness, and was Ox3  Pt does not require further acute OT services while in the hospital  Pt with no questions or concerns about d/c to [facility]  Pt will continue to benefit from practice with ADLs and functional mobility with staff until d/c  The patient's raw score on the AM-PAC Daily Activity Inpatient Short Form is 24  A raw score of greater than or equal to 19 suggests the patient may benefit from discharge to home   Please refer to the recommendation of the Occupational Therapist for safe discharge planning  OT recommendations for d/c include Home with increased social support  Plan   Treatment Interventions Cognitive reorientation   Goal Expiration Date 03/13/23   OT Treatment Day 2   OT Frequency 1-2x/wk   Recommendation   OT Discharge Recommendation No rehabilitation needs  (home with increased social support and assistance)   AM-PAC Daily Activity Inpatient   Lower Body Dressing 4   Bathing 4   Toileting 4   Upper Body Dressing 4   Grooming 4   Eating 4   Daily Activity Raw Score 24   Daily Activity Standardized Score (Calc for Raw Score >=11) 57 54   AM-PAC Applied Cognition Inpatient   Following a Speech/Presentation 3   Understanding Ordinary Conversation 4   Taking Medications 3   Remembering Where Things Are Placed or Put Away 3   Remembering List of 4-5 Errands 3   Taking Care of Complicated Tasks 3   Applied Cognition Raw Score 19   Applied Cognition Standardized Score 39 77   End of Consult   Education Provided Yes   Patient Position at End of Consult Supine; All needs within reach   Nurse Communication Nurse aware of consult       Santos Dowling OTR/MILES

## 2023-03-09 NOTE — ASSESSMENT & PLAN NOTE
· Chronic hx as per labs in 2021 but per pt's preference wanted to just monitor  · S/p repeat labs; pending peptide related PTH, Vit D levels, SPEP, UPEP  · Nephrology on board

## 2023-03-10 VITALS
SYSTOLIC BLOOD PRESSURE: 138 MMHG | OXYGEN SATURATION: 94 % | HEART RATE: 89 BPM | WEIGHT: 190 LBS | TEMPERATURE: 98.5 F | RESPIRATION RATE: 15 BRPM | DIASTOLIC BLOOD PRESSURE: 95 MMHG | BODY MASS INDEX: 31.62 KG/M2

## 2023-03-10 PROBLEM — G93.41 METABOLIC ENCEPHALOPATHY: Status: RESOLVED | Noted: 2023-03-04 | Resolved: 2023-03-10

## 2023-03-10 LAB
ANION GAP SERPL CALCULATED.3IONS-SCNC: 6 MMOL/L (ref 4–13)
BUN SERPL-MCNC: 23 MG/DL (ref 5–25)
CALCIUM SERPL-MCNC: 10.4 MG/DL (ref 8.3–10.1)
CHLORIDE SERPL-SCNC: 110 MMOL/L (ref 96–108)
CO2 SERPL-SCNC: 24 MMOL/L (ref 21–32)
CREAT SERPL-MCNC: 1.69 MG/DL (ref 0.6–1.3)
GFR SERPL CREATININE-BSD FRML MDRD: 38 ML/MIN/1.73SQ M
GLUCOSE SERPL-MCNC: 89 MG/DL (ref 65–140)
POTASSIUM SERPL-SCNC: 4.2 MMOL/L (ref 3.5–5.3)
SODIUM SERPL-SCNC: 140 MMOL/L (ref 135–147)

## 2023-03-10 RX ORDER — OXYCODONE HYDROCHLORIDE AND ACETAMINOPHEN 5; 325 MG/1; MG/1
1 TABLET ORAL EVERY 4 HOURS PRN
Qty: 6 TABLET | Refills: 0 | Status: SHIPPED | OUTPATIENT
Start: 2023-03-10

## 2023-03-10 RX ORDER — METOPROLOL SUCCINATE 50 MG/1
50 TABLET, EXTENDED RELEASE ORAL 2 TIMES DAILY
Qty: 60 TABLET | Refills: 0 | Status: SHIPPED | OUTPATIENT
Start: 2023-03-10 | End: 2023-04-09

## 2023-03-10 RX ORDER — VALSARTAN 80 MG/1
80 TABLET ORAL DAILY
Qty: 30 TABLET | Refills: 0 | Status: SHIPPED | OUTPATIENT
Start: 2023-03-10 | End: 2023-04-09

## 2023-03-10 RX ADMIN — DILTIAZEM HYDROCHLORIDE 120 MG: 120 CAPSULE, COATED, EXTENDED RELEASE ORAL at 09:09

## 2023-03-10 RX ADMIN — APIXABAN 5 MG: 5 TABLET, FILM COATED ORAL at 09:08

## 2023-03-10 RX ADMIN — METOPROLOL SUCCINATE 50 MG: 50 TABLET, EXTENDED RELEASE ORAL at 09:08

## 2023-03-10 RX ADMIN — LEVOTHYROXINE SODIUM 75 MCG: 75 TABLET ORAL at 06:06

## 2023-03-10 RX ADMIN — ATORVASTATIN CALCIUM 20 MG: 20 TABLET, FILM COATED ORAL at 09:09

## 2023-03-10 RX ADMIN — UMECLIDINIUM 1 PUFF: 62.5 AEROSOL, POWDER ORAL at 09:10

## 2023-03-10 RX ADMIN — ALLOPURINOL 100 MG: 100 TABLET ORAL at 09:09

## 2023-03-10 NOTE — ASSESSMENT & PLAN NOTE
· Patient and daughter endorsing about 1 week of increasing confusion  · No focal neurologic deficit; s/p CTA head/neck  Low suspicion for CVA, suspect metabolic encephalopathy possibly due to hypercalcemia  · Suspect may have underlying cognitive impairment as dtr notes "forgetfulness, absent mindedness" for the past 3 months   · Patient's mental status is alert and oriented x3 although he does note some memory difficulties and overnight w/ sundowning    · Vit B12 is low nml at 287, received IM supplementation   · In euthyroid state  · Referral to neuropsych as outpt  · Plan is for pt to be discharged to independent living in more supervised setting

## 2023-03-10 NOTE — DISCHARGE SUMMARY
[facility]  Discharge- [address] 1945, 78 y o  male MRN: [id number]  Unit/Bed#: [address] 321-01 Encounter: [id number]  Primary Care Provider: Jocelynn Grey MD   Date and time admitted to hospital: 3/4/2023 92:73 AM    * Metabolic encephalopathy  Assessment & Plan  · Patient and daughter endorsing about 1 week of increasing confusion  · No focal neurologic deficit; s/p CTA head/neck  Low suspicion for CVA, suspect metabolic encephalopathy possibly due to hypercalcemia  · Suspect may have underlying cognitive impairment as dtr notes "forgetfulness, absent mindedness" for the past 3 months   · Patient's mental status is alert and oriented x3 although he does note some memory difficulties and overnight w/ sundowning  · Vit B12 is low nml at 287, received IM supplementation   · In euthyroid state  · Referral to neuropsych as outpt  · Plan is for pt to be discharged to independent living in more supervised setting    Acute kidney injury superimposed on CKD [facility])  Assessment & Plan  Lab Results   Component Value Date    EGFR 38 03/10/2023    EGFR 35 03/09/2023    EGFR 25 03/08/2023    CREATININE 1 69 (H) 03/10/2023    CREATININE 1 84 (H) 03/09/2023    CREATININE 2 38 (H) 03/08/2023   Likely secondary to contrast induced nephropathy as pt received CTA head/neck/chest/a/pelvis  Nephrology following  Cr trended down to baseline range  OK per nephrology to resume on diovan  [facility] but per dtr wishes to establish care with [facility]      Pyuria  Assessment & Plan  · Status post urine culture, revealing Staph aureus with nonsignificant colony count thus low suspicion for actual UTI  · Regardless patient received IV ceftriaxone x3 days  Monitor off antibiotics    Hypercalcemia  Assessment & Plan  · Mild  · Prior elevation of PTH w  nml PTH related peptide hormone back in 2021  · Nephrology on board   Secondary to primary hyperparathyroidism  · S/p CTA chest/abd/pelvis  Stable pancreatic cyst for which recommending repeat MRI/MRCP in 2 yrs  It looks like may have already been ordered  Otherwise no mention of underlying tumor  · Off IVF  · Cont to trend  · Pending SPEP, vit d levels  UPEP w/o monoclonal bands  · Ok per nephrology for discharge  · Repeat bmp in 1 week    Atrial fibrillation ([facility] [address] )  Assessment & Plan  · Rate controlled  · Anticoagulation with Eliquis    Aortic dissection, thoracoabdominal (HCC)  Assessment & Plan  · CTA reviewed concern for new tear  · As above      Thoracic aortic aneurysm without rupture  Assessment & Plan  · Patient with known type B dissection following up with Dr Gabby Jefferson on 3/9/23  · S/p CTA nead noting possible increased intima disruption, increase in size to 5 1cm  · S/p CTA chest/a/pelvis  · Status post CT surgery evaluation, recommending medical management  Per outpt CT surgery's note had recommended outpt referral to vascular sx for consideration for an axillofemoral bypass  Pt has upcoming appt with vascular sx as outpt but dtr's preference to be evaluated while inpatient  Appreciate input per vascular surgery, recommending continued outpt follow up  May need eventual surgical intervention in the future, will cont to address as outpt  · Pt otherwise hemodynamically stable   · Pt to cont to f/u with CTS and vascular sx      Osteoarthrosis involving multiple sites  Assessment & Plan  Tylenol and home percocet PRN      Hypertension  Assessment & Plan  · Patient appears to have had some issues adhering to his antihypertensives in the outpatient setting, on review of recent note it seems as though he was not taking his Cardizem after the initial prescription ran out  · During hospitalization bp on low side thus lowered dose of meds  · BP has been well controlled on reduced dose of meds likely now due to compliance   And per nephrology can resume back on diovan   · Compliance likely to cont as now will be in halfway    Hyperparathyroidism ([facility] [address] )  Assessment & Plan  · Chronic hx as per labs in 2021 but per pt's preference wanted to just monitor  · S/p repeat labs; pending peptide related PTH, Vit D levels, SPEP will be followed as outpt  UPEP w/o monoclonal bands  · S/p Nephrology consult  · Referral to endocrinology      Discharging Physician / Practitioner: Dixie Rodriguez DO  PCP: John Holden MD  Admission Date:   Admission Orders (From admission, onward)     Ordered        03/05/23 1306  Inpatient Admission  Once            03/04/23 1255  Place in Observation  Once                      Discharge Date: 03/10/23    Medical Problems     Resolved Problems  Date Reviewed: 3/10/2023   None         Consultations During Hospital Stay:  · Cardiothoracic surgery  · Vascular surgery  · Nephrology  · Geriatrics    Procedures Performed:   · CTA head and neck  · CTA chest, abdomen, pelvis    Significant Findings / Test Results:   · See above    Incidental Findings:   ·      Test Results Pending at Discharge (will require follow up):   · SPEP, vit d panel, PTH peptide related level     Outpatient Tests Requested:  · BMP in 1 week    Complications: None    Reason for Admission: Confusion    Hospital Course:     HPI: Vicki Palacios is a 78 y o  male with a PMH of type B aortic dissection, pretension, and polyarthritis who presents with infusion  He is accompanied by his daughter who provides some collateral history  They state that since the beginning of this year, Behzad Sabino appears to have had some mild decline in his mental state  Around this time he was diagnosed with a type B aortic dissection, they have opted for medical management and conversation with her cardiovascular surgeon  He had generally been doing well at home however over the past week or so, his daughter states she feels like he has been a little more confused    Renedarcy Sabino himself agrees with this notion, stating that he has had some trouble navigating the house and doing some of his basic ADLs  Today, things seem to be at the worst where he was grabbing television remotes and acting like they were the phone and not knowing his way around the house or outdoors  He was brought to the ED for further evaluation where during his stay here, both Angela Tripp and his daughter feel that he is had some improvement in his mental state  Neuroimaging was obtained and shows possible extension of known dissection  Angela Tripp endorses a little bit of palpable chest discomfort, but is otherwise hemodynamically stable  Denies dysuria but endorses increased urinary frequency       Please see above list of diagnoses and related plan for additional information  Condition at Discharge: stable     Discharge Day Visit / Exam:     Subjective: Patient seen and examined at bedside  Had period of sundowning at night  But presently oriented x3 now  Has no complaints, denies chest pain, shortness of breath, ambulating  Vitals: Blood Pressure: 138/95 (03/10/23 0738)  Pulse: 89 (03/10/23 0738)  Temperature: 98 5 °F (36 9 °C) (03/10/23 0738)  Temp Source: Oral (03/09/23 2331)  Respirations: 15 (03/09/23 2331)  Weight - Scale: 86 2 kg (190 lb) (03/04/23 1647)  SpO2: 94 % (03/10/23 0738)  Exam:   Physical Exam  Cardiovascular:      Rate and Rhythm: Normal rate and regular rhythm  Pulses: Normal pulses  Heart sounds: Normal heart sounds  Pulmonary:      Effort: Pulmonary effort is normal  No respiratory distress  Breath sounds: Normal breath sounds  No wheezing or rales  Abdominal:      General: Bowel sounds are normal  There is no distension  Palpations: Abdomen is soft  Tenderness: There is no abdominal tenderness  There is no guarding  Musculoskeletal:         General: Normal range of motion  Cervical back: Normal range of motion and neck supple  Right lower leg: No edema  Left lower leg: No edema  Skin:     General: Skin is warm and dry  Neurological:      General: No focal deficit present  Mental Status: He is alert  Mental status is at baseline  Cranial Nerves: No cranial nerve deficit  Motor: No weakness  Comments: Baseline cognitive impairment         Discussion with Family: Patient and his daughter  Discharge instructions/Information to patient and family:   See after visit summary for information provided to patient and family  Provisions for Follow-Up Care:  See after visit summary for information related to follow-up care and any pertinent home health orders  Disposition:     Home with VNA Services (Reminder: Complete face to face encounter) assisted living facility    For Discharges to [facility] SNF:   · Not Applicable to this Patient - Not Applicable to this Patient    Planned Readmission: No     Discharge Statement:  I spent 35 minutes discharging the patient  This time was spent on the day of discharge  I had direct contact with the patient on the day of discharge  Greater than 50% of the total time was spent examining patient, answering all patient questions, arranging and discussing plan of care with patient as well as directly providing post-discharge instructions  Additional time then spent on discharge activities  Discharge Medications:  See after visit summary for reconciled discharge medications provided to patient and family        ** Please Note: This note has been constructed using a voice recognition system **

## 2023-03-10 NOTE — PROGRESS NOTES
NEPHROLOGY PROGRESS NOTE   Beena Adame 78 y o  male MRN: [id number]  Unit/Bed#: CW2 214-01 Encounter: [id number]  Reason for Consult: CRIS    Assessment:  1  Acute kidney injury on chronic kidney disease, etiology most likely secondary to contrast associated nephropathy given to recent contrast studies within the last 50 to 72 hours   Additional component secondary to aortic dissection with decreased right renal perfusion  2  Chronic kidney disease, previous baseline creatinine between 1 6-2 1  3  Mild hypercalcemia, may be secondary to primary hyperparathyroidism non-suppressed PTH, urine electrophoresis negative, pending SPEP  4  Hypertension blood pressure currently appears stable given aortic dissection okay to resume Diovan at discharge although adjust dose to 80 mg daily  5  Type B aortic dissection, as per vascular surgery  6  Atrial fibrillation currently on anticoagulation, Eliquis    PLAN:  · Renal function overall has returned to baseline with a creatinine of 1 69  · Blood pressure acceptable, okay to resume low-dose Diovan 80 mg daily at discharge  · SPEP is pending, UPEP without monoclonal colopathy  PTH nonsuppressed  May be component of hyperparathyroidism  · Stable for discharge from nephrology standpoint  Recommend following up with his primary nephrologist, Dr Nathan Jansen:  Seen and examined  Patient awake and alert  Denies any chest pain, shortness of breath or abdominal pain      Review of Systems    OBJECTIVE:  Current Weight: Weight - Scale: 86 2 kg (190 lb)  Vitals:    03/09/23 1513 03/09/23 2148 03/09/23 2331 03/10/23 0738   BP: 115/73 117/69 144/95 138/95   BP Location:   Right arm    Pulse: 87 82 96 89   Resp:   15    Temp: 98 8 °F (37 1 °C)  98 4 °F (36 9 °C) 98 5 °F (36 9 °C)   TempSrc:   Oral    SpO2: 98%  95% 94%   Weight:           Intake/Output Summary (Last 24 hours) at 3/10/2023 0931  Last data filed at 3/10/2023 0738  Gross per 24 hour   Intake 1250 ml   Output 1625 ml   Net -375 ml       Physical Exam  Constitutional:       Appearance: He is not ill-appearing  Eyes:      General: No scleral icterus  Cardiovascular:      Rate and Rhythm: Normal rate and regular rhythm  Pulmonary:      Effort: Pulmonary effort is normal       Breath sounds: Normal breath sounds  Abdominal:      General: There is no distension  Palpations: Abdomen is soft  Musculoskeletal:      Right lower leg: No edema  Left lower leg: No edema  Skin:     General: Skin is warm and dry  Findings: No rash  Neurological:      Mental Status: He is alert and oriented to person, place, and time           Medications:    Current Facility-Administered Medications:   •  acetaminophen (TYLENOL) tablet 650 mg, 650 mg, Oral, Q6H PRN, Macy Locks  •  allopurinol (ZYLOPRIM) tablet 100 mg, 100 mg, Oral, Daily, Macy Locks, 100 mg at 03/10/23 1589  •  apixaban (ELIQUIS) tablet 5 mg, 5 mg, Oral, BID, Macy Locks, 5 mg at 03/10/23 1047  •  atorvastatin (LIPITOR) tablet 20 mg, 20 mg, Oral, Daily, Macy Locks, 20 mg at 03/10/23 4889  •  diltiazem (CARDIZEM CD) 24 hr capsule 120 mg, 120 mg, Oral, Daily, Wagoner Figures, DO, 120 mg at 03/10/23 8203  •  levothyroxine tablet 75 mcg, 75 mcg, Oral, Daily, Macy Locks, 75 mcg at 03/10/23 1602  •  metoprolol succinate (TOPROL-XL) 24 hr tablet 50 mg, 50 mg, Oral, BID, Wagoner Figures, DO, 50 mg at 03/10/23 0908  •  mirtazapine (REMERON) tablet 7 5 mg, 7 5 mg, Oral, HS, Macy Locks, 7 5 mg at 03/09/23 2148  •  oxyCODONE-acetaminophen (PERCOCET) 5-325 mg per tablet 1 tablet, 1 tablet, Oral, Q6H PRN, Macy Locks, 1 tablet at 03/07/23 0213  •  umeclidinium 62 5 mcg/actuation inhaler AEPB 1 puff, 1 puff, Inhalation, Daily, Lelia Mims PA-C, 1 puff at 03/10/23 1303    Laboratory Results:  Results from last 7 days   Lab Units 03/10/23  0559 03/09/23  0622 03/08/23  6686 03/07/23  0518 03/06/23  0503 03/05/23  0252 03/04/23  1018   WBC Thousand/uL  --   --   --  3 26* 7 00  --  7 63   HEMOGLOBIN g/dL  --   --   --  12 1 12 4  --  13 4   HEMATOCRIT %  --   --   --  39 8 39 7  --  42 4   PLATELETS Thousands/uL  --   --   --  236 230  --  260   POTASSIUM mmol/L 4 2 4 0 3 7 4 1 4 0 4 4 4 5   CHLORIDE mmol/L 110* 111* 109* 109* 110* 112* 107   CO2 mmol/L 24 26 26 26 25 24 25   BUN mg/dL 23 25 30* 23 19 22 22   CREATININE mg/dL 1 69* 1 84* 2 38* 1 88* 1 53* 1 62* 1 69*   CALCIUM mg/dL 10 4* 10 3* 10 2* 10 3* 10 1 10 0 11 1*

## 2023-03-10 NOTE — CASE MANAGEMENT
Case Management Discharge Planning Note    Patient name Paulie Zapata  Location 2 467/JM7 214-01 MRN [id number]  : 1945 Date 3/10/2023       Current Admission Date: 3/4/2023  Current Admission Diagnosis:Metabolic encephalopathy   Patient Active Problem List    Diagnosis Date Noted   • Acute kidney injury superimposed on CKD ([address] ) 2023   • Pyuria 2023   • Hypercalcemia 2023   • UTI (urinary tract infection)    • Metabolic encephalopathy    • Atrial fibrillation ([address] ) 2023   • Aortic dissection, thoracoabdominal ([address] ) 2022   • Continuous opioid dependence ([address] ) 02/10/2022   • Chronic pain syndrome 2021   • Ulcerative pancolitis without complication ([address] )    • Thoracic aortic aneurysm without rupture 2021   • Benign prostatic hyperplasia without lower urinary tract symptoms 2021   • Severe obstructive sleep apnea 2020   • Chronic right shoulder pain 2020   • Chronic left shoulder pain 2020   • Left hip pain 2020   • COPD (chronic obstructive pulmonary disease) ([address] ) 2020   • Hoarse voice quality 2020   • Chronic lumbar pain 2020   • Stage 3a chronic kidney disease ([address] ) 2020   • Hyperlipidemia 2020   • Hyperparathyroidism ([address] ) 2020   • Hypertension 2020   • Impaired fasting glucose 2014   • Hypertensive heart disease without heart failure 10/25/2013   • Hypothyroidism 10/25/2013   • Osteoarthrosis involving multiple sites 2013      LOS (days): 5  Geometric Mean LOS (GMLOS) (days): 5 10  Days to GMLOS:0 2     OBJECTIVE:  Risk of Unplanned Readmission Score: 17 88         Current admission status: Inpatient   Preferred Pharmacy:   [facility], [address] [address]  Phone: [phone] Fax: [phone]    [facility] [address] [facility] [address] [place] [address] [facility] [address]  Phone: [phone] Fax: [phone]    Primary Care Provider: Dina Calix MD    Primary Insurance: [employer]er Peoples [place]  REP  Secondary Insurance:     DISCHARGE DETAILS:       Freedom of Choice: Yes     CM contacted family/caregiver?: Yes  Were Treatment Team discharge recommendations reviewed with patient/caregiver?: Yes  Did patient/caregiver verbalize understanding of patient care needs?: Yes  Were patient/caregiver advised of the risks associated with not following Treatment Team discharge recommendations?: Yes    Contacts  Patient Contacts: Fidelina Jose Raul  Relationship to Patient[de-identified] Family  Contact Method: Phone  Phone Number: [phone]  Reason/Outcome: Discharge Planning    DME Referral Provided  Referral made for DME?: No (pt declined the need for RW order at this time )    Other Referral/Resources/Interventions Provided:  Interventions: Facility Return  Referral Comments: Per discussion w/ Dr Bryan Abdalla, pt is cleared for d/c home today  Plan for patient to admit to [facility]: Independent Living  CM confirmed with pt's daughter Effie Rivera and Freddy 5, arrangements for admission have already been made and pt can return today  R Nnamdi Granados 20 Supervisor requesting AVS faxed to F: [phone]  CM will fax same as requested  Pt accepted by [facility] for SN/PT services as after care plan  CM informed [facility] of plan for d/c today  CM updated daughter Effie Rivera on d/c who informed she will provide transportation for pt home  No further CM needs reported at this time  Treatment Team Recommendation: Home with [facility]  Discharge Destination Plan[de-identified] Home with [facility] (Return home to [address] with Home health services  )  Transport at Discharge : Automobile, Family    IMM Given (Date):: 03/10/23  IMM Given to[de-identified] Patient

## 2023-03-10 NOTE — ASSESSMENT & PLAN NOTE
Lab Results   Component Value Date    EGFR 38 03/10/2023    EGFR 35 03/09/2023    EGFR 25 03/08/2023    CREATININE 1 69 (H) 03/10/2023    CREATININE 1 84 (H) 03/09/2023    CREATININE 2 38 (H) 03/08/2023   Likely secondary to contrast induced nephropathy as pt received CTA head/neck/chest/a/pelvis  Nephrology following  Cr trended down to baseline range  OK per nephrology to resume on diovan  Pt follows with [facility] but per dtr wishes to establish care with [facility]

## 2023-03-10 NOTE — ASSESSMENT & PLAN NOTE
· Chronic hx as per labs in 2021 but per pt's preference wanted to just monitor  · S/p repeat labs; pending peptide related PTH, Vit D levels, SPEP will be followed as outpt   UPEP w/o monoclonal bands  · S/p Nephrology consult  · Referral to endocrinology

## 2023-03-10 NOTE — ASSESSMENT & PLAN NOTE
· Patient appears to have had some issues adhering to his antihypertensives in the outpatient setting, on review of recent note it seems as though he was not taking his Cardizem after the initial prescription ran out  · During hospitalization bp on low side thus lowered dose of meds  · BP has been well controlled on reduced dose of meds likely now due to compliance   And per nephrology can resume back on diovan   · Compliance likely to cont as now will be in SARAH

## 2023-03-10 NOTE — ASSESSMENT & PLAN NOTE
· Patient with known type B dissection following up with Dr Dionna Hooker on 3/9/23  · S/p CTA nead noting possible increased intima disruption, increase in size to 5 1cm  · S/p CTA chest/a/pelvis  · Status post CT surgery evaluation, recommending medical management  Per outpt CT surgery's note had recommended outpt referral to vascular sx for consideration for an axillofemoral bypass  Pt has upcoming appt with vascular sx as outpt but dtr's preference to be evaluated while inpatient  Appreciate input per vascular surgery, recommending continued outpt follow up  May need eventual surgical intervention in the future, will cont to address as outpt     · Pt otherwise hemodynamically stable   · Pt to cont to f/u with CTS and vascular sx

## 2023-03-10 NOTE — ASSESSMENT & PLAN NOTE
· Mild  · Prior elevation of PTH w  nml PTH related peptide hormone back in 2021  · Nephrology on board  Secondary to primary hyperparathyroidism  · S/p CTA chest/abd/pelvis  Stable pancreatic cyst for which recommending repeat MRI/MRCP in 2 yrs  It looks like may have already been ordered  Otherwise no mention of underlying tumor  · Off IVF  · Cont to trend  · Pending SPEP, vit d levels   UPEP w/o monoclonal bands  · Ok per nephrology for discharge  · Repeat bmp in 1 week

## 2023-03-10 NOTE — PLAN OF CARE
Problem: PAIN - ADULT  Goal: Verbalizes/displays adequate comfort level or baseline comfort level  Description: Interventions:  - Encourage patient to monitor pain and request assistance  - Assess pain using appropriate pain scale  - Administer analgesics based on type and severity of pain and evaluate response  - Implement non-pharmacological measures as appropriate and evaluate response  - Consider cultural and social influences on pain and pain management  - Notify physician/advanced practitioner if interventions unsuccessful or patient reports new pain  Outcome: Progressing     Problem: INFECTION - ADULT  Goal: Absence or prevention of progression during hospitalization  Description: INTERVENTIONS:  - Assess and monitor for signs and symptoms of infection  - Monitor lab/diagnostic results  - Monitor all insertion sites, i e  indwelling lines, tubes, and drains  - Monitor endotracheal if appropriate and nasal secretions for changes in amount and color  - [place] appropriate cooling/warming therapies per order  - Administer medications as ordered  - Instruct and encourage patient and family to use good hand hygiene technique  - Identify and instruct in appropriate isolation precautions for identified infection/condition  Outcome: Progressing

## 2023-03-11 LAB
ALBUMIN SERPL ELPH-MCNC: 3.03 G/DL (ref 3.5–5)
ALBUMIN SERPL ELPH-MCNC: 45.9 % (ref 52–65)
ALPHA1 GLOB SERPL ELPH-MCNC: 0.49 G/DL (ref 0.1–0.4)
ALPHA1 GLOB SERPL ELPH-MCNC: 7.4 % (ref 2.5–5)
ALPHA2 GLOB SERPL ELPH-MCNC: 0.79 G/DL (ref 0.4–1.2)
ALPHA2 GLOB SERPL ELPH-MCNC: 12 % (ref 7–13)
BETA GLOB ABNORMAL SERPL ELPH-MCNC: 0.44 G/DL (ref 0.4–0.8)
BETA1 GLOB SERPL ELPH-MCNC: 6.6 % (ref 5–13)
BETA2 GLOB SERPL ELPH-MCNC: 6.1 % (ref 2–8)
BETA2+GAMMA GLOB SERPL ELPH-MCNC: 0.4 G/DL (ref 0.2–0.5)
GAMMA GLOB ABNORMAL SERPL ELPH-MCNC: 1.45 G/DL (ref 0.5–1.6)
GAMMA GLOB SERPL ELPH-MCNC: 22 % (ref 12–22)
IGG/ALB SER: 0.85 {RATIO} (ref 1.1–1.8)
INTERPRETATION UR IFE-IMP: NORMAL
PROT PATTERN SERPL ELPH-IMP: ABNORMAL
PROT SERPL-MCNC: 6.6 G/DL (ref 6.4–8.2)

## 2023-03-12 ENCOUNTER — HOME CARE VISIT (OUTPATIENT)
Dept: HOME HEALTH SERVICES | Facility: HOME HEALTHCARE | Age: 78
End: 2023-03-12

## 2023-03-12 ENCOUNTER — TELEPHONE (OUTPATIENT)
Dept: FAMILY MEDICINE CLINIC | Facility: CLINIC | Age: 78
End: 2023-03-12

## 2023-03-13 ENCOUNTER — TRANSITIONAL CARE MANAGEMENT (OUTPATIENT)
Dept: FAMILY MEDICINE CLINIC | Facility: CLINIC | Age: 78
End: 2023-03-13

## 2023-03-13 ENCOUNTER — TELEPHONE (OUTPATIENT)
Dept: HOME HEALTH SERVICES | Facility: HOME HEALTHCARE | Age: 78
End: 2023-03-13

## 2023-03-13 NOTE — CASE COMMUNICATION
[facility] has admitted your patient to [address] [place] [address] service with the following disciplines:      SN and PT  This report is informational only, no responses is needed  Primary focus of home health care  Andre Yeboah NEURO  Patient stated goals of care  TO BE INDEPENDENT WITH CARE  Anticipated visit pattern and next visit date    0L3  2W8  NEXT VISIT TUESDAY  See medication list - meds in home differ from AVS  ALL MEDS AT HOME  Significant clinica l findings  Pt's cognition flactuates  Dgtr reports pts is having hallucination, unsure if its due to his electrolytes abnormalitis vs UTI vs dehydration vs PTSD as pt was a  vs remeron  right elbow pain, redness, and tenderness per pt it is from the hospital  reports ANSARI at times  Potential barriers to goal achievement  impaired memory   Other pertinent information  na    Thank you for allowing us to participate in the care of  your patient

## 2023-03-13 NOTE — UTILIZATION REVIEW
NOTIFICATION OF ADMISSION DISCHARGE   This is a Notification of Discharge from [address]  Please be advised that this patient has been discharge from our facility  Below you will find the admission and discharge date and time including the patient’s disposition  UTILIZATION REVIEW CONTACT:  Carlo Mejias  Utilization   Network Utilization Review Department  Phone: [phone] x carefully listen to the prompts  All voicemails are confidential   Email: [email or web address]     ADMISSION INFORMATION  PRESENTATION DATE: 3/4/2023 10:03 AM  OBERVATION ADMISSION DATE:   INPATIENT ADMISSION DATE: 3/5/23  1:06 PM   DISCHARGE DATE: 3/10/2023  6:43 PM   DISPOSITION:Home with [address] INFORMATION:  Send all requests for admission clinical reviews, approved or denied determinations and any other requests to dedicated fax number below belonging to the campus where the patient is receiving treatment   List of dedicated fax numbers:  [address] DENIALS (Administrative/Medical Necessity) [phone]   [address] (Maternity/NICU/Pediatrics) [phone]   [facility] [phone]   [facility] [phone]   [address] [phone]   220 [facility] [phone]   90 [facility] [phone]   [facility]0 [facility] 119 [phone]   [facility]  [phone]   4050 [facility] [phone]   [address] [place] [address] [address] [facility] [phone]

## 2023-03-14 ENCOUNTER — HOME CARE VISIT (OUTPATIENT)
Dept: HOME HEALTH SERVICES | Facility: HOME HEALTHCARE | Age: 78
End: 2023-03-14

## 2023-03-14 VITALS
HEART RATE: 78 BPM | TEMPERATURE: 97.2 F | RESPIRATION RATE: 18 BRPM | SYSTOLIC BLOOD PRESSURE: 124 MMHG | DIASTOLIC BLOOD PRESSURE: 72 MMHG

## 2023-03-14 VITALS
RESPIRATION RATE: 20 BRPM | HEART RATE: 100 BPM | TEMPERATURE: 97.1 F | OXYGEN SATURATION: 97 % | DIASTOLIC BLOOD PRESSURE: 88 MMHG | SYSTOLIC BLOOD PRESSURE: 128 MMHG

## 2023-03-15 ENCOUNTER — OFFICE VISIT (OUTPATIENT)
Dept: FAMILY MEDICINE CLINIC | Facility: CLINIC | Age: 78
End: 2023-03-15

## 2023-03-15 ENCOUNTER — TELEPHONE (OUTPATIENT)
Dept: LAB | Facility: HOSPITAL | Age: 78
End: 2023-03-15

## 2023-03-15 VITALS
DIASTOLIC BLOOD PRESSURE: 74 MMHG | OXYGEN SATURATION: 97 % | HEIGHT: 65 IN | BODY MASS INDEX: 30.82 KG/M2 | RESPIRATION RATE: 16 BRPM | SYSTOLIC BLOOD PRESSURE: 118 MMHG | WEIGHT: 185 LBS | HEART RATE: 101 BPM

## 2023-03-15 DIAGNOSIS — L03.113 CELLULITIS OF RIGHT UPPER EXTREMITY: ICD-10-CM

## 2023-03-15 DIAGNOSIS — I48.19 PERSISTENT ATRIAL FIBRILLATION (HCC): ICD-10-CM

## 2023-03-15 DIAGNOSIS — E03.9 HYPOTHYROIDISM, UNSPECIFIED TYPE: ICD-10-CM

## 2023-03-15 DIAGNOSIS — E21.3 HYPERPARATHYROIDISM (HCC): ICD-10-CM

## 2023-03-15 DIAGNOSIS — I71.03 AORTIC DISSECTION, THORACOABDOMINAL (HCC): ICD-10-CM

## 2023-03-15 DIAGNOSIS — R41.0 DISORIENTATION: Primary | ICD-10-CM

## 2023-03-15 DIAGNOSIS — E83.52 HYPERCALCEMIA: ICD-10-CM

## 2023-03-15 DIAGNOSIS — N18.31 STAGE 3A CHRONIC KIDNEY DISEASE (HCC): ICD-10-CM

## 2023-03-15 RX ORDER — CEPHALEXIN 500 MG/1
500 CAPSULE ORAL EVERY 8 HOURS SCHEDULED
Qty: 21 CAPSULE | Refills: 0 | Status: SHIPPED | OUTPATIENT
Start: 2023-03-15 | End: 2023-03-22

## 2023-03-15 NOTE — PROGRESS NOTES
Assessment & Plan       Needs neuro psych   New environment   High calcium   Follow with nephro   And cont with remeron x 7d then take 2 nightly   tx the cellulitis   Get brain MRI without sedation if possible   MRCP can be changed to without if he tollerates the brain mri     1  Disorientation  -     MRI brain w wo contrast; Future; Expected date: 03/15/2023    2  Cellulitis of right upper extremity  -     cephalexin (KEFLEX) 500 mg capsule; Take 1 capsule (500 mg total) by mouth every 8 (eight) hours for 7 days    3  Stage 3a chronic kidney disease ([facility] [address] )    4  Hypothyroidism, unspecified type    5  Aortic dissection, thoracoabdominal (HCC)    6  Persistent atrial fibrillation ([facility] [address] )    7  Hypercalcemia    8  Hyperparathyroidism [facility])       Subjective     Transitional Care Management Review:   Beena Adame is a 78 y o  male here for TCM follow up  During the TCM phone call patient stated:  TCM Call     Date and time call was made  3/13/2023  8:35 AM    Hospital care reviewed  Records reviewed    Patient was hospitialized at  Critical access hospital    Date of Admission  03/04/23    Date of discharge  03/10/23    Diagnosis  Metabolic encephalopathy    Disposition  Home    Were the patients medications reviewed and updated  Yes    Current Symptoms  None      TCM Call     Post hospital issues  None    Should patient be enrolled in anticoag monitoring? No    Scheduled for follow up?   Yes    Patients specialists  Nephrologist; Cardiologist    Did you obtain your prescribed medications  Yes    Do you need help managing your prescriptions or medications  No    Is transportation to your appointment needed  No    I have advised the patient to call PCP with any new or worsening symptoms  Pavan Mcneil MA    Living Arrangements  Spouse or Significiant other    Support System  Family    The type of support provided  None    Do you have social support  Yes, as much as I need    Are you recieving any outpatient services  No Are you recieving home care services  Yes    Types of home care services  Home PT; Nurse visit    Are you using any community resources  No    Current waiver services  No    Have you fallen in the last 12 months  No    Interperter language line needed  No    Counseling  Family        HPI       Last Saturday joseph noticed he he was more confused  Going on for a few days prior he kept repeating himself     Brought to the ER     She wasn't buying the hypercalcemia as the cause     Since then independent living at [facility] in [facility] and PT  Will be coming there Tuesday and Fridays     Elbow red and swollen came out with it     Also with pressure dressing to the right wrist       Review of Systems   Constitutional: Negative for activity change, appetite change and fever  HENT: Negative for congestion, nosebleeds and trouble swallowing  Eyes: Negative for itching  Respiratory: Negative for cough and chest tightness  Cardiovascular: Negative for chest pain and palpitations  Gastrointestinal: Negative for abdominal pain, constipation, diarrhea and nausea  Endocrine: Negative for cold intolerance  Genitourinary: Negative for frequency and hematuria  Musculoskeletal: Positive for arthralgias  Negative for gait problem and joint swelling  Skin: Negative for rash  Allergic/Immunologic: Negative for immunocompromised state  Neurological: Negative for dizziness, tremors, seizures, syncope and headaches  Psychiatric/Behavioral: Positive for confusion and sleep disturbance  Negative for hallucinations and suicidal ideas  The patient is nervous/anxious  Objective     /74 (BP Location: Left arm, Patient Position: Sitting, Cuff Size: Large)   Pulse 101   Resp 16   Ht 5' 5" (1 651 m)   Wt 83 9 kg (185 lb)   SpO2 97%   BMI 30 79 kg/m²      Physical Exam  Vitals and nursing note reviewed  Constitutional:       Appearance: He is well-developed  He is obese     HENT: Head: Normocephalic and atraumatic  Eyes:      Conjunctiva/sclera: Conjunctivae normal       Pupils: Pupils are equal, round, and reactive to light  Cardiovascular:      Rate and Rhythm: Normal rate  Rhythm irregular  Heart sounds: Normal heart sounds  Pulmonary:      Effort: Pulmonary effort is normal       Breath sounds: Normal breath sounds  No wheezing or rales  Abdominal:      General: Bowel sounds are normal  There is no distension  Palpations: Abdomen is soft  Tenderness: There is no abdominal tenderness  Musculoskeletal:         General: No tenderness  Normal range of motion  Cervical back: Normal range of motion and neck supple  Skin:     General: Skin is warm and dry  Findings: No rash  Neurological:      Mental Status: He is alert and oriented to person, place, and time  Cranial Nerves: No cranial nerve deficit  Sensory: No sensory deficit  Coordination: Coordination normal       Comments: Repeats himself sometimes    Psychiatric:         Behavior: Behavior normal          Thought Content:  Thought content normal          Judgment: Judgment normal        Medications have been reviewed by provider in current encounter    Te Nuno MD

## 2023-03-16 PROBLEM — N39.0 UTI (URINARY TRACT INFECTION): Status: RESOLVED | Noted: 2023-03-04 | Resolved: 2023-03-16

## 2023-03-16 PROBLEM — N18.9 ACUTE KIDNEY INJURY SUPERIMPOSED ON CKD (HCC): Status: RESOLVED | Noted: 2023-03-08 | Resolved: 2023-03-16

## 2023-03-16 PROBLEM — N17.9 ACUTE KIDNEY INJURY SUPERIMPOSED ON CKD (HCC): Status: RESOLVED | Noted: 2023-03-08 | Resolved: 2023-03-16

## 2023-03-16 LAB
25(OH)D2 SERPL-MCNC: 6.7 NG/ML
25(OH)D3 SERPL-MCNC: 28 NG/ML
25(OH)D3+25(OH)D2 SERPL-MCNC: 35 NG/ML

## 2023-03-17 ENCOUNTER — HOME CARE VISIT (OUTPATIENT)
Dept: HOME HEALTH SERVICES | Facility: HOME HEALTHCARE | Age: 78
End: 2023-03-17

## 2023-03-17 LAB — PTH RELATED PROT SERPL-SCNC: <2 PMOL/L

## 2023-03-18 VITALS
SYSTOLIC BLOOD PRESSURE: 102 MMHG | DIASTOLIC BLOOD PRESSURE: 60 MMHG | TEMPERATURE: 97.1 F | HEART RATE: 93 BPM | RESPIRATION RATE: 18 BRPM | OXYGEN SATURATION: 98 %

## 2023-03-20 ENCOUNTER — HOME CARE VISIT (OUTPATIENT)
Dept: HOME HEALTH SERVICES | Facility: HOME HEALTHCARE | Age: 78
End: 2023-03-20

## 2023-03-23 ENCOUNTER — HOME CARE VISIT (OUTPATIENT)
Dept: HOME HEALTH SERVICES | Facility: HOME HEALTHCARE | Age: 78
End: 2023-03-23

## 2023-03-24 VITALS
HEART RATE: 76 BPM | OXYGEN SATURATION: 96 % | RESPIRATION RATE: 18 BRPM | TEMPERATURE: 97.1 F | SYSTOLIC BLOOD PRESSURE: 120 MMHG | DIASTOLIC BLOOD PRESSURE: 70 MMHG

## 2023-03-27 ENCOUNTER — OFFICE VISIT (OUTPATIENT)
Dept: VASCULAR SURGERY | Facility: CLINIC | Age: 78
End: 2023-03-27

## 2023-03-27 ENCOUNTER — HOME CARE VISIT (OUTPATIENT)
Dept: HOME HEALTH SERVICES | Facility: HOME HEALTHCARE | Age: 78
End: 2023-03-27

## 2023-03-27 VITALS
BODY MASS INDEX: 31.22 KG/M2 | SYSTOLIC BLOOD PRESSURE: 100 MMHG | DIASTOLIC BLOOD PRESSURE: 72 MMHG | HEIGHT: 65 IN | WEIGHT: 187.4 LBS | HEART RATE: 73 BPM

## 2023-03-27 DIAGNOSIS — I48.19 PERSISTENT ATRIAL FIBRILLATION (HCC): ICD-10-CM

## 2023-03-27 DIAGNOSIS — I71.03 AORTIC DISSECTION, THORACOABDOMINAL (HCC): ICD-10-CM

## 2023-03-27 DIAGNOSIS — N18.31 STAGE 3A CHRONIC KIDNEY DISEASE (HCC): Primary | ICD-10-CM

## 2023-03-27 DIAGNOSIS — I71.21 ANEURYSM OF ASCENDING AORTA WITHOUT RUPTURE (HCC): ICD-10-CM

## 2023-03-27 NOTE — PATIENT INSTRUCTIONS
Aortic dissection, thoracoabdominal (HCC)  Type B aortic dissection  We discussed the findings on CT angiogram along with the pathophysiology of aortic dissection and the indication for further intervention  At this point he does not have any indication for treatment  He has had thrombosis of the majority of his false lumen with a relatively compressed residual true lumen but there does not appear to be any significant associated malperfusion on exam or history  In general indications for intervention would be based upon aneurysm size or signs or symptoms of malperfusion  If his aneurysm progresses to a size approaching 6 cm or he develops symptoms of malperfusion my initial suggestion would be an endovascular intervention which would include placement of a proximal stent graft to cover the proximal fenestration and placement of either a dissection stent or a balloon expandable Palmaz stent in the area of mid thoracic stenosis  At this stage of chronicity his risk of aneurysmal rupture with this procedure would remain relatively low  After discussion with the patient and his daughter we will continue conservative management for which she is already scheduled for follow-up CT scan in August   We will also obtain an aortic duplex with segmental pressures to better understand his level of distal perfusion  Stage 3a chronic kidney disease (HCC)  Chronic renal insufficiency status post type B aortic dissection  His renal function appears to be stable  We will continue to follow  Atrial fibrillation ([address] )  A-fib currently maintained on anticoagulation with Eliquis

## 2023-03-27 NOTE — ASSESSMENT & PLAN NOTE
Chronic renal insufficiency status post type B aortic dissection  His renal function appears to be stable  We will continue to follow

## 2023-03-27 NOTE — LETTER
March 27, 2023     Halley Lange, [address] [address]  [address]  [address] [facility]    Patient: Adolfo Jensen   YOB: 1945   Date of Visit: 3/27/2023       Dear Dr Jeovany Mejia: Thank you for referring Honore Nageotte to me for evaluation  Below are the relevant portions of my assessment and plan of care  Aortic dissection, thoracoabdominal (HCC)  Type B aortic dissection  We discussed the findings on CT angiogram along with the pathophysiology of aortic dissection and the indication for further intervention  At this point he does not have any indication for treatment  He has had thrombosis of the majority of his false lumen with a relatively compressed residual true lumen but there does not appear to be any significant associated malperfusion on exam or history  In general indications for intervention would be based upon aneurysm size or signs or symptoms of malperfusion  If his aneurysm progresses to a size approaching 6 cm or he develops symptoms of malperfusion my initial suggestion would be an endovascular intervention which would include placement of a proximal stent graft to cover the proximal fenestration and placement of either a dissection stent or a balloon expandable Palmaz stent in the area of mid thoracic stenosis  At this stage of chronicity his risk of aneurysmal rupture with this procedure would remain relatively low  After discussion with the patient and his daughter we will continue conservative management for which she is already scheduled for follow-up CT scan in August   We will also obtain an aortic duplex with segmental pressures to better understand his level of distal perfusion  Stage 3a chronic kidney disease (HCC)  Chronic renal insufficiency status post type B aortic dissection  His renal function appears to be stable  We will continue to follow  Atrial fibrillation ([address] )  A-fib currently maintained on anticoagulation with Eliquis       If you have questions, please do not hesitate to call me  I look forward to following Iqra Friend along with you           Sincerely,        Roman Barron MD        CC: MD Kevin Julio MD

## 2023-03-27 NOTE — ASSESSMENT & PLAN NOTE
Type B aortic dissection  We discussed the findings on CT angiogram along with the pathophysiology of aortic dissection and the indication for further intervention  At this point he does not have any indication for treatment  He has had thrombosis of the majority of his false lumen with a relatively compressed residual true lumen but there does not appear to be any significant associated malperfusion on exam or history  In general indications for intervention would be based upon aneurysm size or signs or symptoms of malperfusion  If his aneurysm progresses to a size approaching 6 cm or he develops symptoms of malperfusion my initial suggestion would be an endovascular intervention which would include placement of a proximal stent graft to cover the proximal fenestration and placement of either a dissection stent or a balloon expandable Palmaz stent in the area of mid thoracic stenosis  At this stage of chronicity his risk of aneurysmal rupture with this procedure would remain relatively low  After discussion with the patient and his daughter we will continue conservative management for which she is already scheduled for follow-up CT scan in August   We will also obtain an aortic duplex with segmental pressures to better understand his level of distal perfusion

## 2023-03-27 NOTE — PROGRESS NOTES
Assessment/Plan: Aortic dissection, thoracoabdominal (HCC)  Type B aortic dissection  We discussed the findings on CT angiogram along with the pathophysiology of aortic dissection and the indication for further intervention  At this point he does not have any indication for treatment  He has had thrombosis of the majority of his false lumen with a relatively compressed residual true lumen but there does not appear to be any significant associated malperfusion on exam or history  In general indications for intervention would be based upon aneurysm size or signs or symptoms of malperfusion  If his aneurysm progresses to a size approaching 6 cm or he develops symptoms of malperfusion my initial suggestion would be an endovascular intervention which would include placement of a proximal stent graft to cover the proximal fenestration and placement of either a dissection stent or a balloon expandable Palmaz stent in the area of mid thoracic stenosis  At this stage of chronicity his risk of aneurysmal rupture with this procedure would remain relatively low  After discussion with the patient and his daughter we will continue conservative management for which she is already scheduled for follow-up CT scan in August   We will also obtain an aortic duplex with segmental pressures to better understand his level of distal perfusion  Stage 3a chronic kidney disease (HCC)  Chronic renal insufficiency status post type B aortic dissection  His renal function appears to be stable  We will continue to follow  Atrial fibrillation ([address] )  A-fib currently maintained on anticoagulation with Eliquis         Diagnoses and all orders for this visit:    Stage 3a chronic kidney disease ([address] )    Aneurysm of ascending aorta without rupture  -     Ambulatory Referral to Vascular Surgery    Aortic dissection, thoracoabdominal ([facility] [address] )  -     Ambulatory Referral to Vascular Surgery  -     VAS abdominal aorta/iliacs; with DORA's; Future    Persistent atrial fibrillation (HCC)          Subjective:      Patient ID: Emma Urbina is a 78 y o  male  Patient is new to  practice referred by Abril Vu PA-C  Patient had CT of chest and Abdomen done on 3/5/23  Patient denies any abdominal pain, nausea,changes on appetite or weigh  Patient is currently taking Atorvastatin and Eliquis  68-year-old diagnosed with a type B aortic dissection during hospitalization 3-4 months ago  He presents for vascular evaluation regarding long-term treatment  Noted on previous imaging studies the false lumen has thrombosed and the residual true lumen is compressed  Concerns for malperfusion prompted further evaluation  On evaluation today with his daughter he in fact has no specific complaints other than some residual confusion following his hospitalization  He specifically denies any lower extremity symptoms such as claudication or rest pain  He denies any postprandial pain  He notes no change in his bowel function  He denies abdominal or back pain  He states his blood pressure is currently well controlled on his current regiment which she is tolerating well  He also takes Eliquis for his A-fib  Most recent CT angiogram 3/5/2023 with a type B aortic dissection  Maximum aortic diameter approximately 4 8 cm  The false lumen is mostly thrombosed  There does appear to be a fenestration in the proximal descending thoracic aorta with some residual flow  The true lumen is compressed throughout the thoracic and abdominal aorta with reestablished normal true lumen flow at the aortic bifurcation  Perfusion of all mesenteric/renal vessels originates from the true lumen        The following portions of the patient's history were reviewed and updated as appropriate: allergies, current medications, past family history, past medical history, past social history, past surgical history and problem list     Past Medical History:  Past Medical History: Diagnosis Date   • Aneurysm of common iliac artery (HCC)     b/l, 2 0cm on right, 1 8cm on left   • Aortic dissection, thoracoabdominal (HCC)     type B, from takeoff of left subclavian throughout DTA/abdominal aorta/both common iliac arteries, patent great vessels off the aortic arch with no IMH/dissection & celiac trunk/SMA/bilateral renal/accessory left renal artery/TJ arising from the true lumen & patent   • Arthritis    • Ascending aortic aneurysm     4 6cm, trileaflet AV   • Descending thoracic aortic aneurysm     5 1cm proximally, 4 5x4 5cm distally   • History of nephrolithiasis    • Hyperlipidemia    • Hypertension    • Obesity (BMI 30 0-34  9)    • PAF (paroxysmal atrial fibrillation) (HCC)     Eliquis   • Pancreatic cyst     7mm, pancreatic body       Past Surgical History:  Past Surgical History:   Procedure Laterality Date   • CARDIAC ELECTROPHYSIOLOGY STUDY AND ABLATION  01/2019   • CARDIOVERSION  02/2019   • FL RETROGRADE PYELOGRAM  05/12/2022   • FL RETROGRADE PYELOGRAM  09/01/2022   • HERNIA REPAIR     • IR JOINT INJECTION  09/30/2019   • LAPAROSCOPIC ASSISSTED TOTAL COLECTOMY W/ J-POUCH     • TX CYSTO/URETERO W/LITHOTRIPSY &INDWELL STENT INSRT N/A 05/12/2022    Procedure: BILATERAL RETROGRADE PYELOGRAM, CYSTOSCOPY,  LEFT URETEROSCOPY, LEFT LASER LITHOTRIPSY BASKET EXTRACTION, LEFT STENT;  Surgeon: Pushpa Johnson MD;  Location: [address] [place] [address];  Service: Urology   • TX CYSTO/URETERO W/LITHOTRIPSY &INDWELL STENT INSRT Left 09/01/2022    Procedure: CYSTOSCOPY, URETEROSCOPY STENT REMOVAL, LASER LITHOTRIPSY, WITH BASKET, INSERTION OF URETERAL STENT;  Surgeon: Pushpa Johnson MD;  Location: [address] [place] [address];  Service: Urology   • THORACOTOMY Left 2001   • VASECTOMY         Social History:  Social History     Substance and Sexual Activity   Alcohol Use Yes    Comment: Occas        Social History     Substance and Sexual Activity   Drug Use Never     Social History     Tobacco Use   Smoking Status Former   • Packs/day: 1 50   • Years: 15 00   • Pack years: 22 50   • Types: Cigarettes   • Quit date: 12   • Years since quittin 2   Smokeless Tobacco Never       Family History:  Family History   Problem Relation Age of Onset   • Arthritis Father        Allergies:  No Known Allergies    Medications:    Current Outpatient Medications:   •  acetaminophen (TYLENOL) 650 mg CR tablet, Take 650 mg by mouth every 8 (eight) hours as needed for mild pain, Disp: , Rfl:   •  allopurinol (ZYLOPRIM) 100 mg tablet, Take 1 tablet (100 mg total) by mouth daily, Disp: 90 tablet, Rfl: 3  •  atorvastatin (LIPITOR) 20 mg tablet, TAKE 1 TABLET DAILY, Disp: 90 tablet, Rfl: 1  •  Eliquis 5 MG, Take 1 tablet (5 mg total) by mouth 2 (two) times a day, Disp: 180 tablet, Rfl: 1  •  INCRUSE ELLIPTA 62 5 MCG/INH AEPB inhaler, 1 puff every evening  , Disp: , Rfl:   •  levothyroxine 75 mcg tablet, TAKE 1 TABLET DAILY, Disp: 90 tablet, Rfl: 1  •  metoprolol succinate (TOPROL-XL) 50 mg 24 hr tablet, Take 1 tablet (50 mg total) by mouth 2 (two) times a day, Disp: 60 tablet, Rfl: 0  •  mirtazapine (REMERON) 7 5 MG tablet, Take 1 tablet (7 5 mg total) by mouth daily at bedtime, Disp: 30 tablet, Rfl: 5  •  oxyCODONE-acetaminophen (PERCOCET) 5-325 mg per tablet, Take 1 tablet by mouth every 4 (four) hours as needed for moderate pain or severe pain Max Daily Amount: 6 tablets, Disp: 6 tablet, Rfl: 0  •  valsartan (DIOVAN) 80 mg tablet, Take 1 tablet (80 mg total) by mouth daily, Disp: 30 tablet, Rfl: 0  •  diltiazem (CARDIZEM CD) 120 mg 24 hr capsule, Take 1 capsule (120 mg total) by mouth daily, Disp: 90 capsule, Rfl: 1    Vitals:  /72 (23 1133)    Temp      Pulse 73 (23 1133)   Resp      SpO2        Lab Results and Cultures:   CBC with diff:   Lab Results   Component Value Date    WBC 3 26 (L) 2023    HGB 12 1 2023    HCT 39 8 2023    MCV 91 2023     2023    MCH 27 7 2023    MCHC 30 4 (L) "03/07/2023    RDW 15 4 (H) 03/07/2023    MPV 10 7 03/07/2023    NRBC 0 03/07/2023   ,   BMP/CMP:  Lab Results   Component Value Date    K 4 2 03/10/2023     (H) 03/10/2023    CO2 24 03/10/2023    BUN 23 03/10/2023    CREATININE 1 69 (H) 03/10/2023    CALCIUM 10 4 (H) 03/10/2023    AST 18 03/09/2023    ALT 17 03/09/2023    ALKPHOS 110 03/09/2023    EGFR 38 03/10/2023   ,   Lipid Panel: No results found for: CHOL,   Coags:   Lab Results   Component Value Date    PTT 30 12/30/2022    INR 1 16 12/30/2022   ,       Review of Systems   Constitutional: Negative  HENT: Negative  Eyes: Negative  Respiratory: Negative  Cardiovascular: Negative  Gastrointestinal: Negative  Endocrine: Negative  Genitourinary: Negative  Musculoskeletal: Negative  Skin: Negative  Allergic/Immunologic: Negative  Neurological: Negative  Hematological: Negative  Psychiatric/Behavioral: Negative  Objective:      /72 (BP Location: Left arm, Patient Position: Sitting, Cuff Size: Adult)   Pulse 73   Ht 5' 5\" (1 651 m)   Wt 85 kg (187 lb 6 4 oz)   BMI 31 18 kg/m²          Physical Exam  Constitutional:       Appearance: Normal appearance  He is well-developed  HENT:      Head: Normocephalic and atraumatic  Eyes:      Conjunctiva/sclera: Conjunctivae normal    Neck:      Vascular: No carotid bruit or JVD  Cardiovascular:      Rate and Rhythm: Normal rate  Rhythm irregular  Pulses:           Carotid pulses are 2+ on the right side and 2+ on the left side  Radial pulses are 2+ on the right side and 2+ on the left side  Femoral pulses are 2+ on the right side and 2+ on the left side  Popliteal pulses are 2+ on the right side and 2+ on the left side  Dorsalis pedis pulses are 2+ on the right side and 2+ on the left side  Posterior tibial pulses are 2+ on the right side and 2+ on the left side        Heart sounds: Normal heart sounds, S1 normal and S2 " normal  No murmur heard  Pulmonary:      Effort: Pulmonary effort is normal       Breath sounds: Normal breath sounds  Abdominal:      General: There is no abdominal bruit  Palpations: Abdomen is soft  There is no pulsatile mass  Tenderness: There is no abdominal tenderness  Hernia: No hernia is present  Musculoskeletal:         General: Swelling (Mild) present  No tenderness or deformity  Normal range of motion  Cervical back: Normal range of motion and neck supple  Skin:     General: Skin is warm and dry  Coloration: Skin is not pale  Neurological:      General: No focal deficit present  Mental Status: He is alert and oriented to person, place, and time  Sensory: No sensory deficit  Motor: No weakness        Gait: Gait normal    Psychiatric:         Mood and Affect: Mood normal          Speech: Speech normal          Behavior: Behavior normal

## 2023-03-28 ENCOUNTER — OFFICE VISIT (OUTPATIENT)
Dept: FAMILY MEDICINE CLINIC | Facility: CLINIC | Age: 78
End: 2023-03-28

## 2023-03-28 VITALS
SYSTOLIC BLOOD PRESSURE: 104 MMHG | DIASTOLIC BLOOD PRESSURE: 78 MMHG | BODY MASS INDEX: 30.99 KG/M2 | WEIGHT: 186 LBS | OXYGEN SATURATION: 99 % | HEART RATE: 53 BPM | HEIGHT: 65 IN | RESPIRATION RATE: 16 BRPM

## 2023-03-28 DIAGNOSIS — N18.31 STAGE 3A CHRONIC KIDNEY DISEASE (HCC): ICD-10-CM

## 2023-03-28 DIAGNOSIS — I71.03 AORTIC DISSECTION, THORACOABDOMINAL (HCC): ICD-10-CM

## 2023-03-28 DIAGNOSIS — Z00.00 MEDICARE ANNUAL WELLNESS VISIT, SUBSEQUENT: Primary | ICD-10-CM

## 2023-03-28 DIAGNOSIS — F11.20 CONTINUOUS OPIOID DEPENDENCE (HCC): ICD-10-CM

## 2023-03-28 DIAGNOSIS — J44.9 CHRONIC OBSTRUCTIVE PULMONARY DISEASE, UNSPECIFIED COPD TYPE (HCC): ICD-10-CM

## 2023-03-28 DIAGNOSIS — E66.09 CLASS 1 OBESITY DUE TO EXCESS CALORIES WITH SERIOUS COMORBIDITY AND BODY MASS INDEX (BMI) OF 30.0 TO 30.9 IN ADULT: ICD-10-CM

## 2023-03-28 DIAGNOSIS — I48.19 PERSISTENT ATRIAL FIBRILLATION (HCC): ICD-10-CM

## 2023-03-28 PROBLEM — R82.81 PYURIA: Status: RESOLVED | Noted: 2023-03-07 | Resolved: 2023-03-28

## 2023-03-28 NOTE — PROGRESS NOTES
Assessment and Plan:     Monitor with vascular   Cont with elquis   And good bp control  MRI pending   MRCP after  That     Consider dropping diovan to 40mg if the sbp is under 110's   Cont with 7 5mg remeron     Labs pending     Make an appt with nephro please       Problem List Items Addressed This Visit        Respiratory    COPD (chronic obstructive pulmonary disease) ([facility] )       Cardiovascular and Mediastinum    Aortic dissection, thoracoabdominal ([facility] )    Atrial fibrillation ([facility] )       Genitourinary    Stage 3a chronic kidney disease ([facility] )       Other    Continuous opioid dependence ([facility] )   Other Visit Diagnoses     Medicare annual wellness visit, subsequent    -  Primary    Class 1 obesity due to excess calories with serious comorbidity and body mass index (BMI) of 30 0 to 30 9 in adult            BMI Counseling: There is no height or weight on file to calculate BMI  The BMI is above normal  Nutrition recommendations include decreasing portion sizes, encouraging healthy choices of fruits and vegetables and limiting drinks that contain sugar  Exercise recommendations include moderate physical activity 150 minutes/week  No pharmacotherapy was ordered  Falls Plan of Care: balance, strength, and gait training instructions were provided  Medications that increase falls were reviewed  Preventive health issues were discussed with patient, and age appropriate screening tests were ordered as noted in patient's After Visit Summary  Personalized health advice and appropriate referrals for health education or preventive services given if needed, as noted in patient's After Visit Summary       History of Present Illness:     Patient presents for Medicare Annual Wellness visit    Patient Care Team:  Blossom Grullon MD as PCP - General (Family Medicine)     Problem List:     Patient Active Problem List   Diagnosis   • Chronic right shoulder pain   • Chronic left shoulder pain   • Left hip pain   • COPD (chronic obstructive pulmonary disease) ([facility])   • Hoarse voice quality   • Chronic lumbar pain   • Stage 3a chronic kidney disease ([facility])   • Hyperparathyroidism ([facility] )   • Hypertensive heart disease without heart failure   • Hypertension   • Hypothyroidism   • Osteoarthrosis involving multiple sites   • Impaired fasting glucose   • Hyperlipidemia   • Thoracic aortic aneurysm without rupture   • Benign prostatic hyperplasia without lower urinary tract symptoms   • Ulcerative pancolitis without complication ([facility])   • Chronic pain syndrome   • Severe obstructive sleep apnea   • Continuous opioid dependence ([facility] )   • Aortic dissection, thoracoabdominal ([facility])   • Atrial fibrillation ([facility])   • Hypercalcemia      Past Medical and Surgical History:     Past Medical History:   Diagnosis Date   • Aneurysm of common iliac artery ([facility])     b/l, 2 0cm on right, 1 8cm on left   • Aortic dissection, thoracoabdominal ([facility])     type B, from takeoff of left subclavian throughout DTA/abdominal aorta/both common iliac arteries, patent great vessels off the aortic arch with no IMH/dissection & celiac trunk/SMA/bilateral renal/accessory left renal artery/TJ arising from the true lumen & patent   • Arthritis    • Ascending aortic aneurysm     4 6cm, trileaflet AV   • Descending thoracic aortic aneurysm     5 1cm proximally, 4 5x4 5cm distally   • History of nephrolithiasis    • Hyperlipidemia    • Hypertension    • Obesity (BMI 30 0-34  9)    • PAF (paroxysmal atrial fibrillation) ([facility])     Eliquis   • Pancreatic cyst     7mm, pancreatic body     Past Surgical History:   Procedure Laterality Date   • CARDIAC ELECTROPHYSIOLOGY STUDY AND ABLATION  01/2019   • CARDIOVERSION  02/2019   • FL RETROGRADE PYELOGRAM  05/12/2022   • FL RETROGRADE PYELOGRAM  09/01/2022   • HERNIA REPAIR     • IR JOINT INJECTION  09/30/2019   • LAPAROSCOPIC ASSISSTED TOTAL COLECTOMY W/ J-POUCH     • MA CYSTO/URETERO W/LITHOTRIPSY &INDWELL STENT INSRT N/A 05/12/2022 Procedure: BILATERAL RETROGRADE PYELOGRAM, CYSTOSCOPY,  LEFT URETEROSCOPY, LEFT LASER LITHOTRIPSY BASKET EXTRACTION, LEFT STENT;  Surgeon: Pura Whitehead MD;  Location: [address] [place] [address];  Service: Urology   • RI CYSTO/URETERO W/LITHOTRIPSY &INDWELL STENT INSRT Left 2022    Procedure: CYSTOSCOPY, URETEROSCOPY STENT REMOVAL, LASER LITHOTRIPSY, WITH BASKET, INSERTION OF URETERAL STENT;  Surgeon: Pura Whitehead MD;  Location: [address] [place] [address];  Service: Urology   • THORACOTOMY Left    • VASECTOMY        Family History:     Family History   Problem Relation Age of Onset   • Arthritis Father       Social History:     E-Cigarette/Vaping   • E-Cigarette Use Never User      E-Cigarette/Vaping Substances   • Nicotine No    • THC No    • CBD No    • Flavoring No    • Other No    • Unknown No      Social History     Socioeconomic History   • Marital status: /Civil Union     Spouse name: None   • Number of children: None   • Years of education: None   • Highest education level: None   Occupational History   • Occupation: Retired   Tobacco Use   • Smoking status: Former     Packs/day: 1 50     Years: 15 00     Pack years: 22 50     Types: Cigarettes     Quit date:      Years since quittin 2   • Smokeless tobacco: Never   Vaping Use   • Vaping Use: Never used   Substance and Sexual Activity   • Alcohol use: Yes     Comment: Occas      • Drug use: Never   • Sexual activity: Not Currently   Other Topics Concern   • None   Social History Narrative    [place]:    Most recent tobacco use screenin2020    Do you currently or have you served in [employer] [facility] [employer]: Yes    If Yes, What branch of service: [employer]    Were you activated, into active duty, as a member of the [employer] or as a Reservist: No    Advance directive: No    Chewing tobacco: none    Alcohol intake: Occasional    Marital status:     Live alone or with others: alone    Family history of heart disease: No    High cholesterol: Yes    High blood pressure: Yes    Exercise level: Occasional    Overweight: Yes    Obese: Yes    Diabetes: No    General stress level: Medium    Diet: Diabetic    Occupation: retired    Caffeine intake: Occasional    Guns present in home: No    Seat belts used routinely: Yes    Sexual orientation: Heterosexual    Sunscreen used routinely: Yes    Seat belt/car seat used routinely: Yes    Do you have regular medical check ups: Yes    Do you have regular dental check ups: Yes     Social Determinants of Health     Financial Resource Strain: Not on file   Food Insecurity: No Food Insecurity   • Worried About Running Out of Food in the Last Year: Never true   • Ran Out of Food in the Last Year: Never true   Transportation Needs: No Transportation Needs   • Lack of Transportation (Medical): No   • Lack of Transportation (Non-Medical):  No   Physical Activity: Not on file   Stress: Not on file   Social Connections: Not on file   Intimate Partner Violence: Not on file   Housing Stability: Low Risk    • Unable to Pay for Housing in the Last Year: No   • Number of Places Lived in the Last Year: 1   • Unstable Housing in the Last Year: No      Medications and Allergies:     Current Outpatient Medications   Medication Sig Dispense Refill   • acetaminophen (TYLENOL) 650 mg CR tablet Take 650 mg by mouth every 8 (eight) hours as needed for mild pain     • allopurinol (ZYLOPRIM) 100 mg tablet Take 1 tablet (100 mg total) by mouth daily 90 tablet 3   • atorvastatin (LIPITOR) 20 mg tablet TAKE 1 TABLET DAILY 90 tablet 1   • Eliquis 5 MG Take 1 tablet (5 mg total) by mouth 2 (two) times a day 180 tablet 1   • INCRUSE ELLIPTA 62 5 MCG/INH AEPB inhaler 1 puff every evening       • levothyroxine 75 mcg tablet TAKE 1 TABLET DAILY 90 tablet 1   • metoprolol succinate (TOPROL-XL) 50 mg 24 hr tablet Take 1 tablet (50 mg total) by mouth 2 (two) times a day 60 tablet 0   • mirtazapine (REMERON) 7 5 MG tablet Take 1 tablet (7 5 mg total) by mouth daily at "bedtime 30 tablet 5   • oxyCODONE-acetaminophen (PERCOCET) 5-325 mg per tablet Take 1 tablet by mouth every 4 (four) hours as needed for moderate pain or severe pain Max Daily Amount: 6 tablets 6 tablet 0   • valsartan (DIOVAN) 80 mg tablet Take 1 tablet (80 mg total) by mouth daily 30 tablet 0   • diltiazem (CARDIZEM CD) 120 mg 24 hr capsule Take 1 capsule (120 mg total) by mouth daily 90 capsule 1     No current facility-administered medications for this visit  No Known Allergies   Immunizations:     Immunization History   Administered Date(s) Administered   • COVID-19 PFIZER VACCINE 0 3 ML IM 03/04/2021, 03/24/2021, 10/23/2021   • INFLUENZA 09/14/2018   • Influenza, high dose seasonal 0 7 mL 11/05/2020, 10/01/2021   • Pneumococcal Conjugate 13-Valent 12/16/2015, 04/19/2017   • Pneumococcal Polysaccharide PPV23 03/27/2014, 07/23/2018   • Tdap 04/01/2014   • Zoster 04/01/2014, 04/02/2018      Health Maintenance:         Topic Date Due   • Hepatitis C Screening  Completed         Topic Date Due   • COVID-19 Vaccine (4 - Booster for Pfizer series) 12/18/2021   • Influenza Vaccine (1) 09/01/2022      Medicare Health Risk Assessment:     /78   Pulse (!) 53   Resp 16   Ht 5' 5\" (1 651 m)   Wt 84 4 kg (186 lb)   SpO2 99%   BMI 30 95 kg/m²      Gio Colon is here for his Subsequent Wellness visit  Last Medicare Wellness visit information reviewed, patient interviewed and updates made to the record today  Health Risk Assessment:   Patient rates overall health as good  Patient feels that their physical health rating is same  Patient is satisfied with their life  Eyesight was rated as same  Hearing was rated as same  Patient feels that their emotional and mental health rating is same  Patients states they are never, rarely angry  Patient states they are never, rarely unusually tired/fatigued  Pain experienced in the last 7 days has been some  Patient's pain rating has been 4/10   Patient states that he has " experienced no weight loss or gain in last 6 months  Fall Risk Screening: In the past year, patient has experienced: no history of falling in past year      Home Safety:  Patient has trouble with stairs inside or outside of their home  Patient has working smoke alarms and has working carbon monoxide detector  Home safety hazards include: none  Nutrition:   Current diet is Regular  Medications:   Patient is currently taking over-the-counter supplements  OTC medications include: see medication list  Patient is able to manage medications  Activities of Daily Living (ADLs)/Instrumental Activities of Daily Living (IADLs):   Walk and transfer into and out of bed and chair?: Yes  Dress and groom yourself?: Yes    Bathe or shower yourself?: Yes    Feed yourself?  Yes  Do your laundry/housekeeping?: Yes  Manage your money, pay your bills and track your expenses?: Yes  Make your own meals?: Yes    Do your own shopping?: Yes    Previous Hospitalizations:   Any hospitalizations or ED visits within the last 12 months?: No      Advance Care Planning:   Living will: No    Durable POA for healthcare: No    Advanced directive: No    Five wishes given: Yes      Cognitive Screening:   Provider or family/friend/caregiver concerned regarding cognition?: No    PREVENTIVE SCREENINGS      Cardiovascular Screening:    General: Screening Not Indicated and History Lipid Disorder      Diabetes Screening:     General: Screening Current      Colorectal Cancer Screening:     General: Screening Not Indicated      Prostate Cancer Screening:    General: Screening Not Indicated    Due for: PSA      Osteoporosis Screening:    General: Screening Not Indicated      Abdominal Aortic Aneurysm (AAA) Screening:    Risk factors include: age between 73-67 yo and tobacco use        General: Screening Current      Lung Cancer Screening:     General: Screening Not Indicated      Hepatitis C Screening:    General: Screening Current    Screening, Brief Intervention, and Referral to Treatment (SBIRT)    Screening      Single Item Drug Screening:  How often have you used an illegal drug (including marijuana) or a prescription medication for non-medical reasons in the past year? never    Single Item Drug Screen Score: 0  Interpretation: Negative screen for possible drug use disorder    Brief Intervention  Alcohol & drug use screenings were reviewed  No concerns regarding substance use disorder identified  Zainab Acosta MD    Reviewed the vascular note  concervative management    Lower ext  Doppler     He is now taking both the diltiazem and met and diovan consistantly     He did have 1 episode of lightheadedness and a fall   But only once and we belive it was due to medications taken close to [facility]     Since then he has been fine    Right ebow with mild tenderness     Mirtazapine 7 5mg nightly - never increasd to 2   Worried about confusion     We will keep at the 7 5mg     MRI brain next week   And we can conside MRCP iwthout sedation if he does well         Review of Systems   Constitutional: Negative for activity change, appetite change, chills and fever  HENT: Negative for congestion, ear pain, nosebleeds, sore throat and trouble swallowing  Eyes: Negative for pain, itching and visual disturbance  Respiratory: Negative for cough, chest tightness and shortness of breath  Cardiovascular: Negative for chest pain and palpitations  Gastrointestinal: Negative for abdominal pain, constipation, diarrhea, nausea and vomiting  Endocrine: Negative for cold intolerance  Genitourinary: Negative for dysuria, frequency and hematuria  Musculoskeletal: Negative for arthralgias, back pain, gait problem and joint swelling  Skin: Negative for color change and rash  Allergic/Immunologic: Negative for immunocompromised state  Neurological: Negative for dizziness, tremors, seizures, syncope and headaches     Psychiatric/Behavioral: Positive for confusion and sleep disturbance  Negative for hallucinations and suicidal ideas  The patient is nervous/anxious  All other systems reviewed and are negative  Physical Exam  Vitals and nursing note reviewed  Constitutional:       Appearance: He is well-developed  He is obese  HENT:      Head: Normocephalic and atraumatic  Eyes:      Conjunctiva/sclera: Conjunctivae normal       Pupils: Pupils are equal, round, and reactive to light  Cardiovascular:      Rate and Rhythm: Normal rate  Rhythm irregular  Heart sounds: Normal heart sounds  Pulmonary:      Effort: Pulmonary effort is normal       Breath sounds: Normal breath sounds  No wheezing or rales  Abdominal:      General: Bowel sounds are normal  There is no distension  Palpations: Abdomen is soft  Tenderness: There is no abdominal tenderness  Musculoskeletal:         General: No tenderness  Normal range of motion  Cervical back: Normal range of motion and neck supple  Comments: Right arm is looking much better   Non tender    Skin:     General: Skin is warm and dry  Findings: No rash  Neurological:      Mental Status: He is alert and oriented to person, place, and time  Cranial Nerves: No cranial nerve deficit  Sensory: No sensory deficit  Coordination: Coordination normal       Comments: Repeats himself sometimes    Psychiatric:         Behavior: Behavior normal          Thought Content:  Thought content normal          Judgment: Judgment normal

## 2023-03-28 NOTE — PATIENT INSTRUCTIONS
Medicare Preventive Visit Patient Instructions  Thank you for completing your Welcome to Medicare Visit or Medicare Annual Wellness Visit today  Your next wellness visit will be due in one year (3/28/2024)  The screening/preventive services that you may require over the next 5-10 years are detailed below  Some tests may not apply to you based off risk factors and/or age  Screening tests ordered at today's visit but not completed yet may show as past due  Also, please note that scanned in results may not display below  Preventive Screenings:  Service Recommendations Previous Testing/Comments   Colorectal Cancer Screening  · Colonoscopy    · Fecal Occult Blood Test (FOBT)/Fecal Immunochemical Test (FIT)  · Fecal DNA/Cologuard Test  · Flexible Sigmoidoscopy Age: 39-70 years old   Colonoscopy: every 10 years (May be performed more frequently if at higher risk)  OR  FOBT/FIT: every 1 year  OR  Cologuard: every 3 years  OR  Sigmoidoscopy: every 5 years  Screening may be recommended earlier than age 39 if at higher risk for colorectal cancer  Also, an individualized decision between you and your healthcare provider will decide whether screening between the ages of 74-80 would be appropriate   Colonoscopy: Not on file  FOBT/FIT: Not on file  Cologuard: Not on file  Sigmoidoscopy: Not on file    Screening Not Indicated     Prostate Cancer Screening Individualized decision between patient and health care provider in men between ages of 53-78   Medicare will cover every 12 months beginning on the day after your 50th birthday PSA: 4 2 ng/mL     Screening Not Indicated  Due for PSA     Hepatitis C Screening Once for adults born between 1945 and 1965  More frequently in patients at high risk for Hepatitis C Hep C Antibody: 07/24/2018    Screening Current   Diabetes Screening 1-2 times per year if you're at risk for diabetes or have pre-diabetes Fasting glucose: 132 mg/dL (2/7/2023)  A1C: 5 6 % (5/17/2022)  Screening Current Cholesterol Screening Once every 5 years if you don't have a lipid disorder  May order more often based on risk factors  Lipid panel: 05/17/2022  Screening Not Indicated  History Lipid Disorder      Other Preventive Screenings Covered by Medicare:  1  Abdominal Aortic Aneurysm (AAA) Screening: covered once if your at risk  You're considered to be at risk if you have a family history of AAA or a male between the age of 73-68 who smoking at least 100 cigarettes in your lifetime  2  Lung Cancer Screening: covers low dose CT scan once per year if you meet all of the following conditions: (1) Age 50-69; (2) No signs or symptoms of lung cancer; (3) Current smoker or have quit smoking within the last 15 years; (4) You have a tobacco smoking history of at least 20 pack years (packs per day x number of years you smoked); (5) You get a written order from a healthcare provider  3  Glaucoma Screening: covered annually if you're considered high risk: (1) You have diabetes OR (2) Family history of glaucoma OR (3)  aged 48 and older OR (3)  American aged 72 and older  3  Osteoporosis Screening: covered every 2 years if you meet one of the following conditions: (1) Have a vertebral abnormality; (2) On glucocorticoid therapy for more than 3 months; (3) Have primary hyperparathyroidism; (4) On osteoporosis medications and need to assess response to drug therapy  5  HIV Screening: covered annually if you're between the age of 12-76  Also covered annually if you are younger than 13 and older than 72 with risk factors for HIV infection  For pregnant patients, it is covered up to 3 times per pregnancy      Immunizations:  Immunization Recommendations   Influenza Vaccine Annual influenza vaccination during flu season is recommended for all persons aged >= 6 months who do not have contraindications   Pneumococcal Vaccine   * Pneumococcal conjugate vaccine = PCV13 (Prevnar 13), PCV15 (Vaxneuvance), PCV20 (Prevnar 20)  * Pneumococcal polysaccharide vaccine = PPSV23 (Pneumovax) Adults 2364 years old: 1-3 doses may be recommended based on certain risk factors  Adults 72 years old: 1-2 doses may be recommended based off what pneumonia vaccine you previously received   Hepatitis B Vaccine 3 dose series if at intermediate or high risk (ex: diabetes, end stage renal disease, liver disease)   Tetanus (Td) Vaccine - COST NOT COVERED BY MEDICARE PART B Following completion of primary series, a booster dose should be given every 10 years to maintain immunity against tetanus  Td may also be given as tetanus wound prophylaxis  Tdap Vaccine - COST NOT COVERED BY MEDICARE PART B Recommended at least once for all adults  For pregnant patients, recommended with each pregnancy  Shingles Vaccine (Shingrix) - COST NOT COVERED BY MEDICARE PART B  2 shot series recommended in those aged 48 and above     Health Maintenance Due:      Topic Date Due   • Hepatitis C Screening  Completed     Immunizations Due:      Topic Date Due   • COVID-19 Vaccine (4 - Booster for Pfizer series) 12/18/2021   • Influenza Vaccine (1) 09/01/2022     Advance Directives   What are advance directives? Advance directives are legal documents that state your wishes and plans for medical care  These plans are made ahead of time in case you lose your ability to make decisions for yourself  Advance directives can apply to any medical decision, such as the treatments you want, and if you want to donate organs  What are the types of advance directives? There are many types of advance directives, and each state has rules about how to use them  You may choose a combination of any of the following:  · Living will: This is a written record of the treatment you want  You can also choose which treatments you do not want, which to limit, and which to stop at a certain time  This includes surgery, medicine, IV fluid, and tube feedings     · Durable power of  for [facility]): This is a written record that states who you want to make healthcare choices for you when you are unable to make them for yourself  This person, called a proxy, is usually a family member or a friend  You may choose more than 1 proxy  · Do not resuscitate (DNR) order:  A DNR order is used in case your heart stops beating or you stop breathing  It is a request not to have certain forms of treatment, such as CPR  A DNR order may be included in other types of advance directives  · Medical directive: This covers the care that you want if you are in a coma, near death, or unable to make decisions for yourself  You can list the treatments you want for each condition  Treatment may include pain medicine, surgery, blood transfusions, dialysis, IV or tube feedings, and a ventilator (breathing machine)  · Values history: This document has questions about your views, beliefs, and how you feel and think about life  This information can help others choose the care that you would choose  Why are advance directives important? An advance directive helps you control your care  Although spoken wishes may be used, it is better to have your wishes written down  Spoken wishes can be misunderstood, or not followed  Treatments may be given even if you do not want them  An advance directive may make it easier for your family to make difficult choices about your care  Weight Management   Why it is important to manage your weight:  Being overweight increases your risk of health conditions such as heart disease, high blood pressure, type 2 diabetes, and certain types of cancer  It can also increase your risk for osteoarthritis, sleep apnea, and other respiratory problems  Aim for a slow, steady weight loss  Even a small amount of weight loss can lower your risk of health problems  How to lose weight safely:  A safe and healthy way to lose weight is to eat fewer calories and get regular exercise   You can lose up about 1 pound a week by decreasing the number of calories you eat by 500 calories each day  Healthy meal plan for weight management:  A healthy meal plan includes a variety of foods, contains fewer calories, and helps you stay healthy  A healthy meal plan includes the following:  · Eat whole-grain foods more often  A healthy meal plan should contain fiber  Fiber is the part of grains, fruits, and vegetables that is not broken down by your body  Whole-grain foods are healthy and provide extra fiber in your diet  Some examples of whole-grain foods are whole-wheat breads and pastas, oatmeal, brown rice, and bulgur  · Eat a variety of vegetables every day  Include dark, leafy greens such as spinach, kale, tc greens, and mustard greens  Eat yellow and orange vegetables such as carrots, sweet potatoes, and winter squash  · Eat a variety of fruits every day  Choose fresh or canned fruit (canned in its own juice or light syrup) instead of juice  Fruit juice has very little or no fiber  · Eat low-fat dairy foods  Drink fat-free (skim) milk or 1% milk  Eat fat-free yogurt and low-fat cottage cheese  Try low-fat cheeses such as mozzarella and other reduced-fat cheeses  · Choose meat and other protein foods that are low in fat  Choose beans or other legumes such as split peas or lentils  Choose fish, skinless poultry (chicken or turkey), or lean cuts of red meat (beef or pork)  Before you cook meat or poultry, cut off any visible fat  · Use less fat and oil  Try baking foods instead of frying them  Add less fat, such as margarine, sour cream, regular salad dressing and mayonnaise to foods  Eat fewer high-fat foods  Some examples of high-fat foods include french fries, doughnuts, ice cream, and cakes  · Eat fewer sweets  Limit foods and drinks that are high in sugar  This includes candy, cookies, regular soda, and sweetened drinks  Exercise:  Exercise at least 30 minutes per day on most days of the week  Some examples of exercise include walking, biking, dancing, and swimming  You can also fit in more physical activity by taking the stairs instead of the elevator or parking farther away from stores  Ask your healthcare provider about the best exercise plan for you  © Copyright [employer] 2018 Information is for End User's use only and may not be sold, redistributed or otherwise used for commercial purposes   All illustrations and images included in CareNotes® are the copyrighted property of A D A M , Inc  or [address] [place] [address]

## 2023-03-30 ENCOUNTER — HOME CARE VISIT (OUTPATIENT)
Dept: HOME HEALTH SERVICES | Facility: HOME HEALTHCARE | Age: 78
End: 2023-03-30

## 2023-03-30 ENCOUNTER — TELEPHONE (OUTPATIENT)
Dept: LAB | Facility: HOSPITAL | Age: 78
End: 2023-03-30

## 2023-03-30 NOTE — TELEPHONE ENCOUNTER
3/30 - spoke to pt daughter she states pt has a nurse that draws his bloodwork weekly - states they do not need our service

## 2023-04-04 ENCOUNTER — HOME CARE VISIT (OUTPATIENT)
Dept: HOME HEALTH SERVICES | Facility: HOME HEALTHCARE | Age: 78
End: 2023-04-04

## 2023-04-04 ENCOUNTER — HOSPITAL ENCOUNTER (OUTPATIENT)
Facility: MEDICAL CENTER | Age: 78
Discharge: HOME | End: 2023-04-04

## 2023-04-04 VITALS
HEART RATE: 79 BPM | RESPIRATION RATE: 18 BRPM | SYSTOLIC BLOOD PRESSURE: 116 MMHG | TEMPERATURE: 97.3 F | OXYGEN SATURATION: 96 % | DIASTOLIC BLOOD PRESSURE: 68 MMHG

## 2023-04-04 DIAGNOSIS — R41.0 DISORIENTATION: ICD-10-CM

## 2023-04-04 RX ADMIN — GADOBUTROL 8 ML: 604.72 INJECTION INTRAVENOUS at 09:38

## 2023-04-05 VITALS
SYSTOLIC BLOOD PRESSURE: 120 MMHG | DIASTOLIC BLOOD PRESSURE: 70 MMHG | HEART RATE: 97 BPM | RESPIRATION RATE: 18 BRPM | OXYGEN SATURATION: 96 % | TEMPERATURE: 97.6 F

## 2023-04-25 NOTE — TELEPHONE ENCOUNTER
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

## 2023-04-26 DIAGNOSIS — J44.9 CHRONIC OBSTRUCTIVE PULMONARY DISEASE, UNSPECIFIED COPD TYPE (HCC): Primary | ICD-10-CM

## 2023-05-01 ENCOUNTER — TELEPHONE (OUTPATIENT)
Dept: PSYCHIATRY | Facility: CLINIC | Age: 78
End: 2023-05-01

## 2023-05-04 ENCOUNTER — TELEPHONE (OUTPATIENT)
Dept: PSYCHIATRY | Facility: CLINIC | Age: 78
End: 2023-05-04

## 2023-05-17 ENCOUNTER — TELEPHONE (OUTPATIENT)
Dept: PSYCHIATRY | Facility: CLINIC | Age: 78
End: 2023-05-17

## 2023-05-17 NOTE — TELEPHONE ENCOUNTER
Left message to call back in regards to referral  As this is the third attempt to contact pt, we will have to close out the referral

## 2023-06-22 ENCOUNTER — TELEPHONE (OUTPATIENT)
Dept: FAMILY MEDICINE CLINIC | Facility: CLINIC | Age: 78
End: 2023-06-22

## 2023-06-22 NOTE — TELEPHONE ENCOUNTER
Patient called this afternoon, he no longer needs the doctor to call him, but thanks him anyway because he knows he would have called when he had time

## 2023-06-22 NOTE — TELEPHONE ENCOUNTER
Patient called and asked if we could ask the doctor to give him a call  He stated he has some medical questions he would like to discuss with him

## 2023-08-11 ENCOUNTER — RA CDI HCC (OUTPATIENT)
Dept: OTHER | Facility: HOSPITAL | Age: 78
End: 2023-08-11

## 2023-08-11 NOTE — PROGRESS NOTES
[address] [facility] coding opportunities       Chart reviewed, no opportunity found: [id number]        Patients Insurance     Medicare Insurance: [employer] Advantage

## 2023-08-17 ENCOUNTER — APPOINTMENT (OUTPATIENT)
Dept: LAB | Facility: HOSPITAL | Age: 78
End: 2023-08-17
Payer: COMMERCIAL

## 2023-08-17 ENCOUNTER — HOSPITAL ENCOUNTER (OUTPATIENT)
Dept: CT IMAGING | Facility: HOSPITAL | Age: 78
End: 2023-08-17
Payer: COMMERCIAL

## 2023-08-17 DIAGNOSIS — I71.03 AORTIC DISSECTION, THORACOABDOMINAL (HCC): ICD-10-CM

## 2023-08-17 DIAGNOSIS — K86.2 PANCREATIC CYST: ICD-10-CM

## 2023-08-17 LAB
ANION GAP SERPL CALCULATED.3IONS-SCNC: 8 MMOL/L
BUN SERPL-MCNC: 26 MG/DL (ref 5–25)
CALCIUM SERPL-MCNC: 10.4 MG/DL (ref 8.4–10.2)
CANCER AG125 SERPL-ACNC: 8.3 U/ML (ref 0–35)
CHLORIDE SERPL-SCNC: 106 MMOL/L (ref 96–108)
CO2 SERPL-SCNC: 23 MMOL/L (ref 21–32)
CREAT SERPL-MCNC: 1.59 MG/DL (ref 0.6–1.3)
GFR SERPL CREATININE-BSD FRML MDRD: 41 ML/MIN/1.73SQ M
GLUCOSE P FAST SERPL-MCNC: 88 MG/DL (ref 65–99)
POTASSIUM SERPL-SCNC: 4.7 MMOL/L (ref 3.5–5.3)
SODIUM SERPL-SCNC: 137 MMOL/L (ref 135–147)

## 2023-08-17 PROCEDURE — 86304 IMMUNOASSAY TUMOR CA 125: CPT

## 2023-08-17 PROCEDURE — G1004 CDSM NDSC: HCPCS

## 2023-08-17 PROCEDURE — 36415 COLL VENOUS BLD VENIPUNCTURE: CPT

## 2023-08-17 PROCEDURE — 80048 BASIC METABOLIC PNL TOTAL CA: CPT

## 2023-08-17 PROCEDURE — 74174 CTA ABD&PLVS W/CONTRAST: CPT

## 2023-08-17 PROCEDURE — 71275 CT ANGIOGRAPHY CHEST: CPT

## 2023-08-17 RX ADMIN — IOHEXOL 85 ML: 350 INJECTION, SOLUTION INTRAVENOUS at 08:04

## 2023-08-22 ENCOUNTER — TELEPHONE (OUTPATIENT)
Dept: FAMILY MEDICINE CLINIC | Facility: CLINIC | Age: 78
End: 2023-08-22

## 2023-08-22 NOTE — TELEPHONE ENCOUNTER
----- Message from Manuel Almazan MD sent at 8/21/2023  6:30 AM EDT -----  So far a lot of your scans have been coming back quite stable  The brain MRI and the aneurysm CT the pancreas MRI everything is looking very stable my friend just make an appointment to follow-up with me sometime for the year ends

## 2023-08-22 NOTE — TELEPHONE ENCOUNTER
Left detailed message on daughter's phone for patient to call in to make an appointment before the end of the year

## 2023-08-24 ENCOUNTER — TELEPHONE (OUTPATIENT)
Dept: CARDIAC SURGERY | Facility: CLINIC | Age: 78
End: 2023-08-24

## 2023-08-24 NOTE — TELEPHONE ENCOUNTER
Called patient to see if they where coming in for their 8:30 appointment with Dr. Tsering Chang on 08/24/2023. They did not  the call. I left a voice mail.

## 2023-09-20 DIAGNOSIS — M54.50 CHRONIC LOW BACK PAIN, UNSPECIFIED BACK PAIN LATERALITY, UNSPECIFIED WHETHER SCIATICA PRESENT: ICD-10-CM

## 2023-09-20 DIAGNOSIS — G89.29 CHRONIC LOW BACK PAIN, UNSPECIFIED BACK PAIN LATERALITY, UNSPECIFIED WHETHER SCIATICA PRESENT: ICD-10-CM

## 2023-09-20 DIAGNOSIS — I48.19 ATRIAL FIBRILLATION, PERSISTENT (HCC): ICD-10-CM

## 2023-09-20 RX ORDER — APIXABAN 5 MG/1
5 TABLET, FILM COATED ORAL 2 TIMES DAILY
Qty: 180 TABLET | Refills: 1 | Status: SHIPPED | OUTPATIENT
Start: 2023-09-20

## 2023-09-20 NOTE — TELEPHONE ENCOUNTER
Reason for call:   [x] Refill   [] Prior Auth  [] Other:     Office:   [x] PCP/Provider - [facility]  [] Speciality/Provider -     Medication: Eliquis 5 mg #60  Medication: Oxycodone-Acetaminophen 5-325 mg #180    Pharmacy: [address] [place] [address] #[id number]    Does the patient have enough for 3 days?    [] Yes   [x] No - Send as HP to POD

## 2023-09-21 NOTE — TELEPHONE ENCOUNTER
Please contact pt daughter, she called for the status of her dad percocet requested yesterday. States her dad will run out today. She requested eliquis and percocet, eliquis was sent to pharm but not the percocet. Daughter asking for a cb: [phone]    Pt daughter even tough is an emergency contact, the Communication consent form is not filled. Please update to avoid any inconvenience.

## 2023-09-22 RX ORDER — OXYCODONE HYDROCHLORIDE AND ACETAMINOPHEN 5; 325 MG/1; MG/1
1 TABLET ORAL EVERY 4 HOURS PRN
Qty: 6 TABLET | Refills: 0 | Status: SHIPPED | OUTPATIENT
Start: 2023-09-22 | End: 2023-10-01 | Stop reason: SDUPTHER

## 2023-09-25 NOTE — TELEPHONE ENCOUNTER
Patients daughter called states script for oxcodone 5/325 sent to the pharmacy on 9/22/23 should have been for a qty of 180 but only 6 tablets were sent To [facility] in [employer], michaela [address]. Daughter can be reached at work [phone] or cell phone [phone].

## 2023-09-29 NOTE — TELEPHONE ENCOUNTER
Patient's daughter called again to say that this prescription has not been sent in. On the 22nd, only 6 pills were sent. His prescription is for 180 pills. Pt will be out tomorrow. Please, check into this asap and let daughter to know this is done. Thank you.

## 2023-10-01 DIAGNOSIS — G89.29 CHRONIC LOW BACK PAIN, UNSPECIFIED BACK PAIN LATERALITY, UNSPECIFIED WHETHER SCIATICA PRESENT: ICD-10-CM

## 2023-10-01 DIAGNOSIS — M54.50 CHRONIC LOW BACK PAIN, UNSPECIFIED BACK PAIN LATERALITY, UNSPECIFIED WHETHER SCIATICA PRESENT: ICD-10-CM

## 2023-10-01 RX ORDER — OXYCODONE HYDROCHLORIDE AND ACETAMINOPHEN 5; 325 MG/1; MG/1
1 TABLET ORAL EVERY 4 HOURS PRN
Qty: 180 TABLET | Refills: 0 | Status: SHIPPED | OUTPATIENT
Start: 2023-10-01

## 2023-10-02 DIAGNOSIS — I48.19 PERSISTENT ATRIAL FIBRILLATION (HCC): ICD-10-CM

## 2023-10-02 DIAGNOSIS — E78.5 HYPERLIPIDEMIA, UNSPECIFIED HYPERLIPIDEMIA TYPE: ICD-10-CM

## 2023-10-02 DIAGNOSIS — E03.9 HYPOTHYROIDISM, UNSPECIFIED TYPE: ICD-10-CM

## 2023-10-02 RX ORDER — LEVOTHYROXINE SODIUM 0.07 MG/1
75 TABLET ORAL DAILY
Qty: 90 TABLET | Refills: 1 | Status: SHIPPED | OUTPATIENT
Start: 2023-10-02

## 2023-10-02 RX ORDER — METOPROLOL SUCCINATE 50 MG/1
50 TABLET, EXTENDED RELEASE ORAL 2 TIMES DAILY
Qty: 180 TABLET | Refills: 0 | Status: SHIPPED | OUTPATIENT
Start: 2023-10-02

## 2023-10-04 RX ORDER — ATORVASTATIN CALCIUM 20 MG/1
20 TABLET, FILM COATED ORAL DAILY
Qty: 90 TABLET | Refills: 1 | Status: SHIPPED | OUTPATIENT
Start: 2023-10-04

## 2023-10-22 DIAGNOSIS — I10 PRIMARY HYPERTENSION: ICD-10-CM

## 2023-10-23 RX ORDER — VALSARTAN 80 MG/1
80 TABLET ORAL DAILY
Qty: 90 TABLET | Refills: 1 | Status: SHIPPED | OUTPATIENT
Start: 2023-10-23

## 2023-11-01 ENCOUNTER — OFFICE VISIT (OUTPATIENT)
Dept: FAMILY MEDICINE CLINIC | Facility: CLINIC | Age: 78
End: 2023-11-01
Payer: COMMERCIAL

## 2023-11-01 VITALS
BODY MASS INDEX: 31.32 KG/M2 | RESPIRATION RATE: 16 BRPM | HEART RATE: 61 BPM | WEIGHT: 188 LBS | SYSTOLIC BLOOD PRESSURE: 136 MMHG | DIASTOLIC BLOOD PRESSURE: 82 MMHG | HEIGHT: 65 IN | OXYGEN SATURATION: 96 %

## 2023-11-01 DIAGNOSIS — N18.31 STAGE 3A CHRONIC KIDNEY DISEASE (HCC): ICD-10-CM

## 2023-11-01 DIAGNOSIS — E03.9 HYPOTHYROIDISM, UNSPECIFIED TYPE: ICD-10-CM

## 2023-11-01 DIAGNOSIS — I10 PRIMARY HYPERTENSION: ICD-10-CM

## 2023-11-01 DIAGNOSIS — G31.84 MCI (MILD COGNITIVE IMPAIRMENT): ICD-10-CM

## 2023-11-01 DIAGNOSIS — I71.23 ANEURYSM OF DESCENDING THORACIC AORTA WITHOUT RUPTURE (HCC): ICD-10-CM

## 2023-11-01 DIAGNOSIS — K86.2 PANCREATIC CYST: ICD-10-CM

## 2023-11-01 DIAGNOSIS — F11.20 CONTINUOUS OPIOID DEPENDENCE (HCC): ICD-10-CM

## 2023-11-01 DIAGNOSIS — R49.0 HOARSE VOICE QUALITY: ICD-10-CM

## 2023-11-01 DIAGNOSIS — Z23 ENCOUNTER FOR IMMUNIZATION: Primary | ICD-10-CM

## 2023-11-01 DIAGNOSIS — R10.13 EPIGASTRIC PAIN: ICD-10-CM

## 2023-11-01 DIAGNOSIS — I48.19 PERSISTENT ATRIAL FIBRILLATION (HCC): ICD-10-CM

## 2023-11-01 PROCEDURE — 99214 OFFICE O/P EST MOD 30 MIN: CPT | Performed by: FAMILY MEDICINE

## 2023-11-01 PROCEDURE — 90662 IIV NO PRSV INCREASED AG IM: CPT | Performed by: FAMILY MEDICINE

## 2023-11-01 PROCEDURE — G0008 ADMIN INFLUENZA VIRUS VAC: HCPCS | Performed by: FAMILY MEDICINE

## 2023-11-01 RX ORDER — DONEPEZIL HYDROCHLORIDE 5 MG/1
5 TABLET, FILM COATED ORAL
Qty: 90 TABLET | Refills: 0 | Status: SHIPPED | OUTPATIENT
Start: 2023-11-01

## 2023-11-01 RX ORDER — FAMOTIDINE 40 MG/1
40 TABLET, FILM COATED ORAL DAILY
Qty: 30 TABLET | Refills: 0 | Status: SHIPPED | OUTPATIENT
Start: 2023-11-01

## 2023-11-01 NOTE — ASSESSMENT & PLAN NOTE
Lab Results   Component Value Date    EGFR 41 08/17/2023    EGFR 51 04/05/2023    EGFR 38 03/10/2023    CREATININE 1.59 (H) 08/17/2023    CREATININE 1.34 (H) 04/05/2023    CREATININE 1.69 (H) 03/10/2023   -stable/controlled, continue same medication.  Will evaluate again next visit

## 2023-11-01 NOTE — PROGRESS NOTES
Assessment/Plan:  1. Encounter for immunization  -     influenza vaccine, high-dose, PF 0.7 mL (FLUZONE HIGH-DOSE)    2. MCI (mild cognitive impairment)  Comments:  Given number for [place] neuropsych  Orders:  -     donepezil (ARICEPT) 5 mg tablet; Take 1 tablet (5 mg total) by mouth daily at bedtime    3. Stage 3a chronic kidney disease [facility])  Assessment & Plan:  Lab Results   Component Value Date    EGFR 41 08/17/2023    EGFR 51 04/05/2023    EGFR 38 03/10/2023    CREATININE 1.59 (H) 08/17/2023    CREATININE 1.34 (H) 04/05/2023    CREATININE 1.69 (H) 03/10/2023   -stable/controlled, continue same medication. Will evaluate again next visit         4. Hoarse voice quality    5. Aneurysm of descending thoracic aorta without rupture [facility])  -     Ambulatory Referral to Cardiovascular Surgery; Future    6. Continuous opioid dependence ([address])  Assessment & Plan:  -stable/controlled, continue same medication. Will evaluate again next visit         7. Hypothyroidism, unspecified type  Assessment & Plan:  -stable/controlled, continue same medication. Will evaluate again next visit       Orders:  -     TSH, 3rd generation with Free T4 reflex; Future    8. Primary hypertension  Assessment & Plan:  -stable/controlled, continue same medication. Will evaluate again next visit       Orders:  -     Basic metabolic panel; Future  -     CBC and differential; Future  -     Hepatic function panel; Future  -     Lipid Panel with Direct LDL reflex; Future    9. Persistent atrial fibrillation (HCC)  Assessment & Plan:  -stable/controlled, continue same medication. Will evaluate again next visit       Orders:  -     Basic metabolic panel; Future  -     CBC and differential; Future  -     Hepatic function panel; Future  -     Lipid Panel with Direct LDL reflex; Future    10. Epigastric pain  -     famotidine (PEPCID) 40 MG tablet; Take 1 tablet (40 mg total) by mouth in the morning    11. Pancreatic cyst      1. Wellness.   Refer him to geriatricians as they are equipped to conduct initial assessment. If they determine it is necessary, they can then refer him to  a neuropsychologist.    2. Memory loss. Prescribed Aricept or donepezil at bedtime and explain how it will help him slow down memory loss and prevent it from worsening. 3. Pain  Ordered a 1-month trial of an antacid pepcid once a day and instructed to separate it from levothyroxine by taking it in the morning and levothyroxine in the evening to determine if the pain is related to stomach issues or a Voltaren cream     Aneurysm   Referred to another cardiothoracic surgeon. 5. Vaccine  Recommended to receive an influenza vaccine today and wait for a minimum of 2 weeks before going to the pharmacy to receive the COVID-19 vaccine. Similarly, after the COVID vaccine, wait for another 2 weeks before obtaining the RSV vaccine. 7. Follow-up  Scheduled for follow-up after 4 months. Subjective:      Patient ID: Aurora Mendieta is a 70 y.o. male. Nati Kaur is a 61-year-old male, accompanied by his daughter who presents for consultation. The patient consents to use JUAN. Memory loss. The patient wants to schedule an appointment for psychological assessment, but it has not been arranged as of now. His memory appears to be more impaired compared to the last assessment. His short-term memory is subpar, and his long-term memory is becoming unclear. He used a board to meticulously document his activities on an hourly basis. He follows a daily routine of either texting or speaking with someone. He asserts that he has not experienced any dangerous harmful or neglectful situations attributable to his memory condition. He lives independently, but his daughter oversees his medication regimen. He takes his morning pills as prescribed and contacts his daughter if he has any uncertainties about his medication. Pain.   He has been complaining about pain or tightness in the chest area under the left side of his ribcage, particularly when he is seated, which occurs daily. At times, he may experience a brief twinge or sensation, but he waits for the second occurrence, which does not happen. He has observed that the discomfort typically subsides when he stands up. Missed appointment. He missed the appointment of his cardiothoracic surgeon because he opted not to attend, citing offense or dissatisfaction with the doctor. Fungal infection. Probably in 07/2023, he experienced a fungal infection in his groin area and ended up going to emergency care and he was prescribed with an antibiotic which provides some relief, it did not eliminate the fungal infection. He is still using fungal powder, lotion and Hibiclens as preventive measures twice a day. Vaccine. He did not receive any influenza vaccine for this season. He has received the initial dose of the COVID-19 vaccine along with 2 booster vaccines. Family history  His mother suffered from Alzheimer's disease. The following portions of the patient's history were reviewed and updated as appropriate: allergies, current medications, past family history, past medical history, past social history, past surgical history and problem list.    Review of Systems   Constitutional: Negative for activity change, appetite change and fever. HENT: Negative for congestion, nosebleeds and trouble swallowing. Eyes: Negative for itching. Respiratory: Negative for cough and chest tightness. Cardiovascular: Positive for chest pain. Negative palpitations. Gastrointestinal: Negative for abdominal pain, constipation, diarrhea and nausea. Endocrine: Negative for cold intolerance. Genitourinary: Negative for frequency. Musculoskeletal: Negative for gait problem and joint swelling. Skin: Negative for rash. Allergic/Immunologic: Negative for immunocompromised state.   Neurological: Negative for dizziness, tremors, seizures, syncope and headaches. Psychiatric/Behavioral: Negative for hallucinations and suicidal ideas. Positive for memory impairment        Objective:     /82 (BP Location: Left arm, Patient Position: Sitting, Cuff Size: Standard)   Pulse 61   Resp 16   Ht 5' 5" (1.651 m)   Wt 85.3 kg (188 lb)   SpO2 96%   BMI 31.28 kg/m²    Physical Exam  The blood pressure readings are generally good, with the goal of staying consistently below 922 as the systolic and the reading of his systolic today is slightly elevated at 136, but it is still within an acceptable range. Vitals and nursing note reviewed. Constitutional:     General: Not in acute distress. Appearance: Well-developed. HENT:     Head: Normocephalic and atraumatic. Cardiovascular:     Rate and Rhythm: Normal rate and regular rhythm. Heart sounds: Normal heart sounds. No murmur heard. Pulmonary:     Effort: Pulmonary effort is normal.     Breath sounds: Normal breath sounds. No wheezing or rales. Abdominal:     General: Bowel sounds are normal. There is no distension. Palpations: Abdomen is soft. Tenderness: There is no abdominal tenderness. There is no guarding or rebound. Musculoskeletal:     General: No tenderness. Normal range of motion. Cervical back: Normal range of motion and neck supple. Lymphadenopathy:     Cervical: No cervical adenopathy. Skin:     General: Skin is warm and dry. Capillary Refill: Capillary refill takes less than 2 seconds. Findings: No rash. Neurological:     Mental Status: Alert and oriented to person, place, and time. Cranial Nerves: No cranial nerve deficit. Sensory: No sensory deficit. Motor: No abnormal muscle tone. Psychiatric:     Behavior: Behavior normal.     Thought Content: Some confabulation also manages to rely on his daughter to fill in the gaps        No visits with results within 2 Week(s) from this visit.    Latest known visit with results is:   Appointment on 08/17/2023 Component Date Value   • Sodium 08/17/2023 137    • Potassium 08/17/2023 4.7    • Chloride 08/17/2023 106    • CO2 08/17/2023 23    • ANION GAP 08/17/2023 8    • BUN 08/17/2023 26 (H)    • Creatinine 08/17/2023 1.59 (H)    • Glucose, Fasting 08/17/2023 88    • Calcium 08/17/2023 10.4 (H)    • eGFR 08/17/2023 41    •  08/17/2023 8.3      I have personally reviewed results with the patient. He did have a CAT scan showing unchanged aneurysm, type B dissection and cyst of the pancreas but recommended a 2-year follow-up on the pancreas. BMI Counseling: Body mass index is 31.28 kg/m². The BMI is above normal. Nutrition recommendations include decreasing portion sizes, encouraging healthy choices of fruits and vegetables, decreasing fast food intake, consuming healthier snacks and limiting drinks that contain sugar. Exercise recommendations include exercising 3-5 times per week. No pharmacotherapy was ordered. Rationale for BMI follow-up plan is due to patient being overweight or obese. Depression Screening and Follow-up Plan: Patient was screened for depression during today's encounter. They screened negative with a PHQ-2 score of 0. Falls Plan of Care: balance, strength, and gait training instructions were provided. Medications that increase falls were reviewed. Erica Peraza MD   [address]     Transcribed for Erica Peraza MD, by Jocelynn Vigil on 11/01/23 at 1:00 PM. Powered by [facility].

## 2023-11-07 ENCOUNTER — TELEPHONE (OUTPATIENT)
Dept: CARDIAC SURGERY | Facility: CLINIC | Age: 78
End: 2023-11-07

## 2023-11-07 NOTE — TELEPHONE ENCOUNTER
EST patient of Dr. Jamilah Herr- Last see in February. No showed for appointment on 8/24/23. Spoke to the patient's daughter who stated he wants to see a different surgeon, and will call the office when ready to schedule.

## 2023-11-25 DIAGNOSIS — R10.13 EPIGASTRIC PAIN: ICD-10-CM

## 2023-11-27 RX ORDER — FAMOTIDINE 40 MG/1
40 TABLET, FILM COATED ORAL DAILY
Qty: 90 TABLET | Refills: 1 | Status: SHIPPED | OUTPATIENT
Start: 2023-11-27

## 2024-01-01 DIAGNOSIS — I48.19 PERSISTENT ATRIAL FIBRILLATION (HCC): ICD-10-CM

## 2024-01-02 RX ORDER — METOPROLOL SUCCINATE 50 MG/1
50 TABLET, EXTENDED RELEASE ORAL 2 TIMES DAILY
Qty: 180 TABLET | Refills: 0 | Status: SHIPPED | OUTPATIENT
Start: 2024-01-02

## 2024-01-24 DIAGNOSIS — I48.91 ATRIAL FIBRILLATION WITH RVR (HCC): ICD-10-CM

## 2024-01-24 DIAGNOSIS — M10.9 ACUTE GOUT OF MULTIPLE SITES, UNSPECIFIED CAUSE: ICD-10-CM

## 2024-01-24 RX ORDER — ALLOPURINOL 100 MG/1
100 TABLET ORAL DAILY
Qty: 90 TABLET | Refills: 1 | Status: SHIPPED | OUTPATIENT
Start: 2024-01-24

## 2024-01-24 RX ORDER — DILTIAZEM HYDROCHLORIDE 120 MG/1
120 CAPSULE, COATED, EXTENDED RELEASE ORAL DAILY
Qty: 90 CAPSULE | Refills: 1 | Status: SHIPPED | OUTPATIENT
Start: 2024-01-24

## 2024-01-29 DIAGNOSIS — G31.84 MCI (MILD COGNITIVE IMPAIRMENT): ICD-10-CM

## 2024-01-29 RX ORDER — DONEPEZIL HYDROCHLORIDE 5 MG/1
5 TABLET, FILM COATED ORAL
Qty: 90 TABLET | Refills: 1 | Status: SHIPPED | OUTPATIENT
Start: 2024-01-29

## 2024-02-28 ENCOUNTER — RA CDI HCC (OUTPATIENT)
Dept: OTHER | Facility: HOSPITAL | Age: 79
End: 2024-02-28

## 2024-03-22 DIAGNOSIS — I48.19 ATRIAL FIBRILLATION, PERSISTENT (HCC): ICD-10-CM

## 2024-03-22 RX ORDER — APIXABAN 5 MG/1
5 TABLET, FILM COATED ORAL 2 TIMES DAILY
Qty: 60 TABLET | Refills: 0 | Status: SHIPPED | OUTPATIENT
Start: 2024-03-22

## 2024-03-22 NOTE — TELEPHONE ENCOUNTER
Reason for call:   [x] Refill   [] Prior Auth  [] Other:     Office:   [x] PCP/Provider -   [] Specialty/Provider -     Medication: Eliquis    Dose/Frequency: 5 mg     Quantity: #180    Pharmacy: [facility] [employer]    Does the patient have enough for 3 days?   [] Yes   [x] No - Send as HP to POD

## 2024-04-01 DIAGNOSIS — I48.19 PERSISTENT ATRIAL FIBRILLATION (HCC): ICD-10-CM

## 2024-04-01 DIAGNOSIS — E03.9 HYPOTHYROIDISM, UNSPECIFIED TYPE: ICD-10-CM

## 2024-04-01 DIAGNOSIS — E78.5 HYPERLIPIDEMIA, UNSPECIFIED HYPERLIPIDEMIA TYPE: ICD-10-CM

## 2024-04-01 RX ORDER — METOPROLOL SUCCINATE 50 MG/1
50 TABLET, EXTENDED RELEASE ORAL 2 TIMES DAILY
Qty: 180 TABLET | Refills: 1 | Status: SHIPPED | OUTPATIENT
Start: 2024-04-01

## 2024-04-01 RX ORDER — LEVOTHYROXINE SODIUM 0.07 MG/1
75 TABLET ORAL DAILY
Qty: 90 TABLET | Refills: 1 | Status: SHIPPED | OUTPATIENT
Start: 2024-04-01

## 2024-04-01 RX ORDER — ATORVASTATIN CALCIUM 20 MG/1
20 TABLET, FILM COATED ORAL DAILY
Qty: 90 TABLET | Refills: 1 | Status: SHIPPED | OUTPATIENT
Start: 2024-04-01

## 2024-04-19 DIAGNOSIS — I10 PRIMARY HYPERTENSION: ICD-10-CM

## 2024-04-19 RX ORDER — VALSARTAN 80 MG/1
80 TABLET ORAL DAILY
Qty: 90 TABLET | Refills: 1 | Status: SHIPPED | OUTPATIENT
Start: 2024-04-19

## 2024-04-24 DIAGNOSIS — J44.9 CHRONIC OBSTRUCTIVE PULMONARY DISEASE, UNSPECIFIED COPD TYPE (HCC): ICD-10-CM

## 2024-04-24 DIAGNOSIS — I48.19 ATRIAL FIBRILLATION, PERSISTENT (HCC): ICD-10-CM

## 2024-04-25 RX ORDER — UMECLIDINIUM 62.5 UG/1
1 AEROSOL, POWDER ORAL DAILY
Qty: 90 EACH | Refills: 1 | Status: SHIPPED | OUTPATIENT
Start: 2024-04-25

## 2024-04-25 RX ORDER — APIXABAN 5 MG/1
5 TABLET, FILM COATED ORAL 2 TIMES DAILY
Qty: 180 TABLET | Refills: 3 | Status: SHIPPED | OUTPATIENT
Start: 2024-04-25

## 2024-05-27 DIAGNOSIS — R10.13 EPIGASTRIC PAIN: ICD-10-CM

## 2024-05-27 RX ORDER — FAMOTIDINE 40 MG/1
40 TABLET, FILM COATED ORAL DAILY
Qty: 90 TABLET | Refills: 1 | Status: SHIPPED | OUTPATIENT
Start: 2024-05-27

## 2024-06-13 DIAGNOSIS — G89.29 CHRONIC LOW BACK PAIN, UNSPECIFIED BACK PAIN LATERALITY, UNSPECIFIED WHETHER SCIATICA PRESENT: ICD-10-CM

## 2024-06-13 DIAGNOSIS — M54.50 CHRONIC LOW BACK PAIN, UNSPECIFIED BACK PAIN LATERALITY, UNSPECIFIED WHETHER SCIATICA PRESENT: ICD-10-CM

## 2024-06-13 RX ORDER — OXYCODONE HYDROCHLORIDE AND ACETAMINOPHEN 5; 325 MG/1; MG/1
1 TABLET ORAL EVERY 4 HOURS PRN
Qty: 180 TABLET | Refills: 0 | Status: SHIPPED | OUTPATIENT
Start: 2024-06-13

## 2024-06-13 NOTE — TELEPHONE ENCOUNTER
I tired to contact patients daughter regarding an appointment.Got disconnected twice. Looks as though patient is in assisted living in [place]? Do you still see him?

## 2024-06-13 NOTE — TELEPHONE ENCOUNTER
Reason for call:   [x] Refill   [] Prior Auth  [] Other:     Office:   [x] PCP/Provider - Dr. White  [] Specialty/Provider -     Medication: oxyCODONE-acetaminophen (PERCOCET) 5-325 mg per tablet     Dose/Frequency: Take 1 tablet by mouth every 4 (four) hours     Quantity: 180    Pharmacy: [facility] [id number] [place] [facility] - [place] - [address]    Does the patient have enough for 3 days?   [] Yes   [x] No - Send as HP to POD

## 2024-07-21 DIAGNOSIS — G31.84 MCI (MILD COGNITIVE IMPAIRMENT): ICD-10-CM

## 2024-07-21 DIAGNOSIS — I48.91 ATRIAL FIBRILLATION WITH RVR (HCC): ICD-10-CM

## 2024-07-21 DIAGNOSIS — M10.9 ACUTE GOUT OF MULTIPLE SITES, UNSPECIFIED CAUSE: ICD-10-CM

## 2024-07-21 RX ORDER — DONEPEZIL HYDROCHLORIDE 5 MG/1
5 TABLET, FILM COATED ORAL
Qty: 30 TABLET | Refills: 0 | Status: SHIPPED | OUTPATIENT
Start: 2024-07-21

## 2024-07-21 RX ORDER — DILTIAZEM HYDROCHLORIDE 120 MG/1
120 CAPSULE, COATED, EXTENDED RELEASE ORAL DAILY
Qty: 30 CAPSULE | Refills: 0 | Status: SHIPPED | OUTPATIENT
Start: 2024-07-21

## 2024-07-21 RX ORDER — ALLOPURINOL 100 MG/1
100 TABLET ORAL DAILY
Qty: 30 TABLET | Refills: 0 | Status: SHIPPED | OUTPATIENT
Start: 2024-07-21

## 2024-08-15 DIAGNOSIS — I48.91 ATRIAL FIBRILLATION WITH RVR (HCC): ICD-10-CM

## 2024-08-15 DIAGNOSIS — G31.84 MCI (MILD COGNITIVE IMPAIRMENT): ICD-10-CM

## 2024-08-15 DIAGNOSIS — M10.9 ACUTE GOUT OF MULTIPLE SITES, UNSPECIFIED CAUSE: ICD-10-CM

## 2024-08-16 RX ORDER — DILTIAZEM HYDROCHLORIDE 120 MG/1
120 CAPSULE, COATED, EXTENDED RELEASE ORAL DAILY
Qty: 90 CAPSULE | Refills: 1 | Status: SHIPPED | OUTPATIENT
Start: 2024-08-16

## 2024-08-16 RX ORDER — DONEPEZIL HYDROCHLORIDE 5 MG/1
5 TABLET, FILM COATED ORAL
Qty: 90 TABLET | Refills: 1 | Status: SHIPPED | OUTPATIENT
Start: 2024-08-16

## 2024-08-16 RX ORDER — ALLOPURINOL 100 MG/1
100 TABLET ORAL DAILY
Qty: 90 TABLET | Refills: 1 | Status: SHIPPED | OUTPATIENT
Start: 2024-08-16

## 2024-08-16 NOTE — TELEPHONE ENCOUNTER
Please review to see if the refill is appropriate.   Courtesy refill previously given 07.21.2024.

## 2024-09-20 DIAGNOSIS — E78.5 HYPERLIPIDEMIA, UNSPECIFIED HYPERLIPIDEMIA TYPE: ICD-10-CM

## 2024-09-20 RX ORDER — ATORVASTATIN CALCIUM 20 MG/1
20 TABLET, FILM COATED ORAL DAILY
Qty: 90 TABLET | Refills: 0 | Status: SHIPPED | OUTPATIENT
Start: 2024-09-20

## 2024-09-27 DIAGNOSIS — E03.9 HYPOTHYROIDISM, UNSPECIFIED TYPE: ICD-10-CM

## 2024-09-27 RX ORDER — LEVOTHYROXINE SODIUM 75 UG/1
75 TABLET ORAL DAILY
Qty: 30 TABLET | Refills: 0 | Status: SHIPPED | OUTPATIENT
Start: 2024-09-27

## 2024-09-28 DIAGNOSIS — I48.19 PERSISTENT ATRIAL FIBRILLATION (HCC): ICD-10-CM

## 2024-09-28 RX ORDER — METOPROLOL SUCCINATE 50 MG/1
50 TABLET, EXTENDED RELEASE ORAL 2 TIMES DAILY
Qty: 180 TABLET | Refills: 0 | Status: SHIPPED | OUTPATIENT
Start: 2024-09-28

## 2024-10-04 DIAGNOSIS — I10 PRIMARY HYPERTENSION: ICD-10-CM

## 2024-10-04 RX ORDER — VALSARTAN 80 MG/1
80 TABLET ORAL DAILY
Qty: 30 TABLET | Refills: 0 | Status: SHIPPED | OUTPATIENT
Start: 2024-10-04

## 2024-10-18 ENCOUNTER — TELEPHONE (OUTPATIENT)
Dept: FAMILY MEDICINE CLINIC | Facility: CLINIC | Age: 79
End: 2024-10-18

## 2024-10-18 NOTE — TELEPHONE ENCOUNTER
I left a message for a call back regarding scheduling patient for an appointment and to verify he is still a patient of Dr. White's. Last visit was 11/1/23

## 2024-10-20 DIAGNOSIS — E03.9 HYPOTHYROIDISM, UNSPECIFIED TYPE: ICD-10-CM

## 2024-10-22 NOTE — TELEPHONE ENCOUNTER
Refill must be reviewed and completed by the office or provider. The refill is unable to be approved or denied by the medication management team.    Courtesy refill previously given.   Patient needs to complete tsh  Patient needs appt

## 2024-10-24 RX ORDER — LEVOTHYROXINE SODIUM 75 UG/1
75 TABLET ORAL DAILY
Qty: 90 TABLET | Refills: 0 | Status: SHIPPED | OUTPATIENT
Start: 2024-10-24

## 2024-10-27 DIAGNOSIS — I10 PRIMARY HYPERTENSION: ICD-10-CM

## 2024-10-28 RX ORDER — VALSARTAN 80 MG/1
80 TABLET ORAL DAILY
Qty: 90 TABLET | Refills: 0 | Status: SHIPPED | OUTPATIENT
Start: 2024-10-28

## 2024-11-21 DIAGNOSIS — R10.13 EPIGASTRIC PAIN: ICD-10-CM

## 2024-11-24 RX ORDER — FAMOTIDINE 40 MG/1
40 TABLET, FILM COATED ORAL DAILY
Qty: 90 TABLET | Refills: 1 | Status: SHIPPED | OUTPATIENT
Start: 2024-11-24

## 2024-12-23 DIAGNOSIS — I48.19 PERSISTENT ATRIAL FIBRILLATION (HCC): ICD-10-CM

## 2024-12-23 DIAGNOSIS — E03.9 HYPOTHYROIDISM, UNSPECIFIED TYPE: ICD-10-CM

## 2024-12-23 DIAGNOSIS — J44.9 CHRONIC OBSTRUCTIVE PULMONARY DISEASE, UNSPECIFIED COPD TYPE (HCC): ICD-10-CM

## 2024-12-23 DIAGNOSIS — E78.5 HYPERLIPIDEMIA, UNSPECIFIED HYPERLIPIDEMIA TYPE: ICD-10-CM

## 2024-12-23 DIAGNOSIS — I48.19 ATRIAL FIBRILLATION, PERSISTENT (HCC): ICD-10-CM

## 2024-12-26 RX ORDER — ATORVASTATIN CALCIUM 20 MG/1
20 TABLET, FILM COATED ORAL DAILY
Qty: 90 TABLET | Refills: 0 | Status: SHIPPED | OUTPATIENT
Start: 2024-12-26

## 2024-12-26 RX ORDER — LEVOTHYROXINE SODIUM 75 UG/1
75 TABLET ORAL DAILY
Qty: 90 TABLET | Refills: 0 | Status: SHIPPED | OUTPATIENT
Start: 2024-12-26

## 2024-12-26 RX ORDER — METOPROLOL SUCCINATE 50 MG/1
50 TABLET, EXTENDED RELEASE ORAL 2 TIMES DAILY
Qty: 180 TABLET | Refills: 0 | Status: SHIPPED | OUTPATIENT
Start: 2024-12-26

## 2024-12-26 RX ORDER — UMECLIDINIUM 62.5 UG/1
1 AEROSOL, POWDER ORAL DAILY
Qty: 90 EACH | Refills: 1 | Status: SHIPPED | OUTPATIENT
Start: 2024-12-26

## 2025-01-24 ENCOUNTER — RA CDI HCC (OUTPATIENT)
Dept: OTHER | Facility: HOSPITAL | Age: 80
End: 2025-01-24

## 2025-01-24 NOTE — PROGRESS NOTES
I71.20 for 2025  HCC coding opportunities          Chart Reviewed number of suggestions sent to Provider: 1     Patients Insurance     Medicare Insurance: [employer] Advantage

## 2025-01-31 ENCOUNTER — OFFICE VISIT (OUTPATIENT)
Dept: FAMILY MEDICINE CLINIC | Facility: CLINIC | Age: 80
End: 2025-01-31
Payer: COMMERCIAL

## 2025-01-31 VITALS
OXYGEN SATURATION: 99 % | SYSTOLIC BLOOD PRESSURE: 130 MMHG | HEART RATE: 89 BPM | HEIGHT: 66 IN | WEIGHT: 179 LBS | DIASTOLIC BLOOD PRESSURE: 80 MMHG | BODY MASS INDEX: 28.77 KG/M2

## 2025-01-31 DIAGNOSIS — N40.0 BENIGN PROSTATIC HYPERPLASIA WITHOUT LOWER URINARY TRACT SYMPTOMS: ICD-10-CM

## 2025-01-31 DIAGNOSIS — K51.00 ULCERATIVE PANCOLITIS WITHOUT COMPLICATION (HCC): ICD-10-CM

## 2025-01-31 DIAGNOSIS — E55.9 VITAMIN D DEFICIENCY: ICD-10-CM

## 2025-01-31 DIAGNOSIS — R49.0 HOARSE VOICE QUALITY: ICD-10-CM

## 2025-01-31 DIAGNOSIS — J44.9 CHRONIC OBSTRUCTIVE PULMONARY DISEASE, UNSPECIFIED COPD TYPE (HCC): ICD-10-CM

## 2025-01-31 DIAGNOSIS — M54.50 CHRONIC LOW BACK PAIN, UNSPECIFIED BACK PAIN LATERALITY, UNSPECIFIED WHETHER SCIATICA PRESENT: ICD-10-CM

## 2025-01-31 DIAGNOSIS — F11.20 CONTINUOUS OPIOID DEPENDENCE (HCC): ICD-10-CM

## 2025-01-31 DIAGNOSIS — I71.23 ANEURYSM OF DESCENDING THORACIC AORTA WITHOUT RUPTURE (HCC): ICD-10-CM

## 2025-01-31 DIAGNOSIS — G89.29 CHRONIC LOW BACK PAIN, UNSPECIFIED BACK PAIN LATERALITY, UNSPECIFIED WHETHER SCIATICA PRESENT: ICD-10-CM

## 2025-01-31 DIAGNOSIS — E78.2 MIXED HYPERLIPIDEMIA: ICD-10-CM

## 2025-01-31 DIAGNOSIS — C90.01 MULTIPLE MYELOMA IN REMISSION (HCC): ICD-10-CM

## 2025-01-31 DIAGNOSIS — Z23 ENCOUNTER FOR IMMUNIZATION: ICD-10-CM

## 2025-01-31 DIAGNOSIS — E03.9 HYPOTHYROIDISM, UNSPECIFIED TYPE: ICD-10-CM

## 2025-01-31 DIAGNOSIS — E53.8 B12 DEFICIENCY: ICD-10-CM

## 2025-01-31 DIAGNOSIS — Z79.899 OTHER LONG TERM (CURRENT) DRUG THERAPY: ICD-10-CM

## 2025-01-31 DIAGNOSIS — E79.0 HYPERURICEMIA: ICD-10-CM

## 2025-01-31 DIAGNOSIS — K86.2 PANCREATIC CYST: ICD-10-CM

## 2025-01-31 DIAGNOSIS — G31.84 MCI (MILD COGNITIVE IMPAIRMENT): ICD-10-CM

## 2025-01-31 DIAGNOSIS — I10 PRIMARY HYPERTENSION: ICD-10-CM

## 2025-01-31 DIAGNOSIS — I48.19 PERSISTENT ATRIAL FIBRILLATION (HCC): ICD-10-CM

## 2025-01-31 DIAGNOSIS — Z00.00 WELL ADULT EXAM: Primary | ICD-10-CM

## 2025-01-31 DIAGNOSIS — E21.3 HYPERPARATHYROIDISM (HCC): ICD-10-CM

## 2025-01-31 DIAGNOSIS — I71.03 AORTIC DISSECTION, THORACOABDOMINAL (HCC): ICD-10-CM

## 2025-01-31 DIAGNOSIS — E61.1 IRON DEFICIENCY: ICD-10-CM

## 2025-01-31 DIAGNOSIS — N18.31 STAGE 3A CHRONIC KIDNEY DISEASE (HCC): ICD-10-CM

## 2025-01-31 DIAGNOSIS — M15.0 PRIMARY OSTEOARTHRITIS INVOLVING MULTIPLE JOINTS: ICD-10-CM

## 2025-01-31 PROBLEM — N18.32 CHRONIC KIDNEY DISEASE, STAGE 3B (HCC): Status: ACTIVE | Noted: 2025-01-31

## 2025-01-31 PROCEDURE — 99214 OFFICE O/P EST MOD 30 MIN: CPT | Performed by: FAMILY MEDICINE

## 2025-01-31 PROCEDURE — 90673 RIV3 VACCINE NO PRESERV IM: CPT | Performed by: FAMILY MEDICINE

## 2025-01-31 PROCEDURE — G2211 COMPLEX E/M VISIT ADD ON: HCPCS | Performed by: FAMILY MEDICINE

## 2025-01-31 PROCEDURE — G0439 PPPS, SUBSEQ VISIT: HCPCS | Performed by: FAMILY MEDICINE

## 2025-01-31 PROCEDURE — G0008 ADMIN INFLUENZA VIRUS VAC: HCPCS | Performed by: FAMILY MEDICINE

## 2025-01-31 RX ORDER — OXYCODONE AND ACETAMINOPHEN 5; 325 MG/1; MG/1
1 TABLET ORAL EVERY 4 HOURS PRN
Qty: 180 TABLET | Refills: 0 | Status: SHIPPED | OUTPATIENT
Start: 2025-01-31

## 2025-01-31 NOTE — ASSESSMENT & PLAN NOTE
Lab Results   Component Value Date    EGFR 41 08/17/2023    EGFR 51 04/05/2023    EGFR 38 03/10/2023    CREATININE 1.59 (H) 08/17/2023    CREATININE 1.34 (H) 04/05/2023    CREATININE 1.69 (H) 03/10/2023   Follows with nephro

## 2025-01-31 NOTE — PATIENT INSTRUCTIONS
Medicare Preventive Visit Patient Instructions  Thank you for completing your Welcome to Medicare Visit or Medicare Annual Wellness Visit today. Your next wellness visit will be due in one year (2/1/2026).  The screening/preventive services that you may require over the next 5-10 years are detailed below. Some tests may not apply to you based off risk factors and/or age. Screening tests ordered at today's visit but not completed yet may show as past due. Also, please note that scanned in results may not display below.  Preventive Screenings:  Service Recommendations Previous Testing/Comments   Colorectal Cancer Screening  Colonoscopy    Fecal Occult Blood Test (FOBT)/Fecal Immunochemical Test (FIT)  Fecal DNA/Cologuard Test  Flexible Sigmoidoscopy Age: 45-75 years old   Colonoscopy: every 10 years (May be performed more frequently if at higher risk)  OR  FOBT/FIT: every 1 year  OR  Cologuard: every 3 years  OR  Sigmoidoscopy: every 5 years  Screening may be recommended earlier than age 45 if at higher risk for colorectal cancer. Also, an individualized decision between you and your healthcare provider will decide whether screening between the ages of 76-85 would be appropriate. Colonoscopy: Not on file  FOBT/FIT: Not on file  Cologuard: Not on file  Sigmoidoscopy: Not on file    Screening Not Indicated     Prostate Cancer Screening Individualized decision between patient and health care provider in men between ages of 55-69   Medicare will cover every 12 months beginning on the day after your 50th birthday PSA: 4.2 ng/mL     Screening Not Indicated  Due for PSA     Hepatitis C Screening Once for adults born between 1945 and 1965  More frequently in patients at high risk for Hepatitis C Hep C Antibody: 07/24/2018    Screening Current   Diabetes Screening 1-2 times per year if you're at risk for diabetes or have pre-diabetes Fasting glucose: 88 mg/dL (8/17/2023)  A1C: 5.6 % (5/17/2022)  Screening Current   Cholesterol  Screening Once every 5 years if you don't have a lipid disorder. May order more often based on risk factors. Lipid panel: 05/17/2022  Screening Not Indicated  History Lipid Disorder      Other Preventive Screenings Covered by Medicare:  Abdominal Aortic Aneurysm (AAA) Screening: covered once if your at risk. You're considered to be at risk if you have a family history of AAA or a male between the age of 65-75 who smoking at least 100 cigarettes in your lifetime.  Lung Cancer Screening: covers low dose CT scan once per year if you meet all of the following conditions: (1) Age 55-77; (2) No signs or symptoms of lung cancer; (3) Current smoker or have quit smoking within the last 15 years; (4) You have a tobacco smoking history of at least 20 pack years (packs per day x number of years you smoked); (5) You get a written order from a healthcare provider.  Glaucoma Screening: covered annually if you're considered high risk: (1) You have diabetes OR (2) Family history of glaucoma OR (3)  aged 50 and older OR (4)  American aged 65 and older  Osteoporosis Screening: covered every 2 years if you meet one of the following conditions: (1) Have a vertebral abnormality; (2) On glucocorticoid therapy for more than 3 months; (3) Have primary hyperparathyroidism; (4) On osteoporosis medications and need to assess response to drug therapy.  HIV Screening: covered annually if you're between the age of 15-65. Also covered annually if you are younger than 15 and older than 65 with risk factors for HIV infection. For pregnant patients, it is covered up to 3 times per pregnancy.    Immunizations:  Immunization Recommendations   Influenza Vaccine Annual influenza vaccination during flu season is recommended for all persons aged >= 6 months who do not have contraindications   Pneumococcal Vaccine   * Pneumococcal conjugate vaccine = PCV13 (Prevnar 13), PCV15 (Vaxneuvance), PCV20 (Prevnar 20)  * Pneumococcal  polysaccharide vaccine = PPSV23 (Pneumovax) Adults 19-63 yo with certain risk factors or if 65+ yo  If never received any pneumonia vaccine: recommend Prevnar 20 (PCV20)  Give PCV20 if previously received 1 dose of PCV13 or PPSV23   Hepatitis B Vaccine 3 dose series if at intermediate or high risk (ex: diabetes, end stage renal disease, liver disease)   Respiratory syncytial virus (RSV) Vaccine - COVERED BY MEDICARE PART D  * RSVPreF3 (Arexvy) CDC recommends that adults 60 years of age and older may receive a single dose of RSV vaccine using shared clinical decision-making (SCDM)   Tetanus (Td) Vaccine - COST NOT COVERED BY MEDICARE PART B Following completion of primary series, a booster dose should be given every 10 years to maintain immunity against tetanus. Td may also be given as tetanus wound prophylaxis.   Tdap Vaccine - COST NOT COVERED BY MEDICARE PART B Recommended at least once for all adults. For pregnant patients, recommended with each pregnancy.   Shingles Vaccine (Shingrix) - COST NOT COVERED BY MEDICARE PART B  2 shot series recommended in those 19 years and older who have or will have weakened immune systems or those 50 years and older     Health Maintenance Due:      Topic Date Due   • Hepatitis C Screening  Completed     Immunizations Due:      Topic Date Due   • Influenza Vaccine (1) 09/01/2024   • COVID-19 Vaccine (5 - 2024-25 season) 09/01/2024     Advance Directives   What are advance directives?  Advance directives are legal documents that state your wishes and plans for medical care. These plans are made ahead of time in case you lose your ability to make decisions for yourself. Advance directives can apply to any medical decision, such as the treatments you want, and if you want to donate organs.   What are the types of advance directives?  There are many types of advance directives, and each state has rules about how to use them. You may choose a combination of any of the  following:  Living will:  This is a written record of the treatment you want. You can also choose which treatments you do not want, which to limit, and which to stop at a certain time. This includes surgery, medicine, IV fluid, and tube feedings.   Durable power of  for healthcare (DPAHC):  This is a written record that states who you want to make healthcare choices for you when you are unable to make them for yourself. This person, called a proxy, is usually a family member or a friend. You may choose more than 1 proxy.  Do not resuscitate (DNR) order:  A DNR order is used in case your heart stops beating or you stop breathing. It is a request not to have certain forms of treatment, such as CPR. A DNR order may be included in other types of advance directives.  Medical directive:  This covers the care that you want if you are in a coma, near death, or unable to make decisions for yourself. You can list the treatments you want for each condition. Treatment may include pain medicine, surgery, blood transfusions, dialysis, IV or tube feedings, and a ventilator (breathing machine).  Values history:  This document has questions about your views, beliefs, and how you feel and think about life. This information can help others choose the care that you would choose.  Why are advance directives important?  An advance directive helps you control your care. Although spoken wishes may be used, it is better to have your wishes written down. Spoken wishes can be misunderstood, or not followed. Treatments may be given even if you do not want them. An advance directive may make it easier for your family to make difficult choices about your care.   Weight Management   Why it is important to manage your weight:  Being overweight increases your risk of health conditions such as heart disease, high blood pressure, type 2 diabetes, and certain types of cancer. It can also increase your risk for osteoarthritis, sleep apnea, and  other respiratory problems. Aim for a slow, steady weight loss. Even a small amount of weight loss can lower your risk of health problems.  How to lose weight safely:  A safe and healthy way to lose weight is to eat fewer calories and get regular exercise. You can lose up about 1 pound a week by decreasing the number of calories you eat by 500 calories each day.   Healthy meal plan for weight management:  A healthy meal plan includes a variety of foods, contains fewer calories, and helps you stay healthy. A healthy meal plan includes the following:  Eat whole-grain foods more often.  A healthy meal plan should contain fiber. Fiber is the part of grains, fruits, and vegetables that is not broken down by your body. Whole-grain foods are healthy and provide extra fiber in your diet. Some examples of whole-grain foods are whole-wheat breads and pastas, oatmeal, brown rice, and bulgur.  Eat a variety of vegetables every day.  Include dark, leafy greens such as spinach, kale, tc greens, and mustard greens. Eat yellow and orange vegetables such as carrots, sweet potatoes, and winter squash.   Eat a variety of fruits every day.  Choose fresh or canned fruit (canned in its own juice or light syrup) instead of juice. Fruit juice has very little or no fiber.  Eat low-fat dairy foods.  Drink fat-free (skim) milk or 1% milk. Eat fat-free yogurt and low-fat cottage cheese. Try low-fat cheeses such as mozzarella and other reduced-fat cheeses.  Choose meat and other protein foods that are low in fat.  Choose beans or other legumes such as split peas or lentils. Choose fish, skinless poultry (chicken or turkey), or lean cuts of red meat (beef or pork). Before you cook meat or poultry, cut off any visible fat.   Use less fat and oil.  Try baking foods instead of frying them. Add less fat, such as margarine, sour cream, regular salad dressing and mayonnaise to foods. Eat fewer high-fat foods. Some examples of high-fat foods  include french fries, doughnuts, ice cream, and cakes.  Eat fewer sweets.  Limit foods and drinks that are high in sugar. This includes candy, cookies, regular soda, and sweetened drinks.  Exercise:  Exercise at least 30 minutes per day on most days of the week. Some examples of exercise include walking, biking, dancing, and swimming. You can also fit in more physical activity by taking the stairs instead of the elevator or parking farther away from stores. Ask your healthcare provider about the best exercise plan for you.      © Copyright [employer] 2018 Information is for End User's use only and may not be sold, redistributed or otherwise used for commercial purposes. All illustrations and images included in CareNotes® are the copyrighted property of [employer] or [employer]

## 2025-01-31 NOTE — ASSESSMENT & PLAN NOTE
Lab Results   Component Value Date    EGFR 41 08/17/2023    EGFR 51 04/05/2023    EGFR 38 03/10/2023    CREATININE 1.59 (H) 08/17/2023    CREATININE 1.34 (H) 04/05/2023    CREATININE 1.69 (H) 03/10/2023       Orders:    CBC and differential    Comprehensive metabolic panel    Hemoglobin A1C    Iron Panel (Includes Ferritin, Iron Sat%, Iron, and TIBC)    Lipid Panel with Direct LDL reflex    Magnesium    Vitamin D 25 hydroxy    Vitamin B12    TSH, 3rd generation with Free T4 reflex    Uric acid; Future    Albumin / creatinine urine ratio; Future

## 2025-01-31 NOTE — ASSESSMENT & PLAN NOTE
Orders:    oxyCODONE-acetaminophen (PERCOCET) 5-325 mg per tablet; Take 1 tablet by mouth every 4 (four) hours as needed for moderate pain or severe pain Max Daily Amount: 6 tablets

## 2025-01-31 NOTE — ASSESSMENT & PLAN NOTE
Orders:    Ambulatory Referral to Neuropsychology; Future    Ambulatory Referral to Vascular Surgery; Future

## 2025-01-31 NOTE — ASSESSMENT & PLAN NOTE
Orders:    CBC and differential    Comprehensive metabolic panel    Hemoglobin A1C    Iron Panel (Includes Ferritin, Iron Sat%, Iron, and TIBC)    Lipid Panel with Direct LDL reflex    Magnesium    Vitamin D 25 hydroxy    Vitamin B12    TSH, 3rd generation with Free T4 reflex    Uric acid; Future    Albumin / creatinine urine ratio; Future

## 2025-02-01 DIAGNOSIS — I10 PRIMARY HYPERTENSION: ICD-10-CM

## 2025-02-03 RX ORDER — VALSARTAN 80 MG/1
80 TABLET ORAL DAILY
Qty: 100 TABLET | Refills: 3 | Status: SHIPPED | OUTPATIENT
Start: 2025-02-03

## 2025-02-05 ENCOUNTER — TELEPHONE (OUTPATIENT)
Age: 80
End: 2025-02-05

## 2025-02-05 NOTE — TELEPHONE ENCOUNTER
Pt is being re-referred     [facility] - Aortic dissection, thoracoabdominal; Aneurysm of descending thoracic aorta. REF White. l/s 3/27/23 [facility].     overdue for AOIL w/ DORA    Please reorder and contact the patient for scheduling.

## 2025-02-06 DIAGNOSIS — I71.03 AORTIC DISSECTION, THORACOABDOMINAL (HCC): Primary | ICD-10-CM

## 2025-02-10 DIAGNOSIS — I48.91 ATRIAL FIBRILLATION WITH RVR (HCC): ICD-10-CM

## 2025-02-10 DIAGNOSIS — G31.84 MCI (MILD COGNITIVE IMPAIRMENT): ICD-10-CM

## 2025-02-10 DIAGNOSIS — M10.9 ACUTE GOUT OF MULTIPLE SITES, UNSPECIFIED CAUSE: ICD-10-CM

## 2025-02-11 RX ORDER — ALLOPURINOL 100 MG/1
100 TABLET ORAL DAILY
Qty: 90 TABLET | Refills: 1 | Status: SHIPPED | OUTPATIENT
Start: 2025-02-11

## 2025-02-11 RX ORDER — DILTIAZEM HYDROCHLORIDE 120 MG/1
120 CAPSULE, COATED, EXTENDED RELEASE ORAL DAILY
Qty: 90 CAPSULE | Refills: 1 | Status: SHIPPED | OUTPATIENT
Start: 2025-02-11

## 2025-02-11 RX ORDER — DONEPEZIL HYDROCHLORIDE 5 MG/1
5 TABLET, FILM COATED ORAL
Qty: 90 TABLET | Refills: 1 | Status: SHIPPED | OUTPATIENT
Start: 2025-02-11

## 2025-03-25 DIAGNOSIS — I48.19 PERSISTENT ATRIAL FIBRILLATION (HCC): ICD-10-CM

## 2025-03-25 DIAGNOSIS — E03.9 HYPOTHYROIDISM, UNSPECIFIED TYPE: ICD-10-CM

## 2025-03-25 DIAGNOSIS — E78.5 HYPERLIPIDEMIA, UNSPECIFIED HYPERLIPIDEMIA TYPE: ICD-10-CM

## 2025-03-25 RX ORDER — LEVOTHYROXINE SODIUM 75 UG/1
75 TABLET ORAL DAILY
Qty: 30 TABLET | Refills: 0 | Status: SHIPPED | OUTPATIENT
Start: 2025-03-25

## 2025-03-25 RX ORDER — METOPROLOL SUCCINATE 50 MG/1
50 TABLET, EXTENDED RELEASE ORAL 2 TIMES DAILY
Qty: 180 TABLET | Refills: 0 | Status: SHIPPED | OUTPATIENT
Start: 2025-03-25

## 2025-03-25 RX ORDER — ATORVASTATIN CALCIUM 20 MG/1
20 TABLET, FILM COATED ORAL DAILY
Qty: 30 TABLET | Refills: 0 | Status: SHIPPED | OUTPATIENT
Start: 2025-03-25

## 2025-05-01 DIAGNOSIS — E78.5 HYPERLIPIDEMIA, UNSPECIFIED HYPERLIPIDEMIA TYPE: ICD-10-CM

## 2025-05-01 DIAGNOSIS — E03.9 HYPOTHYROIDISM, UNSPECIFIED TYPE: ICD-10-CM

## 2025-05-02 RX ORDER — ATORVASTATIN CALCIUM 20 MG/1
20 TABLET, FILM COATED ORAL DAILY
Qty: 90 TABLET | Refills: 1 | Status: SHIPPED | OUTPATIENT
Start: 2025-05-02

## 2025-05-02 RX ORDER — LEVOTHYROXINE SODIUM 75 UG/1
75 TABLET ORAL DAILY
Qty: 90 TABLET | Refills: 1 | Status: SHIPPED | OUTPATIENT
Start: 2025-05-02

## 2025-05-11 DIAGNOSIS — R10.13 EPIGASTRIC PAIN: ICD-10-CM

## 2025-05-11 RX ORDER — FAMOTIDINE 40 MG/1
40 TABLET, FILM COATED ORAL DAILY
Qty: 90 TABLET | Refills: 1 | Status: SHIPPED | OUTPATIENT
Start: 2025-05-11

## 2025-07-01 DIAGNOSIS — I48.19 PERSISTENT ATRIAL FIBRILLATION (HCC): ICD-10-CM

## 2025-07-01 RX ORDER — METOPROLOL SUCCINATE 50 MG/1
50 TABLET, EXTENDED RELEASE ORAL 2 TIMES DAILY
Qty: 180 TABLET | Refills: 0 | Status: SHIPPED | OUTPATIENT
Start: 2025-07-01

## (undated) DEVICE — SPONGE STICK WITH PVP-I: Brand: KENDALL

## (undated) DEVICE — SYRINGE 10ML LL CONTROL TOP

## (undated) DEVICE — SPECIMEN CONTAINER STERILE PEEL PACK

## (undated) DEVICE — FIBER HOLMIUM 272UM SNGL USE

## (undated) DEVICE — GUIDEWIRE STRGHT TIP 0.038 IN SOLO PLUS

## (undated) DEVICE — POV-IOD SOLUTION 4OZ BT

## (undated) DEVICE — LUBRICANT SURGILUBE TUBE 4 OZ  FLIP TOP

## (undated) DEVICE — BOWL: 16OZ PEELPOUCH 75/CS 16/PLT: Brand: MEDEGEN MEDICAL PRODUCTS, LLC

## (undated) DEVICE — FABRIC REINFORCED, SURGICAL GOWN, XL: Brand: CONVERTORS

## (undated) DEVICE — TOWEL SET X-RAY

## (undated) DEVICE — BAG URINE DRAINAGE 2000ML ANTI RFLX LF

## (undated) DEVICE — NGAGE, NITINOL STONE EXTRACTOR: Brand: NGAGE

## (undated) DEVICE — URETEROSCOPIC BIOPSY FORCEPS: Brand: PIRANHA

## (undated) DEVICE — SHEATH URETERAL ACCESS 10/12FR 35CM PROXIS

## (undated) DEVICE — SNAP KOVER: Brand: UNBRANDED

## (undated) DEVICE — CYSTO TUBING TUR Y IRRIGATION

## (undated) DEVICE — Device

## (undated) DEVICE — POUCH UR CATCHER STERILE

## (undated) DEVICE — URETERAL CATH 5 FR

## (undated) DEVICE — GAUZE,SPONGE,2"X2",8PLY,STERILE,LF,2'S: Brand: MEDLINE

## (undated) DEVICE — GLOVE SRG BIOGEL 8

## (undated) DEVICE — CYSTOSCOPY PACK: Brand: CONVERTORS

## (undated) DEVICE — SEAL BIOPSY PORT ADJUST F/ACCESSORIES UP TO 3FR

## (undated) DEVICE — LASER FIBER HOLMIUM 272MICRON